# Patient Record
Sex: MALE | Race: WHITE | NOT HISPANIC OR LATINO | Employment: UNEMPLOYED | URBAN - METROPOLITAN AREA
[De-identification: names, ages, dates, MRNs, and addresses within clinical notes are randomized per-mention and may not be internally consistent; named-entity substitution may affect disease eponyms.]

---

## 2018-01-29 ENCOUNTER — APPOINTMENT (OUTPATIENT)
Dept: CT IMAGING | Facility: HOSPITAL | Age: 38
End: 2018-01-29

## 2018-01-29 ENCOUNTER — HOSPITAL ENCOUNTER (EMERGENCY)
Facility: HOSPITAL | Age: 38
Discharge: PSYCHIATRIC OR SUBSTANCE USE FACILITY | End: 2018-01-29
Attending: EMERGENCY MEDICINE | Admitting: EMERGENCY MEDICINE

## 2018-01-29 VITALS
WEIGHT: 132 LBS | BODY MASS INDEX: 20.72 KG/M2 | HEART RATE: 106 BPM | TEMPERATURE: 97.7 F | RESPIRATION RATE: 18 BRPM | HEIGHT: 67 IN | OXYGEN SATURATION: 99 % | DIASTOLIC BLOOD PRESSURE: 85 MMHG | SYSTOLIC BLOOD PRESSURE: 114 MMHG

## 2018-01-29 DIAGNOSIS — M54.6 ACUTE BILATERAL THORACIC BACK PAIN: Primary | ICD-10-CM

## 2018-01-29 DIAGNOSIS — R45.851 SUICIDAL IDEATIONS: ICD-10-CM

## 2018-01-29 DIAGNOSIS — S16.1XXA STRAIN OF NECK MUSCLE, INITIAL ENCOUNTER: ICD-10-CM

## 2018-01-29 PROCEDURE — 72125 CT NECK SPINE W/O DYE: CPT

## 2018-01-29 PROCEDURE — 99284 EMERGENCY DEPT VISIT MOD MDM: CPT

## 2018-01-29 PROCEDURE — 72128 CT CHEST SPINE W/O DYE: CPT

## 2018-01-29 RX ORDER — AZITHROMYCIN 250 MG/1
500 TABLET, FILM COATED ORAL ONCE
Status: COMPLETED | OUTPATIENT
Start: 2018-01-29 | End: 2018-01-29

## 2018-01-29 RX ORDER — PREGABALIN 100 MG/1
300 CAPSULE ORAL 2 TIMES DAILY
COMMUNITY
End: 2019-11-29

## 2018-01-29 RX ORDER — AZITHROMYCIN 250 MG/1
250 TABLET, FILM COATED ORAL DAILY
Qty: 4 TABLET | Refills: 0 | Status: SHIPPED | OUTPATIENT
Start: 2018-01-29 | End: 2019-11-29

## 2018-01-29 RX ORDER — HYDROCODONE BITARTRATE AND ACETAMINOPHEN 5; 325 MG/1; MG/1
1 TABLET ORAL ONCE
Status: COMPLETED | OUTPATIENT
Start: 2018-01-29 | End: 2018-01-29

## 2018-01-29 RX ORDER — EMTRICITABINE AND TENOFOVIR DISOPROXIL FUMARATE 200; 300 MG/1; MG/1
1 TABLET, FILM COATED ORAL DAILY
COMMUNITY
End: 2019-11-29

## 2018-01-29 RX ADMIN — AZITHROMYCIN 500 MG: 250 TABLET, FILM COATED ORAL at 23:23

## 2018-01-29 RX ADMIN — HYDROCODONE BITARTRATE AND ACETAMINOPHEN 1 TABLET: 5; 325 TABLET ORAL at 18:29

## 2018-04-22 ENCOUNTER — LAB REQUISITION (OUTPATIENT)
Dept: LAB | Facility: HOSPITAL | Age: 38
End: 2018-04-22

## 2018-04-22 DIAGNOSIS — Z00.00 ROUTINE GENERAL MEDICAL EXAMINATION AT A HEALTH CARE FACILITY: ICD-10-CM

## 2018-04-22 LAB
ALBUMIN SERPL-MCNC: 5 G/DL (ref 3.2–4.8)
ALBUMIN SERPL-MCNC: 5 G/DL (ref 3.2–4.8)
ALBUMIN/GLOB SERPL: 1.9 G/DL (ref 1.5–2.5)
ALP SERPL-CCNC: 106 U/L (ref 25–100)
ALP SERPL-CCNC: 106 U/L (ref 25–100)
ALT SERPL W P-5'-P-CCNC: 19 U/L (ref 7–40)
ALT SERPL W P-5'-P-CCNC: 19 U/L (ref 7–40)
ANION GAP SERPL CALCULATED.3IONS-SCNC: 13 MMOL/L (ref 3–11)
ARTICHOKE IGE QN: 112 MG/DL (ref 0–130)
AST SERPL-CCNC: 25 U/L (ref 0–33)
AST SERPL-CCNC: 25 U/L (ref 0–33)
BASOPHILS # BLD AUTO: 0.03 10*3/MM3 (ref 0–0.2)
BASOPHILS NFR BLD AUTO: 0.2 % (ref 0–1)
BILIRUB CONJ SERPL-MCNC: 0.4 MG/DL (ref 0–0.2)
BILIRUB INDIRECT SERPL-MCNC: 0.7 MG/DL (ref 0.1–1.1)
BILIRUB SERPL-MCNC: 1.1 MG/DL (ref 0.3–1.2)
BILIRUB SERPL-MCNC: 1.1 MG/DL (ref 0.3–1.2)
BUN BLD-MCNC: 12 MG/DL (ref 9–23)
BUN/CREAT SERPL: 13.3 (ref 7–25)
CALCIUM SPEC-SCNC: 9.7 MG/DL (ref 8.7–10.4)
CHLORIDE SERPL-SCNC: 100 MMOL/L (ref 99–109)
CHOLEST SERPL-MCNC: 164 MG/DL (ref 0–200)
CO2 SERPL-SCNC: 24 MMOL/L (ref 20–31)
CREAT BLD-MCNC: 0.9 MG/DL (ref 0.6–1.3)
DEPRECATED RDW RBC AUTO: 45.6 FL (ref 37–54)
EOSINOPHIL # BLD AUTO: 0.06 10*3/MM3 (ref 0–0.3)
EOSINOPHIL NFR BLD AUTO: 0.5 % (ref 0–3)
ERYTHROCYTE [DISTWIDTH] IN BLOOD BY AUTOMATED COUNT: 14.3 % (ref 11.3–14.5)
GFR SERPL CREATININE-BSD FRML MDRD: 94 ML/MIN/1.73
GLOBULIN UR ELPH-MCNC: 2.6 GM/DL
GLUCOSE BLD-MCNC: 94 MG/DL (ref 70–100)
HCT VFR BLD AUTO: 48.8 % (ref 38.9–50.9)
HDLC SERPL-MCNC: 41 MG/DL (ref 40–60)
HGB BLD-MCNC: 16.7 G/DL (ref 13.1–17.5)
IMM GRANULOCYTES # BLD: 0.04 10*3/MM3 (ref 0–0.03)
IMM GRANULOCYTES NFR BLD: 0.3 % (ref 0–0.6)
LYMPHOCYTES # BLD AUTO: 3.34 10*3/MM3 (ref 0.6–4.8)
LYMPHOCYTES NFR BLD AUTO: 27.8 % (ref 24–44)
MCH RBC QN AUTO: 29.7 PG (ref 27–31)
MCHC RBC AUTO-ENTMCNC: 34.2 G/DL (ref 32–36)
MCV RBC AUTO: 86.8 FL (ref 80–99)
MONOCYTES # BLD AUTO: 1.07 10*3/MM3 (ref 0–1)
MONOCYTES NFR BLD AUTO: 8.9 % (ref 0–12)
NEUTROPHILS # BLD AUTO: 7.53 10*3/MM3 (ref 1.5–8.3)
NEUTROPHILS NFR BLD AUTO: 62.6 % (ref 41–71)
PLATELET # BLD AUTO: 262 10*3/MM3 (ref 150–450)
PMV BLD AUTO: 9.6 FL (ref 6–12)
POTASSIUM BLD-SCNC: 4.1 MMOL/L (ref 3.5–5.5)
PROT SERPL-MCNC: 7.6 G/DL (ref 5.7–8.2)
PROT SERPL-MCNC: 7.6 G/DL (ref 5.7–8.2)
RBC # BLD AUTO: 5.62 10*6/MM3 (ref 4.2–5.76)
SODIUM BLD-SCNC: 137 MMOL/L (ref 132–146)
TRIGL SERPL-MCNC: 81 MG/DL (ref 0–150)
WBC NRBC COR # BLD: 12.03 10*3/MM3 (ref 3.5–10.8)

## 2018-04-22 PROCEDURE — 80076 HEPATIC FUNCTION PANEL: CPT

## 2018-04-22 PROCEDURE — 80061 LIPID PANEL: CPT

## 2018-04-22 PROCEDURE — 80053 COMPREHEN METABOLIC PANEL: CPT

## 2018-04-22 PROCEDURE — 85025 COMPLETE CBC W/AUTO DIFF WBC: CPT

## 2019-06-29 ENCOUNTER — HOSPITAL ENCOUNTER (EMERGENCY)
Facility: HOSPITAL | Age: 39
Discharge: HOME | End: 2019-06-29
Attending: EMERGENCY MEDICINE | Admitting: EMERGENCY MEDICINE

## 2019-06-29 VITALS
OXYGEN SATURATION: 98 % | WEIGHT: 133 LBS | TEMPERATURE: 98.5 F | DIASTOLIC BLOOD PRESSURE: 68 MMHG | RESPIRATION RATE: 15 BRPM | HEART RATE: 102 BPM | BODY MASS INDEX: 20.16 KG/M2 | SYSTOLIC BLOOD PRESSURE: 90 MMHG | HEIGHT: 68 IN

## 2019-06-29 DIAGNOSIS — E87.6 HYPOKALEMIA: ICD-10-CM

## 2019-06-29 DIAGNOSIS — F15.10 METHAMPHETAMINE ABUSE (HCC): Primary | ICD-10-CM

## 2019-06-29 LAB
ALBUMIN SERPL-MCNC: 4.4 G/DL (ref 3.5–5.2)
ALBUMIN/GLOB SERPL: 1.8 G/DL
ALP SERPL-CCNC: 85 U/L (ref 39–117)
ALT SERPL W P-5'-P-CCNC: 20 U/L (ref 1–41)
AMPHET+METHAMPHET UR QL: POSITIVE
AMPHETAMINES UR QL: POSITIVE
ANION GAP SERPL CALCULATED.3IONS-SCNC: 15 MMOL/L (ref 5–15)
APAP SERPL-MCNC: <5 MCG/ML (ref 10–30)
AST SERPL-CCNC: 36 U/L (ref 1–40)
BARBITURATES UR QL SCN: NEGATIVE
BASOPHILS # BLD AUTO: 0.04 10*3/MM3 (ref 0–0.2)
BASOPHILS NFR BLD AUTO: 0.4 % (ref 0–1.5)
BENZODIAZ UR QL SCN: POSITIVE
BILIRUB SERPL-MCNC: 0.9 MG/DL (ref 0.2–1.2)
BUN BLD-MCNC: 15 MG/DL (ref 6–20)
BUN/CREAT SERPL: 16.3 (ref 7–25)
BUPRENORPHINE SERPL-MCNC: NEGATIVE NG/ML
CALCIUM SPEC-SCNC: 9.6 MG/DL (ref 8.6–10.5)
CANNABINOIDS SERPL QL: NEGATIVE
CHLORIDE SERPL-SCNC: 104 MMOL/L (ref 98–107)
CO2 SERPL-SCNC: 22 MMOL/L (ref 22–29)
COCAINE UR QL: NEGATIVE
CREAT BLD-MCNC: 0.92 MG/DL (ref 0.76–1.27)
DEPRECATED RDW RBC AUTO: 43 FL (ref 37–54)
EOSINOPHIL # BLD AUTO: 0.06 10*3/MM3 (ref 0–0.4)
EOSINOPHIL NFR BLD AUTO: 0.7 % (ref 0.3–6.2)
ERYTHROCYTE [DISTWIDTH] IN BLOOD BY AUTOMATED COUNT: 13 % (ref 12.3–15.4)
ETHANOL BLD-MCNC: <10 MG/DL (ref 0–10)
GFR SERPL CREATININE-BSD FRML MDRD: 92 ML/MIN/1.73
GLOBULIN UR ELPH-MCNC: 2.5 GM/DL
GLUCOSE BLD-MCNC: 150 MG/DL (ref 65–99)
HCT VFR BLD AUTO: 40.5 % (ref 37.5–51)
HGB BLD-MCNC: 13.9 G/DL (ref 13–17.7)
HOLD SPECIMEN: NORMAL
HOLD SPECIMEN: NORMAL
IMM GRANULOCYTES # BLD AUTO: 0.02 10*3/MM3 (ref 0–0.05)
IMM GRANULOCYTES NFR BLD AUTO: 0.2 % (ref 0–0.5)
LYMPHOCYTES # BLD AUTO: 2.52 10*3/MM3 (ref 0.7–3.1)
LYMPHOCYTES NFR BLD AUTO: 28.1 % (ref 19.6–45.3)
MCH RBC QN AUTO: 30.7 PG (ref 26.6–33)
MCHC RBC AUTO-ENTMCNC: 34.3 G/DL (ref 31.5–35.7)
MCV RBC AUTO: 89.4 FL (ref 79–97)
METHADONE UR QL SCN: NEGATIVE
MONOCYTES # BLD AUTO: 1.08 10*3/MM3 (ref 0.1–0.9)
MONOCYTES NFR BLD AUTO: 12 % (ref 5–12)
NEUTROPHILS # BLD AUTO: 5.25 10*3/MM3 (ref 1.7–7)
NEUTROPHILS NFR BLD AUTO: 58.6 % (ref 42.7–76)
NRBC BLD AUTO-RTO: 0 /100 WBC (ref 0–0.2)
OPIATES UR QL: NEGATIVE
OXYCODONE UR QL SCN: NEGATIVE
PCP UR QL SCN: NEGATIVE
PLATELET # BLD AUTO: 272 10*3/MM3 (ref 140–450)
PMV BLD AUTO: 9 FL (ref 6–12)
POTASSIUM BLD-SCNC: 3 MMOL/L (ref 3.5–5.2)
PROPOXYPH UR QL: NEGATIVE
PROT SERPL-MCNC: 6.9 G/DL (ref 6–8.5)
RBC # BLD AUTO: 4.53 10*6/MM3 (ref 4.14–5.8)
SALICYLATES SERPL-MCNC: <0.3 MG/DL
SODIUM BLD-SCNC: 141 MMOL/L (ref 136–145)
TRICYCLICS UR QL SCN: POSITIVE
WBC NRBC COR # BLD: 8.97 10*3/MM3 (ref 3.4–10.8)
WHOLE BLOOD HOLD SPECIMEN: NORMAL
WHOLE BLOOD HOLD SPECIMEN: NORMAL

## 2019-06-29 PROCEDURE — 80306 DRUG TEST PRSMV INSTRMNT: CPT | Performed by: EMERGENCY MEDICINE

## 2019-06-29 PROCEDURE — 80307 DRUG TEST PRSMV CHEM ANLYZR: CPT | Performed by: EMERGENCY MEDICINE

## 2019-06-29 PROCEDURE — 96360 HYDRATION IV INFUSION INIT: CPT

## 2019-06-29 PROCEDURE — 93005 ELECTROCARDIOGRAM TRACING: CPT

## 2019-06-29 PROCEDURE — 99284 EMERGENCY DEPT VISIT MOD MDM: CPT

## 2019-06-29 PROCEDURE — 80053 COMPREHEN METABOLIC PANEL: CPT | Performed by: EMERGENCY MEDICINE

## 2019-06-29 PROCEDURE — 93005 ELECTROCARDIOGRAM TRACING: CPT | Performed by: EMERGENCY MEDICINE

## 2019-06-29 PROCEDURE — 85025 COMPLETE CBC W/AUTO DIFF WBC: CPT | Performed by: EMERGENCY MEDICINE

## 2019-06-29 RX ORDER — POTASSIUM CHLORIDE 750 MG/1
40 CAPSULE, EXTENDED RELEASE ORAL ONCE
Status: DISCONTINUED | OUTPATIENT
Start: 2019-06-29 | End: 2019-06-29 | Stop reason: HOSPADM

## 2019-06-29 RX ORDER — SODIUM CHLORIDE 0.9 % (FLUSH) 0.9 %
10 SYRINGE (ML) INJECTION AS NEEDED
Status: DISCONTINUED | OUTPATIENT
Start: 2019-06-29 | End: 2019-06-29 | Stop reason: HOSPADM

## 2019-06-29 RX ADMIN — SODIUM CHLORIDE 1000 ML: 9 INJECTION, SOLUTION INTRAVENOUS at 15:23

## 2019-06-29 RX ADMIN — SODIUM CHLORIDE 1000 ML: 9 INJECTION, SOLUTION INTRAVENOUS at 18:30

## 2019-11-29 ENCOUNTER — OFFICE VISIT (OUTPATIENT)
Dept: RETAIL CLINIC | Facility: CLINIC | Age: 39
End: 2019-11-29

## 2019-11-29 VITALS
DIASTOLIC BLOOD PRESSURE: 92 MMHG | BODY MASS INDEX: 21.22 KG/M2 | RESPIRATION RATE: 16 BRPM | HEIGHT: 68 IN | OXYGEN SATURATION: 99 % | WEIGHT: 140 LBS | SYSTOLIC BLOOD PRESSURE: 108 MMHG | HEART RATE: 112 BPM | TEMPERATURE: 98.5 F

## 2019-11-29 DIAGNOSIS — Z72.0 TOBACCO ABUSE: ICD-10-CM

## 2019-11-29 DIAGNOSIS — R05.9 COUGH: ICD-10-CM

## 2019-11-29 DIAGNOSIS — B37.0 ORAL PHARYNGEAL CANDIDIASIS: Primary | ICD-10-CM

## 2019-11-29 PROCEDURE — 99203 OFFICE O/P NEW LOW 30 MIN: CPT | Performed by: NURSE PRACTITIONER

## 2019-11-29 RX ORDER — 1.1% SODIUM FLUORIDE PRESCRIPTION DENTAL CREAM 5 MG/G
CREAM DENTAL
Refills: 3 | COMMUNITY
Start: 2019-10-30 | End: 2020-03-08

## 2019-11-29 RX ORDER — CLONIDINE HYDROCHLORIDE 0.3 MG/1
0.3 TABLET ORAL
COMMUNITY
Start: 2015-07-07 | End: 2019-11-29

## 2019-11-29 RX ORDER — EMTRICITABINE AND TENOFOVIR ALAFENAMIDE 200; 25 MG/1; MG/1
1 TABLET ORAL DAILY
Refills: 5 | COMMUNITY
Start: 2019-11-18 | End: 2022-11-18 | Stop reason: SDUPTHER

## 2019-11-29 RX ORDER — MECLIZINE HYDROCHLORIDE 25 MG/1
25 TABLET ORAL
COMMUNITY
End: 2020-03-08

## 2019-11-29 RX ORDER — TRAZODONE HYDROCHLORIDE 100 MG/1
100 TABLET ORAL NIGHTLY
COMMUNITY
End: 2021-01-03

## 2019-11-29 RX ORDER — GABAPENTIN 800 MG/1
300 TABLET ORAL 2 TIMES DAILY
COMMUNITY
Start: 2015-07-07 | End: 2020-03-08

## 2019-11-29 RX ORDER — VALACYCLOVIR HYDROCHLORIDE 1 G/1
1000 TABLET, FILM COATED ORAL
COMMUNITY
End: 2020-03-08

## 2019-11-29 RX ORDER — INFLUENZA A VIRUS A/SINGAPORE/GP1908/2015 IVR-180 (H1N1) ANTIGEN (MDCK CELL DERIVED, PROPIOLACTONE INACTIVATED), INFLUENZA A VIRUS A/NORTH CAROLINA/04/2016 (H3N2) HEMAGGLUTININ ANTIGEN (MDCK CELL DERIVED, PROPIOLACTONE INACTIVATED), INFLUENZA B VIRUS B/IOWA/06/2017 HEMAGGLUTININ ANTIGEN (MDCK CELL DERIVED, PROPIOLACTONE INACTIVATED), INFLUENZA B VIRUS B/SINGAPORE/INFTT-16-0610/2016 HEMAGGLUTININ ANTIGEN (MDCK CELL DERIVED, PROPIOLACTONE INACTIVATED) 15; 15; 15; 15 UG/.5ML; UG/.5ML; UG/.5ML; UG/.5ML
INJECTION, SUSPENSION INTRAMUSCULAR
Refills: 0 | COMMUNITY
Start: 2019-10-12 | End: 2020-03-08

## 2019-11-29 RX ORDER — NICOTINE 21 MG/24HR
1 PATCH, TRANSDERMAL 24 HOURS TRANSDERMAL EVERY 24 HOURS
Qty: 7 PATCH | Refills: 0 | Status: SHIPPED | OUTPATIENT
Start: 2019-11-29 | End: 2019-12-06

## 2019-11-29 RX ORDER — ALBUTEROL SULFATE 90 UG/1
2 AEROSOL, METERED RESPIRATORY (INHALATION) EVERY 6 HOURS PRN
Status: ON HOLD | COMMUNITY
Start: 2019-02-06 | End: 2021-06-22

## 2019-11-29 RX ORDER — BUPRENORPHINE HYDROCHLORIDE AND NALOXONE HYDROCHLORIDE DIHYDRATE 8; 2 MG/1; MG/1
1 TABLET SUBLINGUAL DAILY
Refills: 0 | Status: ON HOLD | COMMUNITY
Start: 2019-11-13 | End: 2021-06-22

## 2019-11-29 RX ORDER — CEFDINIR 300 MG/1
300 CAPSULE ORAL 2 TIMES DAILY
Refills: 0 | COMMUNITY
Start: 2019-11-15 | End: 2019-11-29

## 2019-11-29 RX ORDER — BENZONATATE 100 MG/1
100 CAPSULE ORAL 3 TIMES DAILY PRN
Qty: 21 CAPSULE | Refills: 0 | Status: SHIPPED | OUTPATIENT
Start: 2019-11-29 | End: 2019-12-06

## 2019-11-29 NOTE — PROGRESS NOTES
"Subjective   Barak Rivera is a 39 y.o. male.   Chief Complaint   Patient presents with   • URI      40 yo w/m presents with complaint of \"cold\" symptoms duration of 2 weeks.  He reports a cough and drainage.  He denies taking medication for symptoms today.  Refer to HPI/ROS for additional information.    URI    This is a new problem. The current episode started 1 to 4 weeks ago. The problem has been waxing and waning. There has been no fever. Associated symptoms include congestion and coughing. Pertinent negatives include no wheezing. He has tried nothing for the symptoms.        The following portions of the patient's history were reviewed and updated as appropriate: allergies, current medications, past family history, past medical history, past social history, past surgical history and problem list.    Current Outpatient Medications:   •  albuterol sulfate  (90 Base) MCG/ACT inhaler, Inhale 2 puffs Every 6 (Six) Hours As Needed., Disp: , Rfl:   •  BREO ELLIPTA 200-25 MCG/INH inhaler, Inhale 1 puff Daily., Disp: , Rfl: 1  •  buprenorphine-naloxone (SUBOXONE) 8-2 MG per SL tablet, , Disp: , Rfl: 0  •  DESCOVY 200-25 MG per tablet, Take 1 tablet by mouth Daily., Disp: , Rfl: 5  •  FLUCELVAX QUADRIVALENT 0.5 ML suspension prefilled syringe injection, inject 0.5 milliliters intramuscularly, Disp: , Rfl: 0  •  gabapentin (NEURONTIN) 800 MG tablet, Take 300 mg by mouth 2 (Two) Times a Day., Disp: , Rfl:   •  meclizine (ANTIVERT) 25 MG tablet, Take 25 mg by mouth., Disp: , Rfl:   •  Plecanatide 3 MG tablet, Take 3 mg by mouth., Disp: , Rfl:   •  traZODone (DESYREL) 100 MG tablet, Take 100 mg by mouth Every Night., Disp: , Rfl:   •  valACYclovir (VALTREX) 1000 MG tablet, Take 1,000 mg by mouth., Disp: , Rfl:   •  benzonatate (TESSALON PERLES) 100 MG capsule, Take 1 capsule by mouth 3 (Three) Times a Day As Needed for Cough for up to 7 days., Disp: 21 capsule, Rfl: 0  •  nicotine (NICODERM CQ) 14 MG/24HR patch, " "Place 1 patch on the skin as directed by provider Daily for 7 days., Disp: 7 patch, Rfl: 0  •  nystatin (MYCOSTATIN) 918714 UNIT/ML suspension, Swish and swallow 5 mL 4 (Four) Times a Day for 7 days., Disp: 140 mL, Rfl: 0  •  SF 5000 PLUS 1.1 % cream, APPLY TOPICALLY TWICE DAILY OR AS DIRECTED ON TUBE., Disp: , Rfl: 3    Review of Systems   Constitutional: Negative.    HENT: Positive for congestion.    Eyes: Negative.    Respiratory: Positive for cough. Negative for apnea, choking, chest tightness, shortness of breath, wheezing and stridor.    Cardiovascular: Negative.    Gastrointestinal: Negative.    Skin: Negative.    Psychiatric/Behavioral: Negative.      /92 (BP Location: Left arm, Patient Position: Sitting)   Pulse 112   Temp 98.5 °F (36.9 °C) (Oral)   Resp 16   Ht 172.7 cm (68\")   Wt 63.5 kg (140 lb)   SpO2 99%   BMI 21.29 kg/m²     Objective   Allergies   Allergen Reactions   • Phenobarbital Nausea And Vomiting       Physical Exam   Constitutional: He is oriented to person, place, and time. He appears well-developed and well-nourished. No distress.   HENT:   Head: Normocephalic.   Right Ear: External ear normal.   Left Ear: External ear normal.   Nose: Nose normal. No mucosal edema, rhinorrhea or sinus tenderness. Right sinus exhibits no maxillary sinus tenderness and no frontal sinus tenderness. Left sinus exhibits no maxillary sinus tenderness and no frontal sinus tenderness.   Mouth/Throat: Uvula is midline and mucous membranes are normal. Posterior oropharyngeal erythema present. Tonsils are 0 on the right. Tonsils are 0 on the left. No tonsillar exudate.   Posterior oropharynx is erythematous with white cottage cheese like substance noted.     Eyes: Conjunctivae are normal.   Neck: Normal range of motion. Neck supple. No JVD present. No tracheal deviation present. No thyromegaly present.   Cardiovascular: Regular rhythm and normal heart sounds. Tachycardia present.   Pulmonary/Chest: Effort " normal and breath sounds normal. No stridor. No respiratory distress. He has no wheezes. He has no rales. He exhibits no tenderness.   Lymphadenopathy:     He has no cervical adenopathy.   Neurological: He is alert and oriented to person, place, and time.   Skin: Skin is warm. Capillary refill takes less than 2 seconds. He is not diaphoretic.   Psychiatric: His speech is normal. Thought content normal. He is agitated (argumentative).   Pt became argumentative when he was informed of inability to prescribe his controlled and psychiatric medications at this clinic. He demands medication for cough, 14 mg nicotine patches.   Vitals reviewed.      Assessment/Plan   Barak was seen today for uri.    Diagnoses and all orders for this visit:    Oral pharyngeal candidiasis    Tobacco abuse    Cough    Other orders  -     Discontinue: nystatin (MYCOSTATIN) 949383 UNIT/ML suspension; Swish and swallow 5 mL 4 (Four) Times a Day.  -     nystatin (MYCOSTATIN) 685468 UNIT/ML suspension; Swish and swallow 5 mL 4 (Four) Times a Day for 7 days.  -     nicotine (NICODERM CQ) 14 MG/24HR patch; Place 1 patch on the skin as directed by provider Daily for 7 days.  -     benzonatate (TESSALON PERLES) 100 MG capsule; Take 1 capsule by mouth 3 (Three) Times a Day As Needed for Cough for up to 7 days.        Pt became agitated and started pacing around room, saying he was in a hurry.    AVS is completed with work note which he picked up at .       November 29, 2019 7:47 PM

## 2019-11-29 NOTE — PATIENT INSTRUCTIONS
Cough, Adult    A cough helps to clear your throat and lungs. A cough may last only 2-3 weeks (acute), or it may last longer than 8 weeks (chronic). Many different things can cause a cough. A cough may be a sign of an illness or another medical condition.  Follow these instructions at home:  · Pay attention to any changes in your cough.  · Take medicines only as told by your doctor.  ? If you were prescribed an antibiotic medicine, take it as told by your doctor. Do not stop taking it even if you start to feel better.  ? Talk with your doctor before you try using a cough medicine.  · Drink enough fluid to keep your pee (urine) clear or pale yellow.  · If the air is dry, use a cold steam vaporizer or humidifier in your home.  · Stay away from things that make you cough at work or at home.  · If your cough is worse at night, try using extra pillows to raise your head up higher while you sleep.  · Do not smoke, and try not to be around smoke. If you need help quitting, ask your doctor.  · Do not have caffeine.  · Do not drink alcohol.  · Rest as needed.  Contact a doctor if:  · You have new problems (symptoms).  · You cough up yellow fluid (pus).  · Your cough does not get better after 2-3 weeks, or your cough gets worse.  · Medicine does not help your cough and you are not sleeping well.  · You have pain that gets worse or pain that is not helped with medicine.  · You have a fever.  · You are losing weight and you do not know why.  · You have night sweats.  Get help right away if:  · You cough up blood.  · You have trouble breathing.  · Your heartbeat is very fast.  This information is not intended to replace advice given to you by your health care provider. Make sure you discuss any questions you have with your health care provider.  Document Released: 08/30/2012 Document Revised: 05/25/2017 Document Reviewed: 02/24/2016  [employer] Interactive Patient Education © 2019 [employer]  Smoking Tobacco Information,  Adult  Smoking tobacco can be harmful to your health. Tobacco contains a poisonous (toxic), colorless chemical called nicotine. Nicotine is addictive. It changes the brain and can make it hard to stop smoking. Tobacco also has other toxic chemicals that can hurt your body and raise your risk of many cancers.  How can smoking tobacco affect me?  Smoking tobacco puts you at risk for:  · Cancer. Smoking is most commonly associated with lung cancer, but can also lead to cancer in other parts of the body.  · Chronic obstructive pulmonary disease (COPD). This is a long-term lung condition that makes it hard to breathe. It also gets worse over time.  · High blood pressure (hypertension), heart disease, stroke, or heart attack.  · Lung infections, such as pneumonia.  · Cataracts. This is when the lenses in the eyes become clouded.  · Digestive problems. This may include peptic ulcers, heartburn, and gastroesophageal reflux disease (GERD).  · Oral health problems, such as gum disease and tooth loss.  · Loss of taste and smell.  Smoking can affect your appearance by causing:  · Wrinkles.  · Yellow or stained teeth, fingers, and fingernails.  Smoking tobacco can also affect your social life, because:  · It may be challenging to find places to smoke when away from home. Many workplaces, restaurants, hotels, and public places are tobacco-free.  · Smoking is expensive. This is due to the cost of tobacco and the long-term costs of treating health problems from smoking.  · Secondhand smoke may affect those around you. Secondhand smoke can cause lung cancer, breathing problems, and heart disease. Children of smokers have a higher risk for:  ? Sudden infant death syndrome (SIDS).  ? Ear infections.  ? Lung infections.  If you currently smoke tobacco, quitting now can help you:  · Lead a longer and healthier life.  · Look, smell, breathe, and feel better over time.  · Save money.  · Protect others from the harms of secondhand  smoke.  What actions can I take to prevent health problems?  Quit smoking    · Do not start smoking. Quit if you already do.  · Make a plan to quit smoking and commit to it. Look for programs to help you and ask your health care provider for recommendations and ideas.  · Set a date and write down all the reasons you want to quit.  · Let your friends and family know you are quitting so they can help and support you. Consider finding friends who also want to quit. It can be easier to quit with someone else, so that you can support each other.  · Talk with your health care provider about using nicotine replacement medicines to help you quit, such as gum, lozenges, patches, sprays, or pills.  · Do not replace cigarette smoking with electronic cigarettes, which are commonly called e-cigarettes. The safety of e-cigarettes is not known, and some may contain harmful chemicals.  · If you try to quit but return to smoking, stay positive. It is common to slip up when you first quit, so take it one day at a time.  · Be prepared for cravings. When you feel the urge to smoke, chew gum or suck on hard candy.  Lifestyle  · Stay busy and take care of your body.  · Drink enough fluid to keep your urine pale yellow.  · Get plenty of exercise and eat a healthy diet. This can help prevent weight gain after quitting.  · Monitor your eating habits. Quitting smoking can cause you to have a larger appetite than when you smoke.  · Find ways to relax. Go out with friends or family to a movie or a restaurant where people do not smoke.  · Ask your health care provider about having regular tests (screenings) to check for cancer. This may include blood tests, imaging tests, and other tests.  · Find ways to manage your stress, such as meditation, yoga, or exercise.  Where to find support  To get support to quit smoking, consider:  · Asking your health care provider for more information and resources.  · Taking classes to learn more about quitting  smoking.  · Looking for local organizations that offer resources about quitting smoking.  · Joining a support group for people who want to quit smoking in your local community.  · Calling the smokefree.gov counselor helpline: 1-800-Quit-Now (1-608.424.6420)  Where to find more information  You may find more information about quitting smoking from:  · HelpGuide.org: www.helpSkimo TVide.org  · Smokefree.gov: smokefree.gov  · American Lung Association: www.lung.org  Contact a health care provider if you:  · Have problems breathing.  · Notice that your lips, nose, or fingers turn blue.  · Have chest pain.  · Are coughing up blood.  · Feel faint or you pass out.  · Have other health changes that cause you to worry.  Summary  · Smoking tobacco can negatively affect your health, the health of those around you, your finances, and your social life.  · Do not start smoking. Quit if you already do. If you need help quitting, ask your health care provider.  · Think about joining a support group for people who want to quit smoking in your local community. There are many effective programs that will help you to quit this behavior.  This information is not intended to replace advice given to you by your health care provider. Make sure you discuss any questions you have with your health care provider.  Document Released: 01/02/2018 Document Revised: 02/06/2019 Document Reviewed: 01/02/2018  [employer] Interactive Patient Education © 2019 [employer]  Oral Thrush, Adult    Oral thrush is an infection in your mouth and throat. It causes white patches on your tongue and in your mouth.  Follow these instructions at home:  Helping with soreness    · To lessen your pain:  ? Drink cold liquids, like water and iced tea.  ? Eat frozen ice pops or frozen juices.  ? Eat foods that are easy to swallow, like gelatin and ice cream.  ? Drink from a straw if the patches in your mouth are painful.  General instructions  · Take or use over-the-counter and  prescription medicines only as told by your doctor. Medicine for oral thrush may be something to swallow, or it may be something to put on the infected area.  · Eat plain yogurt that has live cultures in it. Read the label to make sure.  · If you wear dentures:  ? Take out your dentures before you go to bed.  ? Brush them well.  ? Soak them in a denture .  · Rinse your mouth with warm salt-water many times a day. To make the salt-water mixture, completely dissolve 1/2-1 teaspoon of salt in 1 cup of warm water.  Contact a doctor if:  · Your problems are getting worse.  · Your problems do not get better in less than 7 days with treatment.  · Your infection is spreading. This may show as white patches on the skin outside of your mouth.  · You are nursing your baby and you have redness and pain in the nipples.  This information is not intended to replace advice given to you by your health care provider. Make sure you discuss any questions you have with your health care provider.  Document Released: 03/14/2011 Document Revised: 09/11/2017 Document Reviewed: 09/11/2017  [employer] Interactive Patient Education © 2019 [employer]

## 2020-03-08 ENCOUNTER — HOSPITAL ENCOUNTER (INPATIENT)
Facility: HOSPITAL | Age: 40
LOS: 1 days | Discharge: HOME | End: 2020-03-09
Attending: EMERGENCY MEDICINE | Admitting: INTERNAL MEDICINE

## 2020-03-08 ENCOUNTER — APPOINTMENT (OUTPATIENT)
Dept: GENERAL RADIOLOGY | Facility: HOSPITAL | Age: 40
End: 2020-03-08

## 2020-03-08 DIAGNOSIS — F23 ACUTE PSYCHOSIS (HCC): Primary | ICD-10-CM

## 2020-03-08 DIAGNOSIS — F19.959: ICD-10-CM

## 2020-03-08 PROBLEM — F41.9 ANXIETY: Status: ACTIVE | Noted: 2020-03-08

## 2020-03-08 PROBLEM — Z72.0 TOBACCO ABUSE: Status: ACTIVE | Noted: 2020-03-08

## 2020-03-08 PROBLEM — T18.5XXA FOREIGN BODY ANUS/RECTUM, INITIAL ENCOUNTER: Status: ACTIVE | Noted: 2020-03-08

## 2020-03-08 PROBLEM — F22 PARANOIA: Status: ACTIVE | Noted: 2020-03-08

## 2020-03-08 PROBLEM — B19.10 HEPATITIS B: Status: ACTIVE | Noted: 2020-03-08

## 2020-03-08 PROBLEM — R45.851 SUICIDAL IDEATIONS: Status: ACTIVE | Noted: 2020-03-08

## 2020-03-08 PROBLEM — F19.10 POLYSUBSTANCE ABUSE (HCC): Status: ACTIVE | Noted: 2020-03-08

## 2020-03-08 PROBLEM — B19.20 HEPATITIS C: Status: ACTIVE | Noted: 2020-03-08

## 2020-03-08 PROBLEM — F31.9 BIPOLAR DISORDER: Status: ACTIVE | Noted: 2020-03-08

## 2020-03-08 LAB
ALBUMIN SERPL-MCNC: 4.9 G/DL (ref 3.5–5.2)
ALBUMIN/GLOB SERPL: 1.9 G/DL
ALP SERPL-CCNC: 58 U/L (ref 39–117)
ALT SERPL W P-5'-P-CCNC: 24 U/L (ref 1–41)
AMPHET+METHAMPHET UR QL: POSITIVE
AMPHETAMINES UR QL: POSITIVE
ANION GAP SERPL CALCULATED.3IONS-SCNC: 16 MMOL/L (ref 5–15)
AST SERPL-CCNC: 31 U/L (ref 1–40)
BARBITURATES UR QL SCN: NEGATIVE
BASOPHILS # BLD AUTO: 0.03 10*3/MM3 (ref 0–0.2)
BASOPHILS NFR BLD AUTO: 0.2 % (ref 0–1.5)
BENZODIAZ UR QL SCN: POSITIVE
BILIRUB SERPL-MCNC: 0.6 MG/DL (ref 0.2–1.2)
BILIRUB UR QL STRIP: NEGATIVE
BUN BLD-MCNC: 16 MG/DL (ref 6–20)
BUN/CREAT SERPL: 20.3 (ref 7–25)
BUPRENORPHINE SERPL-MCNC: POSITIVE NG/ML
CALCIUM SPEC-SCNC: 9.7 MG/DL (ref 8.6–10.5)
CANNABINOIDS SERPL QL: NEGATIVE
CHLORIDE SERPL-SCNC: 99 MMOL/L (ref 98–107)
CK SERPL-CCNC: 484 U/L (ref 20–200)
CLARITY UR: CLEAR
CO2 SERPL-SCNC: 23 MMOL/L (ref 22–29)
COCAINE UR QL: NEGATIVE
COLOR UR: YELLOW
CREAT BLD-MCNC: 0.79 MG/DL (ref 0.76–1.27)
D DIMER PPP FEU-MCNC: <0.27 MCGFEU/ML (ref 0–0.56)
DEPRECATED RDW RBC AUTO: 43.7 FL (ref 37–54)
EOSINOPHIL # BLD AUTO: 0 10*3/MM3 (ref 0–0.4)
EOSINOPHIL NFR BLD AUTO: 0 % (ref 0.3–6.2)
ERYTHROCYTE [DISTWIDTH] IN BLOOD BY AUTOMATED COUNT: 12.8 % (ref 12.3–15.4)
GFR SERPL CREATININE-BSD FRML MDRD: 109 ML/MIN/1.73
GLOBULIN UR ELPH-MCNC: 2.6 GM/DL
GLUCOSE BLD-MCNC: 100 MG/DL (ref 65–99)
GLUCOSE UR STRIP-MCNC: NEGATIVE MG/DL
HCT VFR BLD AUTO: 40.9 % (ref 37.5–51)
HGB BLD-MCNC: 14.2 G/DL (ref 13–17.7)
HGB UR QL STRIP.AUTO: NEGATIVE
HIV1+2 AB SER QL: NORMAL
HOLD SPECIMEN: NORMAL
HOLD SPECIMEN: NORMAL
IMM GRANULOCYTES # BLD AUTO: 0.08 10*3/MM3 (ref 0–0.05)
IMM GRANULOCYTES NFR BLD AUTO: 0.5 % (ref 0–0.5)
KETONES UR QL STRIP: ABNORMAL
LEUKOCYTE ESTERASE UR QL STRIP.AUTO: NEGATIVE
LIPASE SERPL-CCNC: 6 U/L (ref 13–60)
LYMPHOCYTES # BLD AUTO: 1.38 10*3/MM3 (ref 0.7–3.1)
LYMPHOCYTES NFR BLD AUTO: 8.2 % (ref 19.6–45.3)
MCH RBC QN AUTO: 32.2 PG (ref 26.6–33)
MCHC RBC AUTO-ENTMCNC: 34.7 G/DL (ref 31.5–35.7)
MCV RBC AUTO: 92.7 FL (ref 79–97)
METHADONE UR QL SCN: NEGATIVE
MONOCYTES # BLD AUTO: 1.04 10*3/MM3 (ref 0.1–0.9)
MONOCYTES NFR BLD AUTO: 6.2 % (ref 5–12)
NEUTROPHILS # BLD AUTO: 14.37 10*3/MM3 (ref 1.7–7)
NEUTROPHILS NFR BLD AUTO: 84.9 % (ref 42.7–76)
NITRITE UR QL STRIP: NEGATIVE
NRBC BLD AUTO-RTO: 0 /100 WBC (ref 0–0.2)
NT-PROBNP SERPL-MCNC: 588 PG/ML (ref 5–450)
OPIATES UR QL: NEGATIVE
OXYCODONE UR QL SCN: NEGATIVE
PCP UR QL SCN: NEGATIVE
PH UR STRIP.AUTO: 5.5 [PH] (ref 5–8)
PLATELET # BLD AUTO: 233 10*3/MM3 (ref 140–450)
PMV BLD AUTO: 8.5 FL (ref 6–12)
POTASSIUM BLD-SCNC: 3.8 MMOL/L (ref 3.5–5.2)
PROPOXYPH UR QL: NEGATIVE
PROT SERPL-MCNC: 7.5 G/DL (ref 6–8.5)
PROT UR QL STRIP: ABNORMAL
RBC # BLD AUTO: 4.41 10*6/MM3 (ref 4.14–5.8)
SODIUM BLD-SCNC: 138 MMOL/L (ref 136–145)
SP GR UR STRIP: 1.02 (ref 1–1.03)
TRICYCLICS UR QL SCN: POSITIVE
TROPONIN T SERPL-MCNC: <0.01 NG/ML (ref 0–0.03)
UROBILINOGEN UR QL STRIP: ABNORMAL
WBC NRBC COR # BLD: 16.9 10*3/MM3 (ref 3.4–10.8)
WHOLE BLOOD HOLD SPECIMEN: NORMAL
WHOLE BLOOD HOLD SPECIMEN: NORMAL

## 2020-03-08 PROCEDURE — 85025 COMPLETE CBC W/AUTO DIFF WBC: CPT

## 2020-03-08 PROCEDURE — 99291 CRITICAL CARE FIRST HOUR: CPT | Performed by: INTERNAL MEDICINE

## 2020-03-08 PROCEDURE — 93005 ELECTROCARDIOGRAM TRACING: CPT | Performed by: EMERGENCY MEDICINE

## 2020-03-08 PROCEDURE — 83880 ASSAY OF NATRIURETIC PEPTIDE: CPT

## 2020-03-08 PROCEDURE — 81003 URINALYSIS AUTO W/O SCOPE: CPT | Performed by: NURSE PRACTITIONER

## 2020-03-08 PROCEDURE — 80306 DRUG TEST PRSMV INSTRMNT: CPT | Performed by: INTERNAL MEDICINE

## 2020-03-08 PROCEDURE — 25010000002 PIPERACILLIN SOD-TAZOBACTAM PER 1 G: Performed by: NURSE PRACTITIONER

## 2020-03-08 PROCEDURE — 82550 ASSAY OF CK (CPK): CPT | Performed by: NURSE PRACTITIONER

## 2020-03-08 PROCEDURE — 80053 COMPREHEN METABOLIC PANEL: CPT

## 2020-03-08 PROCEDURE — 84484 ASSAY OF TROPONIN QUANT: CPT

## 2020-03-08 PROCEDURE — 25010000002 ZIPRASIDONE MESYLATE PER 10 MG: Performed by: NURSE PRACTITIONER

## 2020-03-08 PROCEDURE — 85379 FIBRIN DEGRADATION QUANT: CPT | Performed by: NURSE PRACTITIONER

## 2020-03-08 PROCEDURE — 83690 ASSAY OF LIPASE: CPT

## 2020-03-08 PROCEDURE — G0432 EIA HIV-1/HIV-2 SCREEN: HCPCS | Performed by: NURSE PRACTITIONER

## 2020-03-08 PROCEDURE — 86592 SYPHILIS TEST NON-TREP QUAL: CPT | Performed by: INTERNAL MEDICINE

## 2020-03-08 PROCEDURE — 71045 X-RAY EXAM CHEST 1 VIEW: CPT

## 2020-03-08 PROCEDURE — 74018 RADEX ABDOMEN 1 VIEW: CPT

## 2020-03-08 PROCEDURE — 99284 EMERGENCY DEPT VISIT MOD MDM: CPT

## 2020-03-08 RX ORDER — QUETIAPINE FUMARATE 100 MG/1
600 TABLET, FILM COATED ORAL NIGHTLY
Status: DISCONTINUED | OUTPATIENT
Start: 2020-03-08 | End: 2020-03-09 | Stop reason: HOSPADM

## 2020-03-08 RX ORDER — TRAZODONE HYDROCHLORIDE 50 MG/1
50 TABLET ORAL NIGHTLY
Status: DISCONTINUED | OUTPATIENT
Start: 2020-03-08 | End: 2020-03-09 | Stop reason: SDUPTHER

## 2020-03-08 RX ORDER — ESCITALOPRAM OXALATE 20 MG/1
20 TABLET ORAL DAILY
Status: ON HOLD | COMMUNITY
End: 2021-06-22

## 2020-03-08 RX ORDER — ESCITALOPRAM OXALATE 20 MG/1
20 TABLET ORAL DAILY
Status: DISCONTINUED | OUTPATIENT
Start: 2020-03-09 | End: 2020-03-09 | Stop reason: HOSPADM

## 2020-03-08 RX ORDER — MAGNESIUM CARB/ALUMINUM HYDROX 105-160MG
296 TABLET,CHEWABLE ORAL ONCE
Status: DISCONTINUED | OUTPATIENT
Start: 2020-03-08 | End: 2020-03-08

## 2020-03-08 RX ORDER — ZIPRASIDONE MESYLATE 20 MG/ML
10 INJECTION, POWDER, LYOPHILIZED, FOR SOLUTION INTRAMUSCULAR ONCE
Status: COMPLETED | OUTPATIENT
Start: 2020-03-08 | End: 2020-03-08

## 2020-03-08 RX ORDER — ASPIRIN 81 MG/1
324 TABLET, CHEWABLE ORAL ONCE
Status: DISCONTINUED | OUTPATIENT
Start: 2020-03-08 | End: 2020-03-08

## 2020-03-08 RX ORDER — CLONIDINE HYDROCHLORIDE 0.3 MG/1
0.3 TABLET ORAL NIGHTLY
COMMUNITY
End: 2022-12-13 | Stop reason: SDUPTHER

## 2020-03-08 RX ORDER — ONDANSETRON 2 MG/ML
4 INJECTION INTRAMUSCULAR; INTRAVENOUS EVERY 6 HOURS PRN
Status: DISCONTINUED | OUTPATIENT
Start: 2020-03-08 | End: 2020-03-09 | Stop reason: HOSPADM

## 2020-03-08 RX ORDER — ALBUTEROL SULFATE 2.5 MG/3ML
2.5 SOLUTION RESPIRATORY (INHALATION) EVERY 6 HOURS PRN
Status: DISCONTINUED | OUTPATIENT
Start: 2020-03-08 | End: 2020-03-09 | Stop reason: HOSPADM

## 2020-03-08 RX ORDER — LORAZEPAM 2 MG/ML
2 INJECTION INTRAMUSCULAR ONCE
Status: DISCONTINUED | OUTPATIENT
Start: 2020-03-08 | End: 2020-03-09

## 2020-03-08 RX ORDER — QUETIAPINE FUMARATE 300 MG/1
600 TABLET, FILM COATED ORAL NIGHTLY
COMMUNITY
End: 2022-11-09

## 2020-03-08 RX ORDER — ZIPRASIDONE MESYLATE 20 MG/ML
10 INJECTION, POWDER, LYOPHILIZED, FOR SOLUTION INTRAMUSCULAR EVERY 12 HOURS PRN
Status: DISCONTINUED | OUTPATIENT
Start: 2020-03-08 | End: 2020-03-09 | Stop reason: HOSPADM

## 2020-03-08 RX ORDER — NICOTINE 21 MG/24HR
1 PATCH, TRANSDERMAL 24 HOURS TRANSDERMAL
Status: DISCONTINUED | OUTPATIENT
Start: 2020-03-08 | End: 2020-03-09 | Stop reason: HOSPADM

## 2020-03-08 RX ORDER — WATER 1000 ML/1000ML
INJECTION, SOLUTION INTRAVENOUS
Status: COMPLETED
Start: 2020-03-08 | End: 2020-03-08

## 2020-03-08 RX ORDER — SODIUM CHLORIDE 0.9 % (FLUSH) 0.9 %
10 SYRINGE (ML) INJECTION AS NEEDED
Status: DISCONTINUED | OUTPATIENT
Start: 2020-03-08 | End: 2020-03-09 | Stop reason: HOSPADM

## 2020-03-08 RX ORDER — SODIUM CHLORIDE 0.9 % (FLUSH) 0.9 %
10 SYRINGE (ML) INJECTION EVERY 12 HOURS SCHEDULED
Status: DISCONTINUED | OUTPATIENT
Start: 2020-03-08 | End: 2020-03-09 | Stop reason: HOSPADM

## 2020-03-08 RX ORDER — ACETAMINOPHEN 325 MG/1
650 TABLET ORAL EVERY 6 HOURS PRN
Status: DISCONTINUED | OUTPATIENT
Start: 2020-03-08 | End: 2020-03-09 | Stop reason: HOSPADM

## 2020-03-08 RX ORDER — PANTOPRAZOLE SODIUM 40 MG/10ML
40 INJECTION, POWDER, LYOPHILIZED, FOR SOLUTION INTRAVENOUS
Status: DISCONTINUED | OUTPATIENT
Start: 2020-03-09 | End: 2020-03-09

## 2020-03-08 RX ADMIN — WATER 1.2 ML: 1 INJECTION INTRAMUSCULAR; INTRAVENOUS; SUBCUTANEOUS at 17:26

## 2020-03-08 RX ADMIN — ACETAMINOPHEN 325 MG: 325 TABLET, FILM COATED ORAL at 20:57

## 2020-03-08 RX ADMIN — TRAZODONE HYDROCHLORIDE 50 MG: 50 TABLET ORAL at 21:49

## 2020-03-08 RX ADMIN — ZIPRASIDONE MESYLATE 10 MG: 20 INJECTION, POWDER, LYOPHILIZED, FOR SOLUTION INTRAMUSCULAR at 17:26

## 2020-03-08 RX ADMIN — TAZOBACTAM SODIUM AND PIPERACILLIN SODIUM 3.38 G: 375; 3 INJECTION, SOLUTION INTRAVENOUS at 20:41

## 2020-03-08 RX ADMIN — SODIUM CHLORIDE, PRESERVATIVE FREE 10 ML: 5 INJECTION INTRAVENOUS at 21:03

## 2020-03-08 RX ADMIN — QUETIAPINE FUMARATE 600 MG: 100 TABLET ORAL at 21:48

## 2020-03-08 NOTE — H&P
Initial Pulmonary Critical Care Evaluation                     Patient name: Barak Rivera  MRN: [id number]  : 1980  Today's date: 3/8/2020  Hospital Day: 0  ICU Stays Timeline            Hospital Admission: 20 1334 - Current  No ICU stays    No ICU stays to display              HPI:  Mr. Barak Rivera is a 39 y.o.  male with PMH Polysubstance Abuse, Suicidal ideations, Bipolar disorder, Paranoia, Hepatitis C and PE, and Hip Osteomyelitis.  Patient presented to the ED today with c/o chest pain and recent methamphetamine use but cardiac work-up was normal with a normal EKG and enzymes.  When told that he was going to be discharged home he threatened to go outside and throw himself in front of a bus.  He apparently has been sober for 7-8 months and relapsed yesterday while visiting his parents in [place].  In addition to using meth, he admitted to using an increased dose of Ambien that is apparently prescribed to him.  He has been having auditory hallucinations and is paranoid.  [facility] in the ED and found him to be unstable with acute psychosis and suicidal ideations.  They recommend psychiatric evaluation at [facility] involuntarily.  The  to facilitate this is not available until tomorrow at 8am.  The patient did agree voluntarily to a Geodon 10 mg IM injection.  He had a WBC 16.9, temperature of 99.8, pulse 106 and respirations of 18 with a blood pressure of 132/87.  , Troponin <0.010. CXR was unremarkable.  UDS + Methamphetamine, Amphetamine, Benzos and TCAs. He will be admitted into the ICU for closer monitoring.     He had a negative HIV 2019, and there is a prior record from 2015 that states he has a h/o taking Truvada due to an unfaithful male partner.  Additional history obtained from the patient's nurse in the ICU indicates that 2 to 3 days ago he pushed a pill bottle up to his rectum and has not been able to pass it.    Review of Systems   Reason unable to  perform ROS: Due to acuity of illness.       PMH:  Past Medical History:   Diagnosis Date   • ADD (attention deficit disorder)    • Anogenital HPV infection    • Anxiety    • Bipolar disorder (CMS/HCC)    • Condyloma acuminatum in male    • Constipation    • Depression    • Fibromyalgia    • Hepatitis B    • Hepatitis C    • Migraines    • Osteomyelitis hip (CMS/HCC)    • Osteoporosis    • Paranoia (CMS/HCC)    • Suicidal ideations    • Urinary tract infection      Past Surgical History:   Procedure Laterality Date   • BACK SURGERY with rods post trauma  09/22/2016   • HAND SURGERY  06/2016       Allergies   Allergen Reactions   • Phenobarbital Nausea And Vomiting     MEDS:  Prior to Admission medications    Medication Sig Start Date End Date Taking? Authorizing Provider   albuterol sulfate  (90 Base) MCG/ACT inhaler Inhale 2 puffs Every 6 (Six) Hours As Needed. 2/6/19  Yes Karan Graham MD   buprenorphine-naloxone (SUBOXONE) 8-2 MG per SL tablet  11/13/19  Yes Karan Graham MD   DESCOVY 200-25 MG per tablet Take 1 tablet by mouth Daily. 11/18/19  Yes Karan Graham MD   escitalopram (LEXAPRO) 20 MG tablet Take 20 mg by mouth Daily.   Yes Karan Graham MD   traZODone (DESYREL) 100 MG tablet Take 100 mg by mouth Every Night.   Yes ProviderKaran MD   BREARELY ELLIPTA 200-25 MCG/INH inhaler Inhale 1 puff Daily. 11/19/19 3/8/20  Karan Graham MD   FLUCELVAX QUADRIVALENT 0.5 ML suspension prefilled syringe injection inject 0.5 milliliters intramuscularly 10/12/19 3/8/20  Karan Graham MD   gabapentin (NEURONTIN) 800 MG tablet Take 300 mg by mouth 2 (Two) Times a Day. 7/7/15 3/8/20  Karan Graham MD   meclizine (ANTIVERT) 25 MG tablet Take 25 mg by mouth.  3/8/20  Karan Graham MD   Plecanatide 3 MG tablet Take 3 mg by mouth. 6/6/19 3/8/20  Karan Graham MD   SF 5000 PLUS 1.1 % cream APPLY TOPICALLY TWICE DAILY OR AS DIRECTED ON TUBE.  "10/30/19 3/8/20  ProviderKaran MD   valACYclovir (VALTREX) 1000 MG tablet Take 1,000 mg by mouth.  3/8/20  ProviderKaran MD     FH:  Family History   Problem Relation Age of Onset   • Hypertension Other    • Diabetes Other    • Depression Other      SH:  Social History     Socioeconomic History   • Marital status: Single     Spouse name: Not on file   • Number of children: Not on file   • Years of education: Not on file   • Highest education level: Not on file   Tobacco Use   • Smoking status: Current Every Day Smoker     Types: Cigarettes   • Smokeless tobacco: Never Used   • Tobacco comment: pt demands nicotine patch, refuses counseling   Substance and Sexual Activity   • Alcohol use: No   • Drug use: Yes     Types: Methamphetamines, Amphetamines, Benzodiazepines     Comment: TCA   • Sexual activity: Yes     Partners: Male     PE:  BP 95/65 (BP Location: Left arm, Patient Position: Lying)   Pulse 92   Temp 98.5 °F (36.9 °C) (Oral)   Resp 16   Ht 172.7 cm (68\")   Wt 65.8 kg (145 lb)   SpO2 97%   BMI 22.05 kg/m²         General: Well-developed healthy looking white male presently quite calm after receiving Geodon in the ER  HEENT: Normocephalic, PERRLA, nose and throat clear  NECK:   Neck is supple, no jvd,thyromegaly, or adenopathy  Nodes:   No axillary or supraclavicular adenopathy  Back:     No CVA or spinal tenderness  Lungs:   Clear without wheezes, rales, rhonchi  Cardiac: RRR without murmurs, gallops, clicks, or rubs   Abdomen: Soft and nontender without hepatosplenomegaly or masses  Extremities: No cyanosis or clubbing edema  Neuro: Nonfocal  Skin: Normal  Lymphatics: No adenopathy    LAB:  Results from last 7 days   Lab Units 03/08/20  1347   WBC 10*3/mm3 16.90*   HEMOGLOBIN g/dL 14.2   HEMATOCRIT % 40.9   PLATELETS 10*3/mm3 233     Results from last 7 days   Lab Units 03/08/20  1347   SODIUM mmol/L 138   POTASSIUM mmol/L 3.8   CHLORIDE mmol/L 99   CO2 mmol/L 23.0   BUN mg/dL 16 "   CREATININE mg/dL 0.79   GLUCOSE mg/dL 100*   CALCIUM mg/dL 9.7         Results from last 7 days   Lab Units 03/08/20  1347   ALK PHOS U/L 58   BILIRUBIN mg/dL 0.6   ALT (SGPT) U/L 24   AST (SGOT) U/L 31       ABG: Not obtained    CXR: Upper thoracic bilateral rods in place from T4, to T10.  Left lung normal.  Elevated right hemidiaphragm with some minimal platelike atelectasis of the right lung base.         KUB: Circular foreign body noted in rectum.  Appears to be a pill bottle and on    EKG 3/8/2020: Normal sinus rhythm    Impression:      Acute psychosis (CMS/HCC)    Suicidal ideations    Paranoia (CMS/HCC)    Bipolar disorder (CMS/HCC)    Foreign body anus/rectum    Polysubstance abuse (CMS/HCC)    Hepatitis C    Hepatitis B    Tobacco abuse    Anxiety    Discussion: Appears calm at the present time.  Only after he was admitted to the ICU that he mentioned that he had a foreign body in his rectum that he had been unable to pass.  On x-ray it appears this is a pill bottle as the patient himself indicated.  Situation discussed with Dr. Chaudhary.  Has asked that I give him a bottle of mag citrate and if he is unable to pass it he will see him tomorrow.    Plan:  ICU admission  Suicide precautions  Watch for withdrawal  UA C&S to R/O UTI  Add Zosyn until f/u culture  HIV  Smoking Cessation  Nicotine Patch  Magnesium citrate and if unsuccessful in passing foreign body colorectal surgery to see    I have interviewed and examined the patient as well as personally reviewing all labs and radiographs.  Case was discussed with the APRN, note reviewed and modified to reflect my additions and findings and I agree with the final note.    45 minutes of time spent with this evaluation    Corbin Michael MD  CC:  Provider, No Known

## 2020-03-08 NOTE — ED PROVIDER NOTES
Subjective   Barak Rivera is a 39 y.o.male who presents to the ED with complaints of an addiction problem. The patient relapsed from methamphetamines last night. Prior to this episode, he states he had been clean for 7-8 months. He states he relapsed yesterday while visiting his parents in [place], for when he is in [place] he states he has an increased urge to use methamphetamine. Of note, he has been admitted to a rehabilitation facility in [place] previously, as he states he was most recently there one year ago. In addition to using methamphetamine yesterday, he states he also took an increased dose of Ambien, which is prescribed to him. He denies any suicidal ideation or homicidal ideation. He also denies any current chest pain.     The patient reportedly told the police at the bedside that he hears voices constantly and he is paranoid. He is convinced this is not related to his drug use. He states he will cooperate with mental health professionals. There are no other complaints at this time.       History provided by:  Patient  Addiction Problem   Location:  Generalized  Quality:  Addiction problem  Severity:  Moderate  Onset quality:  Sudden  Duration:  1 day  Timing:  Constant  Progression:  Unchanged  Chronicity:  Recurrent  Context:  Hx of meth use. Relapsed last night. Also has hallucinations. No SI.  Relieved by:  None tried  Worsened by:  Nothing  Ineffective treatments:  None tried  Associated symptoms: no chest pain        Review of Systems   Cardiovascular: Negative for chest pain.   Psychiatric/Behavioral: Positive for hallucinations. Negative for suicidal ideas.        Addiction problem   All other systems reviewed and are negative.      Past Medical History:   Diagnosis Date   • ADD (attention deficit disorder)    • Anogenital HPV infection    • Anxiety    • Bipolar disorder (CMS/HCC)    • Constipation    • Depression    • Fibromyalgia    • Hepatitis B    • Hepatitis C    • Migraines    •  Osteoporosis    • Paranoia (CMS/HCC)    • Urinary tract infection        Allergies   Allergen Reactions   • Phenobarbital Nausea And Vomiting       Past Surgical History:   Procedure Laterality Date   • BACK SURGERY  09/22/2016   • HAND SURGERY  06/2016       Family History   Problem Relation Age of Onset   • Hypertension Other    • Diabetes Other    • Depression Other        Social History     Socioeconomic History   • Marital status: Single     Spouse name: Not on file   • Number of children: Not on file   • Years of education: Not on file   • Highest education level: Not on file   Tobacco Use   • Smoking status: Current Every Day Smoker     Types: Cigarettes   • Smokeless tobacco: Never Used   • Tobacco comment: pt demands nicotine patch, refuses counseling   Substance and Sexual Activity   • Alcohol use: No   • Drug use: Yes     Types: Methamphetamines, Amphetamines, Benzodiazepines     Comment: TCA   • Sexual activity: Yes     Partners: Male         Objective   Physical Exam   Constitutional: He is oriented to person, place, and time. He appears well-developed and well-nourished.   Agitated resistant to conversation with an angry facial expression. He is staring downwards. Limited eye contact.    HENT:   Head: Normocephalic and atraumatic.   Eyes: Conjunctivae are normal. No scleral icterus.   Neck: Normal range of motion. Neck supple.   Cardiovascular: Normal rate, regular rhythm, normal heart sounds and intact distal pulses.   No murmur heard.  Pulmonary/Chest: Effort normal and breath sounds normal. No respiratory distress.   Abdominal: Soft. Bowel sounds are normal. There is no tenderness.   Musculoskeletal: Normal range of motion. He exhibits no edema.   Neurological: He is alert and oriented to person, place, and time.   No gross neurosensory deficits.    Skin: Skin is warm and dry.   Nursing note and vitals reviewed.      Procedures         ED Course  ED Course as of Mar 08 1804   Sun Mar 08, 2020   1412  WBC(!): 16.90 [MS]   1435 Creatine Kinase(!): 484 [MS]   1602 I discussed the case with the [facility] evaluator.  They recommend placement on a hold and involuntary petition for transfer to [facility].  Please see their note for further details.  I have not personally evaluated the patient but Mable, the patient's primary provider, is in another room and unavailable at this time.    [CP]   1740 During this last hour, the patient's evaluation by professionals from the [facility] reveals an instability and acute psychosis with suicidal ideation that will require transfer to [facility] for psychiatric evaluation involuntarily.  The , who has authority to have the patient transferred, is not available until 8 AM tomorrow.  I have spoken to , Magda Street and the house supervisor.  The patient will have to be admitted with expectation for social work consultation first thing in the morning where the  will be contacted and the patient transferred for psychiatric evaluation.  I spoken to the intensivist, Dr. Michael who agrees to this admission.  The patient consented to receiving an injection of Geodon without confrontation or agitation.    [MS]      ED Course User Index  [CP] Dennis Vega,   [MS] Dayna Drake, CONTRERAS     Recent Results (from the past 24 hour(s))   Troponin    Collection Time: 03/08/20  1:47 PM   Result Value Ref Range    Troponin T <0.010 0.000 - 0.030 ng/mL   Comprehensive Metabolic Panel    Collection Time: 03/08/20  1:47 PM   Result Value Ref Range    Glucose 100 (H) 65 - 99 mg/dL    BUN 16 6 - 20 mg/dL    Creatinine 0.79 0.76 - 1.27 mg/dL    Sodium 138 136 - 145 mmol/L    Potassium 3.8 3.5 - 5.2 mmol/L    Chloride 99 98 - 107 mmol/L    CO2 23.0 22.0 - 29.0 mmol/L    Calcium 9.7 8.6 - 10.5 mg/dL    Total Protein 7.5 6.0 - 8.5 g/dL    Albumin 4.90 3.50 - 5.20 g/dL    ALT (SGPT) 24 1 - 41 U/L    AST (SGOT) 31 1 - 40 U/L    Alkaline Phosphatase 58 39 - 117 U/L    Total  Bilirubin 0.6 0.2 - 1.2 mg/dL    eGFR Non African Amer 109 >60 mL/min/1.73    Globulin 2.6 gm/dL    A/G Ratio 1.9 g/dL    BUN/Creatinine Ratio 20.3 7.0 - 25.0    Anion Gap 16.0 (H) 5.0 - 15.0 mmol/L   Lipase    Collection Time: 03/08/20  1:47 PM   Result Value Ref Range    Lipase 6 (L) 13 - 60 U/L   BNP    Collection Time: 03/08/20  1:47 PM   Result Value Ref Range    proBNP 588.0 (H) 5.0 - 450.0 pg/mL   Light Blue Top    Collection Time: 03/08/20  1:47 PM   Result Value Ref Range    Extra Tube hold for add-on    Green Top (Gel)    Collection Time: 03/08/20  1:47 PM   Result Value Ref Range    Extra Tube Hold for add-ons.    Lavender Top    Collection Time: 03/08/20  1:47 PM   Result Value Ref Range    Extra Tube hold for add-on    Gold Top - SST    Collection Time: 03/08/20  1:47 PM   Result Value Ref Range    Extra Tube Hold for add-ons.    CBC Auto Differential    Collection Time: 03/08/20  1:47 PM   Result Value Ref Range    WBC 16.90 (H) 3.40 - 10.80 10*3/mm3    RBC 4.41 4.14 - 5.80 10*6/mm3    Hemoglobin 14.2 13.0 - 17.7 g/dL    Hematocrit 40.9 37.5 - 51.0 %    MCV 92.7 79.0 - 97.0 fL    MCH 32.2 26.6 - 33.0 pg    MCHC 34.7 31.5 - 35.7 g/dL    RDW 12.8 12.3 - 15.4 %    RDW-SD 43.7 37.0 - 54.0 fl    MPV 8.5 6.0 - 12.0 fL    Platelets 233 140 - 450 10*3/mm3    Neutrophil % 84.9 (H) 42.7 - 76.0 %    Lymphocyte % 8.2 (L) 19.6 - 45.3 %    Monocyte % 6.2 5.0 - 12.0 %    Eosinophil % 0.0 (L) 0.3 - 6.2 %    Basophil % 0.2 0.0 - 1.5 %    Immature Grans % 0.5 0.0 - 0.5 %    Neutrophils, Absolute 14.37 (H) 1.70 - 7.00 10*3/mm3    Lymphocytes, Absolute 1.38 0.70 - 3.10 10*3/mm3    Monocytes, Absolute 1.04 (H) 0.10 - 0.90 10*3/mm3    Eosinophils, Absolute 0.00 0.00 - 0.40 10*3/mm3    Basophils, Absolute 0.03 0.00 - 0.20 10*3/mm3    Immature Grans, Absolute 0.08 (H) 0.00 - 0.05 10*3/mm3    nRBC 0.0 0.0 - 0.2 /100 WBC   CK    Collection Time: 03/08/20  1:47 PM   Result Value Ref Range    Creatine Kinase 484 (H) 20 - 200 U/L    D-dimer, Quantitative    Collection Time: 03/08/20  1:47 PM   Result Value Ref Range    D-Dimer, Quantitative <0.27 0.00 - 0.56 MCGFEU/mL     Note: In addition to lab results from this visit, the labs listed above may include labs taken at another facility or during a different encounter within the last 24 hours. Please correlate lab times with ED admission and discharge times for further clarification of the services performed during this visit.    XR Chest 1 View   Preliminary Result   Mild scarring or atelectatic changes seen within the right   lower lung field. Degenerative changes seen within the spine. No acute   parenchymal disease.                Vitals:    03/08/20 1519 03/08/20 1530 03/08/20 1752 03/08/20 1754   BP: 138/88 140/79 116/86    BP Location:       Patient Position:       Pulse: 82 98     Resp: 18      Temp:       TempSrc:       SpO2: 100% 95%  97%   Weight:       Height:         Medications   sodium chloride 0.9 % flush 10 mL (has no administration in time range)   LORazepam (ATIVAN) injection 2 mg (has no administration in time range)   ziprasidone (GEODON) injection 10 mg (10 mg Intramuscular Given 3/8/20 1726)   sterile water (preservative free) injection  - ADS Override Pull (1.2 mL  Given 3/8/20 1726)     ECG/EMG Results (last 24 hours)     Procedure Component Value Units Date/Time    ECG 12 Lead [[id number]] Collected:  03/08/20 1341     Updated:  03/08/20 1343        ECG 12 Lead                                                    MDM    Final diagnoses:   Acute psychosis (CMS/HCC)   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  Information recorded by the cholo was done at my direction and has been verified and validated by me.     Luis Addison  03/08/20 [id number]       Dayna Drake APRN  03/08/20 7930

## 2020-03-09 ENCOUNTER — APPOINTMENT (OUTPATIENT)
Dept: GENERAL RADIOLOGY | Facility: HOSPITAL | Age: 40
End: 2020-03-09

## 2020-03-09 VITALS
SYSTOLIC BLOOD PRESSURE: 88 MMHG | HEIGHT: 68 IN | HEART RATE: 96 BPM | BODY MASS INDEX: 20.98 KG/M2 | WEIGHT: 138.45 LBS | TEMPERATURE: 98.1 F | OXYGEN SATURATION: 95 % | DIASTOLIC BLOOD PRESSURE: 65 MMHG | RESPIRATION RATE: 18 BRPM

## 2020-03-09 LAB
ALBUMIN SERPL-MCNC: 4.2 G/DL (ref 3.5–5.2)
ALBUMIN/GLOB SERPL: 2 G/DL
ALP SERPL-CCNC: 53 U/L (ref 39–117)
ALT SERPL W P-5'-P-CCNC: 21 U/L (ref 1–41)
ANION GAP SERPL CALCULATED.3IONS-SCNC: 15 MMOL/L (ref 5–15)
AST SERPL-CCNC: 25 U/L (ref 1–40)
BILIRUB SERPL-MCNC: 0.5 MG/DL (ref 0.2–1.2)
BUN BLD-MCNC: 11 MG/DL (ref 6–20)
BUN/CREAT SERPL: 14.9 (ref 7–25)
CALCIUM SPEC-SCNC: 9.2 MG/DL (ref 8.6–10.5)
CHLORIDE SERPL-SCNC: 101 MMOL/L (ref 98–107)
CO2 SERPL-SCNC: 26 MMOL/L (ref 22–29)
CREAT BLD-MCNC: 0.74 MG/DL (ref 0.76–1.27)
DEPRECATED RDW RBC AUTO: 43.8 FL (ref 37–54)
ERYTHROCYTE [DISTWIDTH] IN BLOOD BY AUTOMATED COUNT: 12.9 % (ref 12.3–15.4)
GFR SERPL CREATININE-BSD FRML MDRD: 117 ML/MIN/1.73
GLOBULIN UR ELPH-MCNC: 2.1 GM/DL
GLUCOSE BLD-MCNC: 125 MG/DL (ref 65–99)
HBA1C MFR BLD: 5.2 % (ref 4.8–5.6)
HCT VFR BLD AUTO: 39.1 % (ref 37.5–51)
HGB BLD-MCNC: 13.1 G/DL (ref 13–17.7)
MAGNESIUM SERPL-MCNC: 2.1 MG/DL (ref 1.6–2.6)
MCH RBC QN AUTO: 31.2 PG (ref 26.6–33)
MCHC RBC AUTO-ENTMCNC: 33.5 G/DL (ref 31.5–35.7)
MCV RBC AUTO: 93.1 FL (ref 79–97)
PHOSPHATE SERPL-MCNC: 2.7 MG/DL (ref 2.5–4.5)
PLATELET # BLD AUTO: 196 10*3/MM3 (ref 140–450)
PMV BLD AUTO: 9 FL (ref 6–12)
POTASSIUM BLD-SCNC: 3.3 MMOL/L (ref 3.5–5.2)
PROT SERPL-MCNC: 6.3 G/DL (ref 6–8.5)
RBC # BLD AUTO: 4.2 10*6/MM3 (ref 4.14–5.8)
RPR SER QL: NORMAL
SODIUM BLD-SCNC: 142 MMOL/L (ref 136–145)
TSH SERPL DL<=0.05 MIU/L-ACNC: 2.58 UIU/ML (ref 0.27–4.2)
WBC NRBC COR # BLD: 9.18 10*3/MM3 (ref 3.4–10.8)

## 2020-03-09 PROCEDURE — 25010000002 PIPERACILLIN SOD-TAZOBACTAM PER 1 G: Performed by: NURSE PRACTITIONER

## 2020-03-09 PROCEDURE — 83735 ASSAY OF MAGNESIUM: CPT | Performed by: NURSE PRACTITIONER

## 2020-03-09 PROCEDURE — 84100 ASSAY OF PHOSPHORUS: CPT | Performed by: NURSE PRACTITIONER

## 2020-03-09 PROCEDURE — 25010000002 HALOPERIDOL LACTATE PER 5 MG: Performed by: INTERNAL MEDICINE

## 2020-03-09 PROCEDURE — 74018 RADEX ABDOMEN 1 VIEW: CPT

## 2020-03-09 PROCEDURE — 25010000002 LORAZEPAM PER 2 MG: Performed by: INTERNAL MEDICINE

## 2020-03-09 PROCEDURE — 25010000002 LORAZEPAM PER 2 MG: Performed by: NURSE PRACTITIONER

## 2020-03-09 PROCEDURE — 80053 COMPREHEN METABOLIC PANEL: CPT | Performed by: NURSE PRACTITIONER

## 2020-03-09 PROCEDURE — 85027 COMPLETE CBC AUTOMATED: CPT | Performed by: NURSE PRACTITIONER

## 2020-03-09 PROCEDURE — 83036 HEMOGLOBIN GLYCOSYLATED A1C: CPT | Performed by: NURSE PRACTITIONER

## 2020-03-09 PROCEDURE — 99238 HOSP IP/OBS DSCHRG MGMT 30/<: CPT | Performed by: INTERNAL MEDICINE

## 2020-03-09 PROCEDURE — 84443 ASSAY THYROID STIM HORMONE: CPT | Performed by: NURSE PRACTITIONER

## 2020-03-09 PROCEDURE — 63710000001 DIPHENHYDRAMINE PER 50 MG: Performed by: NURSE PRACTITIONER

## 2020-03-09 RX ORDER — LORAZEPAM 2 MG/ML
1 INJECTION INTRAMUSCULAR ONCE
Status: COMPLETED | OUTPATIENT
Start: 2020-03-09 | End: 2020-03-09

## 2020-03-09 RX ORDER — POTASSIUM CHLORIDE 750 MG/1
40 CAPSULE, EXTENDED RELEASE ORAL ONCE
Status: COMPLETED | OUTPATIENT
Start: 2020-03-09 | End: 2020-03-09

## 2020-03-09 RX ORDER — HALOPERIDOL 5 MG/ML
2 INJECTION INTRAMUSCULAR
Status: DISCONTINUED | OUTPATIENT
Start: 2020-03-09 | End: 2020-03-09

## 2020-03-09 RX ORDER — TRAZODONE HYDROCHLORIDE 100 MG/1
100 TABLET ORAL NIGHTLY
Status: DISCONTINUED | OUTPATIENT
Start: 2020-03-09 | End: 2020-03-09 | Stop reason: HOSPADM

## 2020-03-09 RX ORDER — ESCITALOPRAM OXALATE 20 MG/1
20 TABLET ORAL DAILY
Status: DISCONTINUED | OUTPATIENT
Start: 2020-03-09 | End: 2020-03-09 | Stop reason: SDUPTHER

## 2020-03-09 RX ORDER — DIPHENHYDRAMINE HCL 50 MG
50 CAPSULE ORAL ONCE
Status: COMPLETED | OUTPATIENT
Start: 2020-03-09 | End: 2020-03-09

## 2020-03-09 RX ORDER — HALOPERIDOL 5 MG/ML
5 INJECTION INTRAMUSCULAR EVERY 4 HOURS PRN
Status: DISCONTINUED | OUTPATIENT
Start: 2020-03-09 | End: 2020-03-09 | Stop reason: HOSPADM

## 2020-03-09 RX ORDER — BUPRENORPHINE HYDROCHLORIDE AND NALOXONE HYDROCHLORIDE DIHYDRATE 8; 2 MG/1; MG/1
1 TABLET SUBLINGUAL DAILY
Status: DISCONTINUED | OUTPATIENT
Start: 2020-03-09 | End: 2020-03-09 | Stop reason: HOSPADM

## 2020-03-09 RX ORDER — CHOLECALCIFEROL (VITAMIN D3) 125 MCG
5 CAPSULE ORAL NIGHTLY PRN
Status: DISCONTINUED | OUTPATIENT
Start: 2020-03-09 | End: 2020-03-09 | Stop reason: HOSPADM

## 2020-03-09 RX ORDER — LORAZEPAM 2 MG/ML
2 INJECTION INTRAMUSCULAR EVERY 4 HOURS PRN
Status: DISCONTINUED | OUTPATIENT
Start: 2020-03-09 | End: 2020-03-09 | Stop reason: HOSPADM

## 2020-03-09 RX ADMIN — TAZOBACTAM SODIUM AND PIPERACILLIN SODIUM 3.38 G: 375; 3 INJECTION, SOLUTION INTRAVENOUS at 01:30

## 2020-03-09 RX ADMIN — POTASSIUM CHLORIDE 40 MEQ: 10 CAPSULE, COATED, EXTENDED RELEASE ORAL at 12:32

## 2020-03-09 RX ADMIN — LORAZEPAM 1 MG: 2 INJECTION INTRAMUSCULAR; INTRAVENOUS at 03:22

## 2020-03-09 RX ADMIN — ESCITALOPRAM OXALATE 20 MG: 20 TABLET ORAL at 08:03

## 2020-03-09 RX ADMIN — HALOPERIDOL LACTATE 2 MG: 5 INJECTION INTRAMUSCULAR at 10:06

## 2020-03-09 RX ADMIN — NICOTINE 1 PATCH: 21 PATCH, EXTENDED RELEASE TRANSDERMAL at 08:02

## 2020-03-09 RX ADMIN — HALOPERIDOL LACTATE 3 MG: 5 INJECTION INTRAMUSCULAR at 10:39

## 2020-03-09 RX ADMIN — DIPHENHYDRAMINE HYDROCHLORIDE 50 MG: 50 CAPSULE ORAL at 01:30

## 2020-03-09 RX ADMIN — PANTOPRAZOLE SODIUM 40 MG: 40 INJECTION, POWDER, FOR SOLUTION INTRAVENOUS at 05:41

## 2020-03-09 RX ADMIN — SODIUM CHLORIDE, PRESERVATIVE FREE 10 ML: 5 INJECTION INTRAVENOUS at 08:03

## 2020-03-09 RX ADMIN — SODIUM CHLORIDE, POTASSIUM CHLORIDE, SODIUM LACTATE AND CALCIUM CHLORIDE 500 ML: 600; 310; 30; 20 INJECTION, SOLUTION INTRAVENOUS at 00:12

## 2020-03-09 RX ADMIN — MELATONIN TAB 5 MG 5 MG: 5 TAB at 00:12

## 2020-03-09 RX ADMIN — BUPRENORPHINE AND NALOXONE 1 TABLET: 8; 2 TABLET SUBLINGUAL at 09:15

## 2020-03-09 RX ADMIN — LORAZEPAM 1 MG: 2 INJECTION INTRAMUSCULAR; INTRAVENOUS at 08:26

## 2020-03-09 NOTE — PROGRESS NOTES
Attempted to round on patient, however, patient was resting soundly.    YANETH Coats  [place] Certified Adult Chemical Dependency/Mental Health  [phone]

## 2020-03-09 NOTE — PLAN OF CARE
Problem: Patient Care Overview  Goal: Plan of Care Review  Outcome: Ongoing (interventions implemented as appropriate)  Flowsheets  Taken 3/9/2020 0509  Progress: no change  Outcome Summary: Admitted from ED at 1850 for suicidal ideation after relapse with meth. Multiple medications given for sleep: 600mg seroquel, 50mg trazodone, 5mg melatonin, 50mg benadryl, 1mg ativan. Patient still has not slept. Experienced auditory hallucinations prior to ativan (normal but becoming more intense). Very paranoid and easily becomes fixated on things. Patient stated having foreign body in rectum, KUB obtained, but patient has had multiple bowel movements and expelled object at beginning of shift. HR 90s at beginning of shift, increased to 120s-140s. 500ml LR bolus given for heart rate and hypotension in 80s, HR now 110s mostly, BP 90s-110s.  Taken 3/9/2020 0400  Plan of Care Reviewed With: patient

## 2020-03-09 NOTE — PLAN OF CARE
"  Problem: Patient Care Overview  Goal: Plan of Care Review  Outcome: Outcome(s) achieved  Flowsheets  Taken 3/9/2020 1000  Plan of Care Reviewed With: patient  Taken 3/9/2020 1342  Outcome Summary: VSS. This am talked of suicidal thoughts, was very restless and a little agitated. Ativan 1mgx1 and 5mg Haldol given. David talked to patient in afternoon who denied suicidal thoughts and \"would feel himself after a little more sleep.\" Belongings brought up from security and lift will be called for patient transport.      "

## 2020-03-09 NOTE — PAYOR COMM NOTE
"Elizabeth Cortes RN Utilization Review [phone]  Fax # [phone]  Ref # [id number]            Sherry Rivera (39 y.o. Male)     Date of Birth Social Security Number Address Home Phone MRN    1980  [address]  [facility] [address] [phone] [id number]    Restorationist Marital Status          None Single       Admission Date Admission Type Admitting Provider Attending Provider Department, Room/Bed    3/8/20 Emergency Corbin Michael MD Harrison, John M, MD [facility] 2A ICU, N207/1    Discharge Date Discharge Disposition Discharge Destination                       Attending Provider:  Corbin Michael MD    Allergies:  Phenobarbital    Isolation:  None   Infection:  None   Code Status:  CPR    Ht:  172.7 cm (68\")   Wt:  62.8 kg (138 lb 7.2 oz)    Admission Cmt:  None   Principal Problem:  Acute psychosis (CMS/[facility]) [F23]                 Active Insurance as of 3/8/2020     Primary Coverage     Payor Plan Insurance Group Employer/Plan Group    [place] [id number]     Payor Plan Address Payor Plan Phone Number Payor Plan Fax Number Effective Dates    [employer] Claims Office - [address] [address] [phone]  2020 - None Entered    [facility] [id number]       Subscriber Name Subscriber Birth Date Member ID       SHERRY RIVERA 1980 [id number]                 Emergency Contacts      (Rel.) Home Phone Work Phone Mobile Phone    JACQUELIN RIVERA (Grandparent) -- -- [phone]               History & Physical      Corbin Michael MD at 20 1841          Initial Pulmonary Critical Care Evaluation                     Patient name: Sherry Rivera  MRN: [id number]  : 1980  Today's date: 3/8/2020  Hospital Day: 0  ICU Stays Timeline            Hospital Admission: 20 1334 - Current  No ICU stays    No ICU stays to display              HPI:  Mr. Sherry Rivera is a 39 y.o.  male with PMH Polysubstance Abuse, Suicidal ideations, Bipolar " disorder, Paranoia, Hepatitis C and PE, and Hip Osteomyelitis.  Patient presented to the ED today with c/o chest pain and recent methamphetamine use but cardiac work-up was normal with a normal EKG and enzymes.  When told that he was going to be discharged home he threatened to go outside and throw himself in front of a bus.  He apparently has been sober for 7-8 months and relapsed yesterday while visiting his parents in [place].  In addition to using meth, he admitted to using an increased dose of Ambien that is apparently prescribed to him.  He has been having auditory hallucinations and is paranoid.  [facility] in [facility] and found him to be unstable with acute psychosis and suicidal ideations.  They recommend psychiatric evaluation at [facility] involuntarily.  The  to facilitate this is not available until tomorrow at 8am.  The patient did agree voluntarily to a Geodon 10 mg IM injection.  He had a WBC 16.9, temperature of 99.8, pulse 106 and respirations of 18 with a blood pressure of 132/87.  , Troponin <0.010. CXR was unremarkable.  UDS + Methamphetamine, Amphetamine, Benzos and TCAs. He will be admitted into the ICU for closer monitoring.     He had a negative HIV 2/2019, and there is a prior record from 7/2015 that states he has a h/o taking Truvada due to an unfaithful male partner.  Additional history obtained from the patient's nurse in the ICU indicates that 2 to 3 days ago he pushed a pill bottle up to his rectum and has not been able to pass it.    Review of Systems   Reason unable to perform ROS: Due to acuity of illness.       PMH:  Past Medical History:   Diagnosis Date   • ADD (attention deficit disorder)    • Anogenital HPV infection    • Anxiety    • Bipolar disorder (CMS/HCC)    • Condyloma acuminatum in male    • Constipation    • Depression    • Fibromyalgia    • Hepatitis B    • Hepatitis C    • Migraines    • Osteomyelitis hip (CMS/HCC)    • Osteoporosis    •  Paranoia (CMS/[facility])    • Suicidal ideations    • Urinary tract infection      Past Surgical History:   Procedure Laterality Date   • BACK SURGERY with rods post trauma  09/22/2016   • HAND SURGERY  06/2016       Allergies   Allergen Reactions   • Phenobarbital Nausea And Vomiting     MEDS:  Prior to Admission medications    Medication Sig Start Date End Date Taking? Authorizing Provider   albuterol sulfate  (90 Base) MCG/ACT inhaler Inhale 2 puffs Every 6 (Six) Hours As Needed. 2/6/19  Yes Karan Graham MD   buprenorphine-naloxone (SUBOXONE) 8-2 MG per SL tablet  11/13/19  Yes Karan Graham MD   DESCOVY 200-25 MG per tablet Take 1 tablet by mouth Daily. 11/18/19  Yes Karan Graham MD   escitalopram (LEXAPRO) 20 MG tablet Take 20 mg by mouth Daily.   Yes Karan Graham MD   traZODone (DESYREL) 100 MG tablet Take 100 mg by mouth Every Night.   Yes Karan Graham MD   BREARELY ELLIPTA 200-25 MCG/INH inhaler Inhale 1 puff Daily. 11/19/19 3/8/20  Karan Graham MD   FLUCELVAX QUADRIVALENT 0.5 ML suspension prefilled syringe injection inject 0.5 milliliters intramuscularly 10/12/19 3/8/20  Karan Graham MD   gabapentin (NEURONTIN) 800 MG tablet Take 300 mg by mouth 2 (Two) Times a Day. 7/7/15 3/8/20  Karan Graham MD   meclizine (ANTIVERT) 25 MG tablet Take 25 mg by mouth.  3/8/20  Karan Graham MD   Plecanatide 3 MG tablet Take 3 mg by mouth. 6/6/19 3/8/20  Karan Graham MD   SF 5000 PLUS 1.1 % cream APPLY TOPICALLY TWICE DAILY OR AS DIRECTED ON TUBE. 10/30/19 3/8/20  Karan Graham MD   valACYclovir (VALTREX) 1000 MG tablet Take 1,000 mg by mouth.  3/8/20  Karan Graham MD     FH:  Family History   Problem Relation Age of Onset   • Hypertension Other    • Diabetes Other    • Depression Other      SH:  Social History     Socioeconomic History   • Marital status: Single     Spouse name: Not on file   • Number of children: Not on  "file   • Years of education: Not on file   • Highest education level: Not on file   Tobacco Use   • Smoking status: Current Every Day Smoker     Types: Cigarettes   • Smokeless tobacco: Never Used   • Tobacco comment: pt demands nicotine patch, refuses counseling   Substance and Sexual Activity   • Alcohol use: No   • Drug use: Yes     Types: Methamphetamines, Amphetamines, Benzodiazepines     Comment: TCA   • Sexual activity: Yes     Partners: Male     PE:  BP 95/65 (BP Location: Left arm, Patient Position: Lying)   Pulse 92   Temp 98.5 °F (36.9 °C) (Oral)   Resp 16   Ht 172.7 cm (68\")   Wt 65.8 kg (145 lb)   SpO2 97%   BMI 22.05 kg/m²          General: Well-developed healthy looking white male presently quite calm after receiving Geodon in the ER  HEENT: Normocephalic, PERRLA, nose and throat clear  NECK:   Neck is supple, no jvd,thyromegaly, or adenopathy  Nodes:   No axillary or supraclavicular adenopathy  Back:     No CVA or spinal tenderness  Lungs:   Clear without wheezes, rales, rhonchi  Cardiac: RRR without murmurs, gallops, clicks, or rubs   Abdomen: Soft and nontender without hepatosplenomegaly or masses  Extremities: No cyanosis or clubbing edema  Neuro: Nonfocal  Skin: Normal  Lymphatics: No adenopathy    LAB:  Results from last 7 days   Lab Units 03/08/20  1347   WBC 10*3/mm3 16.90*   HEMOGLOBIN g/dL 14.2   HEMATOCRIT % 40.9   PLATELETS 10*3/mm3 233     Results from last 7 days   Lab Units 03/08/20  1347   SODIUM mmol/L 138   POTASSIUM mmol/L 3.8   CHLORIDE mmol/L 99   CO2 mmol/L 23.0   BUN mg/dL 16   CREATININE mg/dL 0.79   GLUCOSE mg/dL 100*   CALCIUM mg/dL 9.7         Results from last 7 days   Lab Units 03/08/20  1347   ALK PHOS U/L 58   BILIRUBIN mg/dL 0.6   ALT (SGPT) U/L 24   AST (SGOT) U/L 31       ABG: Not obtained    CXR: Upper thoracic bilateral rods in place from T4, to T10.  Left lung normal.  Elevated right hemidiaphragm with some minimal platelike atelectasis of the right lung " base.         KUB: Circular foreign body noted in rectum.  Appears to be a pill bottle and on    EKG 3/8/2020: Normal sinus rhythm    Impression:      Acute psychosis (CMS/HCC)    Suicidal ideations    Paranoia (CMS/HCC)    Bipolar disorder (CMS/HCC)    Foreign body anus/rectum    Polysubstance abuse (CMS/HCC)    Hepatitis C    Hepatitis B    Tobacco abuse    Anxiety    Discussion: Appears calm at the present time.  Only after he was admitted to the ICU that he mentioned that he had a foreign body in his rectum that he had been unable to pass.  On x-ray it appears this is a pill bottle as the patient himself indicated.  Situation discussed with Dr. Chaudhary.  Has asked that I give him a bottle of mag citrate and if he is unable to pass it he will see him tomorrow.    Plan:  ICU admission  Suicide precautions  Watch for withdrawal  UA C&S to R/O UTI  Add Zosyn until f/u culture  HIV  Smoking Cessation  Nicotine Patch  Magnesium citrate and if unsuccessful in passing foreign body colorectal surgery to see    I have interviewed and examined the patient as well as personally reviewing all labs and radiographs.  Case was discussed with the APRN, note reviewed and modified to reflect my additions and findings and I agree with the final note.    45 minutes of time spent with this evaluation    Corbin Michael MD  CC:  Provider, No Known                Electronically signed by Corbin Michael MD at 03/08/20 2009          Emergency Department Notes      Dayna Drake APRN at 03/08/20 1751     Attestation signed by Dennis Vega DO at 03/08/20 3135          For this patient encounter, I reviewed the NP or PA documentation, treatment plan, and medical decision making. Dennis Vega DO 3/8/2020 11:17 PM                  Subjective   Barak Rivera is a 39 y.o.male who presents to the ED with complaints of an addiction problem. The patient relapsed from methamphetamines last night. Prior to this episode, he states he had  been clean for 7-8 months. He states he relapsed yesterday while visiting his parents in [place], for when he is in [place] he states he has an increased urge to use methamphetamine. Of note, he has been admitted to a rehabilitation facility in [place] previously, as he states he was most recently there one year ago. In addition to using methamphetamine yesterday, he states he also took an increased dose of Ambien, which is prescribed to him. He denies any suicidal ideation or homicidal ideation. He also denies any current chest pain.     The patient reportedly told the police at the bedside that he hears voices constantly and he is paranoid. He is convinced this is not related to his drug use. He states he will cooperate with mental health professionals. There are no other complaints at this time.       History provided by:  Patient  Addiction Problem   Location:  Generalized  Quality:  Addiction problem  Severity:  Moderate  Onset quality:  Sudden  Duration:  1 day  Timing:  Constant  Progression:  Unchanged  Chronicity:  Recurrent  Context:  Hx of meth use. Relapsed last night. Also has hallucinations. No SI.  Relieved by:  None tried  Worsened by:  Nothing  Ineffective treatments:  None tried  Associated symptoms: no chest pain        Review of Systems   Cardiovascular: Negative for chest pain.   Psychiatric/Behavioral: Positive for hallucinations. Negative for suicidal ideas.        Addiction problem   All other systems reviewed and are negative.      Past Medical History:   Diagnosis Date   • ADD (attention deficit disorder)    • Anogenital HPV infection    • Anxiety    • Bipolar disorder (CMS/HCC)    • Constipation    • Depression    • Fibromyalgia    • Hepatitis B    • Hepatitis C    • Migraines    • Osteoporosis    • Paranoia (CMS/HCC)    • Urinary tract infection        Allergies   Allergen Reactions   • Phenobarbital Nausea And Vomiting       Past Surgical History:   Procedure Laterality Date   • BACK  SURGERY  09/22/2016   • HAND SURGERY  06/2016       Family History   Problem Relation Age of Onset   • Hypertension Other    • Diabetes Other    • Depression Other        Social History     Socioeconomic History   • Marital status: Single     Spouse name: Not on file   • Number of children: Not on file   • Years of education: Not on file   • Highest education level: Not on file   Tobacco Use   • Smoking status: Current Every Day Smoker     Types: Cigarettes   • Smokeless tobacco: Never Used   • Tobacco comment: pt demands nicotine patch, refuses counseling   Substance and Sexual Activity   • Alcohol use: No   • Drug use: Yes     Types: Methamphetamines, Amphetamines, Benzodiazepines     Comment: TCA   • Sexual activity: Yes     Partners: Male         Objective   Physical Exam   Constitutional: He is oriented to person, place, and time. He appears well-developed and well-nourished.   Agitated resistant to conversation with an angry facial expression. He is staring downwards. Limited eye contact.    HENT:   Head: Normocephalic and atraumatic.   Eyes: Conjunctivae are normal. No scleral icterus.   Neck: Normal range of motion. Neck supple.   Cardiovascular: Normal rate, regular rhythm, normal heart sounds and intact distal pulses.   No murmur heard.  Pulmonary/Chest: Effort normal and breath sounds normal. No respiratory distress.   Abdominal: Soft. Bowel sounds are normal. There is no tenderness.   Musculoskeletal: Normal range of motion. He exhibits no edema.   Neurological: He is alert and oriented to person, place, and time.   No gross neurosensory deficits.    Skin: Skin is warm and dry.   Nursing note and vitals reviewed.      Procedures        ED Course  ED Course as of Mar 08 1804   Sun Mar 08, 2020   1412 WBC(!): 16.90 [MS]   1435 Creatine Kinase(!): 484 [MS]   1602 I discussed the case with the [facility] evaluator.  They recommend placement on a hold and involuntary petition for transfer to [facility].  Please  see their note for further details.  I have not personally evaluated the patient but Mable, the patient's primary provider, is in another room and unavailable at this time.    [CP]   1740 During this last hour, the patient's evaluation by professionals from the [place] reveals an instability and acute psychosis with suicidal ideation that will require transfer to [facility] for psychiatric evaluation involuntarily.  The , who has authority to have the patient transferred, is not available until 8 AM tomorrow.  I have spoken to , Magda Street and the house supervisor.  The patient will have to be admitted with expectation for social work consultation first thing in the morning where the  will be contacted and the patient transferred for psychiatric evaluation.  I spoken to the intensivist, Dr. Michael who agrees to this admission.  The patient consented to receiving an injection of Geodon without confrontation or agitation.    [MS]      ED Course User Index  [CP] Dennis Vega DO  [MS] Dayna Drake, CONTRERAS     Recent Results (from the past 24 hour(s))   Troponin    Collection Time: 03/08/20  1:47 PM   Result Value Ref Range    Troponin T <0.010 0.000 - 0.030 ng/mL   Comprehensive Metabolic Panel    Collection Time: 03/08/20  1:47 PM   Result Value Ref Range    Glucose 100 (H) 65 - 99 mg/dL    BUN 16 6 - 20 mg/dL    Creatinine 0.79 0.76 - 1.27 mg/dL    Sodium 138 136 - 145 mmol/L    Potassium 3.8 3.5 - 5.2 mmol/L    Chloride 99 98 - 107 mmol/L    CO2 23.0 22.0 - 29.0 mmol/L    Calcium 9.7 8.6 - 10.5 mg/dL    Total Protein 7.5 6.0 - 8.5 g/dL    Albumin 4.90 3.50 - 5.20 g/dL    ALT (SGPT) 24 1 - 41 U/L    AST (SGOT) 31 1 - 40 U/L    Alkaline Phosphatase 58 39 - 117 U/L    Total Bilirubin 0.6 0.2 - 1.2 mg/dL    eGFR Non African Amer 109 >60 mL/min/1.73    Globulin 2.6 gm/dL    A/G Ratio 1.9 g/dL    BUN/Creatinine Ratio 20.3 7.0 - 25.0    Anion Gap 16.0 (H) 5.0 - 15.0 mmol/L   Lipase     Collection Time: 03/08/20  1:47 PM   Result Value Ref Range    Lipase 6 (L) 13 - 60 U/L   BNP    Collection Time: 03/08/20  1:47 PM   Result Value Ref Range    proBNP 588.0 (H) 5.0 - 450.0 pg/mL   Light Blue Top    Collection Time: 03/08/20  1:47 PM   Result Value Ref Range    Extra Tube hold for add-on    Green Top (Gel)    Collection Time: 03/08/20  1:47 PM   Result Value Ref Range    Extra Tube Hold for add-ons.    Lavender Top    Collection Time: 03/08/20  1:47 PM   Result Value Ref Range    Extra Tube hold for add-on    Gold Top - SST    Collection Time: 03/08/20  1:47 PM   Result Value Ref Range    Extra Tube Hold for add-ons.    CBC Auto Differential    Collection Time: 03/08/20  1:47 PM   Result Value Ref Range    WBC 16.90 (H) 3.40 - 10.80 10*3/mm3    RBC 4.41 4.14 - 5.80 10*6/mm3    Hemoglobin 14.2 13.0 - 17.7 g/dL    Hematocrit 40.9 37.5 - 51.0 %    MCV 92.7 79.0 - 97.0 fL    MCH 32.2 26.6 - 33.0 pg    MCHC 34.7 31.5 - 35.7 g/dL    RDW 12.8 12.3 - 15.4 %    RDW-SD 43.7 37.0 - 54.0 fl    MPV 8.5 6.0 - 12.0 fL    Platelets 233 140 - 450 10*3/mm3    Neutrophil % 84.9 (H) 42.7 - 76.0 %    Lymphocyte % 8.2 (L) 19.6 - 45.3 %    Monocyte % 6.2 5.0 - 12.0 %    Eosinophil % 0.0 (L) 0.3 - 6.2 %    Basophil % 0.2 0.0 - 1.5 %    Immature Grans % 0.5 0.0 - 0.5 %    Neutrophils, Absolute 14.37 (H) 1.70 - 7.00 10*3/mm3    Lymphocytes, Absolute 1.38 0.70 - 3.10 10*3/mm3    Monocytes, Absolute 1.04 (H) 0.10 - 0.90 10*3/mm3    Eosinophils, Absolute 0.00 0.00 - 0.40 10*3/mm3    Basophils, Absolute 0.03 0.00 - 0.20 10*3/mm3    Immature Grans, Absolute 0.08 (H) 0.00 - 0.05 10*3/mm3    nRBC 0.0 0.0 - 0.2 /100 WBC   CK    Collection Time: 03/08/20  1:47 PM   Result Value Ref Range    Creatine Kinase 484 (H) 20 - 200 U/L   D-dimer, Quantitative    Collection Time: 03/08/20  1:47 PM   Result Value Ref Range    D-Dimer, Quantitative <0.27 0.00 - 0.56 MCGFEU/mL     Note: In addition to lab results from this visit, the labs listed  above may include labs taken at another facility or during a different encounter within the last 24 hours. Please correlate lab times with ED admission and discharge times for further clarification of the services performed during this visit.    XR Chest 1 View   Preliminary Result   Mild scarring or atelectatic changes seen within the right   lower lung field. Degenerative changes seen within the spine. No acute   parenchymal disease.                Vitals:    03/08/20 1519 03/08/20 1530 03/08/20 1752 03/08/20 1754   BP: 138/88 140/79 116/86    BP Location:       Patient Position:       Pulse: 82 98     Resp: 18      Temp:       TempSrc:       SpO2: 100% 95%  97%   Weight:       Height:         Medications   sodium chloride 0.9 % flush 10 mL (has no administration in time range)   LORazepam (ATIVAN) injection 2 mg (has no administration in time range)   ziprasidone (GEODON) injection 10 mg (10 mg Intramuscular Given 3/8/20 1726)   sterile water (preservative free) injection  - ADS Override Pull (1.2 mL  Given 3/8/20 1726)     ECG/EMG Results (last 24 hours)     Procedure Component Value Units Date/Time    ECG 12 Lead [[id number]] Collected:  03/08/20 1341     Updated:  03/08/20 1343        ECG 12 Lead                                                    MDM    Final diagnoses:   Acute psychosis (CMS/HCC)   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  Information recorded by the scribe was done at my direction and has been verified and validated by me.     Luis Addison  03/08/20 1551       Dayna Drake APRN  03/08/20 1805      Electronically signed by Dennis Vega DO at 03/08/20 5342       Vital Signs (last day)     Date/Time   Temp   Temp src   Pulse   Resp   BP   Patient Position   SpO2    03/09/20 0800   98.3 (36.8)   Oral   99   16   108/90   --   98    03/09/20 0600   --   --   (!) 121   20   (!) 89/78   Lying   92    03/09/20 0500   --    --   117   18   112/74   Lying   95    03/09/20 0400   98.5 (36.9)   Oral   118   18   93/73   Lying   96    03/09/20 0300   --   --   115   16   107/73   Lying   96    03/09/20 0202   --   --   118   16   90/63   Lying   --    03/09/20 0100   --   --   112   --   110/69   --   94    03/09/20 0000   98.1 (36.7)   Oral   (!) 133   20   91/67   Lying   94    03/08/20 2300   --   --   96   16   123/88   Lying   96    03/08/20 2200   --   --   93   18   111/73   Lying   95    03/08/20 2100   --   --   96   16   107/76   Lying   97    03/08/20 2000   98.5 (36.9)   Oral   96   16   95/65   Lying   97    03/08/20 1850   --   --   92   --   105/80   Lying   --    03/08/20 1810   --   --   78   18   113/83   --   98    03/08/20 1754   --   --   --   --   --   --   97    03/08/20 1752   --   --   --   --   116/86   --   --    03/08/20 1530   --   --   98   --   140/79   --   95    03/08/20 1519   --   --   82   18   138/88   --   100    03/08/20 1400   --   --   107   16   137/91   --   98    03/08/20 1322   99.8 (37.7)   Oral   106   18   132/87   Sitting   96                Current Facility-Administered Medications   Medication Dose Route Frequency Provider Last Rate Last Dose   • acetaminophen (TYLENOL) tablet 650 mg  650 mg Oral Q6H PRN Edgardo Gibson APRN   325 mg at 03/08/20 2057   • albuterol (PROVENTIL) nebulizer solution 0.083% 2.5 mg/3mL  2.5 mg Nebulization Q6H PRN Corbin Michael MD       • buprenorphine-naloxone (SUBOXONE) 8-2 MG per SL tablet 1 tablet  1 tablet Sublingual Daily Jose Johnson MD   1 tablet at 03/09/20 0915   • escitalopram (LEXAPRO) tablet 20 mg  20 mg Oral Daily Corbin Michael MD   20 mg at 03/09/20 0803   • haloperidol lactate (HALDOL) injection 2 mg  2 mg Intravenous Q2H PRN Jose Johnson MD       • melatonin tablet 5 mg  5 mg Oral Nightly PRN Edgardo Gibson APRN   5 mg at 03/09/20 0012   • nicotine (NICODERM CQ) 21 MG/24HR patch 1 patch  1 patch  Transdermal Q24H Saadia Live APRN   1 patch at 03/09/20 0802   • ondansetron (ZOFRAN) injection 4 mg  4 mg Intravenous Q6H PRN Saadia Live APRN       • pantoprazole (PROTONIX) injection 40 mg  40 mg Intravenous Q AM Saadia Live APRN   40 mg at 03/09/20 0541   • QUEtiapine (SEROquel) tablet 600 mg  600 mg Oral Nightly Corbin Michael MD   600 mg at 03/08/20 2148   • sodium chloride 0.9 % flush 10 mL  10 mL Intravenous PRN Emergency, Triage Protocol, MD       • sodium chloride 0.9 % flush 10 mL  10 mL Intravenous Q12H Saadia Live APRN   10 mL at 03/09/20 0803   • traZODone (DESYREL) tablet 100 mg  100 mg Oral Nightly Jose Johnson MD       • ziprasidone (GEODON) injection 10 mg  10 mg Intramuscular Q12H PRN Corbin Michael MD         Orders (active)      Start     Ordered    03/09/20 2100  traZODone (DESYREL) tablet 100 mg  Nightly      03/09/20 0904    03/09/20 1000  buprenorphine-naloxone (SUBOXONE) 8-2 MG per SL tablet 1 tablet  Daily      03/09/20 0904    03/09/20 0904  haloperidol lactate (HALDOL) injection 2 mg  Every 2 Hours PRN      03/09/20 0905    03/09/20 0903  XR Abdomen KUB  1 Time Imaging      03/09/20 0903 03/09/20 0900  escitalopram (LEXAPRO) tablet 20 mg  Daily      03/08/20 2018 03/09/20 0600  pantoprazole (PROTONIX) injection 40 mg  Every Early Morning      03/08/20 1849    03/09/20 0541  DIET MESSAGE Please send Norwegian toast, over easy eggs and sweet tea  Once     Comments:  Please send Norwegian toast, over easy eggs and sweet tea    03/09/20 0541    03/09/20 0008  melatonin tablet 5 mg  Nightly PRN      03/09/20 0008    03/08/20 2115  QUEtiapine (SEROquel) tablet 600 mg  Nightly      03/08/20 2018 03/08/20 2100  sodium chloride 0.9 % flush 10 mL  Every 12 Hours Scheduled      03/08/20 1849 03/08/20 2058  Diet Regular; Safe Tray  Diet Effective Now      03/08/20 2058 03/08/20 2018   ziprasidone (GEODON) injection 10 mg  Every 12 Hours PRN      03/08/20 2019 03/08/20 2011  albuterol (PROVENTIL) nebulizer solution 0.083% 2.5 mg/3mL  Every 6 Hours PRN      03/08/20 2011 03/08/20 1959  acetaminophen (TYLENOL) tablet 650 mg  Every 6 Hours PRN      03/08/20 2000 03/08/20 1945  nicotine (NICODERM CQ) 21 MG/24HR patch 1 patch  Every 24 Hours Scheduled      03/08/20 1853    03/08/20 1916  RPR  Once      03/08/20 1916 03/08/20 1900  Vital Signs Every Hour and Per Hospital Policy Based on Patient Condition  Every Hour      03/08/20 1849 03/08/20 1900  Intake and Output  Every Hour      03/08/20 1849 03/08/20 1850  Daily Weights  Daily      03/08/20 1849 03/08/20 1848  ondansetron (ZOFRAN) injection 4 mg  Every 6 Hours PRN      03/08/20 1849 03/08/20 1848  Code Status and Medical Interventions:  Continuous      03/08/20 1849    03/08/20 1848  Cardiac Monitoring  Continuous      03/08/20 1849    03/08/20 1848  Continuous Pulse Oximetry  Continuous      03/08/20 1849    03/08/20 1848  Strict Bed Rest  Until Discontinued      03/08/20 1849    03/08/20 1848  Use Mobility Guidelines for Advancement of Activity  Continuous      03/08/20 1849    03/08/20 1848  Height & Weight  Once      03/08/20 1849    03/08/20 1848  Insert Peripheral IV  Once      03/08/20 1849    03/08/20 1848  Saline Lock & Maintain IV Access  Continuous      03/08/20 1849    03/08/20 1848  Maintain Sequential Compression Device  Continuous      03/08/20 1849    03/08/20 1603  ED Place In [facility]  Once      03/08/20 1602    03/08/20 1343  Insert peripheral IV  Once      03/08/20 1343    03/08/20 1343  sodium chloride 0.9 % flush 10 mL  As Needed      03/08/20 1343    Unscheduled  Oxygen Therapy- Nasal Cannula; 2 LPM; Titrate for SPO2: equal to or greater than, 92%  Continuous PRN      03/08/20 1343    Pending  Inpatient Case Management  Consult  Once     Provider:  (Not yet assigned)     Pending                N207-1 - MAR Late Documentation   (last 72 hrs)   ** No Medications to display **   N207-1 - MAR Late Administrations   (last 72 hrs)   ** No Medications to display **   MAR Audit Report   SHERRY LEES (MR #[id number])   ASPIRIN CHEWABLE TABLET 324 MG (Once)     Action Due Canceled Entry*           Reason             Time Entered 03/08/20 1343 03/08/20 1435*           User  Jory Leavitt RN*           Second User             Time Scheduled 03/08/20 1345 03/08/20 1345           Admin Time 03/08/20 1345 03/08/20 1435*           Dose(unit)               Route  Oral*           Site             Comment             Infusion Rate(unit)               Duration(unit)                      ZIPRASIDONE (GEODON) INJECTION 10 MG (Once)     Action Due Given*           Reason             Time Entered 03/08/20 1655 03/08/20 1729*           User  Jory Leavitt RN*           Second User             Time Scheduled 03/08/20 1657 03/08/20 1657           Admin Time 03/08/20 1657 03/08/20 1726*           Dose(unit)   10 mg*           Route  Intramuscular*           Site  Right Deltoid*           Comment             Infusion Rate(unit)               Duration(unit)                      STERILE WATER (PRESERVATIVE FREE) INJECTION  - ADS OVERRIDE PULL (none)     Action Due Given*           Reason             Time Entered 03/08/20 1713 03/08/20 1729*           User  Jory Leavitt RN*           Second User             Time Scheduled 03/08/20 1714 03/08/20 1714           Admin Time 03/08/20 1714 03/08/20 1726*           Dose(unit)   1.2 mL*           Route             Site             Comment  USED TO DILUTE GEODON*           Infusion Rate(unit)               Duration(unit)                      SODIUM CHLORIDE 0.9 % FLUSH 10 ML (Every 12 Hours Scheduled)     Action Due Given*           Reason             Time Entered 03/08/20 1849 03/08/20 2103*           User  Hayes Ramirez RN*           Second  User             Time Scheduled 03/08/20 2100 03/08/20 2100           Admin Time 03/08/20 2100 03/08/20 2103*           Dose(unit)   10 mL*           Route  Intravenous*           Site             Comment             Infusion Rate(unit)               Duration(unit)                      Action Due Given*           Reason             Time Entered 03/08/20 1849 03/09/20 0803*           User  Cecilia Knott RN*           Second User             Time Scheduled 03/09/20 0900 03/09/20 0900           Admin Time 03/09/20 0900 03/09/20 0803*           Dose(unit)   10 mL*           Route  Intravenous*           Site             Comment             Infusion Rate(unit)               Duration(unit)                      PANTOPRAZOLE (PROTONIX) INJECTION 40 MG (Every Early Morning)     Action Due Given*           Reason             Time Entered 03/08/20 1849 03/09/20 0541*           User  Hayes Ramirez, RN*           Second User             Time Scheduled 03/09/20 0600 03/09/20 0600           Admin Time 03/09/20 0600 03/09/20 0541*           Dose(unit)   40 mg*           Route  Intravenous*           Site             Comment             Infusion Rate(unit)               Duration(unit)                      PIPERACILLIN-TAZOBACTAM (ZOSYN) 3.375 G IN ISO-OSMOTIC DEXTROSE 50 ML (PREMIX) (Once)     Action Due New Bag*           Reason             Time Entered 03/08/20 1849 03/08/20 2042*           User  Hayes Ramirez RN*           Second User             Time Scheduled 03/08/20 1945 03/08/20 1945           Admin Time 03/08/20 1945 03/08/20 2041*           Dose(unit)   3.375 g*           Route  Intravenous*           Site             Comment             Infusion Rate(unit)               Duration(unit)   30 Minutes*                  PIPERACILLIN-TAZOBACTAM (ZOSYN) 3.375 G IN ISO-OSMOTIC DEXTROSE 50 ML (PREMIX) (Every 8 Hours)     Action Due New Bag*           Reason             Time Entered 03/08/20 1851 03/09/20 0130*            User  Hayes Ramirez, RN*           Second User             Time Scheduled 03/09/20 0200 03/09/20 0200           Admin Time 03/09/20 0200 03/09/20 0130*           Dose(unit)   3.375 g*           Route  Intravenous*           Site             Comment             Infusion Rate(unit)               Duration(unit)   4 Hours*                  NICOTINE (NICODERM CQ) 21 MG/24HR PATCH 1 PATCH (Every 24 Hours Scheduled)     Action Due Not Given*           Reason  Patient/family refused*           Time Entered 03/08/20 1853 03/08/20 2042*           User  Hayes Ramirez, RN*           Second User             Time Scheduled 03/08/20 1945 03/08/20 1945           Admin Time 03/08/20 1945 03/08/20 2041*           Dose(unit)   1 patch*           Route  Transdermal*           Site             Comment  wants in the AM*           Infusion Rate(unit)               Duration(unit)   24 Hours*                  Action Due Medication Applied*           Reason             Time Entered 03/08/20 1853 03/09/20 0803*           User  Cecilia Knott RN*           Second User             Time Scheduled 03/09/20 0900 03/09/20 0900           Admin Time 03/09/20 0900 03/09/20 0802*           Dose(unit)   1 patch*           Route  Transdermal*           Site  Right Arm*           Comment             Infusion Rate(unit)               Duration(unit)   24 Hours*                  ACETAMINOPHEN (TYLENOL) TABLET 650 MG (Every 6 Hours PRN)     Action Due Given*           Reason             Time Entered 03/08/20 2056 03/08/20 2057*           User  Hayes Ramirez RN*           Second User             Time Scheduled 03/08/20 2056 03/08/20 2056           Admin Time 03/08/20 2056 03/08/20 2057*           Dose(unit)   325 mg*           Route  Oral*           Site             Comment             Infusion Rate(unit)               Duration(unit)                      ESCITALOPRAM (LEXAPRO) TABLET 20 MG (Daily)     Action Due Given*           Reason              Time Entered 03/08/20 2018 03/09/20 0803*           User  Cecilia Knott RN*           Second User             Time Scheduled 03/09/20 0900 03/09/20 0900           Admin Time 03/09/20 0900 03/09/20 0803*           Dose(unit)   20 mg*           Route  Oral*           Site             Comment             Infusion Rate(unit)               Duration(unit)                      QUETIAPINE (SEROQUEL) TABLET 600 MG (Nightly)     Action Due Given*           Reason             Time Entered 03/08/20 2018 03/08/20 2149*           User  Hayes Ramirez, RN*           Second User             Time Scheduled 03/08/20 2115 03/08/20 2115           Admin Time 03/08/20 2115 03/08/20 2148*           Dose(unit)   600 mg*           Route  Oral*           Site             Comment             Infusion Rate(unit)               Duration(unit)                      TRAZODONE (DESYREL) TABLET 50 MG (Nightly)     Action Due Given*           Reason             Time Entered 03/08/20 2018 03/08/20 2149*           User  Hayes Ramirez, RN*           Second User             Time Scheduled 03/08/20 2115 03/08/20 2115           Admin Time 03/08/20 2115 03/08/20 2149*           Dose(unit)   50 mg*           Route  Oral*           Site             Comment             Infusion Rate(unit)               Duration(unit)                      MELATONIN TABLET 5 MG (Nightly PRN)     Action Due Given*           Reason             Time Entered 03/09/20 0011 03/09/20 0012*           User  Hayes Ramirez, RN*           Second User             Time Scheduled 03/09/20 0011 03/09/20 0011           Admin Time 03/09/20 0011 03/09/20 0012*           Dose(unit)   5 mg*           Route  Oral*           Site             Comment             Infusion Rate(unit)               Duration(unit)                      LACTATED RINGERS BOLUS 500 ML (Once)     Action Due New Bag*           Reason             Time Entered 03/09/20 0008 03/09/20 0012*           User   Hayes Ramirez RN*           Second User             Time Scheduled 03/09/20 0100 03/09/20 0100           Admin Time 03/09/20 0100 03/09/20 0012*           Dose(unit)   500 mL*           Route  Intravenous*           Site             Comment             Infusion Rate(unit)   500 mL/hr*           Duration(unit)   1 Hours*                  DIPHENHYDRAMINE (BENADRYL) CAPSULE 50 MG (Once)     Action Due Given*           Reason             Time Entered 03/09/20 0124 03/09/20 0130*           User  Hayes Ramirez RN*           Second User             Time Scheduled 03/09/20 0215 03/09/20 0215           Admin Time 03/09/20 0215 03/09/20 0130*           Dose(unit)   50 mg*           Route  Oral*           Site             Comment             Infusion Rate(unit)               Duration(unit)                      LORAZEPAM (ATIVAN) INJECTION 1 MG (Once)     Action Due Given*           Reason             Time Entered 03/09/20 0317 03/09/20 0322*           User  Hayes Ramirez RN*           Second User             Time Scheduled 03/09/20 0415 03/09/20 0415           Admin Time 03/09/20 0415 03/09/20 0322*           Dose(unit)   1 mg*           Route  Intravenous*           Site             Comment             Infusion Rate(unit)               Duration(unit)                      LORAZEPAM (ATIVAN) INJECTION 1 MG (Once)     Action Due Given*           Reason             Time Entered 03/09/20 0821 03/09/20 0826*           User  Cecilia Knott RN*           Second User             Time Scheduled 03/09/20 0915 03/09/20 0915           Admin Time 03/09/20 0915 03/09/20 0826*           Dose(unit)   1 mg*           Route  Intravenous*           Site             Comment             Infusion Rate(unit)               Duration(unit)                      BUPRENORPHINE-NALOXONE (SUBOXONE) 8-2 MG PER SL TABLET 1 TABLET (Daily)     Action Due Given*           Reason             Time Entered 03/09/20 0904 03/09/20 0915*            User  Cecilia Knott RN*           Second User             Time Scheduled 03/09/20 1000 03/09/20 1000           Admin Time 03/09/20 1000 03/09/20 0915*           Dose(unit)   1 tablet*           Route  Sublingual*           Site             Comment             Infusion Rate(unit)               Duration(unit)

## 2020-03-09 NOTE — PROGRESS NOTES
Continued Stay Note   Sherine     Patient Name: Barak Rivera  MRN: [id number]  Today's Date: 3/9/2020    Admit Date: 3/8/2020    Discharge Plan     Row Name 03/09/20 1241       Plan    Plan  MSW available. Pt to follow up with [facility] as outpatient     Plan Comments  Pt reassessed by David. Pt is not suicidal and is agreeable to intensive outpatient program at [place] starting tomorrow. David to leave note in chart. Pt is cleared to d/c.  MSW will provide lyft ride at d/c.     Final Discharge Disposition Code  01 - home or self-care        Discharge Codes    No documentation.       Expected Discharge Date and Time     Expected Discharge Date Expected Discharge Time    Mar 11, 2020             OFE Montes

## 2020-03-09 NOTE — PROGRESS NOTES
Multidisciplinary Rounds    Time: 20min  Patient Name: Barak Rivera  Date of Encounter: 03/09/20 9:01 AM  MRN: [id number]  Admission date: 3/8/2020      Reason for visit: VANNESA. KRISTYN to continue to follow per protocol.       Current diet: Diet Regular; Safe Tray      Intervention:  Follow treatment plan  Care plan reviewed    Follow up:   Per protocol      Katie Osuna MS RD/KARTHIK CNSC  9:01 AM

## 2020-03-09 NOTE — PROGRESS NOTES
INTENSIVIST / PULMONARY FOLLOW UP NOTE     Hospital:  LOS: 1 day   Mr. Barak Rivera, 39 y.o. male is followed for:     Acute psychosis (CMS/HCC)    Polysubstance abuse (CMS/HCC)    Tobacco abuse    Hepatitis C    Suicidal ideations    Anxiety    Bipolar disorder (CMS/HCC)    Paranoia (CMS/HCC)    Hepatitis B    Foreign body anus/rectum          SUBJECTIVE   Wants to leave    The patient's relevant past medical, surgical, family, and social history were reviewed    Allergies and medications were reviewed    ROS:  Per subjective, all other systems were reviewed and were negative        OBJECTIVE     Vital Sign Min/Max for last 24 hours:  Temp  Min: 98.1 °F (36.7 °C)  Max: 99.8 °F (37.7 °C)   BP  Min: 89/78  Max: 140/79   Pulse  Min: 78  Max: 133   Resp  Min: 16  Max: 20   SpO2  Min: 92 %  Max: 100 %   No data recorded     Physical Exam:  General Appearance:  Conversant, in no acute distress  Eyes:  No scleral icterus or pallor, pupils normal  Ears, Nose, Mouth, Throat:  Atraumatic, oropharynx clear  Neck:  Trachea midline, thyroid normal  Respiratory:  Clear to auscultation bilaterally, normal effort, no tenderness to palpation  Cardiovascular:  Regular rate and rhythm, no murmurs, no peripheral edema, no thrill  Gastrointestinal:  Soft, non-tender, non-distended, no hepatosplenomegaly  Skin:  Normal temperature, no rash  Psychiatric:  Alert, poor insight  Neuro:  No new focal neurologic deficits observed    Telemetry:              Hemodynamics:   CVP:     PAP:     PAOP:     CO:     CI:     SVI:     SVR:       SpO2: 100 % SpO2  Min: 92 %  Max: 100 %   Device:      Flow Rate:   No data recorded     Mechanical Ventilator Settings:                                         Intake/Ouptut 24 hrs (7:00AM - 6:59 AM)  Intake & Output (last 3 days)       03/06 0701 - 03/07 0700 03/07 0701 - 03/08 0700 03/08 0701 - 03/09 0700 03/09 0701 - 03/10 0700    P.O.   680 120    I.V. (mL/kg)   2.9 (0)     IV Piggyback   600     Total  Intake(mL/kg)   1282.9 (20.4) 120 (1.9)    Urine (mL/kg/hr)   525     Stool   0     Total Output   525     Net   +757.9 +120            Urine Unmeasured Occurrence   2 x     Stool Unmeasured Occurrence   7 x           Lines, Drains & Airways    Active LDAs     Name:   Placement date:   Placement time:   Site:   Days:    Peripheral IV 03/08/20 2140 Right Arm   03/08/20 2140    Arm   less than 1                Hematology:  Results from last 7 days   Lab Units 03/09/20  0358 03/08/20  1347   WBC 10*3/mm3 9.18 16.90*   HEMOGLOBIN g/dL 13.1 14.2   HEMATOCRIT % 39.1 40.9   PLATELETS 10*3/mm3 196 233     Electrolytes, Magnesium and Phosphorus:  Results from last 7 days   Lab Units 03/09/20  0358 03/08/20  1347   SODIUM mmol/L 142 138   CHLORIDE mmol/L 101 99   POTASSIUM mmol/L 3.3* 3.8   CO2 mmol/L 26.0 23.0   MAGNESIUM mg/dL 2.1  --    PHOSPHORUS mg/dL 2.7  --      Renal:  Results from last 7 days   Lab Units 03/09/20  0358 03/08/20  1347   CREATININE mg/dL 0.74* 0.79   BUN mg/dL 11 16     Estimated Creatinine Clearance: 117.9 mL/min (A) (by C-G formula based on SCr of 0.74 mg/dL (L)).  Hepatic:  Results from last 7 days   Lab Units 03/09/20  0358 03/08/20  1347   ALK PHOS U/L 53 58   BILIRUBIN mg/dL 0.5 0.6   ALT (SGPT) U/L 21 24   AST (SGOT) U/L 25 31     Arterial Blood Gases:        Results from last 7 days   Lab Units 03/09/20  0358   HEMOGLOBIN A1C % 5.20       No results found for: LACTATE    Relevant imaging studies and labs from 03/09/20 were reviewed and interpreted by me    Medications (drips):         buprenorphine-naloxone 1 tablet Sublingual Daily   Emtricitabine-Tenofovir AF 1 tablet Oral Daily   escitalopram 20 mg Oral Daily   nicotine 1 patch Transdermal Q24H   pantoprazole 40 mg Intravenous Q AM   QUEtiapine 600 mg Oral Nightly   sodium chloride 10 mL Intravenous Q12H   traZODone 100 mg Oral Nightly       Assessment/Plan   IMPRESSION / PLAN     Inpatient Problem List:  39 y.o.male:  Active Hospital  "Problems    Diagnosis   • **Acute psychosis (CMS/HCC)   • Polysubstance abuse (CMS/HCC)   • Tobacco abuse   • Hepatitis C   • Suicidal ideations   • Anxiety   • Bipolar disorder (CMS/HCC)   • Paranoia (CMS/HCC)   • Hepatitis B   • Foreign body anus/rectum        Impression:  39 y.o.male with relevant PMH of polysubstance abuse, suicidal ideation, bipolar disorder, \"paranoia\", Hep C, prior PE, prior hip osteomyelitis admitted 3/8/2020 with chest pain and meth use.  Patient was told he was going to be discharged home and said he was going to go outside and throw himself in front of a bus.  Had previously been sober 7-8 months and relapsed just prior to admission.    Evaluated by the [facility] who recommended involuntary admission to [facility].  Currently awaiting court order and transport.    Plan:  Suicidal Ideation / Psychosis / Polysubstance abuse - awaiting psych admission    Rectal Foreign body - pill bottle.  Now gone.    HIV prophylaxis - patient states he has an unfaithful male partner.  HIV negative.  Continue.    Replace lytes prn    Nutrition - Diet Regular; Safe Tray     Medically cleared for psych admission from my standpoint    Plan of care and goals reviewed with mulitdisciplinary team at daily rounds         Jose Johnson MD  Intensive Care Medicine  03/09/20 11:51 AM       "

## 2020-03-09 NOTE — DISCHARGE SUMMARY
DISCHARGE SUMMARY       Patient name: Barak Rivera  CSN: [id number]  MRN: [id number]  : 1980  Today's date: 3/9/2020     Date of Admission: 3/8/2020  Date of Discharge:  3/9/2020    Admitting Physician: Corbin Michael MD   Primary Care Provider: Provider, No Known    Admission Diagnosis: Acute psychosis     Discharge Diagnoses:     Acute psychosis (CMS/HCC)    Polysubstance abuse (CMS/HCC)    Tobacco abuse    Hepatitis C    Suicidal ideations    Anxiety    Bipolar disorder (CMS/HCC)    Paranoia (CMS/HCC)    Hepatitis B    Foreign body anus/rectum    Imaging:  Xr Chest 1 View    Result Date: 3/9/2020  Mild scarring or atelectatic changes seen within the right lower lung field. Degenerative changes seen within the spine. No acute parenchymal disease.  D:  2020 E:  2020       Xr Abdomen Kub    Result Date: 3/9/2020  There is stool seen scattered throughout the colon with no evidence of obvious obstruction or gross free intraperitoneal air.  D:  2020 E:  2020       Xr Abdomen Kub    Result Date: 3/8/2020  Circular air density overlying the rectum which could represent the air within a pill bottle. Signer Name: Vinny Patel MD  Signed: 3/8/2020 7:44 PM  Workstation Name: [id number]  [facility]    History of Present Illness:  Mr. Barak Rivera is a 39 y.o. male with PMH significant for polysubstance abuse, Suicidal ideations, Bipolar disorder, Paranoia, Hepatitis C, PE, and Hip Osteomyelitis.  Patient presented to the ED yesterday c/o chest pain and recent methamphetamine use. Cardiac work-up was negative with a normal EKG and troponin.  When told that he was going to be discharged home he threatened to go outside and throw himself in front of a bus.  He apparently has been sober for 7-8 months and relapsed the day prior to admission while visiting his parents in [place].  In addition to using meth, he admitted to using an increased dose of Ambien that is  "apparently prescribed to him.  He has been having auditory hallucinations and is paranoid.  [facility] evaluated him in the ED and found him to be unstable with acute psychosis and suicidal ideations.  They recommend psychiatric evaluation at [facility] involuntarily.  The  to facilitate this is not available until tomorrow at 8am.  The patient did agree voluntarily to a Geodon 10 mg IM injection.  He had a WBC 16.9, temperature of 99.8, pulse 106 and respirations of 18 with a blood pressure of 132/87.  CK was 484, Troponin <0.010. CXR was unremarkable.  UDS + Methamphetamine, Amphetamine, Benzos and TCAs. He was admitted into the ICU for closer monitoring.      Of note, he had a negative HIV in 2/2019, and there is a prior record from 7/2015 that states he has a h/o taking Truvada due to an unfaithful male partner.  Additional history obtained from the patient's nurse in the ICU indicated that 2 to 3 days ago he pushed a pill bottle up his rectum and has not been able to pass it.    Hospital Course:  Following ICU admission he was placed on empiric Zosyn. KUB demonstrated a rectal foreign body (consistent with pill bottle) - he was given a bottle of magnesium citrate and passed it successfully. Overnight the patient had difficulty sleeping/persistent auditory hallucinations despite multiple medications (600 mg seroquel, 50 mg trazodone, 5 mg melatonin, 50 mg benadryl, and 1 mg ativan).     The following day the patient was re-assessed by the [facility] and was determined to not be suicidal, but rather attributed his symptoms to relapse on Meth. He was agreeable to intensive outpatient program at the [facility] starting tomorrow. He will go home today and [facility] will provide lyft ride for transportation.     Vitals:  BP (!) 88/65   Pulse 96   Temp 98.1 °F (36.7 °C) (Oral)   Resp 18   Ht 172.7 cm (68\")   Wt 62.8 kg (138 lb 7.2 oz)   SpO2 95%   BMI 21.05 kg/m²     Physical Exam:  General Appearance:  Conversant, in " no acute distress  Eyes:  No scleral icterus or pallor, pupils normal  Ears, Nose, Mouth, Throat:  Atraumatic, oropharynx clear  Neck:  Trachea midline, thyroid normal  Respiratory:  Clear to auscultation bilaterally, normal effort, no tenderness to palpation  Cardiovascular:  Regular rate and rhythm, no murmurs, no peripheral edema, no thrill  Gastrointestinal:  Soft, non-tender, non-distended, no hepatosplenomegaly  Skin:  Normal temperature, no rash  Psychiatric:  Alert, poor insight  Neuro:  No new focal neurologic deficits observed    Labs:  Results from last 7 days   Lab Units 03/09/20  0358   WBC 10*3/mm3 9.18   HEMOGLOBIN g/dL 13.1   HEMATOCRIT % 39.1   PLATELETS 10*3/mm3 196     Results from last 7 days   Lab Units 03/09/20  0358   SODIUM mmol/L 142   POTASSIUM mmol/L 3.3*   CHLORIDE mmol/L 101   CO2 mmol/L 26.0   BUN mg/dL 11   CREATININE mg/dL 0.74*   CALCIUM mg/dL 9.2   BILIRUBIN mg/dL 0.5   ALK PHOS U/L 53   ALT (SGPT) U/L 21   AST (SGOT) U/L 25   GLUCOSE mg/dL 125*         Magnesium   Date Value Ref Range Status   03/09/2020 2.1 1.6 - 2.6 mg/dL Final     Phosphorus   Date Value Ref Range Status   03/09/2020 2.7 2.5 - 4.5 mg/dL Final           Discharge Medications:     Discharge Medications      New Medications      Instructions Start Date   PHARMACY MEDS TO BED CONSULT   Does not apply, Daily         Continue These Medications      Instructions Start Date   albuterol sulfate  (90 Base) MCG/ACT inhaler  Commonly known as:  PROVENTIL HFA;VENTOLIN HFA;PROAIR HFA   2 puffs, Inhalation, Every 6 Hours PRN      buprenorphine-naloxone 8-2 MG per SL tablet  Commonly known as:  SUBOXONE  Notes to patient:  TOMORROW 3/10   No dose, route, or frequency recorded.      cloNIDine 0.3 MG tablet  Commonly known as:  CATAPRES   0.3 mg, Oral, Nightly      DESCOVY 200-25 MG per tablet  Generic drug:  Emtricitabine-Tenofovir AF   1 tablet, Oral, Daily      escitalopram 20 MG tablet  Commonly known as:  LEXAPRO   20  mg, Oral, Daily      QUEtiapine 300 MG tablet  Commonly known as:  SEROquel   600 mg, Oral, Nightly      traZODone 100 MG tablet  Commonly known as:  DESYREL   100 mg, Oral, Nightly           Discharge Diet:   Diet Instructions     Diet: Regular; Thin      Discharge Diet:  Regular    Fluid Consistency:  Thin        Activity at Discharge:    Activity Instructions     Activity as Tolerated          Follow-up Appointments  Future Appointments   Date Time Provider Department Center   3/10/2020  8:30 AM Corbin Waller MD [facility] None     Additional Instructions for the Follow-ups that You Need to Schedule     Discharge Follow-up with PCP   As directed       Currently Documented PCP:    Provider, No Known    PCP Phone Number:    None     Follow Up Details:  1 week             Discharge Instructions:  1. Ok to D/C home  2. To begin outpatient rehabilitation program at the [facility] tomorrow    Current Code Status     Date Active Code Status Order ID Comments User Context       3/8/2020 1849 CPR [id number]  Saadia Live APRN Inpatient       Questions for Current Code Status     Question Answer Comment    Code Status CPR     Medical Interventions (Level of Support Prior to Arrest) Full         Elizabeth Brannon DNP, APRN, AGAP-BC  Pulmonary and Critical Care Medicine  03/09/20     Time: Discharge 30 min    CC: Provider, No Known    *Please note that portions of this note were completed with a voice recognition program. Efforts were made to edit the dictations, but occasionally words are mistranscribed.

## 2020-03-11 NOTE — PROGRESS NOTES
Continued Stay Note  [facility]     Patient Name: Barak Rivera  MRN: [id number]  Today's Date: 3/11/2020    Admit Date: 3/8/2020    Discharge Plan     Row Name 03/11/20 1538       Plan    Plan Hospital Admission/SW consult in am    Provided Post Acute Provider List? N/A    Provided Post Acute Provider Quality & Resource List? N/A    Patient/Family in Agreement with Plan unable to assess    Plan Comments Received call from ED staff on 3/9/20 @ 4:30pm.  Patient presented in ED earlier in day with c/o meth relapse previous evening.  Exhibiting paranoia, hallucinations, agitation and threatening to leave hospital and run into traffic.  [facility] evaluation was completed in ED-recommended 72 hour hold with involuntary petition to [facility] for acute psychosis with suicidal ideation.  Unfortunately  unavailable to sign petition until 3/10/20 @ 8:00am.  Case discussed at length with [facility], ED nursing staff and CONTRERAS Jimenez. Plan hospital admission with the following:  -72 hour hold/chemical restraints; -UDS, informed lab lost prior sample, will need to know if paranoid state drug induced; SW consult in am.  [address], [phone]    Final Discharge Disposition Code 30 - still a patient        Discharge Codes    No documentation.       Expected Discharge Date and Time     Expected Discharge Date Expected Discharge Time    Mar 9, 2020             OFE Contreras

## 2020-04-28 DIAGNOSIS — Z00.6 EXAMINATION FOR NORMAL COMPARISON FOR CLINICAL RESEARCH: Primary | ICD-10-CM

## 2020-06-17 ENCOUNTER — TELEHEALTH PROVIDED OTHER THAN IN PATIENT'S HOME (OUTPATIENT)
Dept: URBAN - METROPOLITAN AREA TELEHEALTH 1 | Facility: TELEHEALTH | Age: 40
End: 2020-06-17
Payer: MEDICAID

## 2020-06-17 DIAGNOSIS — K59.09 OTHER CONSTIPATION: ICD-10-CM

## 2020-06-17 PROCEDURE — 99203 OFFICE O/P NEW LOW 30 MIN: CPT | Mod: 95 | Performed by: NURSE PRACTITIONER

## 2020-06-17 RX ORDER — PRUCALOPRIDE 2 MG/1
2 TABLET, FILM COATED ORAL
Qty: 30 | Refills: 11 | Status: ACTIVE
Start: 2020-06-17

## 2020-07-15 ENCOUNTER — HOSPITAL ENCOUNTER (EMERGENCY)
Facility: HOSPITAL | Age: 40
Discharge: HOME | End: 2020-07-15
Attending: EMERGENCY MEDICINE | Admitting: EMERGENCY MEDICINE

## 2020-07-15 VITALS
HEIGHT: 68 IN | BODY MASS INDEX: 24.25 KG/M2 | WEIGHT: 160 LBS | OXYGEN SATURATION: 97 % | DIASTOLIC BLOOD PRESSURE: 98 MMHG | HEART RATE: 102 BPM | RESPIRATION RATE: 16 BRPM | SYSTOLIC BLOOD PRESSURE: 132 MMHG | TEMPERATURE: 98.3 F

## 2020-07-15 DIAGNOSIS — R44.0 AUDITORY HALLUCINATION: ICD-10-CM

## 2020-07-15 DIAGNOSIS — F15.10 METHAMPHETAMINE ABUSE (HCC): Primary | ICD-10-CM

## 2020-07-15 DIAGNOSIS — F22 PARANOIA (HCC): ICD-10-CM

## 2020-07-15 DIAGNOSIS — R44.1 HALLUCINATIONS, VISUAL: ICD-10-CM

## 2020-07-15 LAB
ALBUMIN SERPL-MCNC: 4.9 G/DL (ref 3.5–5.2)
ALBUMIN/GLOB SERPL: 2 G/DL
ALP SERPL-CCNC: 57 U/L (ref 39–117)
ALT SERPL W P-5'-P-CCNC: 22 U/L (ref 1–41)
AMPHET+METHAMPHET UR QL: POSITIVE
AMPHETAMINES UR QL: POSITIVE
ANION GAP SERPL CALCULATED.3IONS-SCNC: 12 MMOL/L (ref 5–15)
APAP SERPL-MCNC: <5 MCG/ML (ref 10–30)
AST SERPL-CCNC: 22 U/L (ref 1–40)
BARBITURATES UR QL SCN: NEGATIVE
BASOPHILS # BLD AUTO: 0.03 10*3/MM3 (ref 0–0.2)
BASOPHILS NFR BLD AUTO: 0.3 % (ref 0–1.5)
BENZODIAZ UR QL SCN: POSITIVE
BILIRUB SERPL-MCNC: 0.4 MG/DL (ref 0–1.2)
BILIRUB UR QL STRIP: NEGATIVE
BUN SERPL-MCNC: 21 MG/DL (ref 6–20)
BUN/CREAT SERPL: 24.1 (ref 7–25)
BUPRENORPHINE SERPL-MCNC: POSITIVE NG/ML
CALCIUM SPEC-SCNC: 9 MG/DL (ref 8.6–10.5)
CANNABINOIDS SERPL QL: NEGATIVE
CHLORIDE SERPL-SCNC: 99 MMOL/L (ref 98–107)
CLARITY UR: CLEAR
CO2 SERPL-SCNC: 25 MMOL/L (ref 22–29)
COCAINE UR QL: NEGATIVE
COLOR UR: YELLOW
CREAT SERPL-MCNC: 0.87 MG/DL (ref 0.76–1.27)
DEPRECATED RDW RBC AUTO: 44.5 FL (ref 37–54)
EOSINOPHIL # BLD AUTO: 0.01 10*3/MM3 (ref 0–0.4)
EOSINOPHIL NFR BLD AUTO: 0.1 % (ref 0.3–6.2)
ERYTHROCYTE [DISTWIDTH] IN BLOOD BY AUTOMATED COUNT: 13.3 % (ref 12.3–15.4)
ETHANOL BLD-MCNC: <10 MG/DL (ref 0–10)
GFR SERPL CREATININE-BSD FRML MDRD: 97 ML/MIN/1.73
GLOBULIN UR ELPH-MCNC: 2.5 GM/DL
GLUCOSE SERPL-MCNC: 114 MG/DL (ref 65–99)
GLUCOSE UR STRIP-MCNC: NEGATIVE MG/DL
HCT VFR BLD AUTO: 41 % (ref 37.5–51)
HGB BLD-MCNC: 13.8 G/DL (ref 13–17.7)
HGB UR QL STRIP.AUTO: NEGATIVE
IMM GRANULOCYTES # BLD AUTO: 0.05 10*3/MM3 (ref 0–0.05)
IMM GRANULOCYTES NFR BLD AUTO: 0.5 % (ref 0–0.5)
KETONES UR QL STRIP: NEGATIVE
LEUKOCYTE ESTERASE UR QL STRIP.AUTO: NEGATIVE
LYMPHOCYTES # BLD AUTO: 2.28 10*3/MM3 (ref 0.7–3.1)
LYMPHOCYTES NFR BLD AUTO: 22.9 % (ref 19.6–45.3)
MCH RBC QN AUTO: 30.7 PG (ref 26.6–33)
MCHC RBC AUTO-ENTMCNC: 33.7 G/DL (ref 31.5–35.7)
MCV RBC AUTO: 91.3 FL (ref 79–97)
METHADONE UR QL SCN: NEGATIVE
MONOCYTES # BLD AUTO: 0.93 10*3/MM3 (ref 0.1–0.9)
MONOCYTES NFR BLD AUTO: 9.3 % (ref 5–12)
NEUTROPHILS NFR BLD AUTO: 6.67 10*3/MM3 (ref 1.7–7)
NEUTROPHILS NFR BLD AUTO: 66.9 % (ref 42.7–76)
NITRITE UR QL STRIP: NEGATIVE
NRBC BLD AUTO-RTO: 0 /100 WBC (ref 0–0.2)
OPIATES UR QL: NEGATIVE
OXYCODONE UR QL SCN: NEGATIVE
PCP UR QL SCN: NEGATIVE
PH UR STRIP.AUTO: 5.5 [PH] (ref 5–8)
PLATELET # BLD AUTO: 243 10*3/MM3 (ref 140–450)
PMV BLD AUTO: 8.4 FL (ref 6–12)
POTASSIUM SERPL-SCNC: 3.6 MMOL/L (ref 3.5–5.2)
PROPOXYPH UR QL: NEGATIVE
PROT SERPL-MCNC: 7.4 G/DL (ref 6–8.5)
PROT UR QL STRIP: ABNORMAL
RBC # BLD AUTO: 4.49 10*6/MM3 (ref 4.14–5.8)
SALICYLATES SERPL-MCNC: 0.5 MG/DL
SODIUM SERPL-SCNC: 136 MMOL/L (ref 136–145)
SP GR UR STRIP: >=1.03 (ref 1–1.03)
TRICYCLICS UR QL SCN: POSITIVE
UROBILINOGEN UR QL STRIP: ABNORMAL
WBC # BLD AUTO: 9.97 10*3/MM3 (ref 3.4–10.8)

## 2020-07-15 PROCEDURE — 96374 THER/PROPH/DIAG INJ IV PUSH: CPT

## 2020-07-15 PROCEDURE — 99284 EMERGENCY DEPT VISIT MOD MDM: CPT

## 2020-07-15 PROCEDURE — 80307 DRUG TEST PRSMV CHEM ANLYZR: CPT | Performed by: NURSE PRACTITIONER

## 2020-07-15 PROCEDURE — 80053 COMPREHEN METABOLIC PANEL: CPT | Performed by: NURSE PRACTITIONER

## 2020-07-15 PROCEDURE — 81003 URINALYSIS AUTO W/O SCOPE: CPT | Performed by: NURSE PRACTITIONER

## 2020-07-15 PROCEDURE — 25010000002 LORAZEPAM PER 2 MG: Performed by: EMERGENCY MEDICINE

## 2020-07-15 PROCEDURE — 85025 COMPLETE CBC W/AUTO DIFF WBC: CPT | Performed by: NURSE PRACTITIONER

## 2020-07-15 RX ORDER — CLONAZEPAM 1 MG/1
2 TABLET ORAL 3 TIMES DAILY PRN
Status: ON HOLD | COMMUNITY
End: 2021-06-22

## 2020-07-15 RX ORDER — LORAZEPAM 2 MG/ML
0.5 INJECTION INTRAMUSCULAR ONCE
Status: COMPLETED | OUTPATIENT
Start: 2020-07-15 | End: 2020-07-15

## 2020-07-15 RX ORDER — HALOPERIDOL 5 MG/1
5 TABLET ORAL EVERY 6 HOURS PRN
Status: DISCONTINUED | OUTPATIENT
Start: 2020-07-15 | End: 2020-07-15 | Stop reason: HOSPADM

## 2020-07-15 RX ADMIN — SODIUM CHLORIDE 1000 ML: 9 INJECTION, SOLUTION INTRAVENOUS at 08:41

## 2020-07-15 RX ADMIN — LORAZEPAM 0.5 MG: 2 INJECTION INTRAMUSCULAR; INTRAVENOUS at 08:42

## 2020-07-15 RX ADMIN — HALOPERIDOL 5 MG: 5 TABLET ORAL at 10:42

## 2020-07-15 NOTE — PROGRESS NOTES
Continued Stay Note  [facility]     Patient Name: Barak Rivera  MRN: [id number]  Today's Date: 7/15/2020    Admit Date: 7/15/2020    Discharge Plan     Row Name 07/15/20 1048       Plan    Plan Home    Plan Comments Spoke with patient at bedside. He believes that the police are looking for him; however, he does not know why. He stated that he has been to treatment 15 times. He is aware of the services offered through [facility] and also asked about [facility] in [place]. He said that he is prescribed four controlled substances and he takes them as directed. When he is around meth he can't say no. He asked about a dose of Haldol as he believed that would help him. Feli RN, spoke with the provider agreed to this. The patient is waiting to receive the medication and will then be discharged.    Patient reported that he was brought to the hospital by his roommate. His roommate returned home with the patient's car. The patient stated that he was going to call an Uber to get back home.         Discharge Codes    No documentation.             Saadia Ortez   [facility], Chemical Dependency Team  Ext[phone]

## 2020-07-15 NOTE — ED PROVIDER NOTES
"Subjective   Patient is a 43-year-old white male presents to the ER today with complaints of visual hallucinations, and anxiety.  Tells me that he had been off meth for 3 weeks and snorted meth last night, and now is having visual hallucinations of police officers chasing him.  Denies any suicidal ideation, or homicidal ideation.  Is requesting \"Haldol and Ativan\" as he tells me he has missed his Klonopin doses recently.  States that he sees a therapist and psychiatry through [facility], and tells me he has been taking his psych meds as directed.  Denies any alcohol use.  Tells me he wishes to get clean, and is requesting to talk to someone regarding outpatient rehab      History provided by:  Patient  Addiction Problem   Chronicity:  Chronic  Context:  Snorting meth  Relieved by:  Nothing  Worsened by:  Nothing  Associated symptoms: no abdominal pain, no cough, no nausea, no shortness of breath and no vomiting    Associated symptoms comment:  Visual hallucinations      Review of Systems   Respiratory: Negative for cough and shortness of breath.    Gastrointestinal: Negative for abdominal pain, nausea and vomiting.   Neurological: Negative for dizziness, syncope and light-headedness.   Psychiatric/Behavioral: Positive for hallucinations. The patient is nervous/anxious.    All other systems reviewed and are negative.      Past Medical History:   Diagnosis Date   • ADD (attention deficit disorder)    • Anogenital HPV infection    • Anxiety    • Bipolar disorder (CMS/HCC)    • Condyloma acuminatum in male    • Constipation    • Depression    • Fibromyalgia    • Hepatitis B    • Hepatitis C    • Migraines    • Osteomyelitis hip (CMS/HCC)    • Osteoporosis    • Paranoia (CMS/HCC)    • Suicidal ideations    • Urinary tract infection        Allergies   Allergen Reactions   • Phenobarbital Nausea And Vomiting       Past Surgical History:   Procedure Laterality Date   • BACK SURGERY  09/22/2016   • HAND SURGERY  " 06/2016       Family History   Problem Relation Age of Onset   • Hypertension Other    • Diabetes Other    • Depression Other        Social History     Socioeconomic History   • Marital status: Single     Spouse name: Not on file   • Number of children: Not on file   • Years of education: Not on file   • Highest education level: Not on file   Tobacco Use   • Smoking status: Current Every Day Smoker     Types: Cigarettes   • Smokeless tobacco: Never Used   • Tobacco comment: pt demands nicotine patch, refuses counseling   Substance and Sexual Activity   • Alcohol use: No   • Drug use: Yes     Types: Methamphetamines, Amphetamines, Benzodiazepines     Comment: TCA   • Sexual activity: Yes     Partners: Male           Objective   Physical Exam   Constitutional: He is oriented to person, place, and time. He appears well-developed and well-nourished.   HENT:   Right Ear: External ear normal.   Left Ear: External ear normal.   Eyes: Pupils are equal, round, and reactive to light. Conjunctivae are normal.   Cardiovascular: Regular rhythm and intact distal pulses. Tachycardia present.   Neurological: He is alert and oriented to person, place, and time.   Skin: Skin is warm and dry. Capillary refill takes less than 2 seconds.   Psychiatric: His speech is normal and behavior is normal. His mood appears anxious. He is actively hallucinating (sees police officers walking by). Thought content is paranoid. He expresses no homicidal and no suicidal ideation.   Visual hallucinations   Vitals reviewed.      Procedures           ED Course      No results found for this or any previous visit (from the past 24 hour(s)).  Note: In addition to lab results from this visit, the labs listed above may include labs taken at another facility or during a different encounter within the last 24 hours. Please correlate lab times with ED admission and discharge times for further clarification of the services performed during this visit.    No  "orders to display     Vitals:    07/15/20 0824 07/15/20 0900 07/15/20 0930 07/15/20 1000   BP: 95/65 119/88 128/91 132/98   BP Location: Left arm      Patient Position: Lying      Pulse: (!) 124 105 101 102   Resp: 16      Temp: 98.3 °F (36.8 °C)      TempSrc: Oral      SpO2: 94% 96% 97%    Weight: 72.6 kg (160 lb)      Height: 172.7 cm (68\")        Medications   sodium chloride 0.9 % bolus 1,000 mL (0 mL Intravenous Stopped 7/15/20 0930)   LORazepam (ATIVAN) injection 0.5 mg (0.5 mg Intravenous Given 7/15/20 0842)     ECG/EMG Results (last 24 hours)     ** No results found for the last 24 hours. **        No orders to display          Addiction specialist consulted.  Patient declined inpatient treatment.  Please refer to  for chemical dependency's note.                                 MDM    Final diagnoses:   Methamphetamine abuse (CMS/[facility])   Hallucinations, visual   Auditory hallucination   Paranoia (CMS/[facility])            Tiffanie Tran, APRLAUREN  07/20/20 0752    "

## 2020-07-22 ENCOUNTER — TELEPHONE (OUTPATIENT)
Dept: CARDIAC SURGERY | Facility: CLINIC | Age: 40
End: 2020-07-22

## 2020-10-23 ENCOUNTER — TELEPHONE (OUTPATIENT)
Dept: CARDIAC SURGERY | Facility: CLINIC | Age: 40
End: 2020-10-23

## 2020-11-17 ENCOUNTER — OFFICE VISIT (OUTPATIENT)
Dept: CARDIAC SURGERY | Facility: CLINIC | Age: 40
End: 2020-11-17

## 2020-11-17 VITALS
WEIGHT: 174.4 LBS | BODY MASS INDEX: 25.83 KG/M2 | HEIGHT: 69 IN | DIASTOLIC BLOOD PRESSURE: 69 MMHG | OXYGEN SATURATION: 96 % | TEMPERATURE: 98 F | HEART RATE: 113 BPM | SYSTOLIC BLOOD PRESSURE: 101 MMHG

## 2020-11-17 DIAGNOSIS — Z00.6 EXAMINATION FOR NORMAL COMPARISON FOR CLINICAL RESEARCH: Primary | ICD-10-CM

## 2020-11-17 DIAGNOSIS — J98.6 DIAPHRAGM PARALYSIS: Primary | ICD-10-CM

## 2020-11-17 PROCEDURE — 99203 OFFICE O/P NEW LOW 30 MIN: CPT | Performed by: THORACIC SURGERY (CARDIOTHORACIC VASCULAR SURGERY)

## 2020-11-17 RX ORDER — CRISABOROLE 20 MG/G
OINTMENT TOPICAL
Status: ON HOLD | COMMUNITY
Start: 2020-11-03 | End: 2021-06-22

## 2020-11-17 RX ORDER — PREDNISONE 20 MG/1
TABLET ORAL
Status: ON HOLD | COMMUNITY
Start: 2020-10-30 | End: 2021-01-04

## 2020-11-17 RX ORDER — MODAFINIL 200 MG/1
200 TABLET ORAL DAILY
COMMUNITY
End: 2021-01-03

## 2020-11-17 RX ORDER — ATORVASTATIN CALCIUM 10 MG/1
TABLET, FILM COATED ORAL
COMMUNITY
Start: 2020-10-06 | End: 2021-01-03

## 2020-11-17 RX ORDER — VILAZODONE HYDROCHLORIDE 10 MG/1
TABLET ORAL
Status: ON HOLD | COMMUNITY
Start: 2020-11-11 | End: 2021-06-22

## 2020-11-17 RX ORDER — LORAZEPAM 1 MG/1
TABLET ORAL
Status: ON HOLD | COMMUNITY
Start: 2020-11-04 | End: 2021-01-04

## 2020-11-17 RX ORDER — OLANZAPINE 10 MG/1
10 TABLET, ORALLY DISINTEGRATING ORAL NIGHTLY
Status: ON HOLD | COMMUNITY
Start: 2020-11-09 | End: 2021-06-22

## 2020-11-17 RX ORDER — ERGOCALCIFEROL 1.25 MG/1
50000 CAPSULE ORAL
Status: ON HOLD | COMMUNITY
Start: 2020-11-05 | End: 2021-06-22

## 2020-11-17 RX ORDER — ESZOPICLONE 3 MG/1
3 TABLET, FILM COATED ORAL NIGHTLY
Status: ON HOLD | COMMUNITY
End: 2021-06-22

## 2020-11-17 NOTE — PROGRESS NOTES
"11/17/2020  Patient Information  Barak Rivera                                                                                          [address]  [place] [address]   1980  'PCP/Referring Physician'  Lupe Ramires APRN  [phone]  Juliet Roger*  [phone]  Chief Complaint   Patient presents with   • Consult     Referred by CONTRERAS Yung for paralyzed diaphragm        History of Present Illness: 39-year-old  male with a history of hepatitis B/C, fibromyalgia, chronic constipation and active tobacco abuse who presents with shortness of breath.  For the past year, the patient notes worsening shortness of breath.  His dyspnea is present at night when laying flat or when ambulating approximately 500 feet.  Mr. Rivera lives on the fourth floor with no elevator and its \"a nightmare.\"  He denies any chest pain or trauma within the past year.  The patient has been placed on prednisone a few times over the past year, which is improved his breathing.  Per the patient, he has been treated for pneumonia three times over the last year.    Patient Active Problem List   Diagnosis   • Acute psychosis (CMS/HCC)   • Polysubstance abuse (CMS/HCC)   • Tobacco abuse   • Hepatitis C   • Suicidal ideations   • Anxiety   • Bipolar disorder (CMS/HCC)   • Paranoia (CMS/HCC)   • Hepatitis B   • Foreign body anus/rectum     Past Medical History:   Diagnosis Date   • ADD (attention deficit disorder)    • Anogenital HPV infection    • Anxiety    • Arthritis    • Bipolar disorder (CMS/HCC)    • Condyloma acuminatum in male    • Constipation    • COPD (chronic obstructive pulmonary disease) (CMS/HCC)    • Depression    • Fibromyalgia    • GERD (gastroesophageal reflux disease)    • Hepatitis B    • Hepatitis C    • Hyperlipidemia    • Migraines    • Osteomyelitis hip (CMS/HCC)    • Osteoporosis    • Osteoporosis    • Paranoia (CMS/HCC)    • Suicidal ideations    • Urinary tract infection      Past Surgical " History:   Procedure Laterality Date   • BACK SURGERY  09/22/2016   • HAND SURGERY Right 06/2016       Current Outpatient Medications:   •  albuterol sulfate  (90 Base) MCG/ACT inhaler, Inhale 2 puffs Every 6 (Six) Hours As Needed., Disp: , Rfl:   •  atorvastatin (LIPITOR) 10 MG tablet, , Disp: , Rfl:   •  buprenorphine-naloxone (SUBOXONE) 8-2 MG per SL tablet, , Disp: , Rfl: 0  •  cloNIDine (CATAPRES) 0.3 MG tablet, Take 0.3 mg by mouth Every Night., Disp: , Rfl:   •  DESCOVY 200-25 MG per tablet, Take 1 tablet by mouth Daily., Disp: , Rfl: 5  •  escitalopram (LEXAPRO) 20 MG tablet, Take 20 mg by mouth Daily., Disp: , Rfl:   •  eszopiclone (LUNESTA) 3 MG tablet, Take 3 mg by mouth Every Night. Take immediately before bedtime, Disp: , Rfl:   •  Eucrisa 2 % ointment, , Disp: , Rfl:   •  Flovent Diskus 250 MCG/BLIST aerosol powder , , Disp: , Rfl:   •  fluticasone-salmeterol (Advair Diskus) 250-50 MCG/DOSE DISKUS, Inhale 2 (Two) Times a Day., Disp: , Rfl:   •  linaclotide (LINZESS) 290 MCG capsule capsule, Take 290 mcg by mouth Every Morning Before Breakfast., Disp: , Rfl:   •  LINZESS 145 MCG capsule capsule, , Disp: , Rfl:   •  LORazepam (ATIVAN) 1 MG tablet, , Disp: , Rfl:   •  modafinil (PROVIGIL) 200 MG tablet, Take 200 mg by mouth Daily., Disp: , Rfl:   •  OLANZapine zydis (ZyPREXA) 10 MG disintegrating tablet, , Disp: , Rfl:   •  predniSONE (DELTASONE) 20 MG tablet, , Disp: , Rfl:   •  QUEtiapine (SEROquel) 300 MG tablet, Take 600 mg by mouth Every Night., Disp: , Rfl:   •  QUEtiapine (SEROquel) 300 MG tablet, 2 tabs po qhs, Disp: , Rfl:   •  traZODone (DESYREL) 100 MG tablet, Take 100 mg by mouth Every Night., Disp: , Rfl:   •  Viibryd 10 MG tablet tablet, , Disp: , Rfl:   •  vitamin D (ERGOCALCIFEROL) 1.25 MG (73296 UT) capsule capsule, , Disp: , Rfl:   •  clonazePAM (KlonoPIN) 1 MG tablet, Take 1 mg by mouth 3 (Three) Times a Day As Needed for Seizures., Disp: , Rfl:   •  clonazePAM (KlonoPIN) 1 MG  tablet, 1-2 tabs po daily prn panic attack, Disp: , Rfl:   •  PHARMACY MEDS TO BED CONSULT, Daily., Disp: , Rfl:   Allergies   Allergen Reactions   • Phenobarbital Nausea And Vomiting     Social History     Socioeconomic History   • Marital status: Single     Spouse name: Not on file   • Number of children: 0   • Years of education: Not on file   • Highest education level: Not on file   Occupational History   • Occupation: currently on unemployment   Tobacco Use   • Smoking status: Current Every Day Smoker     Packs/day: 1.00     Years: 24.00     Pack years: 24.00     Types: Cigarettes   • Smokeless tobacco: Never Used   • Tobacco comment: pt demands nicotine patch, refuses counseling   Substance and Sexual Activity   • Alcohol use: No   • Drug use: Yes     Types: Methamphetamines, Amphetamines, Benzodiazepines     Comment: TCA   • Sexual activity: Yes     Partners: Male   Social History Narrative    Lives in [place], [place] alone    Also has resident in [place], [place] with a roommate     Family History   Problem Relation Age of Onset   • Hypertension Other    • Diabetes Other    • Depression Other    • Heart attack Mother    • Anxiety disorder Father    • Depression Father      Review of Systems   Constitution: Negative for chills, fever, malaise/fatigue, night sweats and weight loss.   HENT: Negative for ear pain, hearing loss, odynophagia and sore throat.    Cardiovascular: Positive for chest pain, dyspnea on exertion, orthopnea and paroxysmal nocturnal dyspnea. Negative for claudication, leg swelling, palpitations and syncope.   Respiratory: Positive for shortness of breath, sputum production and wheezing. Negative for cough and hemoptysis.    Endocrine: Negative for cold intolerance, heat intolerance, polydipsia, polyphagia and polyuria.   Hematologic/Lymphatic: Does not bruise/bleed easily.   Skin: Negative for itching and rash.   Musculoskeletal: Negative for joint pain, joint swelling, myalgias and neck pain.  "  Gastrointestinal: Negative for abdominal pain, constipation, diarrhea, hematemesis, hematochezia, melena, nausea and vomiting.   Genitourinary: Negative for dysuria, frequency and hematuria.   Neurological: Negative for focal weakness, headaches, numbness and seizures.   Psychiatric/Behavioral: Positive for depression. Negative for suicidal ideas. The patient is nervous/anxious.    All other systems reviewed and are negative.    Vitals:    11/17/20 0917   BP: 101/69   BP Location: Right arm   Patient Position: Sitting   Pulse: 113   Temp: 98 °F (36.7 °C)   SpO2: 96%   Weight: 79.1 kg (174 lb 6.4 oz)   Height: 175.3 cm (69\")      Physical Exam  Constitutional:       General: He is not in acute distress.     Appearance: He is well-developed. He is not diaphoretic.      Comments:  male who appears stated age   HENT:      Head: Normocephalic and atraumatic.   Eyes:      General: No scleral icterus.     Conjunctiva/sclera: Conjunctivae normal.   Neck:      Musculoskeletal: Neck supple.      Vascular: No JVD.      Trachea: No tracheal deviation.      Comments: No carotid bruits bilaterally  Cardiovascular:      Rate and Rhythm: Normal rate and regular rhythm.      Heart sounds: Normal heart sounds. No murmur. No friction rub. No gallop.    Pulmonary:      Effort: Pulmonary effort is normal. No respiratory distress.      Breath sounds: Normal breath sounds. No wheezing or rales.   Abdominal:      General: There is distension.      Palpations: Abdomen is soft. There is no mass.      Tenderness: There is no abdominal tenderness. There is no guarding or rebound.   Musculoskeletal: Normal range of motion.   Skin:     General: Skin is warm and dry.      Findings: No erythema or rash.   Neurological:      Mental Status: He is alert and oriented to person, place, and time.   Psychiatric:         Behavior: Behavior normal.         Thought Content: Thought content normal.         Judgment: Judgment normal.         The " ROS, past medical history, surgical history, family history, social history and vitals were reviewed by myself and corrected as needed.      Labs/Imaging:  -CT of the thoracic spine performed 1/29/2018, personally reviewed, demonstrates T7 body compression fracture with retropulsion into the central canal and neuroforaminal narrowing.  Spinal hardware is present from T4-T10.  -Sniff test performed 2/18/2020, per report (images unavailable from [facility]), demonstrates normal appearance and motion of the left hemidiaphragm.  The right hemidiaphragm is elevated and does not move inferiorly.  -CT of the chest performed 11/10/2020, personally reviewed, demonstrates elevation of the right hemidiaphragm.  Massive stool burden within the abdomen  -Pulmonary function test performed 1/10/2020, forced vital capacity 2.93 (59% predicted), FEV1 2.51 (63% predicted), DLCO 54% predicted.    Assessment/Plan:   39-year-old  male with a history of hepatitis B/C, fibromyalgia, chronic constipation and active tobacco abuse who presents with shortness of breath.  The patient does not have a clear etiology for diaphragm paralysis.  He has not had recent surgery or trauma to the chest.  Obviously, there are other causes of diaphragm paralysis including viral inflammation, idiopathic cases and neuropathic diseases.  Overall, I am most concerned that the patient has significant abdominal distention and severe constipation that may simply be the etiology for his diaphragm elevation.  I recommended that the patient be more aggressive with his bowel regimen and repeat a sniff test.  I will have the patient return to clinic after this is been performed.    Patient Active Problem List   Diagnosis   • Acute psychosis (CMS/HCC)   • Polysubstance abuse (CMS/HCC)   • Tobacco abuse   • Hepatitis C   • Suicidal ideations   • Anxiety   • Bipolar disorder (CMS/HCC)   • Paranoia (CMS/HCC)   • Hepatitis B   • Foreign body anus/rectum                         Answers for HPI/ROS submitted by the patient on 11/9/2020   Shortness of breath  What is the primary reason for your visit?: Shortness of Breath  Chronicity: chronic  Onset: more than 1 month ago  Frequency: daily  Progression since onset: gradually worsening  Episode duration: 1 hours  coryza: No  leg pain: No  rhinorrhea: Yes  swollen glands: Yes  Aggravating factors: any activity, lying flat

## 2020-11-23 PROBLEM — J98.6 DIAPHRAGM PARALYSIS: Status: ACTIVE | Noted: 2020-11-23

## 2020-12-02 ENCOUNTER — APPOINTMENT (OUTPATIENT)
Dept: GENERAL RADIOLOGY | Facility: HOSPITAL | Age: 40
End: 2020-12-02

## 2020-12-14 ENCOUNTER — HOSPITAL ENCOUNTER (OUTPATIENT)
Dept: GENERAL RADIOLOGY | Facility: HOSPITAL | Age: 40
Discharge: HOME | End: 2020-12-14
Admitting: THORACIC SURGERY (CARDIOTHORACIC VASCULAR SURGERY)

## 2020-12-14 PROCEDURE — 76000 FLUOROSCOPY <1 HR PHYS/QHP: CPT

## 2020-12-16 ENCOUNTER — TELEPHONE (OUTPATIENT)
Dept: CARDIAC SURGERY | Facility: CLINIC | Age: 40
End: 2020-12-16

## 2020-12-16 NOTE — TELEPHONE ENCOUNTER
Pt called the office stating he needed to get into the office sooner due to his breathing. He stated he can not breath when laying down at all. His next f/u is with MI on 1/5/21 w/ a sniff test. Which he was asking the results of this when on the phone, explained to him we can not give that over the phone. He is very concerned about his breathing but there is not sooner appt due to the holidays. I told him I would send you a message about this, can you look over his sniff test please to see if he should be seen sooner?

## 2020-12-29 ENCOUNTER — OFFICE VISIT (OUTPATIENT)
Dept: CARDIAC SURGERY | Facility: CLINIC | Age: 40
End: 2020-12-29

## 2020-12-29 VITALS
OXYGEN SATURATION: 97 % | WEIGHT: 179 LBS | HEART RATE: 133 BPM | SYSTOLIC BLOOD PRESSURE: 112 MMHG | HEIGHT: 69 IN | BODY MASS INDEX: 26.51 KG/M2 | TEMPERATURE: 97.5 F | DIASTOLIC BLOOD PRESSURE: 84 MMHG

## 2020-12-29 DIAGNOSIS — J98.6 DIAPHRAGM PARALYSIS: Primary | ICD-10-CM

## 2020-12-29 PROCEDURE — 99214 OFFICE O/P EST MOD 30 MIN: CPT | Performed by: THORACIC SURGERY (CARDIOTHORACIC VASCULAR SURGERY)

## 2020-12-29 NOTE — PROGRESS NOTES
12/29/2020  Patient Information  Barak Rivera                                                                                          [address]  [place] [address]   1980  'PCP/Referring Physician'  Lupe Ramires, APRN  [phone]  No ref. provider found    Chief Complaint   Patient presents with   • Follow-up     Follow up with Sniff Test, very SOB when lying down, when walking any distance pt gets very SOB and light headed. Pt states that he is lightheaded, dizzy and very anxious because he can't breath. He is sleeping while setting up straight in his recliner.        History of Present Illness: 40-year-old  male with a history of hepatitis B/C, fibromyalgia, chronic constipation and active tobacco abuse who returns to clinic with shortness of breath.  Mr. Rivera notes that his dyspnea is worsening.  He is currently on oxygen at home and is unable to lie flat at night.  Mr. Rivera is miserable and feels as though he cannot continue on with this worsening shortness of breath.  He denies any fevers, but does have a cough.      Patient Active Problem List   Diagnosis   • Acute psychosis (CMS/HCC)   • Polysubstance abuse (CMS/HCC)   • Tobacco abuse   • Hepatitis C   • Suicidal ideations   • Anxiety   • Bipolar disorder (CMS/HCC)   • Paranoia (CMS/HCC)   • Hepatitis B   • Foreign body anus/rectum   • Diaphragm paralysis     Past Medical History:   Diagnosis Date   • ADD (attention deficit disorder)    • Anogenital HPV infection    • Anxiety    • Arthritis    • Bipolar disorder (CMS/HCC)    • Condyloma acuminatum in male    • Constipation    • COPD (chronic obstructive pulmonary disease) (CMS/HCC)    • Depression    • Fibromyalgia    • GERD (gastroesophageal reflux disease)    • Hepatitis B    • Hepatitis C    • Hyperlipidemia    • Migraines    • Osteomyelitis hip (CMS/HCC)    • Osteoporosis    • Osteoporosis    • Paranoia (CMS/HCC)    • Suicidal ideations    • Urinary tract infection      Past Surgical  History:   Procedure Laterality Date   • BACK SURGERY  09/22/2016   • HAND SURGERY Right 06/2016       Current Outpatient Medications:   •  albuterol sulfate  (90 Base) MCG/ACT inhaler, Inhale 2 puffs Every 6 (Six) Hours As Needed., Disp: , Rfl:   •  atorvastatin (LIPITOR) 10 MG tablet, , Disp: , Rfl:   •  buprenorphine-naloxone (SUBOXONE) 8-2 MG per SL tablet, , Disp: , Rfl: 0  •  clonazePAM (KlonoPIN) 1 MG tablet, Take 1 mg by mouth 3 (Three) Times a Day As Needed for Seizures., Disp: , Rfl:   •  clonazePAM (KlonoPIN) 1 MG tablet, 1-2 tabs po daily prn panic attack, Disp: , Rfl:   •  cloNIDine (CATAPRES) 0.3 MG tablet, Take 0.3 mg by mouth Every Night., Disp: , Rfl:   •  DESCOVY 200-25 MG per tablet, Take 1 tablet by mouth Daily., Disp: , Rfl: 5  •  escitalopram (LEXAPRO) 20 MG tablet, Take 20 mg by mouth Daily., Disp: , Rfl:   •  eszopiclone (LUNESTA) 3 MG tablet, Take 3 mg by mouth Every Night. Take immediately before bedtime, Disp: , Rfl:   •  Eucrisa 2 % ointment, , Disp: , Rfl:   •  Flovent Diskus 250 MCG/BLIST aerosol powder , , Disp: , Rfl:   •  fluticasone-salmeterol (Advair Diskus) 250-50 MCG/DOSE DISKUS, Inhale 2 (Two) Times a Day., Disp: , Rfl:   •  linaclotide (LINZESS) 290 MCG capsule capsule, Take 290 mcg by mouth Every Morning Before Breakfast., Disp: , Rfl:   •  LINZESS 145 MCG capsule capsule, , Disp: , Rfl:   •  LORazepam (ATIVAN) 1 MG tablet, , Disp: , Rfl:   •  modafinil (PROVIGIL) 200 MG tablet, Take 200 mg by mouth Daily., Disp: , Rfl:   •  OLANZapine zydis (ZyPREXA) 10 MG disintegrating tablet, , Disp: , Rfl:   •  PHARMACY MEDS TO BED CONSULT, Daily., Disp: , Rfl:   •  predniSONE (DELTASONE) 20 MG tablet, , Disp: , Rfl:   •  QUEtiapine (SEROquel) 300 MG tablet, Take 600 mg by mouth Every Night., Disp: , Rfl:   •  QUEtiapine (SEROquel) 300 MG tablet, 2 tabs po qhs, Disp: , Rfl:   •  traZODone (DESYREL) 100 MG tablet, Take 100 mg by mouth Every Night., Disp: , Rfl:   •  Viibryd 10 MG  tablet tablet, , Disp: , Rfl:   •  vitamin D (ERGOCALCIFEROL) 1.25 MG (08554 UT) capsule capsule, , Disp: , Rfl:   Allergies   Allergen Reactions   • Phenobarbital Nausea And Vomiting     Social History     Socioeconomic History   • Marital status: Single     Spouse name: Not on file   • Number of children: 0   • Years of education: Not on file   • Highest education level: Not on file   Occupational History   • Occupation: currently on unemployment   Tobacco Use   • Smoking status: Light Tobacco Smoker     Packs/day: 0.25     Years: 24.00     Pack years: 6.00     Types: Cigarettes   • Smokeless tobacco: Never Used   • Tobacco comment: pt demands nicotine patch, refuses counseling, Pt is smoking 5 cigs   Substance and Sexual Activity   • Alcohol use: No   • Drug use: Yes     Types: Methamphetamines, Amphetamines, Benzodiazepines     Comment: TCA   • Sexual activity: Yes     Partners: Male   Social History Narrative    Lives in [place], [place] alone    Also has resident in [place], [place] with a roommate     Family History   Problem Relation Age of Onset   • Hypertension Other    • Diabetes Other    • Depression Other    • Heart attack Mother    • Anxiety disorder Father    • Depression Father      Review of Systems   Constitution: Positive for malaise/fatigue and weight gain. Negative for chills, fever, night sweats and weight loss.   HENT: Negative for congestion, hearing loss, nosebleeds and odynophagia.    Cardiovascular: Positive for dyspnea on exertion. Negative for chest pain, claudication, leg swelling, orthopnea, palpitations and syncope.   Respiratory: Positive for cough, shortness of breath and wheezing. Negative for hemoptysis.    Endocrine: Negative for cold intolerance, heat intolerance, polydipsia, polyphagia and polyuria.   Hematologic/Lymphatic: Bruises/bleeds easily.   Skin: Negative for itching, poor wound healing and rash.   Musculoskeletal: Positive for arthritis (osteo), back pain, joint pain (right hand)  "and muscle cramps (chest just feels worn out). Negative for joint swelling and myalgias.   Gastrointestinal: Negative for abdominal pain, constipation, diarrhea, hematemesis, melena, nausea and vomiting.   Genitourinary: Positive for frequency and urgency. Negative for dysuria, hematuria and nocturia.   Neurological: Positive for dizziness, light-headedness, loss of balance and numbness (right hand).   Psychiatric/Behavioral: Positive for depression. Negative for suicidal ideas. The patient has insomnia and is nervous/anxious.    Allergic/Immunologic: Negative for environmental allergies and HIV exposure.     Vitals:    12/29/20 1240   BP: 112/84   Pulse: (!) 133   Temp: 97.5 °F (36.4 °C)   TempSrc: Temporal   SpO2: 97%   Weight: 81.2 kg (179 lb)   Height: 175.3 cm (69\")      Physical Exam  Vitals signs reviewed.   Constitutional:       General: He is not in acute distress.     Appearance: He is well-developed. He is not diaphoretic.      Comments:  male who appears stated age   HENT:      Head: Normocephalic and atraumatic.   Eyes:      General: No scleral icterus.     Conjunctiva/sclera: Conjunctivae normal.   Neck:      Musculoskeletal: Neck supple.      Vascular: No JVD.      Trachea: No tracheal deviation.      Comments: No carotid bruits bilaterally  Cardiovascular:      Rate and Rhythm: Normal rate and regular rhythm.      Heart sounds: Normal heart sounds. No murmur. No friction rub. No gallop.    Pulmonary:      Effort: Pulmonary effort is normal. No respiratory distress.      Breath sounds: Normal breath sounds. No wheezing or rales.   Abdominal:      General: There is no distension.      Palpations: Abdomen is soft. There is no mass.      Tenderness: There is no abdominal tenderness. There is no guarding or rebound.   Musculoskeletal: Normal range of motion.   Lymphadenopathy:      Cervical: No cervical adenopathy.      Upper Body:      Right upper body: No supraclavicular or axillary adenopathy. "      Left upper body: No supraclavicular or axillary adenopathy.   Skin:     General: Skin is warm and dry.      Findings: No erythema or rash.   Neurological:      Mental Status: He is alert and oriented to person, place, and time.   Psychiatric:         Behavior: Behavior normal.         Thought Content: Thought content normal.         Judgment: Judgment normal.         The ROS, past medical history, surgical history, family history, social history and vitals were reviewed by myself and corrected as needed.      Labs/Imaging:  -CT of the thoracic spine performed 1/29/2018, personally reviewed, demonstrates T7 body compression fracture with retropulsion into the central canal and neuroforaminal narrowing.  Spinal hardware is present from T4-T10.  -Sniff test performed 2/18/2020, per report (images unavailable from [facility]), demonstrates normal appearance and motion of the left hemidiaphragm.  The right hemidiaphragm is elevated and does not move inferiorly.  -CT of the chest performed 11/10/2020, personally reviewed, demonstrates elevation of the right hemidiaphragm.  Massive stool burden within the abdomen  -Pulmonary function test performed 1/10/2020, forced vital capacity 2.93 (59% predicted), FEV1 2.51 (63% predicted), DLCO 54% predicted.  -Sniff test performed 12/14/2020, personally reviewed, demonstrates elevation of the right hemidiaphragm with paradoxical movement reflective of paralysis.    Assessment/Plan:  40-year-old  male with a history of hepatitis B/C, fibromyalgia, chronic constipation and active tobacco abuse who returns to clinic with shortness of breath.  Mr. Rivera has symptomatic right diaphragm paralysis.  Imaging has failed to demonstrate any malignancy as the etiology of his phrenic nerve paralysis.  He does have to sniff test to confirm diaphragm paralysis over the past year and decreased FEV1 and forced vital capacity on his pulmonary function studies.  I discussed options  with the patient including continued observation versus surgical diaphragm plication and bronchoscopy.  The risks and benefits of surgery were discussed with the patient including pain, bleeding, infection, continued shortness of breath, myocardial infarction and death.  The patient understood these risks and wished to proceed with surgery.  Repeat pulmonary function studies will be obtained after he has recovered from surgery.    Patient Active Problem List   Diagnosis   • Acute psychosis (CMS/HCC)   • Polysubstance abuse (CMS/HCC)   • Tobacco abuse   • Hepatitis C   • Suicidal ideations   • Anxiety   • Bipolar disorder (CMS/HCC)   • Paranoia (CMS/HCC)   • Hepatitis B   • Foreign body anus/rectum   • Diaphragm paralysis

## 2020-12-30 ENCOUNTER — PREP FOR SURGERY (OUTPATIENT)
Dept: OTHER | Facility: HOSPITAL | Age: 40
End: 2020-12-30

## 2020-12-30 DIAGNOSIS — J98.6 DIAPHRAGM PARALYSIS: Primary | ICD-10-CM

## 2020-12-30 RX ORDER — CHLORHEXIDINE GLUCONATE 500 MG/1
1 CLOTH TOPICAL EVERY 12 HOURS PRN
Status: CANCELLED | OUTPATIENT
Start: 2021-01-03

## 2021-01-03 ENCOUNTER — HOSPITAL ENCOUNTER (OUTPATIENT)
Dept: GENERAL RADIOLOGY | Facility: HOSPITAL | Age: 41
Discharge: HOME | End: 2021-01-03

## 2021-01-03 ENCOUNTER — HOSPITAL ENCOUNTER (OUTPATIENT)
Dept: PULMONOLOGY | Facility: HOSPITAL | Age: 41
Discharge: HOME | End: 2021-01-03

## 2021-01-03 ENCOUNTER — ANESTHESIA EVENT (OUTPATIENT)
Dept: PERIOP | Facility: HOSPITAL | Age: 41
End: 2021-01-03

## 2021-01-03 ENCOUNTER — APPOINTMENT (OUTPATIENT)
Dept: PREADMISSION TESTING | Facility: HOSPITAL | Age: 41
End: 2021-01-03

## 2021-01-03 VITALS — BODY MASS INDEX: 26.12 KG/M2 | HEIGHT: 69 IN | WEIGHT: 176.37 LBS | OXYGEN SATURATION: 95 %

## 2021-01-03 DIAGNOSIS — J98.6 DIAPHRAGM PARALYSIS: ICD-10-CM

## 2021-01-03 LAB
ABO GROUP BLD: NORMAL
ALBUMIN SERPL-MCNC: 4.3 G/DL (ref 3.5–5.2)
ALP SERPL-CCNC: 90 U/L (ref 39–117)
ALT SERPL W P-5'-P-CCNC: 26 U/L (ref 1–41)
ANION GAP SERPL CALCULATED.3IONS-SCNC: 13 MMOL/L (ref 5–15)
AST SERPL-CCNC: 34 U/L (ref 1–40)
B PARAPERT DNA SPEC QL NAA+PROBE: NOT DETECTED
B PERT DNA SPEC QL NAA+PROBE: NOT DETECTED
BASOPHILS # BLD AUTO: 0.03 10*3/MM3 (ref 0–0.2)
BASOPHILS NFR BLD AUTO: 0.4 % (ref 0–1.5)
BILIRUB CONJ SERPL-MCNC: 0.3 MG/DL (ref 0–0.3)
BILIRUB INDIRECT SERPL-MCNC: 0.5 MG/DL
BILIRUB SERPL-MCNC: 0.8 MG/DL (ref 0–1.2)
BLD GP AB SCN SERPL QL: NEGATIVE
BUN SERPL-MCNC: 26 MG/DL (ref 6–20)
BUN/CREAT SERPL: 25 (ref 7–25)
C PNEUM DNA NPH QL NAA+NON-PROBE: NOT DETECTED
CALCIUM SPEC-SCNC: 9.4 MG/DL (ref 8.6–10.5)
CHLORIDE SERPL-SCNC: 100 MMOL/L (ref 98–107)
CO2 SERPL-SCNC: 23 MMOL/L (ref 22–29)
CREAT SERPL-MCNC: 1.04 MG/DL (ref 0.76–1.27)
DEPRECATED RDW RBC AUTO: 48.3 FL (ref 37–54)
EOSINOPHIL # BLD AUTO: 0.13 10*3/MM3 (ref 0–0.4)
EOSINOPHIL NFR BLD AUTO: 1.5 % (ref 0.3–6.2)
ERYTHROCYTE [DISTWIDTH] IN BLOOD BY AUTOMATED COUNT: 14.1 % (ref 12.3–15.4)
FLUAV H1 2009 PAND RNA NPH QL NAA+PROBE: NOT DETECTED
FLUAV H1 HA GENE NPH QL NAA+PROBE: NOT DETECTED
FLUAV H3 RNA NPH QL NAA+PROBE: NOT DETECTED
FLUAV SUBTYP SPEC NAA+PROBE: NOT DETECTED
FLUBV RNA ISLT QL NAA+PROBE: NOT DETECTED
GFR SERPL CREATININE-BSD FRML MDRD: 79 ML/MIN/1.73
GLUCOSE SERPL-MCNC: 186 MG/DL (ref 65–99)
HADV DNA SPEC NAA+PROBE: NOT DETECTED
HBA1C MFR BLD: 5.9 % (ref 4.8–5.6)
HCOV 229E RNA SPEC QL NAA+PROBE: NOT DETECTED
HCOV HKU1 RNA SPEC QL NAA+PROBE: NOT DETECTED
HCOV NL63 RNA SPEC QL NAA+PROBE: NOT DETECTED
HCOV OC43 RNA SPEC QL NAA+PROBE: NOT DETECTED
HCT VFR BLD AUTO: 42.8 % (ref 37.5–51)
HGB BLD-MCNC: 14.1 G/DL (ref 13–17.7)
HMPV RNA NPH QL NAA+NON-PROBE: NOT DETECTED
HPIV1 RNA SPEC QL NAA+PROBE: NOT DETECTED
HPIV2 RNA SPEC QL NAA+PROBE: NOT DETECTED
HPIV3 RNA NPH QL NAA+PROBE: NOT DETECTED
HPIV4 P GENE NPH QL NAA+PROBE: NOT DETECTED
IMM GRANULOCYTES # BLD AUTO: 0.05 10*3/MM3 (ref 0–0.05)
IMM GRANULOCYTES NFR BLD AUTO: 0.6 % (ref 0–0.5)
INR PPP: 0.98 (ref 0.85–1.16)
LYMPHOCYTES # BLD AUTO: 2.03 10*3/MM3 (ref 0.7–3.1)
LYMPHOCYTES NFR BLD AUTO: 23.9 % (ref 19.6–45.3)
M PNEUMO IGG SER IA-ACNC: NOT DETECTED
MCH RBC QN AUTO: 30.8 PG (ref 26.6–33)
MCHC RBC AUTO-ENTMCNC: 32.9 G/DL (ref 31.5–35.7)
MCV RBC AUTO: 93.4 FL (ref 79–97)
MONOCYTES # BLD AUTO: 0.48 10*3/MM3 (ref 0.1–0.9)
MONOCYTES NFR BLD AUTO: 5.7 % (ref 5–12)
NEUTROPHILS NFR BLD AUTO: 5.76 10*3/MM3 (ref 1.7–7)
NEUTROPHILS NFR BLD AUTO: 67.9 % (ref 42.7–76)
NRBC BLD AUTO-RTO: 0 /100 WBC (ref 0–0.2)
PLATELET # BLD AUTO: 231 10*3/MM3 (ref 140–450)
PMV BLD AUTO: 8.5 FL (ref 6–12)
POTASSIUM SERPL-SCNC: 3.5 MMOL/L (ref 3.5–5.2)
PROT SERPL-MCNC: 6.6 G/DL (ref 6–8.5)
PROTHROMBIN TIME: 12.7 SECONDS (ref 11.5–14)
RBC # BLD AUTO: 4.58 10*6/MM3 (ref 4.14–5.8)
RH BLD: POSITIVE
RHINOVIRUS RNA SPEC NAA+PROBE: NOT DETECTED
RSV RNA NPH QL NAA+NON-PROBE: NOT DETECTED
SARS-COV-2 RNA NPH QL NAA+NON-PROBE: NOT DETECTED
SODIUM SERPL-SCNC: 136 MMOL/L (ref 136–145)
T&S EXPIRATION DATE: NORMAL
WBC # BLD AUTO: 8.48 10*3/MM3 (ref 3.4–10.8)

## 2021-01-03 PROCEDURE — 80076 HEPATIC FUNCTION PANEL: CPT

## 2021-01-03 PROCEDURE — 94010 BREATHING CAPACITY TEST: CPT | Performed by: INTERNAL MEDICINE

## 2021-01-03 PROCEDURE — 85610 PROTHROMBIN TIME: CPT

## 2021-01-03 PROCEDURE — 86901 BLOOD TYPING SEROLOGIC RH(D): CPT

## 2021-01-03 PROCEDURE — 83036 HEMOGLOBIN GLYCOSYLATED A1C: CPT

## 2021-01-03 PROCEDURE — 94010 BREATHING CAPACITY TEST: CPT

## 2021-01-03 PROCEDURE — C9803 HOPD COVID-19 SPEC COLLECT: HCPCS

## 2021-01-03 PROCEDURE — 93010 ELECTROCARDIOGRAM REPORT: CPT | Performed by: INTERNAL MEDICINE

## 2021-01-03 PROCEDURE — 86850 RBC ANTIBODY SCREEN: CPT

## 2021-01-03 PROCEDURE — 86900 BLOOD TYPING SEROLOGIC ABO: CPT

## 2021-01-03 PROCEDURE — 85025 COMPLETE CBC W/AUTO DIFF WBC: CPT

## 2021-01-03 PROCEDURE — 71046 X-RAY EXAM CHEST 2 VIEWS: CPT

## 2021-01-03 PROCEDURE — 93005 ELECTROCARDIOGRAM TRACING: CPT

## 2021-01-03 PROCEDURE — 0202U NFCT DS 22 TRGT SARS-COV-2: CPT

## 2021-01-03 PROCEDURE — 36415 COLL VENOUS BLD VENIPUNCTURE: CPT

## 2021-01-03 PROCEDURE — 80048 BASIC METABOLIC PNL TOTAL CA: CPT

## 2021-01-03 RX ORDER — CHLORHEXIDINE GLUCONATE 500 MG/1
1 CLOTH TOPICAL EVERY 12 HOURS PRN
Status: ACTIVE | OUTPATIENT
Start: 2021-01-03

## 2021-01-03 RX ORDER — NICOTINE 21 MG/24HR
1 PATCH, TRANSDERMAL 24 HOURS TRANSDERMAL EVERY 24 HOURS
Status: ON HOLD | COMMUNITY
End: 2021-06-22

## 2021-01-03 RX ORDER — ESCITALOPRAM OXALATE 20 MG/1
20 TABLET ORAL DAILY
Status: ON HOLD | COMMUNITY
End: 2021-01-04

## 2021-01-03 RX ORDER — MODAFINIL 100 MG/1
100 TABLET ORAL DAILY
Status: ON HOLD | COMMUNITY
End: 2021-06-22

## 2021-01-03 RX ORDER — FAMOTIDINE 10 MG/ML
20 INJECTION, SOLUTION INTRAVENOUS ONCE
Status: CANCELLED | OUTPATIENT
Start: 2021-01-03 | End: 2021-01-03

## 2021-01-03 RX ORDER — NALOXEGOL OXALATE 25 MG/1
25 TABLET, FILM COATED ORAL EVERY MORNING
COMMUNITY
End: 2021-09-16

## 2021-01-03 RX ORDER — DEXLANSOPRAZOLE 60 MG/1
60 CAPSULE, DELAYED RELEASE ORAL DAILY
Status: ON HOLD | COMMUNITY
End: 2021-06-22

## 2021-01-03 NOTE — PAT
Patient to apply Chlorhexadine wipes  to surgical area (as instructed) the night before procedure and the AM of procedure. Wipes provided.  Blood bank bracelet applied to patient during Pre Admission Testing visit.  Patient instructed not to remove from arm until after procedure and they are discharged from the hospital.  Explained to patient that they may shower and get the bracelet wet, but not to immerse under water for longer periods (bathing, swimming, hand dishwashing, etc).  Patient verbalized understanding.  BACTROBAN APPLIED TO EACH NOSTRIL DURING PAT VISIT   Dr. Thompson was faxed abnormal ekg from today, March 2020 and June 2019 and also fax cover sheet and note from Dr. Jimenez.  Patient will be assessed by staff the morning of surgery and clinical judgement will be made then.

## 2021-01-04 ENCOUNTER — HOSPITAL ENCOUNTER (INPATIENT)
Facility: HOSPITAL | Age: 41
LOS: 5 days | Discharge: HOME | End: 2021-01-09
Attending: THORACIC SURGERY (CARDIOTHORACIC VASCULAR SURGERY) | Admitting: THORACIC SURGERY (CARDIOTHORACIC VASCULAR SURGERY)

## 2021-01-04 ENCOUNTER — APPOINTMENT (OUTPATIENT)
Dept: GENERAL RADIOLOGY | Facility: HOSPITAL | Age: 41
End: 2021-01-04

## 2021-01-04 ENCOUNTER — ANESTHESIA (OUTPATIENT)
Dept: PERIOP | Facility: HOSPITAL | Age: 41
End: 2021-01-04

## 2021-01-04 DIAGNOSIS — J98.6 DIAPHRAGM PARALYSIS: Primary | ICD-10-CM

## 2021-01-04 DIAGNOSIS — J98.6 DIAPHRAGM PARALYSIS: ICD-10-CM

## 2021-01-04 LAB
ABO GROUP BLD: NORMAL
BLD GP AB SCN SERPL QL: NEGATIVE
QT INTERVAL: 412 MS
QTC INTERVAL: 591 MS
RH BLD: POSITIVE
T&S EXPIRATION DATE: NORMAL

## 2021-01-04 PROCEDURE — 86901 BLOOD TYPING SEROLOGIC RH(D): CPT | Performed by: THORACIC SURGERY (CARDIOTHORACIC VASCULAR SURGERY)

## 2021-01-04 PROCEDURE — 25010000002 DEXAMETHASONE PER 1 MG: Performed by: NURSE ANESTHETIST, CERTIFIED REGISTERED

## 2021-01-04 PROCEDURE — 25010000002 CEFUROXIME: Performed by: PHYSICIAN ASSISTANT

## 2021-01-04 PROCEDURE — 25010000002 HYDROMORPHONE 1 MG/ML SOLUTION: Performed by: NURSE ANESTHETIST, CERTIFIED REGISTERED

## 2021-01-04 PROCEDURE — 25010000002 PHENYLEPHRINE PER 1 ML: Performed by: NURSE ANESTHETIST, CERTIFIED REGISTERED

## 2021-01-04 PROCEDURE — 25010000002 HYDROMORPHONE PER 4 MG: Performed by: NURSE ANESTHETIST, CERTIFIED REGISTERED

## 2021-01-04 PROCEDURE — 25010000002 ROPIVACAINE PER 1 MG: Performed by: THORACIC SURGERY (CARDIOTHORACIC VASCULAR SURGERY)

## 2021-01-04 PROCEDURE — 25010000002 MIDAZOLAM PER 1 MG: Performed by: NURSE ANESTHETIST, CERTIFIED REGISTERED

## 2021-01-04 PROCEDURE — 86850 RBC ANTIBODY SCREEN: CPT | Performed by: THORACIC SURGERY (CARDIOTHORACIC VASCULAR SURGERY)

## 2021-01-04 PROCEDURE — 25010000002 ONDANSETRON PER 1 MG: Performed by: NURSE ANESTHETIST, CERTIFIED REGISTERED

## 2021-01-04 PROCEDURE — 25010000002 FENTANYL CITRATE (PF) 100 MCG/2ML SOLUTION: Performed by: ANESTHESIOLOGY

## 2021-01-04 PROCEDURE — 25010000002 PROPOFOL 10 MG/ML EMULSION: Performed by: NURSE ANESTHETIST, CERTIFIED REGISTERED

## 2021-01-04 PROCEDURE — 71045 X-RAY EXAM CHEST 1 VIEW: CPT

## 2021-01-04 PROCEDURE — 39599 UNLISTED PX DIAPHRAGM: CPT | Performed by: THORACIC SURGERY (CARDIOTHORACIC VASCULAR SURGERY)

## 2021-01-04 PROCEDURE — C2618 PROBE/NEEDLE, CRYO: HCPCS | Performed by: THORACIC SURGERY (CARDIOTHORACIC VASCULAR SURGERY)

## 2021-01-04 PROCEDURE — 25010000002 NEOSTIGMINE 10 MG/10ML SOLUTION: Performed by: NURSE ANESTHETIST, CERTIFIED REGISTERED

## 2021-01-04 PROCEDURE — 99233 SBSQ HOSP IP/OBS HIGH 50: CPT | Performed by: INTERNAL MEDICINE

## 2021-01-04 PROCEDURE — 0BST0ZZ REPOSITION DIAPHRAGM, OPEN APPROACH: ICD-10-PCS | Performed by: THORACIC SURGERY (CARDIOTHORACIC VASCULAR SURGERY)

## 2021-01-04 PROCEDURE — 25010000002 MIDAZOLAM PER 1 MG: Performed by: ANESTHESIOLOGY

## 2021-01-04 PROCEDURE — 86900 BLOOD TYPING SEROLOGIC ABO: CPT | Performed by: THORACIC SURGERY (CARDIOTHORACIC VASCULAR SURGERY)

## 2021-01-04 PROCEDURE — C1755 CATHETER, INTRASPINAL: HCPCS | Performed by: ANESTHESIOLOGY

## 2021-01-04 PROCEDURE — 25010000002 HYDROMORPHONE HCL-NACL 30-0.9 MG/30ML-% SOLUTION PREFILLED SYRINGE: Performed by: THORACIC SURGERY (CARDIOTHORACIC VASCULAR SURGERY)

## 2021-01-04 PROCEDURE — 25010000003 CEFUROXIME SODIUM 1.5 G RECONSTITUTED SOLUTION: Performed by: PHYSICIAN ASSISTANT

## 2021-01-04 PROCEDURE — 86923 COMPATIBILITY TEST ELECTRIC: CPT

## 2021-01-04 PROCEDURE — 31622 DX BRONCHOSCOPE/WASH: CPT | Performed by: THORACIC SURGERY (CARDIOTHORACIC VASCULAR SURGERY)

## 2021-01-04 PROCEDURE — 0BJ08ZZ INSPECTION OF TRACHEOBRONCHIAL TREE, VIA NATURAL OR ARTIFICIAL OPENING ENDOSCOPIC: ICD-10-PCS | Performed by: THORACIC SURGERY (CARDIOTHORACIC VASCULAR SURGERY)

## 2021-01-04 DEVICE — PLEDGET INCISIONLINE REINF TFE SFT PTFE 1.5X4.8X9.5MM WHT: Type: IMPLANTABLE DEVICE | Site: BRONCHUS | Status: FUNCTIONAL

## 2021-01-04 RX ORDER — ALBUTEROL SULFATE 2.5 MG/3ML
2.5 SOLUTION RESPIRATORY (INHALATION) EVERY 6 HOURS PRN
Status: DISCONTINUED | OUTPATIENT
Start: 2021-01-04 | End: 2021-01-09 | Stop reason: HOSPADM

## 2021-01-04 RX ORDER — KETAMINE HCL IN NACL, ISO-OSM 100MG/10ML
SYRINGE (ML) INJECTION AS NEEDED
Status: DISCONTINUED | OUTPATIENT
Start: 2021-01-04 | End: 2021-01-04 | Stop reason: SURG

## 2021-01-04 RX ORDER — PROPOFOL 10 MG/ML
VIAL (ML) INTRAVENOUS AS NEEDED
Status: DISCONTINUED | OUTPATIENT
Start: 2021-01-04 | End: 2021-01-04 | Stop reason: SURG

## 2021-01-04 RX ORDER — OLANZAPINE 5 MG/1
10 TABLET, ORALLY DISINTEGRATING ORAL NIGHTLY
Status: DISCONTINUED | OUTPATIENT
Start: 2021-01-04 | End: 2021-01-09 | Stop reason: HOSPADM

## 2021-01-04 RX ORDER — NALOXONE HCL 0.4 MG/ML
0.1 VIAL (ML) INJECTION
Status: DISCONTINUED | OUTPATIENT
Start: 2021-01-04 | End: 2021-01-09 | Stop reason: HOSPADM

## 2021-01-04 RX ORDER — MELATONIN
1000 DAILY
Status: DISCONTINUED | OUTPATIENT
Start: 2021-01-05 | End: 2021-01-09 | Stop reason: HOSPADM

## 2021-01-04 RX ORDER — MIDAZOLAM HYDROCHLORIDE 1 MG/ML
INJECTION INTRAMUSCULAR; INTRAVENOUS AS NEEDED
Status: DISCONTINUED | OUTPATIENT
Start: 2021-01-04 | End: 2021-01-04 | Stop reason: SURG

## 2021-01-04 RX ORDER — SODIUM CHLORIDE, SODIUM LACTATE, POTASSIUM CHLORIDE, CALCIUM CHLORIDE 600; 310; 30; 20 MG/100ML; MG/100ML; MG/100ML; MG/100ML
9 INJECTION, SOLUTION INTRAVENOUS CONTINUOUS
Status: DISCONTINUED | OUTPATIENT
Start: 2021-01-04 | End: 2021-01-07

## 2021-01-04 RX ORDER — ONDANSETRON 2 MG/ML
INJECTION INTRAMUSCULAR; INTRAVENOUS AS NEEDED
Status: DISCONTINUED | OUTPATIENT
Start: 2021-01-04 | End: 2021-01-04 | Stop reason: SURG

## 2021-01-04 RX ORDER — LIDOCAINE HYDROCHLORIDE 10 MG/ML
0.5 INJECTION, SOLUTION EPIDURAL; INFILTRATION; INTRACAUDAL; PERINEURAL ONCE AS NEEDED
Status: COMPLETED | OUTPATIENT
Start: 2021-01-04 | End: 2021-01-04

## 2021-01-04 RX ORDER — ACETAMINOPHEN 325 MG/1
650 TABLET ORAL EVERY 4 HOURS PRN
Status: DISCONTINUED | OUTPATIENT
Start: 2021-01-04 | End: 2021-01-09 | Stop reason: HOSPADM

## 2021-01-04 RX ORDER — LIDOCAINE HYDROCHLORIDE 10 MG/ML
INJECTION, SOLUTION EPIDURAL; INFILTRATION; INTRACAUDAL; PERINEURAL AS NEEDED
Status: DISCONTINUED | OUTPATIENT
Start: 2021-01-04 | End: 2021-01-04 | Stop reason: SURG

## 2021-01-04 RX ORDER — QUETIAPINE FUMARATE 100 MG/1
600 TABLET, FILM COATED ORAL NIGHTLY
Status: DISCONTINUED | OUTPATIENT
Start: 2021-01-04 | End: 2021-01-09 | Stop reason: HOSPADM

## 2021-01-04 RX ORDER — NICOTINE 21 MG/24HR
1 PATCH, TRANSDERMAL 24 HOURS TRANSDERMAL
Status: DISCONTINUED | OUTPATIENT
Start: 2021-01-04 | End: 2021-01-09 | Stop reason: HOSPADM

## 2021-01-04 RX ORDER — ONDANSETRON 2 MG/ML
4 INJECTION INTRAMUSCULAR; INTRAVENOUS EVERY 6 HOURS PRN
Status: DISCONTINUED | OUTPATIENT
Start: 2021-01-04 | End: 2021-01-09 | Stop reason: HOSPADM

## 2021-01-04 RX ORDER — BISACODYL 10 MG
10 SUPPOSITORY, RECTAL RECTAL DAILY PRN
Status: DISCONTINUED | OUTPATIENT
Start: 2021-01-04 | End: 2021-01-09 | Stop reason: HOSPADM

## 2021-01-04 RX ORDER — MAGNESIUM SULFATE HEPTAHYDRATE 40 MG/ML
4 INJECTION, SOLUTION INTRAVENOUS AS NEEDED
Status: DISCONTINUED | OUTPATIENT
Start: 2021-01-04 | End: 2021-01-06

## 2021-01-04 RX ORDER — POTASSIUM CHLORIDE 750 MG/1
40 CAPSULE, EXTENDED RELEASE ORAL AS NEEDED
Status: DISCONTINUED | OUTPATIENT
Start: 2021-01-04 | End: 2021-01-09 | Stop reason: HOSPADM

## 2021-01-04 RX ORDER — NALOXONE HCL 0.4 MG/ML
0.4 VIAL (ML) INJECTION AS NEEDED
Status: DISCONTINUED | OUTPATIENT
Start: 2021-01-04 | End: 2021-01-04 | Stop reason: HOSPADM

## 2021-01-04 RX ORDER — FLUMAZENIL 0.1 MG/ML
0.1 INJECTION INTRAVENOUS AS NEEDED
Status: DISCONTINUED | OUTPATIENT
Start: 2021-01-04 | End: 2021-01-04 | Stop reason: HOSPADM

## 2021-01-04 RX ORDER — SODIUM CHLORIDE 0.9 % (FLUSH) 0.9 %
10 SYRINGE (ML) INJECTION EVERY 12 HOURS SCHEDULED
Status: DISCONTINUED | OUTPATIENT
Start: 2021-01-04 | End: 2021-01-04 | Stop reason: HOSPADM

## 2021-01-04 RX ORDER — MAGNESIUM SULFATE HEPTAHYDRATE 40 MG/ML
2 INJECTION, SOLUTION INTRAVENOUS AS NEEDED
Status: DISCONTINUED | OUTPATIENT
Start: 2021-01-04 | End: 2021-01-06

## 2021-01-04 RX ORDER — MIDAZOLAM HYDROCHLORIDE 1 MG/ML
2 INJECTION INTRAMUSCULAR; INTRAVENOUS ONCE
Status: COMPLETED | OUTPATIENT
Start: 2021-01-04 | End: 2021-01-04

## 2021-01-04 RX ORDER — HEPARIN SODIUM 5000 [USP'U]/ML
5000 INJECTION, SOLUTION INTRAVENOUS; SUBCUTANEOUS EVERY 12 HOURS SCHEDULED
Status: DISCONTINUED | OUTPATIENT
Start: 2021-01-05 | End: 2021-01-09 | Stop reason: HOSPADM

## 2021-01-04 RX ORDER — CLONAZEPAM 1 MG/1
2 TABLET ORAL 3 TIMES DAILY PRN
Status: DISCONTINUED | OUTPATIENT
Start: 2021-01-04 | End: 2021-01-09

## 2021-01-04 RX ORDER — HYDROMORPHONE HYDROCHLORIDE 1 MG/ML
0.5 INJECTION, SOLUTION INTRAMUSCULAR; INTRAVENOUS; SUBCUTANEOUS
Status: DISCONTINUED | OUTPATIENT
Start: 2021-01-04 | End: 2021-01-04 | Stop reason: HOSPADM

## 2021-01-04 RX ORDER — DEXTROSE MONOHYDRATE 25 G/50ML
25 INJECTION, SOLUTION INTRAVENOUS
Status: DISCONTINUED | OUTPATIENT
Start: 2021-01-04 | End: 2021-01-09 | Stop reason: HOSPADM

## 2021-01-04 RX ORDER — CLONIDINE HYDROCHLORIDE 0.1 MG/1
0.3 TABLET ORAL NIGHTLY
Status: DISCONTINUED | OUTPATIENT
Start: 2021-01-04 | End: 2021-01-09 | Stop reason: HOSPADM

## 2021-01-04 RX ORDER — MEPERIDINE HYDROCHLORIDE 25 MG/ML
12.5 INJECTION INTRAMUSCULAR; INTRAVENOUS; SUBCUTANEOUS
Status: DISCONTINUED | OUTPATIENT
Start: 2021-01-04 | End: 2021-01-04 | Stop reason: HOSPADM

## 2021-01-04 RX ORDER — SODIUM CHLORIDE 9 MG/ML
30 INJECTION, SOLUTION INTRAVENOUS CONTINUOUS
Status: DISCONTINUED | OUTPATIENT
Start: 2021-01-04 | End: 2021-01-05

## 2021-01-04 RX ORDER — OXYCODONE AND ACETAMINOPHEN 10; 325 MG/1; MG/1
1 TABLET ORAL EVERY 4 HOURS PRN
Status: DISCONTINUED | OUTPATIENT
Start: 2021-01-04 | End: 2021-01-04 | Stop reason: HOSPADM

## 2021-01-04 RX ORDER — POTASSIUM CHLORIDE 1.5 G/1.77G
40 POWDER, FOR SOLUTION ORAL AS NEEDED
Status: DISCONTINUED | OUTPATIENT
Start: 2021-01-04 | End: 2021-01-09 | Stop reason: HOSPADM

## 2021-01-04 RX ORDER — FENTANYL CITRATE 50 UG/ML
INJECTION, SOLUTION INTRAMUSCULAR; INTRAVENOUS AS NEEDED
Status: DISCONTINUED | OUTPATIENT
Start: 2021-01-04 | End: 2021-01-04 | Stop reason: SURG

## 2021-01-04 RX ORDER — ESCITALOPRAM OXALATE 20 MG/1
20 TABLET ORAL DAILY
Status: DISCONTINUED | OUTPATIENT
Start: 2021-01-04 | End: 2021-01-09 | Stop reason: HOSPADM

## 2021-01-04 RX ORDER — GLYCOPYRROLATE 0.2 MG/ML
INJECTION INTRAMUSCULAR; INTRAVENOUS AS NEEDED
Status: DISCONTINUED | OUTPATIENT
Start: 2021-01-04 | End: 2021-01-04 | Stop reason: SURG

## 2021-01-04 RX ORDER — LORAZEPAM 2 MG/ML
1 INJECTION INTRAMUSCULAR
Status: DISCONTINUED | OUTPATIENT
Start: 2021-01-04 | End: 2021-01-04 | Stop reason: HOSPADM

## 2021-01-04 RX ORDER — PANTOPRAZOLE SODIUM 40 MG/1
40 TABLET, DELAYED RELEASE ORAL
Status: DISCONTINUED | OUTPATIENT
Start: 2021-01-04 | End: 2021-01-09 | Stop reason: HOSPADM

## 2021-01-04 RX ORDER — DEXAMETHASONE SODIUM PHOSPHATE 4 MG/ML
INJECTION, SOLUTION INTRA-ARTICULAR; INTRALESIONAL; INTRAMUSCULAR; INTRAVENOUS; SOFT TISSUE AS NEEDED
Status: DISCONTINUED | OUTPATIENT
Start: 2021-01-04 | End: 2021-01-04 | Stop reason: SURG

## 2021-01-04 RX ORDER — ONDANSETRON 4 MG/1
4 TABLET, FILM COATED ORAL EVERY 6 HOURS PRN
Status: DISCONTINUED | OUTPATIENT
Start: 2021-01-04 | End: 2021-01-09 | Stop reason: HOSPADM

## 2021-01-04 RX ORDER — FENTANYL CITRATE 50 UG/ML
100 INJECTION, SOLUTION INTRAMUSCULAR; INTRAVENOUS ONCE
Status: COMPLETED | OUTPATIENT
Start: 2021-01-04 | End: 2021-01-04

## 2021-01-04 RX ORDER — ROPIVACAINE HYDROCHLORIDE 5 MG/ML
INJECTION, SOLUTION EPIDURAL; INFILTRATION; PERINEURAL AS NEEDED
Status: DISCONTINUED | OUTPATIENT
Start: 2021-01-04 | End: 2021-01-04 | Stop reason: HOSPADM

## 2021-01-04 RX ORDER — SODIUM CHLORIDE 0.9 % (FLUSH) 0.9 %
10 SYRINGE (ML) INJECTION AS NEEDED
Status: DISCONTINUED | OUTPATIENT
Start: 2021-01-04 | End: 2021-01-04 | Stop reason: HOSPADM

## 2021-01-04 RX ORDER — MAGNESIUM HYDROXIDE 1200 MG/15ML
LIQUID ORAL AS NEEDED
Status: DISCONTINUED | OUTPATIENT
Start: 2021-01-04 | End: 2021-01-04 | Stop reason: HOSPADM

## 2021-01-04 RX ORDER — FAMOTIDINE 20 MG/1
20 TABLET, FILM COATED ORAL ONCE
Status: COMPLETED | OUTPATIENT
Start: 2021-01-04 | End: 2021-01-04

## 2021-01-04 RX ORDER — FENTANYL CITRATE 50 UG/ML
50 INJECTION, SOLUTION INTRAMUSCULAR; INTRAVENOUS
Status: DISCONTINUED | OUTPATIENT
Start: 2021-01-04 | End: 2021-01-04 | Stop reason: HOSPADM

## 2021-01-04 RX ORDER — POLYETHYLENE GLYCOL 3350 17 G/17G
17 POWDER, FOR SOLUTION ORAL DAILY
Status: DISCONTINUED | OUTPATIENT
Start: 2021-01-04 | End: 2021-01-09 | Stop reason: HOSPADM

## 2021-01-04 RX ORDER — NEOSTIGMINE METHYLSULFATE 1 MG/ML
INJECTION, SOLUTION INTRAVENOUS AS NEEDED
Status: DISCONTINUED | OUTPATIENT
Start: 2021-01-04 | End: 2021-01-04 | Stop reason: SURG

## 2021-01-04 RX ORDER — ROCURONIUM BROMIDE 10 MG/ML
INJECTION, SOLUTION INTRAVENOUS AS NEEDED
Status: DISCONTINUED | OUTPATIENT
Start: 2021-01-04 | End: 2021-01-04 | Stop reason: SURG

## 2021-01-04 RX ORDER — NICOTINE POLACRILEX 4 MG
15 LOZENGE BUCCAL
Status: DISCONTINUED | OUTPATIENT
Start: 2021-01-04 | End: 2021-01-09 | Stop reason: HOSPADM

## 2021-01-04 RX ORDER — ONDANSETRON 2 MG/ML
4 INJECTION INTRAMUSCULAR; INTRAVENOUS ONCE AS NEEDED
Status: DISCONTINUED | OUTPATIENT
Start: 2021-01-04 | End: 2021-01-04 | Stop reason: HOSPADM

## 2021-01-04 RX ADMIN — Medication: at 17:35

## 2021-01-04 RX ADMIN — PHENYLEPHRINE HYDROCHLORIDE 160 MCG: 10 INJECTION INTRAVENOUS at 13:46

## 2021-01-04 RX ADMIN — DEXAMETHASONE SODIUM PHOSPHATE 8 MG: 4 INJECTION, SOLUTION INTRAMUSCULAR; INTRAVENOUS at 13:46

## 2021-01-04 RX ADMIN — SODIUM CHLORIDE 30 ML/HR: 9 INJECTION, SOLUTION INTRAVENOUS at 17:44

## 2021-01-04 RX ADMIN — LIDOCAINE HYDROCHLORIDE 50 MG: 10 INJECTION, SOLUTION EPIDURAL; INFILTRATION; INTRACAUDAL; PERINEURAL at 13:24

## 2021-01-04 RX ADMIN — CLONAZEPAM 2 MG: 1 TABLET ORAL at 20:53

## 2021-01-04 RX ADMIN — MIDAZOLAM 2 MG: 1 INJECTION INTRAMUSCULAR; INTRAVENOUS at 08:16

## 2021-01-04 RX ADMIN — PHENYLEPHRINE HYDROCHLORIDE 160 MCG: 10 INJECTION INTRAVENOUS at 13:55

## 2021-01-04 RX ADMIN — CEFUROXIME 1.5 G: 1.5 INJECTION, POWDER, FOR SOLUTION INTRAVENOUS at 13:31

## 2021-01-04 RX ADMIN — PROPOFOL 30 MG: 10 INJECTION, EMULSION INTRAVENOUS at 14:00

## 2021-01-04 RX ADMIN — PHENYLEPHRINE HYDROCHLORIDE 0.1 MCG/KG/MIN: 10 INJECTION INTRAVENOUS at 13:59

## 2021-01-04 RX ADMIN — FENTANYL CITRATE 100 MCG: 50 INJECTION, SOLUTION INTRAMUSCULAR; INTRAVENOUS at 12:45

## 2021-01-04 RX ADMIN — CLONIDINE HYDROCHLORIDE 0.3 MG: 0.3 TABLET ORAL at 20:53

## 2021-01-04 RX ADMIN — LIDOCAINE HYDROCHLORIDE 0.2 ML: 10 INJECTION, SOLUTION EPIDURAL; INFILTRATION; INTRACAUDAL; PERINEURAL at 07:45

## 2021-01-04 RX ADMIN — GLYCOPYRROLATE 0.4 MG: 0.4 INJECTION INTRAMUSCULAR; INTRAVENOUS at 16:15

## 2021-01-04 RX ADMIN — Medication 1 PATCH: at 18:55

## 2021-01-04 RX ADMIN — FAMOTIDINE 20 MG: 20 TABLET, FILM COATED ORAL at 08:07

## 2021-01-04 RX ADMIN — ROCURONIUM BROMIDE 30 MG: 10 INJECTION INTRAVENOUS at 14:00

## 2021-01-04 RX ADMIN — FENTANYL CITRATE 100 MCG: 0.05 INJECTION, SOLUTION INTRAMUSCULAR; INTRAVENOUS at 08:29

## 2021-01-04 RX ADMIN — ONDANSETRON 4 MG: 2 INJECTION INTRAMUSCULAR; INTRAVENOUS at 16:18

## 2021-01-04 RX ADMIN — QUETIAPINE FUMARATE 600 MG: 100 TABLET ORAL at 20:53

## 2021-01-04 RX ADMIN — PANTOPRAZOLE SODIUM 40 MG: 40 TABLET, DELAYED RELEASE ORAL at 20:53

## 2021-01-04 RX ADMIN — HYDROMORPHONE HYDROCHLORIDE 0.3 MG: 1 INJECTION, SOLUTION INTRAMUSCULAR; INTRAVENOUS; SUBCUTANEOUS at 15:59

## 2021-01-04 RX ADMIN — ROCURONIUM BROMIDE 60 MG: 10 INJECTION INTRAVENOUS at 13:24

## 2021-01-04 RX ADMIN — FENTANYL CITRATE 100 MCG: 50 INJECTION, SOLUTION INTRAMUSCULAR; INTRAVENOUS at 08:45

## 2021-01-04 RX ADMIN — NEOSTIGMINE 3 MG: 1 INJECTION INTRAVENOUS at 16:15

## 2021-01-04 RX ADMIN — SODIUM CHLORIDE, POTASSIUM CHLORIDE, SODIUM LACTATE AND CALCIUM CHLORIDE: 600; 310; 30; 20 INJECTION, SOLUTION INTRAVENOUS at 13:56

## 2021-01-04 RX ADMIN — Medication 30 MG: at 14:15

## 2021-01-04 RX ADMIN — HYDROMORPHONE HYDROCHLORIDE 0.5 MG: 1 INJECTION, SOLUTION INTRAMUSCULAR; INTRAVENOUS; SUBCUTANEOUS at 18:12

## 2021-01-04 RX ADMIN — OLANZAPINE 10 MG: 5 TABLET, ORALLY DISINTEGRATING ORAL at 20:53

## 2021-01-04 RX ADMIN — PHENYLEPHRINE HYDROCHLORIDE 160 MCG: 10 INJECTION INTRAVENOUS at 13:48

## 2021-01-04 RX ADMIN — ESCITALOPRAM OXALATE 20 MG: 20 TABLET ORAL at 20:53

## 2021-01-04 RX ADMIN — PHENYLEPHRINE HYDROCHLORIDE 160 MCG: 10 INJECTION INTRAVENOUS at 13:52

## 2021-01-04 RX ADMIN — PROPOFOL 250 MG: 10 INJECTION, EMULSION INTRAVENOUS at 13:24

## 2021-01-04 RX ADMIN — HYDROMORPHONE HYDROCHLORIDE 0.2 MG: 1 INJECTION, SOLUTION INTRAMUSCULAR; INTRAVENOUS; SUBCUTANEOUS at 15:46

## 2021-01-04 RX ADMIN — CEFUROXIME SODIUM 1.5 G: 1.5 INJECTION, POWDER, FOR SOLUTION INTRAVENOUS at 20:53

## 2021-01-04 RX ADMIN — MIDAZOLAM 2 MG: 1 INJECTION INTRAMUSCULAR; INTRAVENOUS at 13:18

## 2021-01-04 RX ADMIN — SODIUM CHLORIDE, POTASSIUM CHLORIDE, SODIUM LACTATE AND CALCIUM CHLORIDE 9 ML/HR: 600; 310; 30; 20 INJECTION, SOLUTION INTRAVENOUS at 07:45

## 2021-01-04 NOTE — ANESTHESIA POSTPROCEDURE EVALUATION
Patient: Barak Rivera    Procedure Summary     Date: 01/04/21 Room / Location:  [facility] [address] [place] [address] [facility]    Anesthesia Start: 1320 Anesthesia Stop:     Procedure: THORACOTOMY WITH DIAPHRAGM PLICATION RIGHT (Right Bronchus) Diagnosis:       Diaphragm paralysis      (Diaphragm paralysis [J98.6])    Surgeon: Corbin Waller MD Provider: Cain Benson MD    Anesthesia Type: epidural, general ASA Status: 3          Anesthesia Type: epidural, general    Vitals  Vitals Value Taken Time   /82 01/04/21 1720   Temp     Pulse 102 01/04/21 1723   Resp     SpO2 97 % 01/04/21 1723   Vitals shown include unvalidated device data.        Post Anesthesia Care and Evaluation    Patient location during evaluation: PACU  Patient participation: complete - patient participated  Level of consciousness: awake  Pain score: 0  Pain management: adequate  Airway patency: patent  Anesthetic complications: No anesthetic complications  PONV Status: none  Cardiovascular status: acceptable and stable  Respiratory status: nasal cannula, unassisted, acceptable and spontaneous ventilation  Hydration status: acceptable

## 2021-01-04 NOTE — PROGRESS NOTES
"Intensive Care Follow-up     Hospital:  LOS: 0 days   Mr. Barak Rivera, 40 y.o. male is followed for: Postoperative medical management including electrolyte and glucose management        Subjective      History of present illness:     Barak Rivera is a 40 y.o. male with PMH HLP, COPD, 2.5L Home O2, Hep B, Hep C, GERD, Bipolar, Acute Psychosis, Suicidal Ideations, Paranoia and Polysubstance abuse who was admitted today for Bronchoscopy, Thoracotomy and Plication of right diaphragm for paralysis.  He was referred to Dr. Waller from PCP for paralyzed diaphragm.  He had c/o worsening shortness of air especially with exertion and lying flat for one year.  He lives on the 4th floor of an apartment complex with no elevator and physically, it was \"a nightmare\".   He denied any recent surgery or trauma to the chest.  He underwent a sniff test 12/14 that revealed elevation of right hemidiaphragm with paradoxical movement observed during inhalation.  He returned to clinic for follow up and stated that he was miserable and didn't know if he could continue on with the worsening shortness of air.  Dr. Waller recommended plication of right diaphragm which was scheduled for today.    Patient was seen in the PACU.  He is awake and alert and complaining of chest pain.  No air leak from chest tubes.       The patient's past medical, surgical and social history were reviewed and updated in Epic as appropriate.       Objective     Infusions:  lactated ringers, 9 mL/hr, Last Rate: 9 mL/hr (01/04/21 0745)      Medications:  cefuroxime, 1.5 g, Intravenous, Once  sodium chloride, 10 mL, Intravenous, Q12H      I reviewed the patient's medications.    Vital Sign Min/Max for last 24 hours  Temp  Min: 97.8 °F (36.6 °C)  Max: 97.8 °F (36.6 °C)   BP  Min: 95/78  Max: 95/78   Pulse  Min: 111  Max: 111   Resp  Min: 18  Max: 18   SpO2  Min: 94 %  Max: 95 %   No data recorded       Input/Output for last 24 hour shift  No intake/output data " recorded.      Physical Exam:   GENERAL: Awake, uncomfortable, no overt distress   HEENT: No adenopathy or thyromegaly   LUNGS: Decreased breath sounds bilaterally, with chest tubes on the right.  No active drainage or air leak   HEART: Regular tachycardia without murmurs   GI: Soft, nontender   EXTREMITIES: No clubbing, cyanosis, or edema.   NEURO/PSYCH: Somewhat lethargic but arousable and follows commands.  Moves all 4 symmetrically      Results from last 7 days   Lab Units 01/03/21  1033   WBC 10*3/mm3 8.48   HEMOGLOBIN g/dL 14.1   PLATELETS 10*3/mm3 231     Results from last 7 days   Lab Units 01/03/21  1033   SODIUM mmol/L 136   POTASSIUM mmol/L 3.5   CO2 mmol/L 23.0   BUN mg/dL 26*   CREATININE mg/dL 1.04   GLUCOSE mg/dL 186*     Estimated Creatinine Clearance: 105.8 mL/min (by C-G formula based on SCr of 1.04 mg/dL).          I reviewed the patient's new clinical results.  I reviewed the patient's new imaging results/reports including actual images and agree with reports.            Postoperative chest x-ray reveals chest tubes in good position with a very small apical pneumothorax      Assessment/Plan   Impression        Rt Diaphragm Paralysis    Acute psychosis (CMS/HCC)    Tobacco abuse    Hyperlipidemia    COPD (chronic obstructive pulmonary disease) (CMS/HCC)    Hepatitis C    Hepatitis B    GERD (gastroesophageal reflux disease)    H/O Polysubstance abuse (CMS/HCC)    H/O Suicidal ideations    Bipolar disorder (CMS/HCC)    Paranoia (CMS/HCC)       Plan        Monitor in the intensive care unit  Monitor blood sugars and address with insulin  Follow-up electrolytes and replace as necessary  Monitor for evidence of drug withdrawal  Continue preoperative psychoactive medications    CONTRERAS Pal, AGACNP-BC, FNP-BC  Pulmonary and Critical Care Service    I discussed the patient's findings and my recommendations with patient and nursing staff     High level of risk due to:  illness with  threat to life or bodily function and parenteral controlled substances.        JOE Johnson MD  Pulmonary and Critical Care Medicine

## 2021-01-04 NOTE — BRIEF OP NOTE
BRONCHOSCOPY THORACOTOMY  Progress Note    Barak Rivera  1/4/2021    Pre-op Diagnosis:   Diaphragm paralysis [J98.6]       Post-Op Diagnosis Codes:     * Diaphragm paralysis [J98.6]    Procedure/CPT® Codes:    Procedure(s):  THORACOTOMY WITH DIAPHRAGM PLICATION RIGHT    Surgeon(s):  Corbin Waller MD Earle, Gary F, MD    Anesthesia: General    Staff:   Circulator: Vasiliy Paulino RN; Jacek Campos RN; Tiffany Estes RN  Scrub Person: Anni Euceda; Silvano Anne  Assistant: Kalpana Molina PA-C  Assistant: Kalpana Molina PA-C      Estimated Blood Loss: 75 mL    Urine Voided: 75 mL    Specimens:                None          Drains: * No LDAs found *    Findings: Diaphragm elevation    Complications: None    Assistant: Kalpana Molina PA-C  was responsible for performing the following activities: Retraction, Closing and Placing Dressing and their skilled assistance was necessary for the success of this case.    Corbin Waller MD     Date: 1/4/2021  Time: 16:28 EST

## 2021-01-04 NOTE — OP NOTE
DATE OF PROCEDURE: 1/4/2021     PREOPERATIVE DIAGNOSES:  1. Right diaphragm paralysis     POSTOPERATIVE DIAGNOSES:    1. Right diaphragm paralysis     PROCEDURES PERFORMED:    1. Bronchoscopy  2. Right thoracotomy  3. Diaphragm plication    SURGEON: Corbin Waller MD       ASSISTANTS:    1. Yoseph Wisdom MD was responsible for performing the following activities: Retraction and Suction and their skilled assistance was necessary for the success of this case.  2. Kalpana Molina PA-C was responsible for performing the following activities: Retraction, Suction, Closing and Placing Dressing and their skilled assistance was necessary for the success of this case.    Circulator: Vasiliy Paulino RN; Jacek Campos RN; Tiffany Estes RN  Scrub Person: Anni Euceda; Silvano Anne    ANESTHESIA: General endotracheal anesthesia with Rolando Jhaveri CRNA     ESTIMATED BLOOD LOSS: 75 mL.       INDICATIONS:  40-year-old  male with a history of hepatitis B/C, fibromyalgia, chronic constipation and active tobacco abuse who presented with worsening shortness of breath.  He has right diaphragm paralysis that has been present a year on Sniff tests.  The etiology is unknown and possibly viral.  The patient was felt to be a candidate for diaphragm plication. The risks and benefits of surgery were discussed with the patient including pain, bleeding, infection, continued shortness of breath, damage to surrounding structures including those underneath the diaphragm, myocardial infarction and death. The patient understood these risks and wished to proceed with surgery.      DESCRIPTION OF PROCEDURE:  The patient was taken to the operating room and placed under general endotracheal anesthesia.  A double lumen endotracheal tube and grant catheter were placed.  Bronchoscopy was performed and tube position verified.  Examination of the bilateral bronchial tree down to the subsegmental bronchi was performed that did not  demonstrate any masses or blood.  There were scattered clumps of tan secretions.  The patient was placed in the left lateral decubitus position and all 4 extremities were padded and secured to prevent neurologic injury.  He was prepped and draped in the usual sterile fashion and a timeout was performed including the patient's name, procedure and antibiotic administration.  A posterolateral thoracotomy incision was made and the seventh intercostal space was utilized.  The diaphragm was loose and well into the right chest as expected.  No defects were present in the diaphragm.  Zero Ethibond pledgeted sutures were placed from the central/medial portion of the diaphragm out the periphery.  Sutures were carefully placed a centimeter apart with attention to the contents below the diaphragm until the whole diaphragm was pleated.  These sutures were tightened and the diaphragm was placed under appropriate tension.  The sutures were individually tied when the plications were satisfactory.  After all sutures were tied down, the diaphragm was dramatically lowered.  No bleeding was present on the diaphragm surface.    Two 28 Lithuanian channel drains were then placed anterior and posterior to the hilum.  These were secured using 0 silk suture.  A 0 Ethibond suture was placed in a mattress fashion for later thoracostomy site closure.  Vicryl pericostal sutures were placed and the lung inflated under direct vision with satisfactorily occupation of the chest.  The incision was closed with a running 0 Vicryl suture x 2, 2-0 Vicryl suture followed by 3-0 Vicryl dermal layer and 4-0 Monocryl subcuticular stitch.  Overlying skin glue was applied to this incision and gauze and tape to the chest tube sites.  The patient was returned to the supine position, extubated in the operating room and transported to recovery in stable condition.

## 2021-01-04 NOTE — PERIOPERATIVE NURSING NOTE
Patient sleeping quietly. Pulse ox 94% on 2.5 L O2. Pulse 98. Resps 18. IV patent WNL running KVO. Call light in reach. Resps easy and reg.

## 2021-01-04 NOTE — ANESTHESIA PROCEDURE NOTES
Airway  Urgency: elective    Date/Time: 1/4/2021 1:27 PM  Airway not difficult    General Information and Staff    Patient location during procedure: OR  CRNA: Rolando Jhaveri CRNA    Indications and Patient Condition  Indications for airway management: airway protection    Preoxygenated: yes  MILS not maintained throughout  Mask difficulty assessment: 1 - vent by mask    Final Airway Details  Final airway type: endotracheal airway      Successful airway: EBT - double lumen left  Cuffed: yes   Successful intubation technique: direct laryngoscopy  Facilitating devices/methods: intubating stylet and anterior pressure/BURP  Endotracheal tube insertion site: oral  Blade: Tran  Blade size: 2  EBT DL size (fr): 39  Cormack-Lehane Classification: grade IIa - partial view of glottis  Placement verified by: chest auscultation and capnometry   Measured from: lips  ETT/EBT  to lips (cm): 31  Number of attempts at approach: 2  Assessment: lips, teeth, and gum same as pre-op and atraumatic intubation    Additional Comments  Negative epigastric sounds, Breath sound equal bilaterally with symmetric chest rise and fall

## 2021-01-04 NOTE — INTERVAL H&P NOTE
[facility] Pre-op    Full history and physical note from office is attached.    BP 95/78 (BP Location: Right arm, Patient Position: Lying)   Pulse 111   Temp 97.8 °F (36.6 °C) (Temporal)   Resp 18   SpO2 94%     Immunizations:  Influenza:  2020  Pneumococcal:  No  Tetanus:  yes    LAB Results:  Lab Results   Component Value Date    WBC 8.48 01/03/2021    HGB 14.1 01/03/2021    HCT 42.8 01/03/2021    MCV 93.4 01/03/2021     01/03/2021    NEUTROABS 5.76 01/03/2021    GLUCOSE 186 (H) 01/03/2021    BUN 26 (H) 01/03/2021    CREATININE 1.04 01/03/2021    EGFRIFNONA 79 01/03/2021     01/03/2021    K 3.5 01/03/2021     01/03/2021    CO2 23.0 01/03/2021    MG 2.1 03/09/2020    PHOS 2.7 03/09/2020    CALCIUM 9.4 01/03/2021    ALBUMIN 4.30 01/03/2021    AST 34 01/03/2021    ALT 26 01/03/2021    BILITOT 0.8 01/03/2021    INR 0.98 01/03/2021       Cancer Staging (if applicable)  Cancer Patient: __ yes __no __unknown__N/A; If yes, clinical stage T:__ N:__M:__, stage group or __N/A      Impression: Diaphragm paralysis       Plan: THORACOTOMY WITH DIAPHRAGM PLICATION RIGHT      Elizabeth Ly, CONTRERAS   1/4/2021   08:24 EST

## 2021-01-04 NOTE — PERIOPERATIVE NURSING NOTE
Patient sleeping quietly. Pulse ox 94% on 2.5 L O2. IV patent WNL running KVO. Call light in reach. Resps easy and reg.

## 2021-01-04 NOTE — PERIOPERATIVE NURSING NOTE
Patient rolled over onto side. Still sleeping quietly. IV patent, WNL running KVO. Resps and reg. Call light in reach.

## 2021-01-04 NOTE — ANESTHESIA PROCEDURE NOTES
Epidural Block      Patient reassessed immediately prior to procedure    Patient location during procedure: pre-op  Start Time: 1/4/2021 8:40 AM  Stop Time: 1/4/2021 9:03 AM  Indication:at surgeon's request and post-op pain management  Performed By  Anesthesiologist: Silvano Andrade Jr., MD  Preanesthetic Checklist  Completed: patient identified, site marked, surgical consent, pre-op evaluation, timeout performed, IV checked, risks and benefits discussed and monitors and equipment checked  Additional Notes  Procedure:                                                                                   The pt. was placed in the sitting position and the legs placed over the side of bed in position of comfort.  The pt was instructed in optimal spine presentation and the insertion site was prepped and draped in sterile fashion.  The insertion site was anesthetized with 1% Lidocaine 2 ml.  Multiple attempts at T5-6 and T6-7 without success. Pt has pt of thoracic back surgery with xi rods. Will discuss management with surgical team given inability to place epidural.  Prep:  Pt Position:sitting  Sterile Tech:cap, gloves, mask and sterile barrier  Prep:chlorhexidine gluconate and isopropyl alcohol  Monitoring:blood pressure monitoring, continuous pulse oximetry and EKG  Epidural Block Procedure:  Approach:midline  Guidance:landmark technique and palpation technique  Location:thoracic  Level:5-6  Needle Type:Tuohy  Needle Gauge:18 G  Paresthesia: none  Post Assessment:  Dressing:biopatch applied, occlusive dressing applied and secured with tape  Pt Tolerance:patient tolerated the procedure well with no apparent complications  Complications:no

## 2021-01-04 NOTE — ANESTHESIA PREPROCEDURE EVALUATION
Anesthesia Evaluation     Patient summary reviewed and Nursing notes reviewed   no history of anesthetic complications:  NPO Solid Status: > 8 hours  NPO Liquid Status: > 2 hours           Airway   Mallampati: II  TM distance: >3 FB  Neck ROM: full  No difficulty expected  Dental      Pulmonary    (+) COPD,   Cardiovascular   Exercise tolerance: good (4-7 METS)    ECG reviewed    (+) hyperlipidemia,       Neuro/Psych  (+) psychiatric history Anxiety, ADHD, Bipolar and Depression,     (-) seizures, CVA  GI/Hepatic/Renal/Endo    (+)   hepatitis C and B, liver disease,     Musculoskeletal     Abdominal    Substance History   (+) drug use     OB/GYN          Other   arthritis,                    Anesthesia Plan    ASA 3     epidural and general     intravenous induction     Anesthetic plan, all risks, benefits, and alternatives have been provided, discussed and informed consent has been obtained with: patient.    Plan discussed with CRNA.

## 2021-01-05 ENCOUNTER — APPOINTMENT (OUTPATIENT)
Dept: GENERAL RADIOLOGY | Facility: HOSPITAL | Age: 41
End: 2021-01-05

## 2021-01-05 LAB
ANION GAP SERPL CALCULATED.3IONS-SCNC: 9 MMOL/L (ref 5–15)
BASOPHILS # BLD AUTO: 0.02 10*3/MM3 (ref 0–0.2)
BASOPHILS NFR BLD AUTO: 0.1 % (ref 0–1.5)
BH BB BLOOD EXPIRATION DATE: NORMAL
BH BB BLOOD EXPIRATION DATE: NORMAL
BH BB BLOOD TYPE BARCODE: 5100
BH BB BLOOD TYPE BARCODE: 5100
BH BB DISPENSE STATUS: NORMAL
BH BB DISPENSE STATUS: NORMAL
BH BB PRODUCT CODE: NORMAL
BH BB PRODUCT CODE: NORMAL
BH BB UNIT NUMBER: NORMAL
BH BB UNIT NUMBER: NORMAL
BUN SERPL-MCNC: 15 MG/DL (ref 6–20)
BUN/CREAT SERPL: 22.7 (ref 7–25)
CALCIUM SPEC-SCNC: 8.8 MG/DL (ref 8.6–10.5)
CHLORIDE SERPL-SCNC: 102 MMOL/L (ref 98–107)
CO2 SERPL-SCNC: 26 MMOL/L (ref 22–29)
CREAT SERPL-MCNC: 0.66 MG/DL (ref 0.76–1.27)
CROSSMATCH INTERPRETATION: NORMAL
CROSSMATCH INTERPRETATION: NORMAL
DEPRECATED RDW RBC AUTO: 49.1 FL (ref 37–54)
EOSINOPHIL # BLD AUTO: 0 10*3/MM3 (ref 0–0.4)
EOSINOPHIL NFR BLD AUTO: 0 % (ref 0.3–6.2)
ERYTHROCYTE [DISTWIDTH] IN BLOOD BY AUTOMATED COUNT: 14.4 % (ref 12.3–15.4)
GFR SERPL CREATININE-BSD FRML MDRD: 133 ML/MIN/1.73
GLUCOSE BLDC GLUCOMTR-MCNC: 115 MG/DL (ref 70–130)
GLUCOSE BLDC GLUCOMTR-MCNC: 116 MG/DL (ref 70–130)
GLUCOSE BLDC GLUCOMTR-MCNC: 147 MG/DL (ref 70–130)
GLUCOSE BLDC GLUCOMTR-MCNC: 188 MG/DL (ref 70–130)
GLUCOSE SERPL-MCNC: 160 MG/DL (ref 65–99)
HCT VFR BLD AUTO: 38.6 % (ref 37.5–51)
HGB BLD-MCNC: 12.3 G/DL (ref 13–17.7)
IMM GRANULOCYTES # BLD AUTO: 0.11 10*3/MM3 (ref 0–0.05)
IMM GRANULOCYTES NFR BLD AUTO: 0.7 % (ref 0–0.5)
LYMPHOCYTES # BLD AUTO: 1.48 10*3/MM3 (ref 0.7–3.1)
LYMPHOCYTES NFR BLD AUTO: 9 % (ref 19.6–45.3)
MCH RBC QN AUTO: 29.9 PG (ref 26.6–33)
MCHC RBC AUTO-ENTMCNC: 31.9 G/DL (ref 31.5–35.7)
MCV RBC AUTO: 93.9 FL (ref 79–97)
MONOCYTES # BLD AUTO: 0.78 10*3/MM3 (ref 0.1–0.9)
MONOCYTES NFR BLD AUTO: 4.7 % (ref 5–12)
NEUTROPHILS NFR BLD AUTO: 14.08 10*3/MM3 (ref 1.7–7)
NEUTROPHILS NFR BLD AUTO: 85.5 % (ref 42.7–76)
NRBC BLD AUTO-RTO: 0 /100 WBC (ref 0–0.2)
PLATELET # BLD AUTO: 248 10*3/MM3 (ref 140–450)
PMV BLD AUTO: 8.8 FL (ref 6–12)
POTASSIUM SERPL-SCNC: 4.2 MMOL/L (ref 3.5–5.2)
RBC # BLD AUTO: 4.11 10*6/MM3 (ref 4.14–5.8)
SODIUM SERPL-SCNC: 137 MMOL/L (ref 136–145)
UNIT  ABO: NORMAL
UNIT  ABO: NORMAL
UNIT  RH: NORMAL
UNIT  RH: NORMAL
WBC # BLD AUTO: 16.47 10*3/MM3 (ref 3.4–10.8)

## 2021-01-05 PROCEDURE — 71045 X-RAY EXAM CHEST 1 VIEW: CPT

## 2021-01-05 PROCEDURE — 82962 GLUCOSE BLOOD TEST: CPT

## 2021-01-05 PROCEDURE — 25010000003 CEFUROXIME SODIUM 1.5 G RECONSTITUTED SOLUTION: Performed by: PHYSICIAN ASSISTANT

## 2021-01-05 PROCEDURE — 63710000001 ONDANSETRON PER 8 MG: Performed by: PHYSICIAN ASSISTANT

## 2021-01-05 PROCEDURE — 80048 BASIC METABOLIC PNL TOTAL CA: CPT | Performed by: PHYSICIAN ASSISTANT

## 2021-01-05 PROCEDURE — 85025 COMPLETE CBC W/AUTO DIFF WBC: CPT | Performed by: PHYSICIAN ASSISTANT

## 2021-01-05 PROCEDURE — 25010000002 ONDANSETRON PER 1 MG: Performed by: PHYSICIAN ASSISTANT

## 2021-01-05 PROCEDURE — 25010000002 HEPARIN (PORCINE) PER 1000 UNITS: Performed by: PHYSICIAN ASSISTANT

## 2021-01-05 PROCEDURE — 99232 SBSQ HOSP IP/OBS MODERATE 35: CPT | Performed by: INTERNAL MEDICINE

## 2021-01-05 PROCEDURE — 63710000001 INSULIN LISPRO (HUMAN) PER 5 UNITS: Performed by: THORACIC SURGERY (CARDIOTHORACIC VASCULAR SURGERY)

## 2021-01-05 PROCEDURE — 99024 POSTOP FOLLOW-UP VISIT: CPT | Performed by: PHYSICIAN ASSISTANT

## 2021-01-05 RX ORDER — OXYCODONE AND ACETAMINOPHEN 10; 325 MG/1; MG/1
1 TABLET ORAL
Status: DISCONTINUED | OUTPATIENT
Start: 2021-01-05 | End: 2021-01-05

## 2021-01-05 RX ORDER — OXYCODONE HYDROCHLORIDE AND ACETAMINOPHEN 5; 325 MG/1; MG/1
1 TABLET ORAL
Status: DISCONTINUED | OUTPATIENT
Start: 2021-01-05 | End: 2021-01-05

## 2021-01-05 RX ORDER — OXYCODONE HYDROCHLORIDE 5 MG/1
5 TABLET ORAL
Status: DISCONTINUED | OUTPATIENT
Start: 2021-01-05 | End: 2021-01-06

## 2021-01-05 RX ORDER — OXYCODONE HYDROCHLORIDE 5 MG/1
10 TABLET ORAL
Status: DISCONTINUED | OUTPATIENT
Start: 2021-01-05 | End: 2021-01-06

## 2021-01-05 RX ADMIN — CLONIDINE HYDROCHLORIDE 0.3 MG: 0.3 TABLET ORAL at 20:22

## 2021-01-05 RX ADMIN — CLONAZEPAM 2 MG: 1 TABLET ORAL at 04:57

## 2021-01-05 RX ADMIN — EMTRICITABINE AND TENOFOVIR ALAFENAMIDE 1 TABLET: 200; 25 TABLET ORAL at 20:31

## 2021-01-05 RX ADMIN — ESCITALOPRAM OXALATE 20 MG: 20 TABLET ORAL at 20:23

## 2021-01-05 RX ADMIN — PANTOPRAZOLE SODIUM 40 MG: 40 TABLET, DELAYED RELEASE ORAL at 16:39

## 2021-01-05 RX ADMIN — INSULIN LISPRO 2 UNITS: 100 INJECTION, SOLUTION INTRAVENOUS; SUBCUTANEOUS at 08:19

## 2021-01-05 RX ADMIN — OXYCODONE 10 MG: 5 TABLET ORAL at 13:32

## 2021-01-05 RX ADMIN — OXYCODONE 10 MG: 5 TABLET ORAL at 10:36

## 2021-01-05 RX ADMIN — OXYCODONE HYDROCHLORIDE AND ACETAMINOPHEN 1 TABLET: 10; 325 TABLET ORAL at 07:38

## 2021-01-05 RX ADMIN — CLONAZEPAM 2 MG: 1 TABLET ORAL at 20:30

## 2021-01-05 RX ADMIN — OLANZAPINE 10 MG: 5 TABLET, ORALLY DISINTEGRATING ORAL at 20:30

## 2021-01-05 RX ADMIN — ONDANSETRON HYDROCHLORIDE 4 MG: 4 TABLET, FILM COATED ORAL at 13:02

## 2021-01-05 RX ADMIN — ONDANSETRON 4 MG: 2 INJECTION INTRAMUSCULAR; INTRAVENOUS at 07:04

## 2021-01-05 RX ADMIN — CEFUROXIME SODIUM 1.5 G: 1.5 INJECTION, POWDER, FOR SOLUTION INTRAVENOUS at 04:57

## 2021-01-05 RX ADMIN — HEPARIN SODIUM 5000 UNITS: 5000 INJECTION INTRAVENOUS; SUBCUTANEOUS at 20:23

## 2021-01-05 RX ADMIN — Medication 1 PATCH: at 08:21

## 2021-01-05 RX ADMIN — OXYCODONE 10 MG: 5 TABLET ORAL at 20:23

## 2021-01-05 RX ADMIN — QUETIAPINE FUMARATE 600 MG: 100 TABLET ORAL at 20:22

## 2021-01-05 RX ADMIN — ACETAMINOPHEN 650 MG: 325 TABLET, FILM COATED ORAL at 12:32

## 2021-01-05 RX ADMIN — Medication 1000 UNITS: at 08:19

## 2021-01-05 RX ADMIN — HEPARIN SODIUM 5000 UNITS: 5000 INJECTION INTRAVENOUS; SUBCUTANEOUS at 08:20

## 2021-01-05 RX ADMIN — CLONAZEPAM 2 MG: 1 TABLET ORAL at 14:30

## 2021-01-05 RX ADMIN — OXYCODONE 10 MG: 5 TABLET ORAL at 16:36

## 2021-01-05 RX ADMIN — OXYCODONE 10 MG: 5 TABLET ORAL at 23:40

## 2021-01-05 RX ADMIN — PANTOPRAZOLE SODIUM 40 MG: 40 TABLET, DELAYED RELEASE ORAL at 08:19

## 2021-01-05 NOTE — PROGRESS NOTES
"Intensive Care Follow-up     Hospital:  LOS: 1 day   Mr. Barak Rivera, 40 y.o. male is followed for:   Diaphragm paralysis        History of present illness:   Barak Rivera is a 40 y.o. male with PMH HLP, COPD, 2.5L Home O2, Hep B, Hep C, GERD, Bipolar, Acute Psychosis, Suicidal Ideations, Paranoia and Polysubstance abuse who was admitted today for Bronchoscopy, Thoracotomy and Plication of right diaphragm for paralysis.  He was referred to Dr. Waller from PCP for paralyzed diaphragm.  He had c/o worsening shortness of air especially with exertion and lying flat for one year.  He lives on the 4th floor of an apartment complex with no elevator and physically, it was \"a nightmare\".   He denied any recent surgery or trauma to the chest.  He underwent a sniff test 12/14 that revealed elevation of right hemidiaphragm with paradoxical movement observed during inhalation.  He returned to clinic for follow up and stated that he was miserable and didn't know if he could continue on with the worsening shortness of air.  Dr. Waller recommended plication of right diaphragm which was scheduled for today.    Subjective   Interval History:  Patient stable this morning, chest remains in place.  Denies any nausea, or vomiting.  But continues to have significant pain that is difficult to control.  Blood pressures well controlled.           The patient's past medical, surgical and social history were reviewed and updated in Epic as appropriate.       Objective     Infusions:  HYDROmorphone HCl-NaCl,   lactated ringers, 9 mL/hr, Last Rate: 9 mL/hr (01/04/21 7430)      Medications:  cholecalciferol, 1,000 Units, Oral, Daily  cloNIDine, 0.3 mg, Oral, Nightly  Emtricitabine-Tenofovir AF, 1 tablet, Oral, Daily  escitalopram, 20 mg, Oral, Daily  heparin (porcine), 5,000 Units, Subcutaneous, Q12H  insulin lispro, 0-7 Units, Subcutaneous, TID AC  nicotine, 1 patch, Transdermal, Q24H  OLANZapine zydis, 10 mg, Oral, Nightly  pantoprazole, " 40 mg, Oral, BID AC  polyethylene glycol, 17 g, Oral, Daily  QUEtiapine, 600 mg, Oral, Nightly      I reviewed the patient's medications.    Vital Sign Min/Max for last 24 hours  Temp  Min: 97.5 °F (36.4 °C)  Max: 99.1 °F (37.3 °C)   BP  Min: 95/62  Max: 129/91   Pulse  Min: 96  Max: 110   Resp  Min: 14  Max: 18   SpO2  Min: 91 %  Max: 99 %   Flow (L/min)  Min: 2  Max: 4       Input/Output for last 24 hour shift  01/04 0701 - 01/05 0700  In: 2564.5 [I.V.:2564.5]  Out: 1195 [Urine:715]      GENERAL : NAD, conversant  RESPIRATORY/THORAX : normal respiratory effort and no intercostal retractions, clear to auscultation bilaterally,  CARDIOVASCULAR : Normal S1/S2, RRR. no lower ext edema.  GASTROINTESTINAL : Soft, NT/ND. BS x 4 normoactive. No hepatosplenomegaly.  MUSCULOSKELETAL : No cyanosis, clubbing, or ischemia  NEUROLOGICAL: alert and oriented to person, place and time  PSYCHOLOGICAL : Appropriate affect    Results from last 7 days   Lab Units 01/05/21  0331 01/03/21  1033   WBC 10*3/mm3 16.47* 8.48   HEMOGLOBIN g/dL 12.3* 14.1   PLATELETS 10*3/mm3 248 231     Results from last 7 days   Lab Units 01/05/21  0331 01/03/21  1033   SODIUM mmol/L 137 136   POTASSIUM mmol/L 4.2 3.5   CO2 mmol/L 26.0 23.0   BUN mg/dL 15 26*   CREATININE mg/dL 0.66* 1.04   GLUCOSE mg/dL 160* 186*     Estimated Creatinine Clearance: 166.7 mL/min (A) (by C-G formula based on SCr of 0.66 mg/dL (L)).          I reviewed the patient's new clinical results.  I reviewed the patient's new imaging results/reports including actual images and agree with reports.              Imaging Results (Last 24 Hours)     Procedure Component Value Units Date/Time    XR Chest 1 View [[id number]] Collected: 01/05/21 0758     Updated: 01/05/21 0833    Narrative:      EXAMINATION: XR CHEST 1 VW-      INDICATION: Postop; J98.6-Disorders of diaphragm.      COMPARISON: Chest x-ray 01/04/2021.     FINDINGS: Support hardware unchanged. Persistent bibasilar and  midlung  opacifications right greater than left similar to prior. No discrete  pneumothorax or large effusion.           Impression:      No significant interval change.     D:  01/05/2021  E:  01/05/2021       XR Chest 1 View [[id number]] Collected: 01/04/21 1759     Updated: 01/05/21 0822    Narrative:      EXAMINATION: XR CHEST 1 VW-      INDICATION: Postop; J98.6-Disorders of diaphragm.      COMPARISON: 01/03/2021.     FINDINGS: Portable chest reveals patient with two chest tubes identified  on the right. There is some increased markings identified in the mid and  lower lung fields bilaterally with increased pulmonary vascularity  bilaterally.  Tiny apical pneumothorax on the right. Degenerative change  is seen within the spine with hardware identified throughout the  thoracic spine.       Impression:      Increased markings seen in the perihilar regions bilaterally  with two chest tubes identified on the right and tiny apical  pneumothorax present with increased pulmonary vascularity bilaterally.     D:  01/04/2021  E:  01/05/2021             Assessment/Plan   Impression        Rt Diaphragm Paralysis    Acute psychosis (CMS/HCC)    H/O Polysubstance abuse (CMS/HCC)    Tobacco abuse    Hepatitis C    H/O Suicidal ideations    Bipolar disorder (CMS/HCC)    Paranoia (CMS/HCC)    Hepatitis B    Hyperlipidemia    GERD (gastroesophageal reflux disease)    COPD (chronic obstructive pulmonary disease) (CMS/HCC)       Plan        40-year-old male with a past medical history significant for acute psychosis, polysubstance abuse, hepatitis C, suicidal ideations, bipolar disease, hepatitis B, hyperlipidemia, GERD, and COPD.  Who presents to [facility] ICU on 1/4/2021 status post thoracotomy and plication of the right hemidiaphragm.  Postop course complicated by severe pain necessitating PCA pump.    · Continue PCA pump for adequate pain control  · Chest tubes per CT surgery  · Continue antihypertensive  medications  · Insulin for blood sugar control  · Continue home antipsychotic medications  · Patient okay to be transferred to the floor    Plan of care and goals reviewed with multidisciplinary/antibiotic stewardship team during rounds.   I discussed the patient's findings and my recommendations with patient, family and nursing staff       Delmar Oliva DO  Pulmonary, Critical care and Sleep Medicine

## 2021-01-05 NOTE — PLAN OF CARE
Goal Outcome Evaluation:  Plan of Care Reviewed With: patient  Progress: no change  Outcome Summary: Received from unit today. Patient alert and oriented. Patient complains of pain at a 10 at all times. Diluadid PCa pump in use along with PRN medications. Refuses to walk in hallway with staff. Refusing to eat. Tells staff when pain medication and anxiety medication due. Chest tube in place with serousangious drainage. Will continue to monitor.

## 2021-01-05 NOTE — PROGRESS NOTES
Discharge Planning Assessment  [facility]     Patient Name: Barak Rivera  MRN: [id number]  Today's Date: 1/5/2021    Admit Date: 1/4/2021    Discharge Needs Assessment     Row Name 01/05/21 1156       Living Environment    Lives With  alone    Current Living Arrangements  home/apartment/condo    Primary Care Provided by  self    Provides Primary Care For  no one    Family Caregiver if Needed  parent(s)    Family Caregiver Names  Father, Joe Rivera    Quality of Family Relationships  supportive    Able to Return to Prior Arrangements  yes    Living Arrangement Comments  Has a 4th floor apartment in [place] and an apartment at his fathers home in [facility].       Resource/Environmental Concerns    Resource/Environmental Concerns  none    Transportation Concerns  car, none       Transition Planning    Patient/Family Anticipates Transition to  home    Patient/Family Anticipated Services at Transition  none    Transportation Anticipated  family or friend will provide       Discharge Needs Assessment    Readmission Within the Last 30 Days  no previous admission in last 30 days    Equipment Currently Used at Home  oxygen Home O2 at 2-3 L/NC as needed. Ordered by physician in [facility] and supplied by [employer].    Concerns to be Addressed  discharge planning    Anticipated Changes Related to Illness  none    Equipment Needed After Discharge  none    Provided Post Acute Provider List?  N/A    N/A Provider List Comment  No post discharge services anticipated        Discharge Plan     Row Name 01/05/21 1202       Plan    Plan  Home    Patient/Family in Agreement with Plan  yes    Plan Comments  Met with patient and his father in the room for initial discharge planning.  He underwent a Thoracotomy with diaphragmic plication on 1-4.  Patient has a 4th floor apartment in [place] and an apartment at his dad's home in [facility]  Independent.  PCP is Lupe Ramires.  Has home O2 he uses PRN at 2-3L/NC provided  by [employer].  O2 ordered by physician in [facility]  History of polysubstance abuse and psychosis. Transfer orders.  Plan is home.  Following for discharge needs.    Final Discharge Disposition Code  01 - home or self-care        Continued Care and Services - Admitted Since 1/4/2021    Coordination has not been started for this encounter.         Demographic Summary     Row Name 01/05/21 1155       General Information    Admission Type  inpatient    Arrived From  operating room    Referral Source  admission list;physician    Reason for Consult  discharge planning    Preferred Language  English        Functional Status     Row Name 01/05/21 1156       Functional Status    Usual Activity Tolerance  moderate    Current Activity Tolerance  moderate       Functional Status, IADL    Medications  independent    Meal Preparation  independent    Housekeeping  independent    Laundry  independent    Shopping  independent        Psychosocial    No documentation.       Abuse/Neglect    No documentation.       Legal    No documentation.       Substance Abuse    No documentation.       Patient Forms    No documentation.           Manuela Vásquez RN

## 2021-01-05 NOTE — PROGRESS NOTES
Multidisciplinary Rounds    Time: 20min  Patient Name: Barak Rivera  Date of Encounter: 01/05/21 09:16 EST  MRN: [id number]  Admission date: 1/4/2021      Reason for visit: MDR. RD to continue to follow per protocol.     Additional information obtained during MDR: Pt s/p bronchoscopy, R thoracotomy, and diaphragm plication yesterday (1/4). 50% PO intake of past 1 documented meal. Pt with orders to be transferred to the floor.     Current diet: Diet Regular      Intervention:  Follow treatment plan  Care plan reviewed    Follow up:   Per protocol      Katie Osuna MS RD/LD [facility]  09:16 EST

## 2021-01-05 NOTE — PROGRESS NOTES
CTS Progress Note      POD 1 s/p bronchoscopy, right thoracotomy, diaphragm plication     LOS: 1 day   Patient Care Team:  Lupe Ramires APRN as PCP - General (Nurse Practitioner)    Subjective  Continues in significant pain despite PCA as predicted    Objective    Vital Signs  Temp:  [97.5 °F (36.4 °C)-99.1 °F (37.3 °C)] 98.3 °F (36.8 °C)  Heart Rate:  [] 105  Resp:  [14-18] 18  BP: ()/() 125/94    Physical Exam:   General Appearance: Lying in bed, comfortable until wakes up   Lungs: Decreased breath sounds right base, poor effort   Heart: regular rhythm & normal rate, normal S1, S2, no murmur, no gallop, no rub and no click   Skin: Incision c/d/i   Abdomen: Soft, nontender  Results   Results from last 7 days   Lab Units 01/05/21  0331   WBC 10*3/mm3 16.47*   HEMOGLOBIN g/dL 12.3*   HEMATOCRIT % 38.6   PLATELETS 10*3/mm3 248     Results from last 7 days   Lab Units 01/03/21  1033   SODIUM mmol/L 136   POTASSIUM mmol/L 3.5   CHLORIDE mmol/L 100   CO2 mmol/L 23.0   BUN mg/dL 26*   CREATININE mg/dL 1.04   GLUCOSE mg/dL 186*   CALCIUM mg/dL 9.4           Imaging Results (Last 24 Hours)     Procedure Component Value Units Date/Time    XR Chest 1 View [[id number]] Resulted: 01/05/21 0453     Updated: 01/05/21 0455    XR Chest 1 View [[id number]] Collected: 01/04/21 6739     Updated: 01/04/21 1803    Narrative:         EXAMINATION: XR CHEST 1 VW-      INDICATION: postop; J98.6-Disorders of diaphragm      COMPARISON: 01/03/2021     FINDINGS: Portable chest reveals patient with 2 chest tubes identified  on the right. There is some increased markings identified in the mid and  lower lung fields bilaterally with increased pulmonary vascularity  bilaterally. Tiny apical pneumothorax on the right. Degenerative changes  seen within the spine with hardware identified throughout the thoracic  spine.       Impression:      Is markings seen in the perihilar regions bilaterally with 2  chest tubes identified of the  right and tiny apical pneumothorax present  with increased pulmonary vascularity bilaterally.                Assessment  POD 1 s/p bronchoscopy, right thoracotomy, diaphragm plication      Rt Diaphragm Paralysis    Acute psychosis (CMS/HCC)    H/O Polysubstance abuse (CMS/HCC)    Tobacco abuse    Hepatitis C    H/O Suicidal ideations    Bipolar disorder (CMS/HCC)    Paranoia (CMS/HCC)    Hepatitis B    Hyperlipidemia    GERD (gastroesophageal reflux disease)    COPD (chronic obstructive pulmonary disease) (CMS/HCC)  Hemodynamically stable  Chest tubes 330 cc out previous 12 hours, no airleak    Plan   Transfer to telemetry  Chest tubes out when drainage down  Up to chair  Pulmonary toilet  PO oxycodone, PCA    01/05/21  06:44 EST

## 2021-01-05 NOTE — PLAN OF CARE
Goal Outcome Evaluation:  Plan of Care Reviewed With: patient  Progress: improving     Pt stable overnight on 2L/nc. Minimal drainage from pleural tubes. Rested well intermittently, but complains of pain 10/10 at all times when awake. Does not feel his PCA is sufficient: asked multiple times for PO/IV pain meds despite being educated as to medication orders. Asked multiple times for Anesthesia to be paged. Asked multiple times for additional doses of Klonopin. PCA titrated per order. Home psych meds given. Adequate UOP. Appropriate for transfer to telemetry.

## 2021-01-06 ENCOUNTER — APPOINTMENT (OUTPATIENT)
Dept: GENERAL RADIOLOGY | Facility: HOSPITAL | Age: 41
End: 2021-01-06

## 2021-01-06 LAB
AMPHET+METHAMPHET UR QL: NEGATIVE
AMPHETAMINES UR QL: NEGATIVE
ANION GAP SERPL CALCULATED.3IONS-SCNC: 4 MMOL/L (ref 5–15)
ARTERIAL PATENCY WRIST A: ABNORMAL
ATMOSPHERIC PRESS: ABNORMAL MM[HG]
BARBITURATES UR QL SCN: NEGATIVE
BASE EXCESS BLDA CALC-SCNC: 5.7 MMOL/L (ref 0–2)
BDY SITE: ABNORMAL
BENZODIAZ UR QL SCN: POSITIVE
BODY TEMPERATURE: 37 C
BUN SERPL-MCNC: 10 MG/DL (ref 6–20)
BUN/CREAT SERPL: 15.4 (ref 7–25)
BUPRENORPHINE SERPL-MCNC: POSITIVE NG/ML
CALCIUM SPEC-SCNC: 8.5 MG/DL (ref 8.6–10.5)
CANNABINOIDS SERPL QL: NEGATIVE
CHLORIDE SERPL-SCNC: 96 MMOL/L (ref 98–107)
CO2 BLDA-SCNC: 33.1 MMOL/L (ref 22–33)
CO2 SERPL-SCNC: 32 MMOL/L (ref 22–29)
COCAINE UR QL: NEGATIVE
COHGB MFR BLD: 1.7 % (ref 0–2)
CREAT SERPL-MCNC: 0.65 MG/DL (ref 0.76–1.27)
DEPRECATED RDW RBC AUTO: 49.7 FL (ref 37–54)
EPAP: 0
ERYTHROCYTE [DISTWIDTH] IN BLOOD BY AUTOMATED COUNT: 14 % (ref 12.3–15.4)
GFR SERPL CREATININE-BSD FRML MDRD: 135 ML/MIN/1.73
GLUCOSE BLDC GLUCOMTR-MCNC: 102 MG/DL (ref 70–130)
GLUCOSE BLDC GLUCOMTR-MCNC: 149 MG/DL (ref 70–130)
GLUCOSE BLDC GLUCOMTR-MCNC: 174 MG/DL (ref 70–130)
GLUCOSE BLDC GLUCOMTR-MCNC: 174 MG/DL (ref 70–130)
GLUCOSE SERPL-MCNC: 136 MG/DL (ref 65–99)
HCO3 BLDA-SCNC: 31.6 MMOL/L (ref 20–26)
HCT VFR BLD AUTO: 38 % (ref 37.5–51)
HCT VFR BLD CALC: 37 %
HGB BLD-MCNC: 12.2 G/DL (ref 13–17.7)
HGB BLDA-MCNC: 12.1 G/DL (ref 13.5–17.5)
INHALED O2 CONCENTRATION: 28 %
IPAP: 0
MAGNESIUM SERPL-MCNC: 2 MG/DL (ref 1.6–2.6)
MCH RBC QN AUTO: 30.9 PG (ref 26.6–33)
MCHC RBC AUTO-ENTMCNC: 32.1 G/DL (ref 31.5–35.7)
MCV RBC AUTO: 96.2 FL (ref 79–97)
METHADONE UR QL SCN: NEGATIVE
METHGB BLD QL: 0.4 % (ref 0–1.5)
MODALITY: ABNORMAL
NOTE: ABNORMAL
OPIATES UR QL: POSITIVE
OXYCODONE UR QL SCN: POSITIVE
OXYHGB MFR BLDV: 93 % (ref 94–99)
PAW @ PEAK INSP FLOW SETTING VENT: 0 CMH2O
PCO2 BLDA: 50.7 MM HG (ref 35–45)
PCO2 TEMP ADJ BLD: 50.7 MM HG (ref 35–48)
PCP UR QL SCN: NEGATIVE
PH BLDA: 7.4 PH UNITS (ref 7.35–7.45)
PH, TEMP CORRECTED: 7.4 PH UNITS
PLATELET # BLD AUTO: 179 10*3/MM3 (ref 140–450)
PMV BLD AUTO: 8.7 FL (ref 6–12)
PO2 BLDA: 70.4 MM HG (ref 83–108)
PO2 TEMP ADJ BLD: 70.4 MM HG (ref 83–108)
POTASSIUM SERPL-SCNC: 3.6 MMOL/L (ref 3.5–5.2)
PROPOXYPH UR QL: NEGATIVE
RBC # BLD AUTO: 3.95 10*6/MM3 (ref 4.14–5.8)
SODIUM SERPL-SCNC: 132 MMOL/L (ref 136–145)
TOTAL RATE: 0 BREATHS/MINUTE
TRICYCLICS UR QL SCN: POSITIVE
WBC # BLD AUTO: 11.52 10*3/MM3 (ref 3.4–10.8)

## 2021-01-06 PROCEDURE — 80306 DRUG TEST PRSMV INSTRMNT: CPT | Performed by: PHYSICIAN ASSISTANT

## 2021-01-06 PROCEDURE — 99024 POSTOP FOLLOW-UP VISIT: CPT | Performed by: PHYSICIAN ASSISTANT

## 2021-01-06 PROCEDURE — 25010000002 ONDANSETRON PER 1 MG: Performed by: PHYSICIAN ASSISTANT

## 2021-01-06 PROCEDURE — 82962 GLUCOSE BLOOD TEST: CPT

## 2021-01-06 PROCEDURE — 82805 BLOOD GASES W/O2 SATURATION: CPT

## 2021-01-06 PROCEDURE — 25010000002 MORPHINE PER 10 MG: Performed by: THORACIC SURGERY (CARDIOTHORACIC VASCULAR SURGERY)

## 2021-01-06 PROCEDURE — 25010000002 HEPARIN (PORCINE) PER 1000 UNITS: Performed by: PHYSICIAN ASSISTANT

## 2021-01-06 PROCEDURE — 83735 ASSAY OF MAGNESIUM: CPT | Performed by: INTERNAL MEDICINE

## 2021-01-06 PROCEDURE — 63710000001 INSULIN LISPRO (HUMAN) PER 5 UNITS: Performed by: PHYSICIAN ASSISTANT

## 2021-01-06 PROCEDURE — 85027 COMPLETE CBC AUTOMATED: CPT | Performed by: INTERNAL MEDICINE

## 2021-01-06 PROCEDURE — 36600 WITHDRAWAL OF ARTERIAL BLOOD: CPT

## 2021-01-06 PROCEDURE — 80048 BASIC METABOLIC PNL TOTAL CA: CPT | Performed by: INTERNAL MEDICINE

## 2021-01-06 PROCEDURE — 99232 SBSQ HOSP IP/OBS MODERATE 35: CPT | Performed by: PHYSICIAN ASSISTANT

## 2021-01-06 PROCEDURE — 82375 ASSAY CARBOXYHB QUANT: CPT

## 2021-01-06 PROCEDURE — 83050 HGB METHEMOGLOBIN QUAN: CPT

## 2021-01-06 PROCEDURE — 25010000002 KETOROLAC TROMETHAMINE PER 15 MG: Performed by: PHYSICIAN ASSISTANT

## 2021-01-06 PROCEDURE — 71045 X-RAY EXAM CHEST 1 VIEW: CPT

## 2021-01-06 RX ORDER — MORPHINE SULFATE 1 MG/ML
INJECTION INTRAVENOUS
Status: DISCONTINUED | OUTPATIENT
Start: 2021-01-06 | End: 2021-01-07

## 2021-01-06 RX ORDER — KETOROLAC TROMETHAMINE 30 MG/ML
30 INJECTION, SOLUTION INTRAMUSCULAR; INTRAVENOUS ONCE
Status: COMPLETED | OUTPATIENT
Start: 2021-01-06 | End: 2021-01-06

## 2021-01-06 RX ORDER — DEXTROMETHORPHAN POLISTIREX 30 MG/5ML
60 SUSPENSION ORAL EVERY 12 HOURS SCHEDULED
Status: DISCONTINUED | OUTPATIENT
Start: 2021-01-06 | End: 2021-01-09 | Stop reason: HOSPADM

## 2021-01-06 RX ORDER — OXYCODONE HYDROCHLORIDE 5 MG/1
5 TABLET ORAL EVERY 4 HOURS PRN
Status: DISCONTINUED | OUTPATIENT
Start: 2021-01-06 | End: 2021-01-07

## 2021-01-06 RX ADMIN — OXYCODONE 5 MG: 5 TABLET ORAL at 15:50

## 2021-01-06 RX ADMIN — OXYCODONE 5 MG: 5 TABLET ORAL at 11:45

## 2021-01-06 RX ADMIN — OXYCODONE 5 MG: 5 TABLET ORAL at 20:15

## 2021-01-06 RX ADMIN — INSULIN LISPRO 2 UNITS: 100 INJECTION, SOLUTION INTRAVENOUS; SUBCUTANEOUS at 17:18

## 2021-01-06 RX ADMIN — PANTOPRAZOLE SODIUM 40 MG: 40 TABLET, DELAYED RELEASE ORAL at 05:22

## 2021-01-06 RX ADMIN — HEPARIN SODIUM 5000 UNITS: 5000 INJECTION INTRAVENOUS; SUBCUTANEOUS at 08:11

## 2021-01-06 RX ADMIN — HEPARIN SODIUM 5000 UNITS: 5000 INJECTION INTRAVENOUS; SUBCUTANEOUS at 20:15

## 2021-01-06 RX ADMIN — ESCITALOPRAM OXALATE 20 MG: 20 TABLET ORAL at 20:15

## 2021-01-06 RX ADMIN — CLONAZEPAM 2 MG: 1 TABLET ORAL at 05:28

## 2021-01-06 RX ADMIN — KETOROLAC TROMETHAMINE 30 MG: 30 INJECTION, SOLUTION INTRAMUSCULAR at 11:45

## 2021-01-06 RX ADMIN — Medication: at 08:12

## 2021-01-06 RX ADMIN — Medication 1 PATCH: at 08:16

## 2021-01-06 RX ADMIN — OXYCODONE 10 MG: 5 TABLET ORAL at 05:22

## 2021-01-06 RX ADMIN — ACETAMINOPHEN 650 MG: 325 TABLET, FILM COATED ORAL at 20:22

## 2021-01-06 RX ADMIN — CLONIDINE HYDROCHLORIDE 0.3 MG: 0.3 TABLET ORAL at 20:15

## 2021-01-06 RX ADMIN — ACETAMINOPHEN 650 MG: 325 TABLET, FILM COATED ORAL at 06:00

## 2021-01-06 RX ADMIN — INSULIN LISPRO 2 UNITS: 100 INJECTION, SOLUTION INTRAVENOUS; SUBCUTANEOUS at 12:06

## 2021-01-06 RX ADMIN — ONDANSETRON 4 MG: 2 INJECTION INTRAMUSCULAR; INTRAVENOUS at 06:48

## 2021-01-06 RX ADMIN — DEXTROMETHORPHAN 60 MG: 30 SUSPENSION, EXTENDED RELEASE ORAL at 06:02

## 2021-01-06 RX ADMIN — DEXTROMETHORPHAN 60 MG: 30 SUSPENSION, EXTENDED RELEASE ORAL at 18:21

## 2021-01-06 RX ADMIN — PANTOPRAZOLE SODIUM 40 MG: 40 TABLET, DELAYED RELEASE ORAL at 15:46

## 2021-01-06 RX ADMIN — CLONAZEPAM 2 MG: 1 TABLET ORAL at 15:46

## 2021-01-06 RX ADMIN — ACETAMINOPHEN 650 MG: 325 TABLET, FILM COATED ORAL at 00:48

## 2021-01-06 RX ADMIN — POTASSIUM CHLORIDE 40 MEQ: 10 CAPSULE, COATED, EXTENDED RELEASE ORAL at 15:50

## 2021-01-06 RX ADMIN — OLANZAPINE 10 MG: 5 TABLET, ORALLY DISINTEGRATING ORAL at 20:22

## 2021-01-06 RX ADMIN — POTASSIUM CHLORIDE 40 MEQ: 10 CAPSULE, COATED, EXTENDED RELEASE ORAL at 20:15

## 2021-01-06 RX ADMIN — Medication: at 15:54

## 2021-01-06 RX ADMIN — QUETIAPINE FUMARATE 600 MG: 100 TABLET ORAL at 20:15

## 2021-01-06 RX ADMIN — SODIUM CHLORIDE 1000 ML: 9 INJECTION, SOLUTION INTRAVENOUS at 12:06

## 2021-01-06 RX ADMIN — Medication 1000 UNITS: at 08:11

## 2021-01-06 NOTE — PLAN OF CARE
Goal Outcome Evaluation:  Plan of Care Reviewed With: patient  Progress: no change  Outcome Summary: Patient very demanding and rude to staff. Bean pulled yesterday morning, pt unable to void. In and out cath 750 mL last night. Bladder scan this AM with only 231 mL. Dilaudid PCA pump in use and PRN pain and anxiety meds given. Pt seeks out staff often and states he has a pain of 10 at all times. RA-2L NC per pt request. Pt complains of new onset cough, pt requested cough syrup. Order obtained and given.

## 2021-01-06 NOTE — PROGRESS NOTES
[facility] Medicine Services  PROGRESS NOTE    Patient Name: Barak Rivera  : 1980  MRN: [id number]    Date of Admission: 2021  Primary Care Physician: Lupe Ramires APRN    Subjective   Subjective     CC:  F/u plication of right diaphragm    HPI:  Mr. Rivera is sleeping deeply when I approached and visited him today.  He had oxygen on but it was off and his oxygen saturation was 87% on room air.  I reapplied his oxygen.  I visited him a second time and his pulse ox was off and so was his oxygen.  When we checked his o2 with it was 84%.  He states that the morphine PCA is a not preferred for him.   He states during his sleeping time he is unable to press the button for pain medication.  He complains his klopopin isn't given enough either.  The patient's chest tubes were pulled this morning.  He has shortness of breath.    ROS:  Gen- No fevers, chills  CV- No chest pain, palpitations  Resp- No cough, dyspnea  GI- No N/V/D, abd pain  MSK- complains of pain    Objective   Objective     Vital Signs:   Temp:  [98.6 °F (37 °C)-98.8 °F (37.1 °C)] 98.8 °F (37.1 °C)  Heart Rate:  [] 109  Resp:  [18] 18  BP: ()/(62-99) 91/62        Physical Exam:  Constitutional: No acute distress, awake, alert  HENT: NCAT, mucous membranes moist  Respiratory: Clear to auscultation bilaterally, respiratory effort normal   Cardiovascular: RRR, no murmurs, rubs, or gallops  Gastrointestinal: Positive bowel sounds, soft, nontender, nondistended  Musculoskeletal: No bilateral ankle edema  Psychiatric: Appropriate affect, cooperative  Neurologic: Oriented x 3   Skin: No rashes    Results Reviewed:  Results from last 7 days   Lab Units 21  1220 21  03321  1033   WBC 10*3/mm3 11.52* 16.47* 8.48   HEMOGLOBIN g/dL 12.2* 12.3* 14.1   HEMATOCRIT % 38.0 38.6 42.8   PLATELETS 10*3/mm3 179 248 231   INR   --   --  0.98     Results from last 7 days   Lab Units 21  033  01/03/21  1033   SODIUM mmol/L 137 136   POTASSIUM mmol/L 4.2 3.5   CHLORIDE mmol/L 102 100   CO2 mmol/L 26.0 23.0   BUN mg/dL 15 26*   CREATININE mg/dL 0.66* 1.04   GLUCOSE mg/dL 160* 186*   CALCIUM mg/dL 8.8 9.4   ALT (SGPT) U/L  --  26   AST (SGOT) U/L  --  34     Estimated Creatinine Clearance: 166.7 mL/min (A) (by C-G formula based on SCr of 0.66 mg/dL (L)).    Microbiology Results Abnormal     None          Imaging Results (Last 24 Hours)     Procedure Component Value Units Date/Time    XR Chest 1 View [[id number]] Collected: 01/04/21 1753     Updated: 01/06/21 1213    Narrative:      EXAMINATION: XR CHEST 1 VW-      INDICATION: Postop; J98.6-Disorders of diaphragm.      COMPARISON: 01/03/2021.     FINDINGS: Portable chest reveals patient with two chest tubes identified  on the right. There is some increased markings identified in the mid and  lower lung fields bilaterally with increased pulmonary vascularity  bilaterally.  Tiny apical pneumothorax on the right. Degenerative change  is seen within the spine with hardware identified throughout the  thoracic spine.       Impression:      Increased markings seen in the perihilar regions bilaterally  with two chest tubes identified on the right and tiny apical  pneumothorax present with increased pulmonary vascularity bilaterally.     D:  01/04/2021  E:  01/05/2021     This report was finalized on 1/6/2021 12:10 PM by Dr. Marilyn Wallis MD.                  I have reviewed the medications:  Scheduled Meds:cholecalciferol, 1,000 Units, Oral, Daily  cloNIDine, 0.3 mg, Oral, Nightly  dextromethorphan polistirex ER, 60 mg, Oral, Q12H  Emtricitabine-Tenofovir AF, 1 tablet, Oral, Daily  escitalopram, 20 mg, Oral, Daily  heparin (porcine), 5,000 Units, Subcutaneous, Q12H  insulin lispro, 0-7 Units, Subcutaneous, TID AC  nicotine, 1 patch, Transdermal, Q24H  OLANZapine zydis, 10 mg, Oral, Nightly  pantoprazole, 40 mg, Oral, BID AC  polyethylene glycol, 17 g, Oral,  Daily  QUEtiapine, 600 mg, Oral, Nightly  sodium chloride, 1,000 mL, Intravenous, Once      Continuous Infusions:lactated ringers, 9 mL/hr, Last Rate: 9 mL/hr (01/04/21 0745)  Morphine,       PRN Meds:.•  acetaminophen  •  albuterol  •  bisacodyl  •  clonazePAM  •  dextrose  •  dextrose  •  glucagon (human recombinant)  •  magnesium hydroxide  •  naloxone  •  ondansetron **OR** ondansetron  •  oxyCODONE **OR** [DISCONTINUED] oxyCODONE  •  potassium & sodium phosphates **OR** potassium & sodium phosphates  •  potassium chloride  •  potassium chloride    Assessment/Plan   Assessment & Plan     Active Hospital Problems    Diagnosis  POA   • **Rt Diaphragm Paralysis [J98.6]  Yes   • Hyperlipidemia [E78.5]  Yes   • GERD (gastroesophageal reflux disease) [K21.9]  Yes   • COPD (chronic obstructive pulmonary disease) (CMS/HCC) [J44.9]  Yes   • Hepatitis C [B19.20]  Yes   • Hepatitis B [B19.10]  Yes   • Bipolar disorder (CMS/HCC) [F31.9]  Yes   • Acute psychosis (CMS/HCC) [F23]  Yes   • H/O Suicidal ideations [R45.851]  Not Applicable   • H/O Polysubstance abuse (CMS/HCC) [F19.10]  Yes   • Tobacco abuse [Z72.0]  Yes   • Paranoia (CMS/HCC) [F22]  Yes      Resolved Hospital Problems   No resolved problems to display.        Brief Hospital Course to date:  Barak Rivera is a 40 y.o. male with past medical hx of COPD, noctural hypoxia, heptatis C, hepatitis B, GERD, Bipolar disorder, substance abuse who was admitted for shortness of breath and right diaphragm paralysis.  He underwent bronchoscopy, right thoracotomy and diaphragm plication with Dr. Waller on 1/5/2021.    Right diaphragm paralysis  Status post bronchoscopy, right thoracotomy and diaphragm plication 1/5/2021  Nocturnal Hypoxia (on chronic 2.5L oxygen nightly)  -chest tubes removed  -hypoxia on room air (complicated by oversedation from morphine PCA)  -wean oxygen during the day as toleraterated  -labs and post chest tube pull xray pending.    Uncontrolled  pain  Opioid addiction  -requiring PCA, so sedated this am, could barely wake him.  More awake upon afternoon visit.  He seems frustrated he can get a bolus of morphine while sleeping because he cannot press the button  -wean off PCA today  -Continue oral pain medications    Chronic anxiety  Chronic benzo dependence  -Klonopin TID, do not change frequency    HIV exposure  -on prophylaxis dosing, taking home medication     DVT Prophylaxis:  SCD      Disposition: I expect the patient to be discharged .    CODE STATUS:   Code Status and Medical Interventions:   Ordered at: 01/04/21 1727     Code Status:    CPR     Medical Interventions (Level of Support Prior to Arrest):    Full       Nichelle Estes PA-C  01/06/21

## 2021-01-06 NOTE — PROGRESS NOTES
CTS Progress Note      POD 2 s/p bronchoscopy, right thoracotomy, right diaphragm plication       LOS: 2 days   Patient Care Team:  Lupe Ramires APRN as PCP - General (Nurse Practitioner)    Subjective  Continued complaints of pain despite morphine PCA and oral narcotics  Has not ambulated out of room    Objective    Vital Signs  Temp:  [98.5 °F (36.9 °C)-98.8 °F (37.1 °C)] 98.8 °F (37.1 °C)  Heart Rate:  [] 109  Resp:  [16-18] 18  BP: ()/(62-99) 91/62    Physical Exam:   General Appearance: Sleepy but easily arousable, in chair   Lungs: Decreased breath sounds bilateral bases, poor effort.  Scattered rhonchi poor cough   Heart: regular rhythm & normal rate, normal S1, S2, no murmur, no gallop, no rub and no click   Skin: Incision c/d/i   Abdomen: Soft, nontender  Results   Results from last 7 days   Lab Units 01/05/21  0331   WBC 10*3/mm3 16.47*   HEMOGLOBIN g/dL 12.3*   HEMATOCRIT % 38.6   PLATELETS 10*3/mm3 248     Results from last 7 days   Lab Units 01/05/21  0331   SODIUM mmol/L 137   POTASSIUM mmol/L 4.2   CHLORIDE mmol/L 102   CO2 mmol/L 26.0   BUN mg/dL 15   CREATININE mg/dL 0.66*   GLUCOSE mg/dL 160*   CALCIUM mg/dL 8.8           Imaging Results (Last 24 Hours)     Procedure Component Value Units Date/Time    XR Chest 1 View [[id number]] Collected: 01/05/21 0758     Updated: 01/05/21 1021    Narrative:      EXAMINATION: XR CHEST 1 VW-      INDICATION: Postop; J98.6-Disorders of diaphragm.      COMPARISON: Chest x-ray 01/04/2021.     FINDINGS: Support hardware unchanged. Persistent bibasilar and midlung  opacifications right greater than left similar to prior. No discrete  pneumothorax or large effusion.           Impression:      No significant interval change.     D:  01/05/2021  E:  01/05/2021     This report was finalized on 1/5/2021 10:18 AM by Dr. Corbin Scherer.             Assessment  POD 2 s/p bronchoscopy, right thoracotomy, right diaphragm plication    Rt Diaphragm Paralysis    Acute  psychosis (CMS/HCC)    H/O Polysubstance abuse (CMS/HCC)    Tobacco abuse    Hepatitis C    H/O Suicidal ideations    Bipolar disorder (CMS/HCC)    Paranoia (CMS/HCC)    Hepatitis B    Hyperlipidemia    GERD (gastroesophageal reflux disease)    COPD (chronic obstructive pulmonary disease) (CMS/HCC)  Chest tubes 250 cc out previous 24 hours, no airleak  Hemodynamically stable    Plan   D/C chest tubes  Pulmonary toilet - atelectasis on CXR  Ambulate  Wean narcotics over the next 24 hours.    01/06/21  08:53 EST

## 2021-01-07 ENCOUNTER — APPOINTMENT (OUTPATIENT)
Dept: GENERAL RADIOLOGY | Facility: HOSPITAL | Age: 41
End: 2021-01-07

## 2021-01-07 LAB
GLUCOSE BLDC GLUCOMTR-MCNC: 128 MG/DL (ref 70–130)
GLUCOSE BLDC GLUCOMTR-MCNC: 132 MG/DL (ref 70–130)
GLUCOSE BLDC GLUCOMTR-MCNC: 138 MG/DL (ref 70–130)
GLUCOSE BLDC GLUCOMTR-MCNC: 169 MG/DL (ref 70–130)
POTASSIUM SERPL-SCNC: 4.1 MMOL/L (ref 3.5–5.2)

## 2021-01-07 PROCEDURE — 94799 UNLISTED PULMONARY SVC/PX: CPT

## 2021-01-07 PROCEDURE — 99232 SBSQ HOSP IP/OBS MODERATE 35: CPT | Performed by: PHYSICIAN ASSISTANT

## 2021-01-07 PROCEDURE — 25010000002 HEPARIN (PORCINE) PER 1000 UNITS: Performed by: PHYSICIAN ASSISTANT

## 2021-01-07 PROCEDURE — 84132 ASSAY OF SERUM POTASSIUM: CPT | Performed by: THORACIC SURGERY (CARDIOTHORACIC VASCULAR SURGERY)

## 2021-01-07 PROCEDURE — 71045 X-RAY EXAM CHEST 1 VIEW: CPT

## 2021-01-07 PROCEDURE — 82962 GLUCOSE BLOOD TEST: CPT

## 2021-01-07 RX ORDER — GUAIFENESIN 600 MG/1
600 TABLET, EXTENDED RELEASE ORAL EVERY 12 HOURS SCHEDULED
Status: DISCONTINUED | OUTPATIENT
Start: 2021-01-07 | End: 2021-01-09 | Stop reason: HOSPADM

## 2021-01-07 RX ORDER — OXYCODONE HYDROCHLORIDE 5 MG/1
10 TABLET ORAL
Status: DISCONTINUED | OUTPATIENT
Start: 2021-01-07 | End: 2021-01-07

## 2021-01-07 RX ORDER — OXYCODONE HYDROCHLORIDE 5 MG/1
5 TABLET ORAL
Status: DISCONTINUED | OUTPATIENT
Start: 2021-01-07 | End: 2021-01-09 | Stop reason: HOSPADM

## 2021-01-07 RX ORDER — OXYCODONE HYDROCHLORIDE 5 MG/1
10 TABLET ORAL
Status: DISCONTINUED | OUTPATIENT
Start: 2021-01-07 | End: 2021-01-08

## 2021-01-07 RX ORDER — OXYCODONE HYDROCHLORIDE 5 MG/1
5 TABLET ORAL
Status: DISCONTINUED | OUTPATIENT
Start: 2021-01-07 | End: 2021-01-07

## 2021-01-07 RX ADMIN — OLANZAPINE 10 MG: 5 TABLET, ORALLY DISINTEGRATING ORAL at 21:33

## 2021-01-07 RX ADMIN — OXYCODONE 5 MG: 5 TABLET ORAL at 04:32

## 2021-01-07 RX ADMIN — CLONAZEPAM 2 MG: 1 TABLET ORAL at 08:16

## 2021-01-07 RX ADMIN — DEXTROMETHORPHAN 60 MG: 30 SUSPENSION, EXTENDED RELEASE ORAL at 21:32

## 2021-01-07 RX ADMIN — OXYCODONE 10 MG: 5 TABLET ORAL at 23:03

## 2021-01-07 RX ADMIN — Medication 1000 UNITS: at 08:16

## 2021-01-07 RX ADMIN — OXYCODONE 10 MG: 5 TABLET ORAL at 08:16

## 2021-01-07 RX ADMIN — ACETAMINOPHEN 650 MG: 325 TABLET, FILM COATED ORAL at 04:00

## 2021-01-07 RX ADMIN — GUAIFENESIN 600 MG: 600 TABLET, EXTENDED RELEASE ORAL at 11:37

## 2021-01-07 RX ADMIN — LINACLOTIDE 290 MCG: 290 CAPSULE, GELATIN COATED ORAL at 15:53

## 2021-01-07 RX ADMIN — HEPARIN SODIUM 5000 UNITS: 5000 INJECTION INTRAVENOUS; SUBCUTANEOUS at 08:16

## 2021-01-07 RX ADMIN — EMTRICITABINE AND TENOFOVIR ALAFENAMIDE 1 TABLET: 200; 25 TABLET ORAL at 22:02

## 2021-01-07 RX ADMIN — CLONAZEPAM 2 MG: 1 TABLET ORAL at 23:03

## 2021-01-07 RX ADMIN — CLONAZEPAM 2 MG: 1 TABLET ORAL at 15:53

## 2021-01-07 RX ADMIN — PANTOPRAZOLE SODIUM 40 MG: 40 TABLET, DELAYED RELEASE ORAL at 06:41

## 2021-01-07 RX ADMIN — OXYCODONE 5 MG: 5 TABLET ORAL at 00:46

## 2021-01-07 RX ADMIN — CLONIDINE HYDROCHLORIDE 0.3 MG: 0.3 TABLET ORAL at 21:32

## 2021-01-07 RX ADMIN — Medication 1 PATCH: at 08:20

## 2021-01-07 RX ADMIN — OXYCODONE 10 MG: 5 TABLET ORAL at 19:57

## 2021-01-07 RX ADMIN — PANTOPRAZOLE SODIUM 40 MG: 40 TABLET, DELAYED RELEASE ORAL at 15:53

## 2021-01-07 RX ADMIN — MAGNESIUM HYDROXIDE 10 ML: 2400 SUSPENSION ORAL at 08:16

## 2021-01-07 RX ADMIN — QUETIAPINE FUMARATE 600 MG: 100 TABLET ORAL at 21:32

## 2021-01-07 RX ADMIN — NALOXEGOL OXALATE 25 MG: 25 TABLET, FILM COATED ORAL at 15:53

## 2021-01-07 RX ADMIN — CLONAZEPAM 2 MG: 1 TABLET ORAL at 00:46

## 2021-01-07 RX ADMIN — SODIUM CHLORIDE 1000 ML: 9 INJECTION, SOLUTION INTRAVENOUS at 17:25

## 2021-01-07 RX ADMIN — GUAIFENESIN 600 MG: 600 TABLET, EXTENDED RELEASE ORAL at 21:33

## 2021-01-07 RX ADMIN — ACETAMINOPHEN 650 MG: 325 TABLET, FILM COATED ORAL at 22:02

## 2021-01-07 RX ADMIN — OXYCODONE 10 MG: 5 TABLET ORAL at 15:53

## 2021-01-07 RX ADMIN — ESCITALOPRAM OXALATE 20 MG: 20 TABLET ORAL at 21:33

## 2021-01-07 RX ADMIN — DEXTROMETHORPHAN 60 MG: 30 SUSPENSION, EXTENDED RELEASE ORAL at 08:20

## 2021-01-07 RX ADMIN — POLYETHYLENE GLYCOL 3350 17 G: 17 POWDER, FOR SOLUTION ORAL at 08:16

## 2021-01-07 RX ADMIN — OXYCODONE 10 MG: 5 TABLET ORAL at 11:37

## 2021-01-07 NOTE — PROGRESS NOTES
"    [facility] Medicine Services  PROGRESS NOTE    Patient Name: Barak Rivera  : 1980  MRN: [id number]    Date of Admission: 2021  Primary Care Physician: Lupe Ramires APRN    Subjective   Subjective     CC:  F/u chest wall pain and cough    HPI:  Mr. Rivera reports he is having pain in the right flank which has been chronic since his chest tube was removed.  He denies any shortness of breath.  He has been on 2 L of oxygen today.  He normally does not wear oxygen during the day but does wear it while sleeping.  He has been in and out of sleep today as well.  He also coughed up several large \"mucous plugs today.\"  Discussed the importance of flutter valve and ambulation.    ROS:  Gen- No fevers, chills  CV- No chest pain, palpitations  Resp- positive for cough, shortness of breath  GI- No N/V/D, abd pain       Objective   Objective     Vital Signs:   Temp:  [98.1 °F (36.7 °C)-98.2 °F (36.8 °C)] 98.2 °F (36.8 °C)  Heart Rate:  [] 107  Resp:  [18] 18  BP: ()/(68-75) 98/68        Physical Exam:  Constitutional: No acute distress, awake, alert  HENT: NCAT, mucous membranes moist  Respiratory: Clear to auscultation bilaterally, respiratory effort normal   Cardiovascular: RRR, no murmurs, rubs, or gallops  Gastrointestinal: Positive bowel sounds, soft, nontender, nondistended  Musculoskeletal: No bilateral ankle edema  Psychiatric: Appropriate affect, cooperative  Neurologic: Oriented x 3, strength symmetric in all extremities, Cranial Nerves grossly intact to confrontation, speech clear  Skin: No rashes      Results Reviewed:  Results from last 7 days   Lab Units 21  1220 21  0331 21  1033   WBC 10*3/mm3 11.52* 16.47* 8.48   HEMOGLOBIN g/dL 12.2* 12.3* 14.1   HEMATOCRIT % 38.0 38.6 42.8   PLATELETS 10*3/mm3 179 248 231   INR   --   --  0.98     Results from last 7 days   Lab Units 21  0148 21  1220 21  0331 21  1033   SODIUM " mmol/L  --  132* 137 136   POTASSIUM mmol/L 4.1 3.6 4.2 3.5   CHLORIDE mmol/L  --  96* 102 100   CO2 mmol/L  --  32.0* 26.0 23.0   BUN mg/dL  --  10 15 26*   CREATININE mg/dL  --  0.65* 0.66* 1.04   GLUCOSE mg/dL  --  136* 160* 186*   CALCIUM mg/dL  --  8.5* 8.8 9.4   ALT (SGPT) U/L  --   --   --  26   AST (SGOT) U/L  --   --   --  34     Estimated Creatinine Clearance: 169.2 mL/min (A) (by C-G formula based on SCr of 0.65 mg/dL (L)).    Microbiology Results Abnormal     None          Imaging Results (Last 24 Hours)     Procedure Component Value Units Date/Time    XR Chest 1 View [[id number]] Collected: 01/07/21 0901     Updated: 01/07/21 0901    Narrative:      EXAMINATION: XR CHEST 1 VW-     INDICATION: J98.6-Disorders of diaphragm.      COMPARISON: 01/06/2021.     FINDINGS: Paraspinous fixation rods are noted. Heart is mildly enlarged.  Vasculature appears at upper limits of normal. There is persistent and  fairly extensive right basilar atelectasis, stable in extent but a  little denser today. Milder left basilar discoid atelectasis appear  stable. No pneumothorax is seen. There is gaseous distention of the  colon as on prior study which may reflect some degree of ileus.       Impression:      Bibasilar atelectasis as described, mildly increased on the  right.      D:  01/07/2021  E:  01/07/2021                  I have reviewed the medications:  Scheduled Meds:cholecalciferol, 1,000 Units, Oral, Daily  cloNIDine, 0.3 mg, Oral, Nightly  dextromethorphan polistirex ER, 60 mg, Oral, Q12H  Emtricitabine-Tenofovir AF, 1 tablet, Oral, Daily  escitalopram, 20 mg, Oral, Daily  guaiFENesin, 600 mg, Oral, Q12H  heparin (porcine), 5,000 Units, Subcutaneous, Q12H  insulin lispro, 0-7 Units, Subcutaneous, TID AC  linaclotide, 290 mcg, Oral, Daily  Naloxegol Oxalate, 25 mg, Oral, QAM  nicotine, 1 patch, Transdermal, Q24H  OLANZapine zydis, 10 mg, Oral, Nightly  pantoprazole, 40 mg, Oral, BID AC  polyethylene glycol, 17 g,  Oral, Daily  QUEtiapine, 600 mg, Oral, Nightly      Continuous Infusions:   PRN Meds:.•  acetaminophen  •  albuterol  •  bisacodyl  •  clonazePAM  •  dextrose  •  dextrose  •  glucagon (human recombinant)  •  magnesium hydroxide  •  naloxone  •  ondansetron **OR** ondansetron  •  oxyCODONE  •  oxyCODONE  •  potassium & sodium phosphates **OR** potassium & sodium phosphates  •  potassium chloride  •  potassium chloride    Assessment/Plan   Assessment & Plan     Active Hospital Problems    Diagnosis  POA   • **Rt Diaphragm Paralysis [J98.6]  Yes   • Hyperlipidemia [E78.5]  Yes   • GERD (gastroesophageal reflux disease) [K21.9]  Yes   • COPD (chronic obstructive pulmonary disease) (CMS/HCC) [J44.9]  Yes   • Hepatitis C [B19.20]  Yes   • Hepatitis B [B19.10]  Yes   • Bipolar disorder (CMS/HCC) [F31.9]  Yes   • Acute psychosis (CMS/HCC) [F23]  Yes   • H/O Suicidal ideations [R45.851]  Not Applicable   • H/O Polysubstance abuse (CMS/HCC) [F19.10]  Yes   • Tobacco abuse [Z72.0]  Yes   • Paranoia (CMS/HCC) [F22]  Yes      Resolved Hospital Problems   No resolved problems to display.        Brief Hospital Course to date:  Barak Rivera is a 40 y.o. male with past medical hx of COPD, noctural hypoxia, heptatis C, hepatitis B, GERD, Bipolar disorder, substance abuse who was admitted for shortness of breath and right diaphragm paralysis.  He underwent bronchoscopy, right thoracotomy and diaphragm plication with Dr. Waller on 1/5/2021.     Right diaphragm paralysis  Status post bronchoscopy, right thoracotomy and diaphragm plication 1/5/2021  Nocturnal Hypoxia (on chronic 2.5L oxygen nightly)  Oxygen requirement (normally on room air during the day)  -chest tubes removed  -has some mucous plugging and using flutter valve  -weaned off PCA, now on oral pain medication  -post chest tube xray Is alright  -wean oxygen during the day as toleraterated  -WBC improving, no fevers, no sign of infection requiring abx      Uncontrolled  pain  Opioid addiction  -weaned off PCA  -on oxycodone  -Continue oral pain medications     Chronic anxiety  Chronic benzo dependence  -Klonopin TID, do not change frequency     HIV exposure  -on prophylaxis dosing, taking home medication      Constipation  -takes Linzess at home      DVT Prophylaxis:  SCD         Disposition: I expect the patient to be discharged once improved.    CODE STATUS:   Code Status and Medical Interventions:   Ordered at: 01/04/21 1727     Code Status:    CPR     Medical Interventions (Level of Support Prior to Arrest):    Full       Nichelle Estes PA-C  01/07/21

## 2021-01-07 NOTE — PROGRESS NOTES
Cardiothoracic Surgery Progress Note      POD 3 s/p bronchoscopy, right thoracotomy, right diaphragm plication     LOS: 3 days      Subjective:  Pain is still his complaint.  Wants to get rid of PCA.  According to nursing, patient is asleep most of the time.      Objective:  Vital Signs  Temp:  [98.1 °F (36.7 °C)-98.2 °F (36.8 °C)] 98.2 °F (36.8 °C)  Heart Rate:  [] 107  Resp:  [18] 18  BP: ()/(68-75) 98/68    Physical Exam:   General Appearance: Sitting up in chair on phone   Lungs: respirations regular, respirations even and respirations unlabored, Decreased breath sounds and rhonchi on right   Heart: normal S1, S2 and no murmur, no gallop, no rub, tachycardia   Skin: Incision c/d/i     Results:  Results from last 7 days   Lab Units 01/06/21  1220   WBC 10*3/mm3 11.52*   HEMOGLOBIN g/dL 12.2*   HEMATOCRIT % 38.0   PLATELETS 10*3/mm3 179     Results from last 7 days   Lab Units 01/07/21  0148 01/06/21  1220   SODIUM mmol/L  --  132*   POTASSIUM mmol/L 4.1 3.6   CHLORIDE mmol/L  --  96*   CO2 mmol/L  --  32.0*   BUN mg/dL  --  10   CREATININE mg/dL  --  0.65*   GLUCOSE mg/dL  --  136*   CALCIUM mg/dL  --  8.5*       Assessment:  POD 3 s/p bronchoscopy, right thoracotomy, right diaphragm plication  Marginal pulmonary toilet  No pain tolerance    Plan:  D/C PCA and Oral narcotics at patient's request  Ambulate  Pulmonary toilet - spoke with nursing staff about marginal status  IS and flutter valve  D/C home when pain controlled  AM CXR pending    Corbin Waller MD  01/07/21  07:51 EST

## 2021-01-07 NOTE — PLAN OF CARE
Goal Outcome Evaluation:  Plan of Care Reviewed With: patient  Progress: no change  Outcome Summary: Pt very lethargic but arousable overnight, still requiring PCA and PRN pain meds, continue to monitor

## 2021-01-08 ENCOUNTER — APPOINTMENT (OUTPATIENT)
Dept: GENERAL RADIOLOGY | Facility: HOSPITAL | Age: 41
End: 2021-01-08

## 2021-01-08 LAB — GLUCOSE BLDC GLUCOMTR-MCNC: 109 MG/DL (ref 70–130)

## 2021-01-08 PROCEDURE — 25010000002 HEPARIN (PORCINE) PER 1000 UNITS: Performed by: PHYSICIAN ASSISTANT

## 2021-01-08 PROCEDURE — 99233 SBSQ HOSP IP/OBS HIGH 50: CPT | Performed by: FAMILY MEDICINE

## 2021-01-08 PROCEDURE — 82962 GLUCOSE BLOOD TEST: CPT

## 2021-01-08 PROCEDURE — 71045 X-RAY EXAM CHEST 1 VIEW: CPT

## 2021-01-08 RX ORDER — OXYCODONE HYDROCHLORIDE 15 MG/1
15 TABLET ORAL
Status: DISCONTINUED | OUTPATIENT
Start: 2021-01-08 | End: 2021-01-09 | Stop reason: HOSPADM

## 2021-01-08 RX ADMIN — ESCITALOPRAM OXALATE 20 MG: 20 TABLET ORAL at 20:02

## 2021-01-08 RX ADMIN — EMTRICITABINE AND TENOFOVIR ALAFENAMIDE 1 TABLET: 200; 25 TABLET ORAL at 19:59

## 2021-01-08 RX ADMIN — OXYCODONE HYDROCHLORIDE 15 MG: 15 TABLET ORAL at 11:01

## 2021-01-08 RX ADMIN — CLONAZEPAM 2 MG: 1 TABLET ORAL at 16:13

## 2021-01-08 RX ADMIN — NALOXEGOL OXALATE 25 MG: 25 TABLET, FILM COATED ORAL at 08:10

## 2021-01-08 RX ADMIN — LINACLOTIDE 290 MCG: 290 CAPSULE, GELATIN COATED ORAL at 08:10

## 2021-01-08 RX ADMIN — GUAIFENESIN 600 MG: 600 TABLET, EXTENDED RELEASE ORAL at 20:01

## 2021-01-08 RX ADMIN — DEXTROMETHORPHAN 60 MG: 30 SUSPENSION, EXTENDED RELEASE ORAL at 08:05

## 2021-01-08 RX ADMIN — ACETAMINOPHEN 650 MG: 325 TABLET, FILM COATED ORAL at 18:01

## 2021-01-08 RX ADMIN — OXYCODONE HYDROCHLORIDE 15 MG: 15 TABLET ORAL at 20:01

## 2021-01-08 RX ADMIN — OXYCODONE HYDROCHLORIDE 15 MG: 15 TABLET ORAL at 17:01

## 2021-01-08 RX ADMIN — Medication 1 PATCH: at 08:04

## 2021-01-08 RX ADMIN — PANTOPRAZOLE SODIUM 40 MG: 40 TABLET, DELAYED RELEASE ORAL at 08:04

## 2021-01-08 RX ADMIN — HEPARIN SODIUM 5000 UNITS: 5000 INJECTION INTRAVENOUS; SUBCUTANEOUS at 08:05

## 2021-01-08 RX ADMIN — GUAIFENESIN 600 MG: 600 TABLET, EXTENDED RELEASE ORAL at 08:04

## 2021-01-08 RX ADMIN — HEPARIN SODIUM 5000 UNITS: 5000 INJECTION INTRAVENOUS; SUBCUTANEOUS at 20:02

## 2021-01-08 RX ADMIN — CLONIDINE HYDROCHLORIDE 0.3 MG: 0.3 TABLET ORAL at 20:25

## 2021-01-08 RX ADMIN — OLANZAPINE 10 MG: 5 TABLET, ORALLY DISINTEGRATING ORAL at 19:57

## 2021-01-08 RX ADMIN — Medication 1000 UNITS: at 08:04

## 2021-01-08 RX ADMIN — DEXTROMETHORPHAN 60 MG: 30 SUSPENSION, EXTENDED RELEASE ORAL at 19:58

## 2021-01-08 RX ADMIN — QUETIAPINE FUMARATE 600 MG: 100 TABLET ORAL at 20:00

## 2021-01-08 RX ADMIN — OXYCODONE HYDROCHLORIDE 15 MG: 15 TABLET ORAL at 08:04

## 2021-01-08 RX ADMIN — OXYCODONE HYDROCHLORIDE 15 MG: 15 TABLET ORAL at 13:58

## 2021-01-08 RX ADMIN — CLONAZEPAM 2 MG: 1 TABLET ORAL at 08:04

## 2021-01-08 NOTE — PROGRESS NOTES
Continued Stay Note  [facility]     Patient Name: Barak Rivera  MRN: [id number]  Today's Date: 1/8/2021    Admit Date: 1/4/2021    Discharge Plan     Row Name 01/08/21 1407       Plan    Plan  Home    Patient/Family in Agreement with Plan  yes    Plan Comments  Met & spoke with Mr Rivera at the bedside. Up in chair and dosing during limited conversation. Not wanting to answer questions or converse. Per chart review, possible DC home 1/9. No discharge needs voiced for case mgmt to address. Plan is home with dad to transport via car. I called and spoke to his dad, Joe. He said he left the oxygen tank in his room for the ride home. He stated it would take a few hours to get to [place] on DC day.    Final Discharge Disposition Code  01 - home or self-care        Discharge Codes    No documentation.       Expected Discharge Date and Time     Expected Discharge Date Expected Discharge Time    Jan 11, 2021             Cecilia Craft RN

## 2021-01-08 NOTE — PLAN OF CARE
Goal Outcome Evaluation:  Plan of Care Reviewed With: patient  Progress: no change   Pt consistency calling out for pain medication, even though he remains lethargic and falls asleep during conversation. Pt demanded more pain medicine for break through pain, PA notified and no new orders. Pt is verbally abusive to staff and is frequently on the call light asking repeated questions immediately after staff leaves the room. Pt refuses ambulation and the use of flutter valve. VSS, on 2L NC. Will continue to monitor.

## 2021-01-08 NOTE — PROGRESS NOTES
Cardiothoracic Surgery Progress Note      POD 4 s/p bronchoscopy, right thoracotomy, right diaphragm plication     LOS: 4 days      Subjective:  No acute events overnight.  Patient is sitting in bed and demanding to be given more pain medication than which has been prescribed.  Denies chest pain or dyspnea.     Objective:  Vital Signs  Temp:  [97.7 °F (36.5 °C)-99.9 °F (37.7 °C)] 97.7 °F (36.5 °C)  Heart Rate:  [105-127] 105  Resp:  [18] 18  BP: ()/(63-93) 101/74    Physical Exam:     General Appearance: Well-developed, well-nourished no acute distress   Lungs: Respirations even and unlabored and clear to auscultation bilaterally no wheezes, rales or rhonchi   Heart: Regular rate and rhythm no murmurs, rubs or gallops   Skin: Incision c/d/i     Results:    Results from last 7 days   Lab Units 01/06/21  1220   WBC 10*3/mm3 11.52*   HEMOGLOBIN g/dL 12.2*   HEMATOCRIT % 38.0   PLATELETS 10*3/mm3 179     Results from last 7 days   Lab Units 01/07/21  0148 01/06/21  1220   SODIUM mmol/L  --  132*   POTASSIUM mmol/L 4.1 3.6   CHLORIDE mmol/L  --  96*   CO2 mmol/L  --  32.0*   BUN mg/dL  --  10   CREATININE mg/dL  --  0.65*   GLUCOSE mg/dL  --  136*   CALCIUM mg/dL  --  8.5*       Assessment:  POD 4 s/p bronchoscopy, right thoracotomy, right diaphragm plication  Marginal pulmonary toilet  No pain tolerance     Plan:  Oral narcotics have been increased - no further increases  Mobilize  Aggressive pulmonary toilet  DC home in a.m.    01/08/21  08:39 EST

## 2021-01-09 ENCOUNTER — APPOINTMENT (OUTPATIENT)
Dept: GENERAL RADIOLOGY | Facility: HOSPITAL | Age: 41
End: 2021-01-09

## 2021-01-09 VITALS
TEMPERATURE: 98.2 F | DIASTOLIC BLOOD PRESSURE: 70 MMHG | OXYGEN SATURATION: 95 % | HEART RATE: 108 BPM | RESPIRATION RATE: 18 BRPM | SYSTOLIC BLOOD PRESSURE: 106 MMHG

## 2021-01-09 PROBLEM — R73.9 HYPERGLYCEMIA: Status: ACTIVE | Noted: 2021-01-09

## 2021-01-09 LAB
ANION GAP SERPL CALCULATED.3IONS-SCNC: 10 MMOL/L (ref 5–15)
BASOPHILS # BLD AUTO: 0.03 10*3/MM3 (ref 0–0.2)
BASOPHILS NFR BLD AUTO: 0.4 % (ref 0–1.5)
BUN SERPL-MCNC: 8 MG/DL (ref 6–20)
BUN/CREAT SERPL: 9.9 (ref 7–25)
CALCIUM SPEC-SCNC: 9.1 MG/DL (ref 8.6–10.5)
CHLORIDE SERPL-SCNC: 97 MMOL/L (ref 98–107)
CO2 SERPL-SCNC: 30 MMOL/L (ref 22–29)
CREAT SERPL-MCNC: 0.81 MG/DL (ref 0.76–1.27)
DEPRECATED RDW RBC AUTO: 48.1 FL (ref 37–54)
EOSINOPHIL # BLD AUTO: 0.3 10*3/MM3 (ref 0–0.4)
EOSINOPHIL NFR BLD AUTO: 3.9 % (ref 0.3–6.2)
ERYTHROCYTE [DISTWIDTH] IN BLOOD BY AUTOMATED COUNT: 13.7 % (ref 12.3–15.4)
GFR SERPL CREATININE-BSD FRML MDRD: 105 ML/MIN/1.73
GLUCOSE SERPL-MCNC: 152 MG/DL (ref 65–99)
HCT VFR BLD AUTO: 36.7 % (ref 37.5–51)
HGB BLD-MCNC: 11.9 G/DL (ref 13–17.7)
IMM GRANULOCYTES # BLD AUTO: 0.07 10*3/MM3 (ref 0–0.05)
IMM GRANULOCYTES NFR BLD AUTO: 0.9 % (ref 0–0.5)
LYMPHOCYTES # BLD AUTO: 2.45 10*3/MM3 (ref 0.7–3.1)
LYMPHOCYTES NFR BLD AUTO: 31.9 % (ref 19.6–45.3)
MCH RBC QN AUTO: 30.8 PG (ref 26.6–33)
MCHC RBC AUTO-ENTMCNC: 32.4 G/DL (ref 31.5–35.7)
MCV RBC AUTO: 95.1 FL (ref 79–97)
MONOCYTES # BLD AUTO: 0.71 10*3/MM3 (ref 0.1–0.9)
MONOCYTES NFR BLD AUTO: 9.2 % (ref 5–12)
NEUTROPHILS NFR BLD AUTO: 4.12 10*3/MM3 (ref 1.7–7)
NEUTROPHILS NFR BLD AUTO: 53.7 % (ref 42.7–76)
NRBC BLD AUTO-RTO: 0 /100 WBC (ref 0–0.2)
PLATELET # BLD AUTO: 207 10*3/MM3 (ref 140–450)
PMV BLD AUTO: 8.8 FL (ref 6–12)
POTASSIUM SERPL-SCNC: 3.8 MMOL/L (ref 3.5–5.2)
RBC # BLD AUTO: 3.86 10*6/MM3 (ref 4.14–5.8)
SODIUM SERPL-SCNC: 137 MMOL/L (ref 136–145)
WBC # BLD AUTO: 7.68 10*3/MM3 (ref 3.4–10.8)

## 2021-01-09 PROCEDURE — 85025 COMPLETE CBC W/AUTO DIFF WBC: CPT | Performed by: FAMILY MEDICINE

## 2021-01-09 PROCEDURE — 25010000002 HEPARIN (PORCINE) PER 1000 UNITS: Performed by: PHYSICIAN ASSISTANT

## 2021-01-09 PROCEDURE — 99233 SBSQ HOSP IP/OBS HIGH 50: CPT | Performed by: NURSE PRACTITIONER

## 2021-01-09 PROCEDURE — 71045 X-RAY EXAM CHEST 1 VIEW: CPT

## 2021-01-09 PROCEDURE — 80048 BASIC METABOLIC PNL TOTAL CA: CPT | Performed by: FAMILY MEDICINE

## 2021-01-09 RX ORDER — LIDOCAINE 50 MG/G
1 PATCH TOPICAL
Status: DISCONTINUED | OUTPATIENT
Start: 2021-01-09 | End: 2021-01-09 | Stop reason: HOSPADM

## 2021-01-09 RX ORDER — AMOXICILLIN 250 MG
2 CAPSULE ORAL 2 TIMES DAILY
Status: DISCONTINUED | OUTPATIENT
Start: 2021-01-09 | End: 2021-01-09 | Stop reason: HOSPADM

## 2021-01-09 RX ORDER — LIDOCAINE 50 MG/G
1 PATCH TOPICAL
Status: ON HOLD
Start: 2021-01-09 | End: 2021-06-22

## 2021-01-09 RX ORDER — BISACODYL 10 MG
10 SUPPOSITORY, RECTAL RECTAL DAILY
Status: DISCONTINUED | OUTPATIENT
Start: 2021-01-09 | End: 2021-01-09 | Stop reason: HOSPADM

## 2021-01-09 RX ORDER — MAGNESIUM CARB/ALUMINUM HYDROX 105-160MG
296 TABLET,CHEWABLE ORAL ONCE
Status: COMPLETED | OUTPATIENT
Start: 2021-01-09 | End: 2021-01-09

## 2021-01-09 RX ORDER — CLONAZEPAM 1 MG/1
2 TABLET ORAL 3 TIMES DAILY PRN
Status: DISCONTINUED | OUTPATIENT
Start: 2021-01-09 | End: 2021-01-09 | Stop reason: HOSPADM

## 2021-01-09 RX ORDER — OXYCODONE HYDROCHLORIDE 15 MG/1
15 TABLET ORAL
Qty: 60 TABLET | Refills: 0 | Status: ON HOLD
Start: 2021-01-09 | End: 2021-06-22

## 2021-01-09 RX ADMIN — Medication 296 ML: at 09:41

## 2021-01-09 RX ADMIN — Medication 1000 UNITS: at 07:58

## 2021-01-09 RX ADMIN — OXYCODONE HYDROCHLORIDE 15 MG: 15 TABLET ORAL at 09:40

## 2021-01-09 RX ADMIN — OXYCODONE HYDROCHLORIDE 15 MG: 15 TABLET ORAL at 00:07

## 2021-01-09 RX ADMIN — Medication 1 PATCH: at 09:40

## 2021-01-09 RX ADMIN — PANTOPRAZOLE SODIUM 40 MG: 40 TABLET, DELAYED RELEASE ORAL at 07:58

## 2021-01-09 RX ADMIN — DOCUSATE SODIUM 50MG AND SENNOSIDES 8.6MG 2 TABLET: 8.6; 5 TABLET, FILM COATED ORAL at 09:40

## 2021-01-09 RX ADMIN — NALOXEGOL OXALATE 25 MG: 25 TABLET, FILM COATED ORAL at 05:20

## 2021-01-09 RX ADMIN — HEPARIN SODIUM 5000 UNITS: 5000 INJECTION INTRAVENOUS; SUBCUTANEOUS at 09:41

## 2021-01-09 RX ADMIN — GUAIFENESIN 600 MG: 600 TABLET, EXTENDED RELEASE ORAL at 07:58

## 2021-01-09 RX ADMIN — CLONAZEPAM 2 MG: 1 TABLET ORAL at 00:07

## 2021-01-09 RX ADMIN — LINACLOTIDE 290 MCG: 290 CAPSULE, GELATIN COATED ORAL at 05:22

## 2021-01-09 RX ADMIN — ACETAMINOPHEN 650 MG: 325 TABLET, FILM COATED ORAL at 05:34

## 2021-01-09 RX ADMIN — BISACODYL 10 MG: 10 SUPPOSITORY RECTAL at 09:41

## 2021-01-09 RX ADMIN — OXYCODONE HYDROCHLORIDE 15 MG: 15 TABLET ORAL at 06:01

## 2021-01-09 RX ADMIN — OXYCODONE HYDROCHLORIDE 15 MG: 15 TABLET ORAL at 12:08

## 2021-01-09 RX ADMIN — DEXTROMETHORPHAN 60 MG: 30 SUSPENSION, EXTENDED RELEASE ORAL at 09:44

## 2021-01-09 RX ADMIN — OXYCODONE HYDROCHLORIDE 15 MG: 15 TABLET ORAL at 03:25

## 2021-01-09 RX ADMIN — CLONAZEPAM 2 MG: 1 TABLET ORAL at 07:58

## 2021-01-09 RX ADMIN — LIDOCAINE 1 PATCH: 50 PATCH CUTANEOUS at 12:08

## 2021-01-09 NOTE — PROGRESS NOTES
[facility] Medicine Services  PROGRESS NOTE    Patient Name: Barak Rivera  : 1980  MRN: [id number]    Date of Admission: 2021  Primary Care Physician: Lupe Ramires APRN    Subjective   Subjective   CC:  F/u chest wall pain    HPI:  Patient is sitting up in a chair in NAD. Patient is breathing without any difficulty. Patient was up walking in the hallways without oxygen, but apparently he is on 2 L of oxygen at home. Patient is requesting Neurontin, but he has not been prescribed it since January of last year. Patient wants his Klonopin scheduled like he takes it at home -3-9, but it is ordered as needed at this point. Patient is requesting pain medication at discharge, which will be left up to CT surgery. He told nurse that he takes it at home, but there is no narcotics on his Don. Don last shows buprenorphine prescribed 2020. UDS was positive for opiates, tricyclics, buprenorphine and benzos.    Okay from hospital medicine standpoint to discharge home, but no prescriptions for scheduled medications will be prescribed at discharge.    ROS:  Gen- No fevers, chills  CV- No chest pain, palpitations  Chest- +posterior chest wall pain  Resp- No cough, dyspnea  GI- No N/V/D, abd pain; +constipaiton  All other systems have been reviewed and the pertinent positives and negatives are listed above the HPI and ROS    Objective   Objective   Vital Signs:   Temp:  [98.2 °F (36.8 °C)-99.2 °F (37.3 °C)] 98.2 °F (36.8 °C)  Heart Rate:  [] 107  Resp:  [18] 18  BP: ()/(63-81) 106/70  Physical Exam:  Constitutional: Alert, WD, WN male sitting up in a chair in NAD  Eyes: EOMI, sclerae anicteric, no conjunctival injection  Head: NCAT  ENT: [place], moist mucous membranes   Respiratory: Nonlabored, symmetrical chest expansion, CTAB on 2 L NC  Cardiovascular: Mild tachycardia, no M/R/G, +DP pulses bilaterally  Gastrointestinal: Soft, NT, ND +BS  Musculoskeletal:  MILLAN; no LE edema bilaterally  Neurologic: Oriented x4, strength symmetric in all extremities, follows all commands, speech clear  Skin: No rashes on exposed skin; right posterior chest wall incision healing well CDI  Psychiatric:Pleasant and cooperative; normal affect    Results Reviewed:  Results from last 7 days   Lab Units 01/09/21  0529 01/06/21  1220 01/05/21  0331 01/03/21  1033   WBC 10*3/mm3 7.68 11.52* 16.47* 8.48   HEMOGLOBIN g/dL 11.9* 12.2* 12.3* 14.1   HEMATOCRIT % 36.7* 38.0 38.6 42.8   PLATELETS 10*3/mm3 207 179 248 231   INR   --   --   --  0.98     Results from last 7 days   Lab Units 01/09/21 0529 01/07/21  0148 01/06/21 1220 01/05/21 0331 01/03/21  1033   SODIUM mmol/L 137  --  132* 137 136   POTASSIUM mmol/L 3.8 4.1 3.6 4.2 3.5   CHLORIDE mmol/L 97*  --  96* 102 100   CO2 mmol/L 30.0*  --  32.0* 26.0 23.0   BUN mg/dL 8  --  10 15 26*   CREATININE mg/dL 0.81  --  0.65* 0.66* 1.04   GLUCOSE mg/dL 152*  --  136* 160* 186*   CALCIUM mg/dL 9.1  --  8.5* 8.8 9.4   ALT (SGPT) U/L  --   --   --   --  26   AST (SGOT) U/L  --   --   --   --  34     Estimated Creatinine Clearance: 135.8 mL/min (by C-G formula based on SCr of 0.81 mg/dL).    Microbiology Results Abnormal     None          Imaging Results (Last 24 Hours)     Procedure Component Value Units Date/Time    XR Chest 1 View [[id number]] Collected: 01/09/21 0748     Updated: 01/09/21 0751    Narrative:      EXAMINATION: XR CHEST 1 VW-      INDICATION: s/p diaphragm plication; J98.6-Disorders of diaphragm      COMPARISON: One day prior     FINDINGS: Mildly improved aeration in lung volumes, with persistent  linear opacities and small right pleural effusion. No distinct  pneumothorax. Unchanged heart and mediastinal contours, partially  obscured by extensive thoracic fusion hardware.       Impression:      Mildly improved aeration in lung volumes, with persistent  linear opacities and small right pleural effusion. No distinct  pneumothorax.  Unchanged heart and mediastinal contours, partially  obscured by extensive thoracic fusion hardware.     This report was finalized on 1/9/2021 7:48 AM by Evens Lazar.       XR Chest 1 View [[id number]] Collected: 01/08/21 0925     Updated: 01/08/21 1641    Narrative:      EXAMINATION: XR CHEST 1 VW-01/08/2021:      INDICATION: S/p diaphragm plication; J98.6-Disorders of diaphragm.      COMPARISON: 01/07/2021.     FINDINGS: Portable chest reveals hardware identified in the spine.  Patchy ill-defined opacification seen in the lower lung fields  bilaterally, somewhat improving in the right lung base in the interval.  Upper lung fields are clear. The heart is enlarged.       Impression:      Some improvement seen in the aeration of the right lung  base. The remainder of the chest is stable.     D:  01/08/2021  E:  01/08/2021     This report was finalized on 1/8/2021 4:38 PM by Dr. Marilyn Wallis MD.           I have reviewed the medications:  Scheduled Meds:bisacodyl, 10 mg, Rectal, Daily  cholecalciferol, 1,000 Units, Oral, Daily  cloNIDine, 0.3 mg, Oral, Nightly  dextromethorphan polistirex ER, 60 mg, Oral, Q12H  Emtricitabine-Tenofovir AF, 1 tablet, Oral, Daily  escitalopram, 20 mg, Oral, Daily  guaiFENesin, 600 mg, Oral, Q12H  heparin (porcine), 5,000 Units, Subcutaneous, Q12H  insulin lispro, 0-7 Units, Subcutaneous, TID AC  linaclotide, 290 mcg, Oral, Daily  magnesium citrate, 296 mL, Oral, Once  Naloxegol Oxalate, 25 mg, Oral, QAM  nicotine, 1 patch, Transdermal, Q24H  OLANZapine zydis, 10 mg, Oral, Nightly  pantoprazole, 40 mg, Oral, BID AC  polyethylene glycol, 17 g, Oral, Daily  QUEtiapine, 600 mg, Oral, Nightly  senna-docusate sodium, 2 tablet, Oral, BID      Continuous Infusions:   PRN Meds:.•  acetaminophen  •  albuterol  •  bisacodyl  •  clonazePAM  •  dextrose  •  dextrose  •  glucagon (human recombinant)  •  magnesium hydroxide  •  naloxone  •  ondansetron **OR** ondansetron  •  oxyCODONE  •   oxyCODONE  •  potassium & sodium phosphates **OR** potassium & sodium phosphates  •  potassium chloride  •  potassium chloride    Assessment/Plan   Assessment & Plan     Active Hospital Problems    Diagnosis  POA   • **Rt Diaphragm Paralysis [J98.6]  Yes   • Hyperglycemia [R73.9]  No   • Hyperlipidemia [E78.5]  Yes   • GERD (gastroesophageal reflux disease) [K21.9]  Yes   • COPD (chronic obstructive pulmonary disease) (CMS/HCC) [J44.9]  Yes   • Hepatitis C [B19.20]  Yes   • Hepatitis B [B19.10]  Yes   • Bipolar disorder (CMS/HCC) [F31.9]  Yes   • Acute psychosis (CMS/HCC) [F23]  Yes   • H/O Suicidal ideations [R45.851]  Not Applicable   • H/O Polysubstance abuse (CMS/HCC) [F19.10]  Yes   • Tobacco abuse [Z72.0]  Yes   • Paranoia (CMS/HCC) [F22]  Yes      Resolved Hospital Problems   No resolved problems to display.     Brief Hospital Course to date:  Barak Rivera is a 40 y.o. male with past medical hx of COPD, noctural hypoxia, heptatis C, hepatitis B, GERD, Bipolar disorder, substance abuse who was admitted for shortness of breath and right diaphragm paralysis.  He underwent bronchoscopy, right thoracotomy and diaphragm plication with Dr. Waller on 1/5/2021.    These problems are new to me today    This patient's problems and plans were partially entered by my partner and updated as appropriate by me 01/09/21.    Patient's procedure notes, diagnostic studies and notes were all reviewed by me and more than 2 problems were addressed.    Right diaphragm paralysis  Status post bronchoscopy, right thoracotomy and diaphragm plication 1/5/2021  Nocturnal Hypoxia (on chronic 2.5L oxygen nightly)  Oxygen requirement (normally on room air during the day)  -weaned off PCA, now on oral pain medication  -wean oxygen during the day as toleraterated  -WBC 7.68; no fevers, no sign of infection requiring abx    Uncontrolled pain  Opioid addiction  -weaned off PCA  --Stopped Suboxone 2 days prior to admission  --Patient knows  that to restart Suboxone will put him into withdrawal; he needs to be off narcotics and in mild withdrawal before he starts Suboxone back  -on oxycodone per CTS- will not increase dose   -Continue oral pain medications; to be prescribed per CTS   --CT surgery states pain from his type of surgery will last approximately 4 weeks  --Follow-up with Suboxone clinic  --May consider Addiction medicine or Palliative consult for pain management     Chronic anxiety  Chronic benzo dependence  -Klonopin TID PRN    Hyperglycemia  --A1c 5.9  --24H  Glucose 109-152  --SSI     HIV exposure  -on prophylaxis dosing, taking home medication      Constipation  --takes Linzess and Movantik at home  --Increased bowel meds    DVT Prophylaxis:  Mechanical     Disposition: discharged per CTS     Spoke with patient who understands that he cannot start back on his Suboxone while taking narcotics or he will going to withdrawal; patient will follow up in his Suboxone clinic after he finishes his pain medication from surgery; CTS will prescribe his medications; patient has plenty of Klonopin per his Don and his gabapentin was only prescribed for 2 weeks back in November 2020, so no controlled substances should be prescribed by hospital medicine service.    CODE STATUS:   Code Status and Medical Interventions:   Ordered at: 01/04/21 1727     Code Status:    CPR     Medical Interventions (Level of Support Prior to Arrest):    Full     Shirin Minor, APRN  01/09/21

## 2021-01-09 NOTE — PROGRESS NOTES
[facility] Medicine Services  PROGRESS NOTE    Patient Name: Barak Rivera  : 1980  MRN: [id number]    Date of Admission: 2021  Primary Care Physician: Lupe Ramires APRN    Subjective   Subjective     CC:  F/u chest wall pain    HPI:  Patient complains of left ear pain. He states he is able to hear out of it just fine.    ROS:  Gen- No fevers, chills  CV- No chest pain, palpitations  Resp- No cough,+ dyspnea  GI- No N/V/D, abd pain        Objective   Objective     Vital Signs:   Temp:  [97.7 °F (36.5 °C)-99.9 °F (37.7 °C)] 97.7 °F (36.5 °C)  Heart Rate:  [105-128] 128  Resp:  [18] 18  BP: (101-129)/(74-93) 108/78        Physical Exam:  Constitutional: No acute distress, awake, male sitting up in chair, less than alert   HENT: NCAT, mucous membranes moist  Respiratory: respiratory effort normal on 2L   Cardiovascular: tachycardia, no murmurs, rubs, or gallops  Gastrointestinal: Positive bowel sounds, soft, nontender, nondistended  Musculoskeletal: No bilateral ankle edema  Psychiatric: Appropriate affect, cooperative  Neurologic: Oriented x 3, strength symmetric in all extremities,  speech clear  Skin: No rashes      Results Reviewed:  Results from last 7 days   Lab Units 21  1220 21  03321  1033   WBC 10*3/mm3 11.52* 16.47* 8.48   HEMOGLOBIN g/dL 12.2* 12.3* 14.1   HEMATOCRIT % 38.0 38.6 42.8   PLATELETS 10*3/mm3 179 248 231   INR   --   --  0.98     Results from last 7 days   Lab Units 21  0148 21  1220 21  0331 21  1033   SODIUM mmol/L  --  132* 137 136   POTASSIUM mmol/L 4.1 3.6 4.2 3.5   CHLORIDE mmol/L  --  96* 102 100   CO2 mmol/L  --  32.0* 26.0 23.0   BUN mg/dL  --  10 15 26*   CREATININE mg/dL  --  0.65* 0.66* 1.04   GLUCOSE mg/dL  --  136* 160* 186*   CALCIUM mg/dL  --  8.5* 8.8 9.4   ALT (SGPT) U/L  --   --   --  26   AST (SGOT) U/L  --   --   --  34     Estimated Creatinine Clearance: 169.2 mL/min (A) (by C-G  formula based on SCr of 0.65 mg/dL (L)).    Microbiology Results Abnormal     None          Imaging Results (Last 24 Hours)     Procedure Component Value Units Date/Time    XR Chest 1 View [[id number]] Collected: 01/08/21 0925     Updated: 01/08/21 1641    Narrative:      EXAMINATION: XR CHEST 1 VW-01/08/2021:      INDICATION: S/p diaphragm plication; J98.6-Disorders of diaphragm.      COMPARISON: 01/07/2021.     FINDINGS: Portable chest reveals hardware identified in the spine.  Patchy ill-defined opacification seen in the lower lung fields  bilaterally, somewhat improving in the right lung base in the interval.  Upper lung fields are clear. The heart is enlarged.       Impression:      Some improvement seen in the aeration of the right lung  base. The remainder of the chest is stable.     D:  01/08/2021  E:  01/08/2021     This report was finalized on 1/8/2021 4:38 PM by Dr. Marilyn Wallis MD.       XR Chest 1 View [[id number]] Collected: 01/07/21 0901     Updated: 01/07/21 2206    Narrative:      EXAMINATION: XR CHEST 1 VW-     INDICATION: J98.6-Disorders of diaphragm.      COMPARISON: 01/06/2021.     FINDINGS: Paraspinous fixation rods are noted. Heart is mildly enlarged.  Vasculature appears at upper limits of normal. There is persistent and  fairly extensive right basilar atelectasis, stable in extent but a  little denser today. Milder left basilar discoid atelectasis appear  stable. No pneumothorax is seen. There is gaseous distention of the  colon as on prior study which may reflect some degree of ileus.       Impression:      Bibasilar atelectasis as described, mildly increased on the  right.      D:  01/07/2021  E:  01/07/2021     This report was finalized on 1/7/2021 10:03 PM by Dr. Demetrius Howe MD.                  I have reviewed the medications:  Scheduled Meds:cholecalciferol, 1,000 Units, Oral, Daily  cloNIDine, 0.3 mg, Oral, Nightly  dextromethorphan polistirex ER, 60 mg, Oral,  Q12H  Emtricitabine-Tenofovir AF, 1 tablet, Oral, Daily  escitalopram, 20 mg, Oral, Daily  guaiFENesin, 600 mg, Oral, Q12H  heparin (porcine), 5,000 Units, Subcutaneous, Q12H  insulin lispro, 0-7 Units, Subcutaneous, TID AC  linaclotide, 290 mcg, Oral, Daily  Naloxegol Oxalate, 25 mg, Oral, QAM  nicotine, 1 patch, Transdermal, Q24H  OLANZapine zydis, 10 mg, Oral, Nightly  pantoprazole, 40 mg, Oral, BID AC  polyethylene glycol, 17 g, Oral, Daily  QUEtiapine, 600 mg, Oral, Nightly      Continuous Infusions:   PRN Meds:.•  acetaminophen  •  albuterol  •  bisacodyl  •  clonazePAM  •  dextrose  •  dextrose  •  glucagon (human recombinant)  •  magnesium hydroxide  •  naloxone  •  ondansetron **OR** ondansetron  •  oxyCODONE  •  oxyCODONE  •  potassium & sodium phosphates **OR** potassium & sodium phosphates  •  potassium chloride  •  potassium chloride    Assessment/Plan   Assessment & Plan     Active Hospital Problems    Diagnosis  POA   • **Rt Diaphragm Paralysis [J98.6]  Yes   • Hyperlipidemia [E78.5]  Yes   • GERD (gastroesophageal reflux disease) [K21.9]  Yes   • COPD (chronic obstructive pulmonary disease) (CMS/HCC) [J44.9]  Yes   • Hepatitis C [B19.20]  Yes   • Hepatitis B [B19.10]  Yes   • Bipolar disorder (CMS/HCC) [F31.9]  Yes   • Acute psychosis (CMS/HCC) [F23]  Yes   • H/O Suicidal ideations [R45.851]  Not Applicable   • H/O Polysubstance abuse (CMS/HCC) [F19.10]  Yes   • Tobacco abuse [Z72.0]  Yes   • Paranoia (CMS/HCC) [F22]  Yes      Resolved Hospital Problems   No resolved problems to display.        Brief Hospital Course to date:  Barak Rivera is a 40 y.o. male with past medical hx of COPD, noctural hypoxia, heptatis C, hepatitis B, GERD, Bipolar disorder, substance abuse who was admitted for shortness of breath and right diaphragm paralysis.  He underwent bronchoscopy, right thoracotomy and diaphragm plication with Dr. Waller on 1/5/2021.  This patient's problems and plans were partially entered by my  partner and updated as appropriate by me 01/08/21.    Right diaphragm paralysis  Status post bronchoscopy, right thoracotomy and diaphragm plication 1/5/2021  Nocturnal Hypoxia (on chronic 2.5L oxygen nightly)  Oxygen requirement (normally on room air during the day)  -weaned off PCA, now on oral pain medication  -wean oxygen during the day as toleraterated  -WBC improving, no fevers, no sign of infection requiring abx      Uncontrolled pain  Opioid addiction  -weaned off PCA  -on oxycodone- will not increase dose   -Continue oral pain medications  -If he is not discharged, could consider getting addiction team consult and dr Hunt might be able to wean back to suboxone      Chronic anxiety  Chronic benzo dependence  -Klonopin TID, do not change frequency     HIV exposure  -on prophylaxis dosing, taking home medication      Constipation  -takes Linzess at home             DVT Prophylaxis:  Mechanical       Disposition: I expect the patient to be discharged possibly tomorrow     CODE STATUS:   Code Status and Medical Interventions:   Ordered at: 01/04/21 1727     Code Status:    CPR     Medical Interventions (Level of Support Prior to Arrest):    Full       Magalys Clarke,   01/08/21

## 2021-01-09 NOTE — PROGRESS NOTES
Cardiothoracic Surgery Progress Note    Postop day 5 s/p bronchoscopy, right thoracotomy, right diaphragm plication     LOS: 5 days      Subjective:  He is having significant pain  With his previous Suboxone his narcotics were just now started to take effect  He has had no bowel movements  He is having some amount of pain at the right chest wall     Objective:  Vital Signs  Temp:  [98.2 °F (36.8 °C)-99.2 °F (37.3 °C)] 98.2 °F (36.8 °C)  Heart Rate:  [] 107  Resp:  [18] 18  BP: ()/(63-81) 106/70    Physical Exam:     General Appearance: Well-developed, well-nourished no acute distress   Lungs: Minich breath sounds right chest   Heart: Regular rate and rhythm no murmurs, rubs or gallops   Skin: Incision c/d/i     Results:    Results from last 7 days   Lab Units 01/09/21  0529   WBC 10*3/mm3 7.68   HEMOGLOBIN g/dL 11.9*   HEMATOCRIT % 36.7*   PLATELETS 10*3/mm3 207     Results from last 7 days   Lab Units 01/09/21  0529   SODIUM mmol/L 137   POTASSIUM mmol/L 3.8   CHLORIDE mmol/L 97*   CO2 mmol/L 30.0*   BUN mg/dL 8   CREATININE mg/dL 0.81   GLUCOSE mg/dL 152*   CALCIUM mg/dL 9.1       Assessment:  POD 5 s/p bronchoscopy, right thoracotomy, right diaphragm plication  Marginal pulmonary toilet  No pain tolerance he has been in on Suboxone prior to this hospitalization  X-ray has improved  He still continued on 3 L of oxygen via nasal cannula  Plan:  Narcotics are at their maximum  Needs to have a bowel movement  Home 24 hours  Mobilize  Aggressive pulmonary toilet      01/09/21  08:56 EST

## 2021-01-10 ENCOUNTER — READMISSION MANAGEMENT (OUTPATIENT)
Dept: CALL CENTER | Facility: HOSPITAL | Age: 41
End: 2021-01-10

## 2021-01-10 NOTE — OUTREACH NOTE
Prep Survey      Responses   [employer] facility patient discharged from?  [place]   Is LACE score < 7 ?  No   Emergency Room discharge w/ pulse ox?  No   Eligibility  Readm Mgmt   Discharge diagnosis  Right diaphragm paralysis,  THORACOTOMY WITH DIAPHRAGM PLICATION RIGHT   Does the patient have one of the following disease processes/diagnoses(primary or secondary)?  Cardiothoracic surgery   Does the patient have Home health ordered?  No   Is there a DME ordered?  No   Prep survey completed?  Yes          Magda Bernabe RN

## 2021-01-11 NOTE — PAYOR COMM NOTE
"Mary oRman RN  Utilization Review  P: [phone]  F: [phone]    Ref # [id number]  DC date = 1/9/2021    Sherry Rivera (40 y.o. Male)     Date of Birth Social Security Number Address Home Phone MRN    1980  [address]  [place] [address] [phone] [id number]    Faith Marital Status          None Single       Admission Date Admission Type Admitting Provider Attending Provider Department, Room/Bed    1/4/21 Elective Corbin Waller MD  [address], S463/1    Discharge Date Discharge Disposition Discharge Destination        1/9/2021 Home or Self Care              Attending Provider: (none)   Allergies: Phenobarbital    Isolation: None   Infection: None   Code Status: Prior    Ht: 175.3 cm (69\")   Wt: 80 kg (176 lb 5.9 oz)    Admission Cmt: None   Principal Problem: Rt Diaphragm Paralysis [J98.6]                 Active Insurance as of 1/4/2021     Primary Coverage     Payor Plan Insurance Group Employer/Plan Group    ANTH MEDICAID ANTH MEDICAID [id number]     Payor Plan Address Payor Plan Phone Number Payor Plan Fax Number Effective Dates    [address] [address] [phone]  4/1/2020 - None Entered    [facility] [address]       Subscriber Name Subscriber Birth Date Member ID       SHERRY RIVERA 1980 [id number]                 Emergency Contacts      (Rel.) Home Phone Work Phone Mobile Phone    Joe Rivera (Father) -- -- [phone]            Discharge Summary    No notes of this type exist for this encounter.         "

## 2021-01-12 ENCOUNTER — READMISSION MANAGEMENT (OUTPATIENT)
Dept: CALL CENTER | Facility: HOSPITAL | Age: 41
End: 2021-01-12

## 2021-01-12 NOTE — OUTREACH NOTE
"CT Surgery Week 1 Survey      Responses   [facility] patient discharged from?  [place]   Does the patient have one of the following disease processes/diagnoses(primary or secondary)?  Cardiothoracic surgery   Week 1 attempt successful?  Yes   Call start time  0926   Call end time  1022   Meds reviewed with patient/caregiver?  Yes   Is the patient having any side effects they believe may be caused by any medication additions or changes?  No   Does the patient have all medications related to this admission filled (includes all antibiotics, pain medications, cardiac medications, etc.)  Yes   Is the patient taking all medications as directed (includes completed medication regime)?  Yes   Medication comments  Pain since Dc has been 9/10 with meds 5/10. He does not feel that he's getting good pain control, he's also using Advil. He has been using lidocaine pain patches directily over the incision for pain. He reports waking up w/\"gooey drainage\" on the sheets on occas. Pt denies redness, edema or warmth at incision.     Does the patient have a primary care provider?   Yes   Does the patient have an appointment scheduled with their C/T surgeon?  No   What is preventing the patient from scheduling follow up appointments?  Waiting on return call   Nursing Interventions  Verified appointment date/time/provider, Educated patient on importance of making appointment   Has the patient kept scheduled appointments due by today?  N/A   DME comments  Home O2 in place, no oximeter in place.    Psychosocial issues?  No   Comments  Pt has no appetite, has diarrhea. Encouraged him to eat small amt of high protien foods when he can, he plans to suppliment w/ BOOST. No issues urinating.Advised him to gently clean sticky substance off incision, w/antibacterial soap.Try to use ice packs for pain control in addition to the pain meds. Advised to call MD if pain does not improve.    Did the patient receive a copy of their discharge " instructions?  Yes   Nursing interventions  Reviewed instructions with patient   What is the patient's perception of their health status since discharge?  Improving   Nursing interventions  Nurse provided patient education   Is the patient/caregiver able to teach back normal signs of recovery?  Nausea and lack of appetite, Pain or discomfort at incisional site   Nursing interventions  Reassured on normal signs of recovery, Advised to call surgeon   Is the patient /caregiver able to teach back basic post-op care?  Continue use of incentive spirometry at least 1 week post discharge, Practice 'cough and deep breath', Keep incision areas clean, dry and protected, No tub bath, swimming, or hot tub until instructed by MD   Is the patient/caregiver able to teach back signs and symptoms of incisional infection?  Increased redness, swelling or pain at the incisonal site, Increased drainage or bleeding, Incisional warmth, Pus or odor from incision   Is the patient/caregiver able to teach back steps to recovery at home?  Set small, achievable goals for return to baseline health, Rest and rebuild strength, gradually increase activity, Eat a well-balance diet   Is the patient/caregiver able to teach back the hierarchy of who to call/visit for symptoms/problems? PCP, Specialist, Home health nurse, Urgent Care, ED, 911  Yes   Week 1 call completed?  Yes            Elaine Wallace RN

## 2021-01-19 ENCOUNTER — READMISSION MANAGEMENT (OUTPATIENT)
Dept: CALL CENTER | Facility: HOSPITAL | Age: 41
End: 2021-01-19

## 2021-01-19 NOTE — OUTREACH NOTE
CT Surgery Week 2 Survey      Responses   [facility] patient discharged from?  [place]   Does the patient have one of the following disease processes/diagnoses(primary or secondary)?  Cardiothoracic surgery   Week 2 attempt successful?  Yes   Call start time  1527   Call end time  1533   Discharge diagnosis  Right diaphragm paralysis,  THORACOTOMY WITH DIAPHRAGM PLICATION RIGHT   Meds reviewed with patient/caregiver?  Yes   Is the patient having any side effects they believe may be caused by any medication additions or changes?  No   Does the patient have all medications related to this admission filled (includes all antibiotics, pain medications, cardiac medications, etc.)  Yes   Is the patient taking all medications as directed (includes completed medication regime)?  Yes   Does the patient have a primary care provider?   Yes   Does the patient have an appointment scheduled with their C/T surgeon?  Yes   Has the patient kept scheduled appointments due by today?  N/A   Has home health visited the patient within 72 hours of discharge?  N/A   Psychosocial issues?  No   Comments  states having increased pain with coughing, states unable to walk long distances   Did the patient receive a copy of their discharge instructions?  Yes   Nursing interventions  Reviewed instructions with patient   What is the patient's perception of their health status since discharge?  Improving   Nursing interventions  Nurse provided patient education   Is the patient/caregiver able to teach back normal signs of recovery?  Pain or discomfort at incisional site   Nursing interventions  Reassured on normal signs of recovery   Is the patient /caregiver able to teach back basic post-op care?  Keep incision areas clean, dry and protected, No tub bath, swimming, or hot tub until instructed by MD, Take showers only when approved by MD-sponge bathe until then, Drive as instructed by MD in discharge instructions, Do not remove steri-strips,  Lifting as instructed by MD in discharge instructions   Is the patient/caregiver able to teach back signs and symptoms of incisional infection?  Increased redness, swelling or pain at the incisonal site, Increased drainage or bleeding, Incisional warmth, Pus or odor from incision, Fever   Is the patient/caregiver able to teach back steps to recovery at home?  Set small, achievable goals for return to baseline health, Rest and rebuild strength, gradually increase activity, Eat a well-balance diet   Is the patient/caregiver able to teach back the hierarchy of who to call/visit for symptoms/problems? PCP, Specialist, Home health nurse, Urgent Care, ED, 911  Yes   Additional teach back comments  states had episode x1 of incision leaking sanguinous fluid, reviewed wound care with pt and advised to cover lightly with loose gauze to avoid clothing irritating site   Week 2 call completed?  Yes          Josie Zaragoza, RN

## 2021-01-26 ENCOUNTER — OFFICE VISIT (OUTPATIENT)
Dept: CARDIAC SURGERY | Facility: CLINIC | Age: 41
End: 2021-01-26

## 2021-01-26 ENCOUNTER — READMISSION MANAGEMENT (OUTPATIENT)
Dept: CALL CENTER | Facility: HOSPITAL | Age: 41
End: 2021-01-26

## 2021-01-26 VITALS
HEIGHT: 69 IN | HEART RATE: 131 BPM | TEMPERATURE: 97.4 F | WEIGHT: 174 LBS | OXYGEN SATURATION: 94 % | DIASTOLIC BLOOD PRESSURE: 77 MMHG | SYSTOLIC BLOOD PRESSURE: 104 MMHG | BODY MASS INDEX: 25.77 KG/M2

## 2021-01-26 DIAGNOSIS — Z00.6 EXAMINATION FOR NORMAL COMPARISON FOR CLINICAL RESEARCH: Primary | ICD-10-CM

## 2021-01-26 DIAGNOSIS — J98.6 DIAPHRAGM PARALYSIS: Primary | ICD-10-CM

## 2021-01-26 PROCEDURE — 99024 POSTOP FOLLOW-UP VISIT: CPT | Performed by: NURSE PRACTITIONER

## 2021-01-26 NOTE — PROGRESS NOTES
[facility] Cardiothoracic Surgery Office Follow Up Note     Date of Encounter: 01/26/2021     MRN Number: [id number]  Name: Barak Rivera  Phone Number: [phone]     Referred By: No ref. provider found  PCP: Lupe Ramires APRN    Chief Complaint:    Chief Complaint   Patient presents with   • Post-op     Hospital follow up s/p right thoracotomy with diaphram plicatation 1/4/21. Complains of shortness of breath and fluid leaking from incision site.       History of Present Illness:    Barak Rivera is a 40 y.o. male with a history of hepatitis B/C, fibromyalgia, drug use, ongoing tobacco use and right diaphragm paralysis s/p bronchoscopy, right thoracotomy and diaphragm plication on 1/4/2021 with Dr. Waller.  He presents today for postoperative follow-up.  Patient complains of shortness of breath and using oxygen 2-1/2 L nightly.  He has been laying in bed most of the day and getting little exercise.  He continues to smoke.  He has not been using his incentive spirometer from the hospital due to losing it.  Denies deep breathing.  He does have a baseline productive cough.  He has had some clear drainage out of his incision over the last 2 days but this has improved.  He denies any fevers or chills.  Due to his shortness of breath, primary care provider did obtain a CT scan of his chest on 1/20/2021 at his local hospital.    Review of Systems:  Review of Systems   Constitution: Positive for malaise/fatigue. Negative for chills, decreased appetite and fever.   Cardiovascular: Positive for dyspnea on exertion. Negative for chest pain, claudication, irregular heartbeat, leg swelling, near-syncope, orthopnea, palpitations and syncope.   Respiratory: Positive for cough, shortness of breath and sputum production. Negative for hemoptysis and wheezing.    Hematologic/Lymphatic: Negative for bleeding problem. Does not bruise/bleed easily.   Skin: Negative for color change, poor wound healing and rash.    Musculoskeletal: Negative for back pain, falls and joint pain.   Gastrointestinal: Positive for constipation. Negative for abdominal pain, diarrhea, nausea and vomiting.   Neurological: Negative for focal weakness, numbness and paresthesias.   Psychiatric/Behavioral: Positive for depression. The patient does not have insomnia.        I have reviewed the review of systems as entered by my clinical support staff and have updated it as appropriate.       Allergies:  Allergies   Allergen Reactions   • Phenobarbital Nausea And Vomiting       Medications:      Current Outpatient Medications:   •  albuterol sulfate  (90 Base) MCG/ACT inhaler, Inhale 2 puffs Every 6 (Six) Hours As Needed., Disp: , Rfl:   •  buprenorphine-naloxone (SUBOXONE) 8-2 MG per SL tablet, 1 tablet Daily. Last dose of suboxone was 12-31-20 per patient's choice  Indications: 1 tablet, sublingual, daily, Disp: , Rfl: 0  •  clonazePAM (KlonoPIN) 1 MG tablet, Take 2 mg by mouth 3 (Three) Times a Day As Needed for Anxiety., Disp: , Rfl:   •  cloNIDine (CATAPRES) 0.3 MG tablet, Take 0.3 mg by mouth Every Night., Disp: , Rfl:   •  DESCOVY 200-25 MG per tablet, Take 1 tablet by mouth Daily., Disp: , Rfl: 5  •  dexlansoprazole (Dexilant) 60 MG capsule, Take 60 mg by mouth Daily., Disp: , Rfl:   •  eszopiclone (LUNESTA) 3 MG tablet, Take 3 mg by mouth Every Night. Take immediately before bedtime, Disp: , Rfl:   •  Flovent Diskus 250 MCG/BLIST aerosol powder , , Disp: , Rfl:   •  fluticasone-salmeterol (Advair Diskus) 250-50 MCG/DOSE DISKUS, Inhale 2 (Two) Times a Day., Disp: , Rfl:   •  linaclotide (LINZESS) 290 MCG capsule capsule, Take 290 mcg by mouth Every Morning Before Breakfast., Disp: , Rfl:   •  Naloxegol Oxalate (Movantik) 25 MG tablet, Take 25 mg by mouth Every Morning., Disp: , Rfl:   •  nicotine (NICODERM CQ) 21 MG/24HR patch, Place 1 patch on the skin as directed by provider Daily., Disp: , Rfl:   •  OLANZapine zydis (ZyPREXA) 10 MG  disintegrating tablet, Place 10 mg on the tongue Every Night., Disp: , Rfl:   •  QUEtiapine (SEROquel) 300 MG tablet, Take 600 mg by mouth Every Night., Disp: , Rfl:   •  Viibryd 10 MG tablet tablet, , Disp: , Rfl:   •  vitamin D (ERGOCALCIFEROL) 1.25 MG (60896 UT) capsule capsule, Take 50,000 Units by mouth Every 30 (Thirty) Days. MONTHLY, Disp: , Rfl:   •  escitalopram (LEXAPRO) 20 MG tablet, Take 20 mg by mouth Daily., Disp: , Rfl:   •  Eucrisa 2 % ointment, , Disp: , Rfl:   •  lidocaine (LIDODERM) 5 %, Place 1 patch on the skin as directed by provider Daily. Remove & Discard patch within 12 hours or as directed by MD, Disp:  , Rfl:   •  modafinil (PROVIGIL) 100 MG tablet, Take 100 mg by mouth Daily., Disp: , Rfl:   •  oxyCODONE (ROXICODONE) 15 MG immediate release tablet, Take 1 tablet by mouth Every 3 (Three) Hours As Needed for Severe Pain ., Disp: 60 tablet, Rfl: 0  •  PHARMACY MEDS TO BED CONSULT, Daily., Disp: , Rfl:   No current facility-administered medications for this visit.     Facility-Administered Medications Ordered in Other Visits:   •  Chlorhexidine Gluconate Cloth 2 % pads 1 application, 1 application, Topical, Q12H PRN, Josee Sanchez PA-C    Social History     Socioeconomic History   • Marital status: Single     Spouse name: Not on file   • Number of children: 0   • Years of education: Not on file   • Highest education level: Not on file   Occupational History   • Occupation: currently on unemployment   Tobacco Use   • Smoking status: Light Tobacco Smoker     Packs/day: 0.25     Years: 24.00     Pack years: 6.00     Types: Cigarettes   • Smokeless tobacco: Never Used   • Tobacco comment: pt demands nicotine patch, refuses counseling, Pt is smoking 5 cigs   Substance and Sexual Activity   • Alcohol use: No   • Drug use: Yes     Types: Methamphetamines, Amphetamines, Benzodiazepines     Comment: TCA; 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    • Sexual activity: Yes     Partners: Male  "  Social History Narrative    Lives in [place], [place] alone    Also has resident in [place], [place] with a roommate       Family History   Problem Relation Age of Onset   • Hypertension Other    • Diabetes Other    • Depression Other    • Heart attack Mother    • Anxiety disorder Father    • Depression Father        Past Medical History:   Diagnosis Date   • ADD (attention deficit disorder)    • Anogenital HPV infection    • Anxiety    • Arthritis    • Bipolar disorder (CMS/HCC)    • Condyloma acuminatum in male    • Constipation    • COPD (chronic obstructive pulmonary disease) (CMS/HCC)    • Depression    • Fibromyalgia    • GERD (gastroesophageal reflux disease)    • Hepatitis B     antibodies   • Hepatitis C     antibodies   • HIV disease (CMS/HCC)    • Hyperlipidemia    • Migraines    • On home oxygen therapy     at hs per NC, 2.5 L   • Osteomyelitis hip (CMS/HCC)    • Osteoporosis    • Osteoporosis    • Paranoia (CMS/HCC)    • Suicidal ideations    • Urinary tract infection        Past Surgical History:   Procedure Laterality Date   • BACK SURGERY  09/22/2016   • BRONCHOSCOPY THORACOTOMY Right 1/4/2021    Procedure: THORACOTOMY WITH DIAPHRAGM PLICATION RIGHT;  Surgeon: Corbin Waller MD;  Location: [facility];  Service: Cardiothoracic;  Laterality: Right;   • HAND SURGERY Right 06/2016       Physical Exam:  Vital Signs:    Vitals:    01/26/21 1215   BP: 104/77   BP Location: Right arm   Patient Position: Sitting   Pulse: (!) 131   Temp: 97.4 °F (36.3 °C)   SpO2: 94%   Weight: 78.9 kg (174 lb)   Height: 175.3 cm (69\")     Body mass index is 25.7 kg/m².     Physical Exam  Vitals signs and nursing note reviewed.   Constitutional:       Appearance: Normal appearance. He is well-developed and well-groomed.   HENT:      Head: Normocephalic and atraumatic.   Neck:      Musculoskeletal: Neck supple.   Cardiovascular:      Rate and Rhythm: Regular rhythm. Tachycardia present.      Heart sounds: Normal heart sounds, S1 normal " and S2 normal. No murmur. No friction rub.   Pulmonary:      Comments: Mildly labored, decreased in the right  Abdominal:      General: Bowel sounds are normal.      Palpations: Abdomen is soft.      Tenderness: There is no abdominal tenderness.   Musculoskeletal:      Right lower leg: No edema.      Left lower leg: No edema.   Skin:     General: Skin is warm and dry.      Comments: Incisions: Right lower incision intact.  Appears to have had superficial dehiscence along incision line, but this has healed.  Noted large fluid collection around incision line.  No drainage, surrounding erythema, hematoma   Neurological:      Mental Status: He is alert and oriented to person, place, and time.   Psychiatric:         Attention and Perception: Attention normal.         Mood and Affect: Mood normal.         Speech: Speech normal.         Behavior: Behavior is cooperative.         Labs/Imaging:    Xr Chest 1 View    Result Date: 1/9/2021  Mildly improved aeration in lung volumes, with persistent linear opacities and small right pleural effusion. No distinct pneumothorax. Unchanged heart and mediastinal contours, partially obscured by extensive thoracic fusion hardware.  This report was finalized on 1/9/2021 7:48 AM by Evens Lazar.      Xr Chest 1 View    Result Date: 1/8/2021  Some improvement seen in the aeration of the right lung base. The remainder of the chest is stable.  D:  01/08/2021 E:  01/08/2021  This report was finalized on 1/8/2021 4:38 PM by Dr. Marilyn Wallis MD.      CT chest with contrast on 1/20/2021 [address] [place] [address]: Large right pleural effusion.  Right hemidiaphragm with elevation.  No pneumothorax.  Passive atelectasis and residual aeration of the right upper lobe.  Personally reviewed.       Assessment / Plan:  Diagnoses and all orders for this visit:    1. Rt Diaphragm Paralysis (Primary)     Barak Rivera is a 40 y.o. male with a history of hepatitis B/C, fibromyalgia, drug use,  ongoing tobacco use and right diaphragm paralysis s/p bronchoscopy, right thoracotomy and diaphragm plication on 1/4/2021 with Dr. Waller.  Patient with noted large right pleural effusion on CT chest 1/20/2021.  Dr. Waller had a long discussion with patient and father with the need to increase activity with recommendations for walking 3 times per day, deep breathing with incentive spirometer every 15 minutes while awake (new I-S provided).  Discussed risks for pneumonia.  Have advised patient on the importance of smoking cessation.  Will refer patient to physical therapy due to deconditioning.  We will follow-up with patient in 2 months or earlier with chest x-ray.    Elizabeth Dempsey, [facility] Cardiothoracic Surgery    Please note that portions of this note may have been completed with a voice recognition program. Efforts were made to edit the dictations, but occasionally words are mistranscribed.   Answers for HPI/ROS submitted by the patient on 1/22/2021   What is the primary reason for your visit?: Other  Please describe your symptoms.: Followup  Have you had these symptoms before?: No  How long have you been having these symptoms?: 1-4 days  Please list any medications you are currently taking for this condition.: None for this condition.  Please describe any probable cause for these symptoms. : Follow up.

## 2021-01-26 NOTE — OUTREACH NOTE
CT Surgery Week 3 Survey      Responses   [facility] patient discharged from?  [place]   Does the patient have one of the following disease processes/diagnoses(primary or secondary)?  Cardiothoracic surgery   Week 3 attempt successful?  Yes   Call start time  0725   Rescheduled  Rescheduled-pt requested   Discharge diagnosis  Right diaphragm paralysis,  THORACOTOMY WITH DIAPHRAGM PLICATION RIGHT          Jeannine Solorzano RN

## 2021-01-28 ENCOUNTER — READMISSION MANAGEMENT (OUTPATIENT)
Dept: CALL CENTER | Facility: HOSPITAL | Age: 41
End: 2021-01-28

## 2021-01-28 NOTE — OUTREACH NOTE
CT Surgery Week 3 Survey      Responses   [facility] patient discharged from?  [place]   Does the patient have one of the following disease processes/diagnoses(primary or secondary)?  Cardiothoracic surgery   Week 3 attempt successful?  Yes   Call start time  1104   Call end time  1119   Discharge diagnosis  Right diaphragm paralysis,  THORACOTOMY WITH DIAPHRAGM PLICATION RIGHT   Meds reviewed with patient/caregiver?  Yes   Is the patient having any side effects they believe may be caused by any medication additions or changes?  No   Does the patient have all medications related to this admission filled (includes all antibiotics, pain medications, cardiac medications, etc.)  Yes   Is the patient taking all medications as directed (includes completed medication regime)?  Yes   Does the patient have a primary care provider?   Yes   Does the patient have an appointment scheduled with their C/T surgeon?  Yes   Has the patient kept scheduled appointments due by today?  Yes   Has home health visited the patient within 72 hours of discharge?  N/A   Psychosocial issues?  No   Comments  pt states incision draining clear odorless fluid, pt afebrile, has been seen by MD   Did the patient receive a copy of their discharge instructions?  Yes   Nursing interventions  Reviewed instructions with patient   What is the patient's perception of their health status since discharge?  Improving   Nursing interventions  Nurse provided patient education   Is the patient/caregiver able to teach back normal signs of recovery?  Pain or discomfort at incisional site   Nursing interventions  Reassured on normal signs of recovery   Is the patient /caregiver able to teach back basic post-op care?  Take showers only when approved by MD-sponge bathe until then, No tub bath, swimming, or hot tub until instructed by MD, Keep incision areas clean, dry and protected, Lifting as instructed by MD in discharge instructions   Is the  patient/caregiver able to teach back signs and symptoms of incisional infection?  Increased redness, swelling or pain at the incisonal site, Increased drainage or bleeding, Incisional warmth, Pus or odor from incision, Fever   Is the patient/caregiver able to teach back steps to recovery at home?  Set small, achievable goals for return to baseline health, Rest and rebuild strength, gradually increase activity   Is the patient/caregiver able to teach back the hierarchy of who to call/visit for symptoms/problems? PCP, Specialist, Home health nurse, Urgent Care, ED, 911  Yes   Additional teach back comments  states incision at times leaking serous sanguinous dge, asked about use of antibiotic oint. instructed to avoid ointments, instructed to use good handwashing, clean area with soap and water in shower, pat dry, cover with absorbant gauze, pt asked about using NSS wound wash, advised pt to use as instructed on bottle   Week 3 call completed?  Yes          Josie Zaragoza RN

## 2021-02-08 ENCOUNTER — READMISSION MANAGEMENT (OUTPATIENT)
Dept: CALL CENTER | Facility: HOSPITAL | Age: 41
End: 2021-02-08

## 2021-02-08 NOTE — OUTREACH NOTE
CT Surgery Week 4 Survey      Responses   [facility] patient discharged from?  [facility]   Does the patient have one of the following disease processes/diagnoses(primary or secondary)?  Cardiothoracic surgery   Week 4 attempt successful?  Yes   Call start time  1053   Call end time  1101   General alerts for this patient  Acute psychosis, polysubstance abuse, suicidal ideation   Discharge diagnosis  Right diaphragm paralysis,  THORACOTOMY WITH DIAPHRAGM PLICATION RIGHT   Meds reviewed with patient/caregiver?  Yes   Is the patient taking all medications as directed (includes completed medication regime)?  Yes   Has the patient kept scheduled appointments due by today?  Yes   Is the patient still receiving Home Health Services?  N/A   Comments  Pulse ox, does not have one   What is the patient's perception of their health status since discharge?  New symptoms unrelated to diagnosis   If the patient is a current smoker, are they able to teach back resources for cessation?  [id number]   Additional teach back comments  Smoking about 1 per day   Week 4 Call Completed?  Yes   Would the patient like one additional call?  No   Graduated  Yes   Is the patient interested in additional calls from an ambulatory ?  NOTE:  applies to high risk patients requiring additional follow-up.  Yes   Did the patient feel the follow up calls were helpful during their recovery period?  Yes   Was the number of calls appropriate?  Yes   Wrap up additional comments  Pt has lost 20 lb since discharge from hospital. No appetite.  States he still has fluid on his lung.  MD is aware.  Appt have been kept.  Referred to ambulatory .           Mary Reyes RN

## 2021-02-12 ENCOUNTER — PATIENT OUTREACH (OUTPATIENT)
Dept: CASE MANAGEMENT | Facility: OTHER | Age: 41
End: 2021-02-12

## 2021-02-12 NOTE — OUTREACH NOTE
Care Coordination Assessment    Documented/Reviewed By: Socorro Morris RN Date/time: 2/12/2021  2:51 PM   Assessment completed with: patient  Enrolled in care management program: Yes  Living arrangement: friends, family members (Comment: Patient lives part time with his father in [facility] and at other times in [place] with a friend. )  Support system: family, friends  Type of residence: private residence  Home care services: No  Equipment used at home: oxygen/respiratory treatment (Comment: Patient has home O2.  He reports starting the night with the oxygen on but it becomes dislodged during the night.  Education provided. )  Bed or wheelchair confined: No  Inadequate nutrition:  (Comment: Patient reported 20lb weight loss in recent weeks.  He reported supplimenting with Boost.)

## 2021-02-12 NOTE — OUTREACH NOTE
Patient Outreach Note    [employer]    Patient recently completed Readmission Management with [facility] who referred patient to Ambulatory Case Management.  Brief conversation with patient this date.  RN-LITAM introduced self and explained purpose of outreach.  Patient v/u and requested to call RN-LITAM back at a more convenient time.     Socorro Morris RN  Ambulatory     2/12/2021, 13:27 EST

## 2021-02-12 NOTE — OUTREACH NOTE
Care Coordination Note    RN-ABDULAZIZ outreach with [facility] Cardiothoracic Surgery. Patient would like to be seen sooner than the end of March.  Per scheduling, only one early appointment is open at this time.      RN-LITAM spoke with patient.  Patient declined appointment on 02-16-21 at 8:45 am.  Patient stated preference to keep existing appointment.  He stated intent to call periodically through the month to see if anything opens up, and present to ED for acute/worsening needs.      Socorro Morris RN  Ambulatory     2/12/2021, 15:24 EST

## 2021-02-12 NOTE — OUTREACH NOTE
Care Plan Note      Responses   Lifestyle Goals  Avoid respiratory irritants, Eat a healthy diet, Have more energy, Less shortness of air, Medication management   Barriers  -- [Hx of social concerns]   Self Management  Breathing techniques, Check Weight Daily, Decrease smoking, Medication Adherence, Use oxygen   Suggested Appointments  Other (See Comment) [Patient has next appointment with cardiothoracic surgeon on 03/30/21.  Patient to call periodically to check for cancellations as he would like to be seen sooner. ]   Care Gaps Addressed  Other (See Comment)   Specific Disease Process Teaching  -- [Compliance with oxygen, medications, use of pulse oximeter, keep log for review with MD.]        The main concerns and/or symptoms the patient would like to address are: Patient recently completed Readmission Management with [facility]. Call center referral to Ambulatory Case Management.  Patient enrolled this date for ongoing HRCM.    Education/instruction provided by Care Coordinator: RN-ACM assessment and care plan initiated.     Follow Up Outreach Due: HRCM ongoing, additional outreach scheduled.  Patient will be at his father's home until the first of March.  Calls should be directed to [phone].  Per patient, okay to leave messages on this number and his cell phone.      Socorro Morris RN  Ambulatory     2/12/2021, 15:20 EST

## 2021-03-24 ENCOUNTER — TELEPHONE (OUTPATIENT)
Dept: CARDIAC SURGERY | Facility: CLINIC | Age: 41
End: 2021-03-24

## 2021-03-30 ENCOUNTER — OFFICE VISIT (OUTPATIENT)
Dept: CARDIAC SURGERY | Facility: CLINIC | Age: 41
End: 2021-03-30

## 2021-03-30 VITALS
DIASTOLIC BLOOD PRESSURE: 70 MMHG | OXYGEN SATURATION: 99 % | TEMPERATURE: 98.7 F | BODY MASS INDEX: 22.96 KG/M2 | WEIGHT: 155 LBS | SYSTOLIC BLOOD PRESSURE: 108 MMHG | HEIGHT: 69 IN | HEART RATE: 87 BPM

## 2021-03-30 DIAGNOSIS — J98.6 DIAPHRAGM PARALYSIS: Primary | ICD-10-CM

## 2021-03-30 PROCEDURE — 99024 POSTOP FOLLOW-UP VISIT: CPT | Performed by: NURSE PRACTITIONER

## 2021-03-30 RX ORDER — ONDANSETRON HYDROCHLORIDE 8 MG/1
TABLET, FILM COATED ORAL
Status: ON HOLD | COMMUNITY
Start: 2021-02-24 | End: 2021-06-22

## 2021-03-30 NOTE — PROGRESS NOTES
"     [facility] Cardi[facility] Office Follow Up Note     Date of Encounter: 03/30/2021     MRN Number: [id number]  Name: Barak Rivera  Phone Number: [phone]     Referred By: No ref. provider found  PCP: Lupe Ramires APRN    Chief Complaint:    Chief Complaint   Patient presents with   • Follow-up     2 month follow up w/CXR. 1/4/2021 Right Thor, diaphram                                 2month follow up with CXR right Thor, diaphram plication 1/4/2021 [facility]. Pt states that he is still having trouble breathing especially at night wakes up terrified because he feels like he is smothering, he is very fatigued.                                                                                                                           History of Present Illness:    Barak Rivera is a 40 y.o. male with a history of hep B/C, HIV, fibromyalgia, drug use, ongoing tobacco use and right diaphragm paralysis s/p bronchoscopy, right thoracotomy and diaphragm plication on 1/4/2021 with Dr. Waller.  Presents today in follow-up.  Last seen in the office on 1/26/2021.  Patient has discontinued his antianxiety medication has lost 30 pounds since last office visit.  He is walking daily.  He continues to need O2 2 L at night and to feel \"smothered\".  This is not improved since surgery.  He is following closely with his pulmonologist Dr. Jimenez and reports recent PFTs and chest x-ray.  Reports that his chest x-ray performed 2 days ago is unchanged since preoperatively and is concerned that his symptoms are no better.    Review of Systems:  Review of Systems   Constitutional: Positive for malaise/fatigue. Negative for chills, decreased appetite, diaphoresis, fever, night sweats and weight loss.   HENT: Negative for congestion, hoarse voice, sore throat and stridor.    Cardiovascular: Positive for chest pain (some chest pain ) and dyspnea on exertion. Negative for claudication, irregular heartbeat, leg swelling, " near-syncope, orthopnea, palpitations, paroxysmal nocturnal dyspnea and syncope.   Respiratory: Negative for cough, hemoptysis, shortness of breath, sleep disturbances due to breathing, snoring, sputum production and wheezing.    Hematologic/Lymphatic: Negative for adenopathy and bleeding problem. Does not bruise/bleed easily.   Skin: Negative for color change, dry skin, itching, poor wound healing and rash.   Musculoskeletal: Negative for arthritis, back pain, falls and muscle weakness.   Gastrointestinal: Positive for constipation (chronic ). Negative for abdominal pain, anorexia, diarrhea, hematochezia, melena, nausea and vomiting.   Neurological: Positive for numbness. Negative for difficulty with concentration, disturbances in coordination, dizziness, loss of balance, seizures, vertigo and weakness.   Psychiatric/Behavioral: Negative for altered mental status, depression, memory loss and substance abuse. The patient has insomnia and is nervous/anxious.    Allergic/Immunologic: Negative for persistent infections.       I have reviewed the review of systems as entered by my clinical support staff and have updated it as appropriate.       Allergies:  Allergies   Allergen Reactions   • Phenobarbital Nausea And Vomiting       Medications:      Current Outpatient Medications:   •  albuterol sulfate  (90 Base) MCG/ACT inhaler, Inhale 2 puffs Every 6 (Six) Hours As Needed., Disp: , Rfl:   •  buprenorphine-naloxone (SUBOXONE) 8-2 MG per SL tablet, 1 tablet Daily. Last dose of suboxone was 12-31-20 per patient's choice  Indications: 1 tablet, sublingual, daily, Disp: , Rfl: 0  •  clonazePAM (KlonoPIN) 1 MG tablet, Take 2 mg by mouth 3 (Three) Times a Day As Needed for Anxiety., Disp: , Rfl:   •  cloNIDine (CATAPRES) 0.3 MG tablet, Take 0.3 mg by mouth Every Night., Disp: , Rfl:   •  DESCOVY 200-25 MG per tablet, Take 1 tablet by mouth Daily., Disp: , Rfl: 5  •  dexlansoprazole (Dexilant) 60 MG capsule, Take 60 mg  by mouth Daily., Disp: , Rfl:   •  escitalopram (LEXAPRO) 20 MG tablet, Take 20 mg by mouth Daily., Disp: , Rfl:   •  eszopiclone (LUNESTA) 3 MG tablet, Take 3 mg by mouth Every Night. Take immediately before bedtime, Disp: , Rfl:   •  Eucrisa 2 % ointment, , Disp: , Rfl:   •  Flovent Diskus 250 MCG/BLIST aerosol powder , , Disp: , Rfl:   •  fluticasone-salmeterol (Advair Diskus) 250-50 MCG/DOSE DISKUS, Inhale 2 (Two) Times a Day., Disp: , Rfl:   •  lidocaine (LIDODERM) 5 %, Place 1 patch on the skin as directed by provider Daily. Remove & Discard patch within 12 hours or as directed by MD, Disp:  , Rfl:   •  linaclotide (LINZESS) 290 MCG capsule capsule, Take 290 mcg by mouth Every Morning Before Breakfast., Disp: , Rfl:   •  modafinil (PROVIGIL) 100 MG tablet, Take 100 mg by mouth Daily., Disp: , Rfl:   •  Naloxegol Oxalate (Movantik) 25 MG tablet, Take 25 mg by mouth Every Morning., Disp: , Rfl:   •  nicotine (NICODERM CQ) 21 MG/24HR patch, Place 1 patch on the skin as directed by provider Daily., Disp: , Rfl:   •  OLANZapine zydis (ZyPREXA) 10 MG disintegrating tablet, Place 10 mg on the tongue Every Night., Disp: , Rfl:   •  ondansetron (ZOFRAN) 8 MG tablet, , Disp: , Rfl:   •  oxyCODONE (ROXICODONE) 15 MG immediate release tablet, Take 1 tablet by mouth Every 3 (Three) Hours As Needed for Severe Pain ., Disp: 60 tablet, Rfl: 0  •  PHARMACY MEDS TO BED CONSULT, Daily., Disp: , Rfl:   •  QUEtiapine (SEROquel) 300 MG tablet, Take 600 mg by mouth Every Night., Disp: , Rfl:   •  Viibryd 10 MG tablet tablet, , Disp: , Rfl:   •  vitamin D (ERGOCALCIFEROL) 1.25 MG (90585 UT) capsule capsule, Take 50,000 Units by mouth Every 30 (Thirty) Days. MONTHLY, Disp: , Rfl:   No current facility-administered medications for this visit.    Facility-Administered Medications Ordered in Other Visits:   •  Chlorhexidine Gluconate Cloth 2 % pads 1 application, 1 application, Topical, Q12H PRN, Josee Sanchez PA-C    Social History      Socioeconomic History   • Marital status: Single     Spouse name: Not on file   • Number of children: 0   • Years of education: Not on file   • Highest education level: Not on file   Tobacco Use   • Smoking status: Light Tobacco Smoker     Packs/day: 0.25     Years: 24.00     Pack years: 6.00     Types: Cigarettes   • Smokeless tobacco: Never Used   • Tobacco comment: pt demands nicotine patch, refuses counseling, Pt is smoking 5 cigs   Vaping Use   • Vaping Use: Never used   Substance and Sexual Activity   • Alcohol use: No   • Drug use: Yes     Types: Methamphetamines, Amphetamines, Benzodiazepines     Comment: TCA; 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    • Sexual activity: Yes     Partners: Male       Family History   Problem Relation Age of Onset   • Hypertension Other    • Diabetes Other    • Depression Other    • Heart attack Mother    • Anxiety disorder Father    • Depression Father        Past Medical History:   Diagnosis Date   • ADD (attention deficit disorder)    • Anogenital HPV infection    • Anxiety    • Arthritis    • Bipolar disorder (CMS/HCC)    • Condyloma acuminatum in male    • Constipation    • COPD (chronic obstructive pulmonary disease) (CMS/HCC)    • Depression    • Fibromyalgia    • GERD (gastroesophageal reflux disease)    • Hepatitis B     antibodies   • Hepatitis C     antibodies   • HIV disease (CMS/HCC)    • Hyperlipidemia    • Migraines    • On home oxygen therapy     at hs per NC, 2.5 L   • Osteomyelitis hip (CMS/HCC)    • Osteoporosis    • Osteoporosis    • Paranoia (CMS/HCC)    • Suicidal ideations    • Urinary tract infection        Past Surgical History:   Procedure Laterality Date   • BACK SURGERY  09/22/2016   • BRONCHOSCOPY THORACOTOMY Right 1/4/2021    Procedure: THORACOTOMY WITH DIAPHRAGM PLICATION RIGHT;  Surgeon: Corbin Waller MD;  Location: [facility];  Service: Cardiothoracic;  Laterality: Right;   • HAND SURGERY Right 06/2016       Physical Exam:  Vital  "Signs:    Vitals:    03/30/21 1339   BP: 108/70   Pulse: 87   Temp: 98.7 °F (37.1 °C)   SpO2: 99%   Weight: 70.3 kg (155 lb)   Height: 175.3 cm (69\")     Body mass index is 22.89 kg/m².     Physical Exam  Vitals and nursing note reviewed.   Constitutional:       Appearance: Normal appearance. He is well-developed and well-groomed.   HENT:      Head: Normocephalic and atraumatic.   Cardiovascular:      Rate and Rhythm: Normal rate and regular rhythm.      Heart sounds: Normal heart sounds, S1 normal and S2 normal. No murmur heard.   No friction rub.   Pulmonary:      Comments: Unlabored, Clear to auscultation bilaterally  Abdominal:      General: Bowel sounds are normal.      Palpations: Abdomen is soft.      Tenderness: There is no abdominal tenderness.   Musculoskeletal:      Cervical back: Neck supple.      Right lower leg: No edema.      Left lower leg: No edema.   Skin:     General: Skin is warm and dry.   Neurological:      Mental Status: He is alert and oriented to person, place, and time.   Psychiatric:         Attention and Perception: Attention normal.         Mood and Affect: Mood normal.         Speech: Speech normal.         Behavior: Behavior is cooperative.         Labs/Imaging:    No radiology results for the last 30 days.     Assessment / Plan:  Diagnoses and all orders for this visit:    1. Rt Diaphragm Paralysis (Primary)     Barak Rivera is a 40 y.o. male with a history of hep B/C, HIV, fibromyalgia, drug use, ongoing tobacco use and right diaphragm paralysis s/p bronchoscopy, right thoracotomy and diaphragm plication on 1/4/2021 with Dr. Waller.  Patient recently had chest x-ray at pulmonology office, Dr. Del Rio as well as PFTs.  He does not have his disc.  We will obtain for review.  Reassured patient on his improvement since last office visit.  He continues to smoke approximately 3 to 4 cigarettes/day.  We will follow-up with patient in 1 month after obtaining PFT/chest x-ray from " pulmonology.  May need to proceed with CT chest.  Will discuss with Dr. Waller.    Elizabeth Dempsey [facility] Cardiothoracic Surgery    Please note that portions of this note may have been completed with a voice recognition program. Efforts were made to edit the dictations, but occasionally words are mistranscribed.

## 2021-04-01 DIAGNOSIS — Z00.6 EXAMINATION FOR NORMAL COMPARISON FOR CLINICAL RESEARCH: Primary | ICD-10-CM

## 2021-04-13 NOTE — TELEPHONE ENCOUNTER
Just wanted to let you know his films from [facility] ER visit are uploaded.  He also saw Elizabeth on 3/30/21 in the office, let me know if there's anything else I need to do.

## 2021-04-16 DIAGNOSIS — J90 PLEURAL EFFUSION: Primary | ICD-10-CM

## 2021-05-12 ENCOUNTER — APPOINTMENT (OUTPATIENT)
Dept: CT IMAGING | Facility: HOSPITAL | Age: 41
End: 2021-05-12

## 2021-05-16 ENCOUNTER — APPOINTMENT (OUTPATIENT)
Dept: PREADMISSION TESTING | Facility: HOSPITAL | Age: 41
End: 2021-05-16

## 2021-05-16 LAB — SARS-COV-2 RNA NOSE QL NAA+PROBE: NOT DETECTED

## 2021-05-16 PROCEDURE — C9803 HOPD COVID-19 SPEC COLLECT: HCPCS

## 2021-05-16 PROCEDURE — U0004 COV-19 TEST NON-CDC HGH THRU: HCPCS

## 2021-05-19 ENCOUNTER — HOSPITAL ENCOUNTER (OUTPATIENT)
Dept: GENERAL RADIOLOGY | Facility: HOSPITAL | Age: 41
Discharge: HOME | End: 2021-05-19

## 2021-05-19 ENCOUNTER — HOSPITAL ENCOUNTER (OUTPATIENT)
Dept: CT IMAGING | Facility: HOSPITAL | Age: 41
Discharge: HOME | End: 2021-05-19

## 2021-05-19 VITALS
WEIGHT: 148 LBS | TEMPERATURE: 97.3 F | HEART RATE: 84 BPM | HEIGHT: 69 IN | SYSTOLIC BLOOD PRESSURE: 110 MMHG | RESPIRATION RATE: 18 BRPM | DIASTOLIC BLOOD PRESSURE: 81 MMHG | OXYGEN SATURATION: 92 % | BODY MASS INDEX: 21.92 KG/M2

## 2021-05-19 DIAGNOSIS — J90 PLEURAL EFFUSION: ICD-10-CM

## 2021-05-19 PROCEDURE — 71045 X-RAY EXAM CHEST 1 VIEW: CPT

## 2021-05-19 NOTE — PROGRESS NOTES
Thoracentesis procedure not done as scan revealed insufficient fluid. IV dc'd. Pt discharged home with Father and with all belongings at 1010.

## 2021-05-20 PROBLEM — T18.5XXA FOREIGN BODY ANUS/RECTUM, INITIAL ENCOUNTER: Status: RESOLVED | Noted: 2020-03-08 | Resolved: 2021-05-20

## 2021-05-20 PROBLEM — F23 ACUTE PSYCHOSIS: Status: RESOLVED | Noted: 2020-03-08 | Resolved: 2021-05-20

## 2021-05-28 ENCOUNTER — TELEPHONE (OUTPATIENT)
Dept: CARDIAC SURGERY | Facility: CLINIC | Age: 41
End: 2021-05-28

## 2021-06-22 ENCOUNTER — HOSPITAL ENCOUNTER (EMERGENCY)
Facility: HOSPITAL | Age: 41
Discharge: PSYCHIATRIC OR SUBSTANCE USE FACILITY | End: 2021-06-22
Attending: EMERGENCY MEDICINE | Admitting: EMERGENCY MEDICINE

## 2021-06-22 ENCOUNTER — APPOINTMENT (OUTPATIENT)
Dept: GENERAL RADIOLOGY | Facility: HOSPITAL | Age: 41
End: 2021-06-22

## 2021-06-22 ENCOUNTER — HOSPITAL ENCOUNTER (INPATIENT)
Facility: HOSPITAL | Age: 41
LOS: 1 days | Discharge: LEFT BEFORE DISCHARGE | End: 2021-06-23
Attending: PSYCHIATRY & NEUROLOGY | Admitting: PSYCHIATRY & NEUROLOGY

## 2021-06-22 VITALS
RESPIRATION RATE: 18 BRPM | SYSTOLIC BLOOD PRESSURE: 115 MMHG | OXYGEN SATURATION: 100 % | HEART RATE: 99 BPM | DIASTOLIC BLOOD PRESSURE: 88 MMHG | TEMPERATURE: 98 F | BODY MASS INDEX: 20.73 KG/M2 | WEIGHT: 140 LBS | HEIGHT: 69 IN

## 2021-06-22 DIAGNOSIS — F19.10 SUBSTANCE ABUSE (HCC): Primary | ICD-10-CM

## 2021-06-22 PROBLEM — F15.20 METHAMPHETAMINE USE DISORDER, SEVERE: Status: ACTIVE | Noted: 2021-06-22

## 2021-06-22 PROBLEM — F11.20 OPIOID USE DISORDER, SEVERE, DEPENDENCE: Status: ACTIVE | Noted: 2021-06-22

## 2021-06-22 PROBLEM — F13.20 SEVERE BENZODIAZEPINE USE DISORDER: Status: ACTIVE | Noted: 2021-06-22

## 2021-06-22 PROBLEM — F17.200 NICOTINE USE DISORDER: Status: ACTIVE | Noted: 2020-03-08

## 2021-06-22 LAB
ALBUMIN SERPL-MCNC: 4.4 G/DL (ref 3.5–5.2)
ALBUMIN/GLOB SERPL: 1.4 G/DL
ALP SERPL-CCNC: 107 U/L (ref 39–117)
ALT SERPL W P-5'-P-CCNC: 40 U/L (ref 1–41)
AMPHET+METHAMPHET UR QL: POSITIVE
AMPHETAMINES UR QL: POSITIVE
ANION GAP SERPL CALCULATED.3IONS-SCNC: 11 MMOL/L (ref 5–15)
APAP SERPL-MCNC: <5 MCG/ML (ref 0–30)
AST SERPL-CCNC: 65 U/L (ref 1–40)
BACTERIA UR QL AUTO: ABNORMAL /HPF
BARBITURATES UR QL SCN: NEGATIVE
BASOPHILS # BLD AUTO: 0.05 10*3/MM3 (ref 0–0.2)
BASOPHILS NFR BLD AUTO: 0.5 % (ref 0–1.5)
BENZODIAZ UR QL SCN: POSITIVE
BILIRUB SERPL-MCNC: 0.7 MG/DL (ref 0–1.2)
BILIRUB UR QL STRIP: ABNORMAL
BUN SERPL-MCNC: 10 MG/DL (ref 6–20)
BUN/CREAT SERPL: 12.8 (ref 7–25)
BUPRENORPHINE SERPL-MCNC: POSITIVE NG/ML
CALCIUM SPEC-SCNC: 9.9 MG/DL (ref 8.6–10.5)
CANNABINOIDS SERPL QL: NEGATIVE
CHLORIDE SERPL-SCNC: 100 MMOL/L (ref 98–107)
CLARITY UR: CLEAR
CO2 SERPL-SCNC: 26 MMOL/L (ref 22–29)
COCAINE UR QL: NEGATIVE
COLOR UR: ABNORMAL
CREAT SERPL-MCNC: 0.78 MG/DL (ref 0.76–1.27)
DEPRECATED RDW RBC AUTO: 47.4 FL (ref 37–54)
EOSINOPHIL # BLD AUTO: 0.08 10*3/MM3 (ref 0–0.4)
EOSINOPHIL NFR BLD AUTO: 0.8 % (ref 0.3–6.2)
ERYTHROCYTE [DISTWIDTH] IN BLOOD BY AUTOMATED COUNT: 14.9 % (ref 12.3–15.4)
ETHANOL BLD-MCNC: <10 MG/DL (ref 0–10)
FLUAV RNA RESP QL NAA+PROBE: NOT DETECTED
FLUAV RNA RESP QL NAA+PROBE: NOT DETECTED
FLUBV RNA RESP QL NAA+PROBE: NOT DETECTED
FLUBV RNA RESP QL NAA+PROBE: NOT DETECTED
GFR SERPL CREATININE-BSD FRML MDRD: 110 ML/MIN/1.73
GLOBULIN UR ELPH-MCNC: 3.2 GM/DL
GLUCOSE SERPL-MCNC: 100 MG/DL (ref 65–99)
GLUCOSE UR STRIP-MCNC: NEGATIVE MG/DL
HCT VFR BLD AUTO: 42.1 % (ref 37.5–51)
HGB BLD-MCNC: 14.2 G/DL (ref 13–17.7)
HGB UR QL STRIP.AUTO: NEGATIVE
HOLD SPECIMEN: NORMAL
HOLD SPECIMEN: NORMAL
HYALINE CASTS UR QL AUTO: ABNORMAL /LPF
IMM GRANULOCYTES # BLD AUTO: 0.03 10*3/MM3 (ref 0–0.05)
IMM GRANULOCYTES NFR BLD AUTO: 0.3 % (ref 0–0.5)
KETONES UR QL STRIP: ABNORMAL
LEUKOCYTE ESTERASE UR QL STRIP.AUTO: NEGATIVE
LYMPHOCYTES # BLD AUTO: 3.26 10*3/MM3 (ref 0.7–3.1)
LYMPHOCYTES NFR BLD AUTO: 31.3 % (ref 19.6–45.3)
MAGNESIUM SERPL-MCNC: 2.1 MG/DL (ref 1.6–2.6)
MCH RBC QN AUTO: 29.3 PG (ref 26.6–33)
MCHC RBC AUTO-ENTMCNC: 33.7 G/DL (ref 31.5–35.7)
MCV RBC AUTO: 86.8 FL (ref 79–97)
METHADONE UR QL SCN: NEGATIVE
MONOCYTES # BLD AUTO: 1.29 10*3/MM3 (ref 0.1–0.9)
MONOCYTES NFR BLD AUTO: 12.4 % (ref 5–12)
NEUTROPHILS NFR BLD AUTO: 5.72 10*3/MM3 (ref 1.7–7)
NEUTROPHILS NFR BLD AUTO: 54.7 % (ref 42.7–76)
NITRITE UR QL STRIP: NEGATIVE
NRBC BLD AUTO-RTO: 0 /100 WBC (ref 0–0.2)
OPIATES UR QL: NEGATIVE
OXYCODONE UR QL SCN: NEGATIVE
PCP UR QL SCN: NEGATIVE
PH UR STRIP.AUTO: 7 [PH] (ref 5–8)
PLATELET # BLD AUTO: 292 10*3/MM3 (ref 140–450)
PMV BLD AUTO: 8.3 FL (ref 6–12)
POTASSIUM SERPL-SCNC: 3.6 MMOL/L (ref 3.5–5.2)
PROPOXYPH UR QL: NEGATIVE
PROT SERPL-MCNC: 7.6 G/DL (ref 6–8.5)
PROT UR QL STRIP: ABNORMAL
QT INTERVAL: 346 MS
QTC INTERVAL: 468 MS
RBC # BLD AUTO: 4.85 10*6/MM3 (ref 4.14–5.8)
RBC # UR: ABNORMAL /HPF
REF LAB TEST METHOD: ABNORMAL
SALICYLATES SERPL-MCNC: <0.3 MG/DL
SARS-COV-2 RNA RESP QL NAA+PROBE: NOT DETECTED
SARS-COV-2 RNA RESP QL NAA+PROBE: NOT DETECTED
SODIUM SERPL-SCNC: 137 MMOL/L (ref 136–145)
SP GR UR STRIP: 1.02 (ref 1–1.03)
SQUAMOUS #/AREA URNS HPF: ABNORMAL /HPF
TRICYCLICS UR QL SCN: POSITIVE
UROBILINOGEN UR QL STRIP: ABNORMAL
WBC # BLD AUTO: 10.43 10*3/MM3 (ref 3.4–10.8)
WBC UR QL AUTO: ABNORMAL /HPF
WHOLE BLOOD HOLD SPECIMEN: NORMAL

## 2021-06-22 PROCEDURE — 96374 THER/PROPH/DIAG INJ IV PUSH: CPT

## 2021-06-22 PROCEDURE — 80179 DRUG ASSAY SALICYLATE: CPT | Performed by: EMERGENCY MEDICINE

## 2021-06-22 PROCEDURE — HZ2ZZZZ DETOXIFICATION SERVICES FOR SUBSTANCE ABUSE TREATMENT: ICD-10-PCS | Performed by: PSYCHIATRY & NEUROLOGY

## 2021-06-22 PROCEDURE — 93010 ELECTROCARDIOGRAM REPORT: CPT | Performed by: INTERNAL MEDICINE

## 2021-06-22 PROCEDURE — 87636 SARSCOV2 & INF A&B AMP PRB: CPT | Performed by: EMERGENCY MEDICINE

## 2021-06-22 PROCEDURE — 25010000002 HALOPERIDOL LACTATE PER 5 MG: Performed by: PSYCHIATRY & NEUROLOGY

## 2021-06-22 PROCEDURE — 99285 EMERGENCY DEPT VISIT HI MDM: CPT

## 2021-06-22 PROCEDURE — 93005 ELECTROCARDIOGRAM TRACING: CPT | Performed by: PSYCHIATRY & NEUROLOGY

## 2021-06-22 PROCEDURE — 99223 1ST HOSP IP/OBS HIGH 75: CPT | Performed by: PSYCHIATRY & NEUROLOGY

## 2021-06-22 PROCEDURE — 74018 RADEX ABDOMEN 1 VIEW: CPT

## 2021-06-22 PROCEDURE — 85025 COMPLETE CBC W/AUTO DIFF WBC: CPT | Performed by: EMERGENCY MEDICINE

## 2021-06-22 PROCEDURE — 87636 SARSCOV2 & INF A&B AMP PRB: CPT | Performed by: PSYCHIATRY & NEUROLOGY

## 2021-06-22 PROCEDURE — 74018 RADEX ABDOMEN 1 VIEW: CPT | Performed by: RADIOLOGY

## 2021-06-22 PROCEDURE — 80306 DRUG TEST PRSMV INSTRMNT: CPT | Performed by: EMERGENCY MEDICINE

## 2021-06-22 PROCEDURE — 93005 ELECTROCARDIOGRAM TRACING: CPT | Performed by: EMERGENCY MEDICINE

## 2021-06-22 PROCEDURE — 25010000002 LORAZEPAM PER 2 MG: Performed by: EMERGENCY MEDICINE

## 2021-06-22 PROCEDURE — 71045 X-RAY EXAM CHEST 1 VIEW: CPT

## 2021-06-22 PROCEDURE — 82077 ASSAY SPEC XCP UR&BREATH IA: CPT | Performed by: EMERGENCY MEDICINE

## 2021-06-22 PROCEDURE — 83735 ASSAY OF MAGNESIUM: CPT | Performed by: EMERGENCY MEDICINE

## 2021-06-22 PROCEDURE — 25010000002 LORAZEPAM PER 2 MG: Performed by: PSYCHIATRY & NEUROLOGY

## 2021-06-22 PROCEDURE — 81001 URINALYSIS AUTO W/SCOPE: CPT | Performed by: EMERGENCY MEDICINE

## 2021-06-22 PROCEDURE — 80143 DRUG ASSAY ACETAMINOPHEN: CPT | Performed by: EMERGENCY MEDICINE

## 2021-06-22 PROCEDURE — 80053 COMPREHEN METABOLIC PANEL: CPT | Performed by: EMERGENCY MEDICINE

## 2021-06-22 RX ORDER — CLONIDINE HYDROCHLORIDE 0.1 MG/1
0.1 TABLET ORAL 4 TIMES DAILY PRN
Status: DISPENSED | OUTPATIENT
Start: 2021-06-22 | End: 2021-06-23

## 2021-06-22 RX ORDER — QUETIAPINE FUMARATE 300 MG/1
600 TABLET, FILM COATED ORAL NIGHTLY
Status: DISCONTINUED | OUTPATIENT
Start: 2021-06-22 | End: 2021-06-23 | Stop reason: HOSPADM

## 2021-06-22 RX ORDER — FAMOTIDINE 20 MG/1
20 TABLET, FILM COATED ORAL 2 TIMES DAILY PRN
Status: DISCONTINUED | OUTPATIENT
Start: 2021-06-22 | End: 2021-06-23 | Stop reason: HOSPADM

## 2021-06-22 RX ORDER — OLANZAPINE 10 MG/1
10 TABLET, ORALLY DISINTEGRATING ORAL NIGHTLY
COMMUNITY
End: 2021-09-16

## 2021-06-22 RX ORDER — CLONIDINE HYDROCHLORIDE 0.1 MG/1
0.1 TABLET ORAL 4 TIMES DAILY PRN
Status: DISCONTINUED | OUTPATIENT
Start: 2021-06-23 | End: 2021-06-23 | Stop reason: HOSPADM

## 2021-06-22 RX ORDER — CLONIDINE HYDROCHLORIDE 0.1 MG/1
0.1 TABLET ORAL 2 TIMES DAILY PRN
Status: DISCONTINUED | OUTPATIENT
Start: 2021-06-25 | End: 2021-06-23 | Stop reason: HOSPADM

## 2021-06-22 RX ORDER — LORAZEPAM 2 MG/1
2 TABLET ORAL EVERY 4 HOURS PRN
Status: DISCONTINUED | OUTPATIENT
Start: 2021-06-23 | End: 2021-06-23 | Stop reason: HOSPADM

## 2021-06-22 RX ORDER — DICYCLOMINE HYDROCHLORIDE 10 MG/1
10 CAPSULE ORAL 3 TIMES DAILY PRN
Status: DISCONTINUED | OUTPATIENT
Start: 2021-06-22 | End: 2021-06-23 | Stop reason: HOSPADM

## 2021-06-22 RX ORDER — ACETAMINOPHEN 500 MG
1000 TABLET ORAL ONCE
Status: COMPLETED | OUTPATIENT
Start: 2021-06-22 | End: 2021-06-22

## 2021-06-22 RX ORDER — OLANZAPINE 5 MG/1
10 TABLET, ORALLY DISINTEGRATING ORAL NIGHTLY
Status: CANCELLED | OUTPATIENT
Start: 2021-06-22

## 2021-06-22 RX ORDER — LORAZEPAM 2 MG/1
2 TABLET ORAL
Status: DISPENSED | OUTPATIENT
Start: 2021-06-22 | End: 2021-06-23

## 2021-06-22 RX ORDER — ONDANSETRON 4 MG/1
4 TABLET, FILM COATED ORAL EVERY 6 HOURS PRN
Status: DISCONTINUED | OUTPATIENT
Start: 2021-06-22 | End: 2021-06-23 | Stop reason: HOSPADM

## 2021-06-22 RX ORDER — HYDROXYZINE 50 MG/1
50 TABLET, FILM COATED ORAL EVERY 6 HOURS PRN
Status: DISCONTINUED | OUTPATIENT
Start: 2021-06-22 | End: 2021-06-23 | Stop reason: HOSPADM

## 2021-06-22 RX ORDER — CLONIDINE HYDROCHLORIDE 0.1 MG/1
0.3 TABLET ORAL NIGHTLY
Status: CANCELLED | OUTPATIENT
Start: 2021-06-22

## 2021-06-22 RX ORDER — TRAZODONE HYDROCHLORIDE 100 MG/1
100 TABLET ORAL NIGHTLY
COMMUNITY
End: 2021-12-20 | Stop reason: ALTCHOICE

## 2021-06-22 RX ORDER — LORAZEPAM 1 MG/1
1 TABLET ORAL
Status: DISCONTINUED | OUTPATIENT
Start: 2021-06-25 | End: 2021-06-23 | Stop reason: HOSPADM

## 2021-06-22 RX ORDER — LORAZEPAM 2 MG/1
2 TABLET ORAL
Status: DISCONTINUED | OUTPATIENT
Start: 2021-06-23 | End: 2021-06-23 | Stop reason: HOSPADM

## 2021-06-22 RX ORDER — LORAZEPAM 0.5 MG/1
0.5 TABLET ORAL
Status: DISCONTINUED | OUTPATIENT
Start: 2021-06-26 | End: 2021-06-23 | Stop reason: HOSPADM

## 2021-06-22 RX ORDER — ACETAMINOPHEN 325 MG/1
650 TABLET ORAL EVERY 6 HOURS PRN
Status: DISCONTINUED | OUTPATIENT
Start: 2021-06-22 | End: 2021-06-22

## 2021-06-22 RX ORDER — NICOTINE 21 MG/24HR
1 PATCH, TRANSDERMAL 24 HOURS TRANSDERMAL EVERY 24 HOURS
Status: DISCONTINUED | OUTPATIENT
Start: 2021-06-22 | End: 2021-06-23 | Stop reason: HOSPADM

## 2021-06-22 RX ORDER — CYCLOBENZAPRINE HCL 10 MG
10 TABLET ORAL 3 TIMES DAILY PRN
Status: DISCONTINUED | OUTPATIENT
Start: 2021-06-22 | End: 2021-06-23 | Stop reason: HOSPADM

## 2021-06-22 RX ORDER — BUPRENORPHINE AND NALOXONE 8; 2 MG/1; MG/1
1.5 FILM, SOLUBLE BUCCAL; SUBLINGUAL DAILY
COMMUNITY
End: 2021-12-20 | Stop reason: ALTCHOICE

## 2021-06-22 RX ORDER — IBUPROFEN 400 MG/1
400 TABLET ORAL EVERY 6 HOURS PRN
Status: DISCONTINUED | OUTPATIENT
Start: 2021-06-22 | End: 2021-06-23 | Stop reason: HOSPADM

## 2021-06-22 RX ORDER — ECHINACEA PURPUREA EXTRACT 125 MG
2 TABLET ORAL AS NEEDED
Status: DISCONTINUED | OUTPATIENT
Start: 2021-06-22 | End: 2021-06-23 | Stop reason: HOSPADM

## 2021-06-22 RX ORDER — ALUMINA, MAGNESIA, AND SIMETHICONE 2400; 2400; 240 MG/30ML; MG/30ML; MG/30ML
15 SUSPENSION ORAL EVERY 6 HOURS PRN
Status: DISCONTINUED | OUTPATIENT
Start: 2021-06-22 | End: 2021-06-23 | Stop reason: HOSPADM

## 2021-06-22 RX ORDER — LORAZEPAM 0.5 MG/1
0.5 TABLET ORAL EVERY 4 HOURS PRN
Status: DISCONTINUED | OUTPATIENT
Start: 2021-06-26 | End: 2021-06-23 | Stop reason: HOSPADM

## 2021-06-22 RX ORDER — TRAZODONE HYDROCHLORIDE 50 MG/1
50 TABLET ORAL NIGHTLY PRN
Status: DISCONTINUED | OUTPATIENT
Start: 2021-06-22 | End: 2021-06-23 | Stop reason: HOSPADM

## 2021-06-22 RX ORDER — BENZTROPINE MESYLATE 1 MG/1
2 TABLET ORAL ONCE AS NEEDED
Status: DISCONTINUED | OUTPATIENT
Start: 2021-06-22 | End: 2021-06-23 | Stop reason: HOSPADM

## 2021-06-22 RX ORDER — LORAZEPAM 2 MG/ML
2 INJECTION INTRAMUSCULAR ONCE
Status: COMPLETED | OUTPATIENT
Start: 2021-06-22 | End: 2021-06-22

## 2021-06-22 RX ORDER — TRAZODONE HYDROCHLORIDE 50 MG/1
100 TABLET ORAL NIGHTLY
Status: CANCELLED | OUTPATIENT
Start: 2021-06-22

## 2021-06-22 RX ORDER — LORAZEPAM 1 MG/1
1 TABLET ORAL EVERY 4 HOURS PRN
Status: DISCONTINUED | OUTPATIENT
Start: 2021-06-25 | End: 2021-06-23 | Stop reason: HOSPADM

## 2021-06-22 RX ORDER — BENZTROPINE MESYLATE 1 MG/ML
1 INJECTION INTRAMUSCULAR; INTRAVENOUS ONCE AS NEEDED
Status: DISCONTINUED | OUTPATIENT
Start: 2021-06-22 | End: 2021-06-23 | Stop reason: HOSPADM

## 2021-06-22 RX ORDER — CLONIDINE HYDROCHLORIDE 0.1 MG/1
0.1 TABLET ORAL DAILY PRN
Status: DISCONTINUED | OUTPATIENT
Start: 2021-06-26 | End: 2021-06-23 | Stop reason: HOSPADM

## 2021-06-22 RX ORDER — CLONIDINE HYDROCHLORIDE 0.1 MG/1
0.1 TABLET ORAL 3 TIMES DAILY PRN
Status: DISCONTINUED | OUTPATIENT
Start: 2021-06-24 | End: 2021-06-23 | Stop reason: HOSPADM

## 2021-06-22 RX ORDER — QUETIAPINE FUMARATE 300 MG/1
600 TABLET, FILM COATED ORAL NIGHTLY
Status: CANCELLED | OUTPATIENT
Start: 2021-06-22

## 2021-06-22 RX ORDER — OLANZAPINE 5 MG/1
5 TABLET ORAL NIGHTLY
Status: DISCONTINUED | OUTPATIENT
Start: 2021-06-22 | End: 2021-06-23 | Stop reason: HOSPADM

## 2021-06-22 RX ORDER — LORAZEPAM 2 MG/ML
1 INJECTION INTRAMUSCULAR ONCE
Status: COMPLETED | OUTPATIENT
Start: 2021-06-22 | End: 2021-06-22

## 2021-06-22 RX ORDER — BUPRENORPHINE HYDROCHLORIDE AND NALOXONE HYDROCHLORIDE DIHYDRATE 8; 2 MG/1; MG/1
1.5 TABLET SUBLINGUAL DAILY
Status: CANCELLED | OUTPATIENT
Start: 2021-06-22

## 2021-06-22 RX ORDER — HALOPERIDOL 5 MG/ML
5 INJECTION INTRAMUSCULAR ONCE
Status: COMPLETED | OUTPATIENT
Start: 2021-06-22 | End: 2021-06-22

## 2021-06-22 RX ORDER — BENZONATATE 100 MG/1
100 CAPSULE ORAL 3 TIMES DAILY PRN
Status: DISCONTINUED | OUTPATIENT
Start: 2021-06-22 | End: 2021-06-23 | Stop reason: HOSPADM

## 2021-06-22 RX ORDER — LOPERAMIDE HYDROCHLORIDE 2 MG/1
2 CAPSULE ORAL
Status: DISCONTINUED | OUTPATIENT
Start: 2021-06-22 | End: 2021-06-23 | Stop reason: HOSPADM

## 2021-06-22 RX ADMIN — QUETIAPINE FUMARATE 600 MG: 300 TABLET, FILM COATED ORAL at 21:27

## 2021-06-22 RX ADMIN — LORAZEPAM 2 MG: 2 INJECTION INTRAMUSCULAR; INTRAVENOUS at 14:56

## 2021-06-22 RX ADMIN — LORAZEPAM 1 MG: 2 INJECTION INTRAMUSCULAR; INTRAVENOUS at 05:16

## 2021-06-22 RX ADMIN — TRAZODONE HYDROCHLORIDE 50 MG: 50 TABLET ORAL at 21:27

## 2021-06-22 RX ADMIN — HYDROXYZINE HYDROCHLORIDE 50 MG: 50 TABLET ORAL at 21:27

## 2021-06-22 RX ADMIN — HALOPERIDOL LACTATE 5 MG: 5 INJECTION, SOLUTION INTRAMUSCULAR at 14:56

## 2021-06-22 RX ADMIN — CLONIDINE HYDROCHLORIDE 0.1 MG: 0.1 TABLET ORAL at 11:51

## 2021-06-22 RX ADMIN — OLANZAPINE 5 MG: 5 TABLET, FILM COATED ORAL at 21:27

## 2021-06-22 RX ADMIN — ACETAMINOPHEN 1000 MG: 500 TABLET, FILM COATED ORAL at 05:19

## 2021-06-22 RX ADMIN — LORAZEPAM 2 MG: 2 TABLET ORAL at 11:52

## 2021-06-22 RX ADMIN — IBUPROFEN 400 MG: 400 TABLET, FILM COATED ORAL at 11:51

## 2021-06-22 NOTE — ED PROVIDER NOTES
"Subjective   40-year-old male with extensive past medical history presents requesting detox.  Of note, the patient checked into the [facility] today for inpatient detox from benzodiazepines, meth, and Klonopin.  He has a complex past medical history and the providers at the [facility] did not feel comfortable with him detoxing there as result.  They subsequently sent him here to be medically evaluated as they felt that he needed \"medical detox.\"  The patient states that he last used drugs 2 days ago.  He denies any concomitant alcohol use or IV drug use.  He is not suicidal or homicidal.          Review of Systems   Musculoskeletal: Positive for myalgias.   Psychiatric/Behavioral: Negative for hallucinations and suicidal ideas.   All other systems reviewed and are negative.      Past Medical History:   Diagnosis Date   • ADD (attention deficit disorder)    • Anogenital HPV infection    • Anxiety    • Arthritis    • Bipolar disorder (CMS/HCC)    • Condyloma acuminatum in male    • Constipation    • COPD (chronic obstructive pulmonary disease) (CMS/HCC)    • Depression    • Fibromyalgia    • GERD (gastroesophageal reflux disease)    • Hepatitis B     antibodies   • Hepatitis C     antibodies   • HIV disease (CMS/HCC)    • Hyperlipidemia    • Migraines    • On home oxygen therapy     at  per NC, 2.5 L   • Osteomyelitis hip (CMS/HCC)    • Osteoporosis    • Osteoporosis    • Paranoia (CMS/HCC)    • Suicidal ideations    • Urinary tract infection        Allergies   Allergen Reactions   • Phenobarbital Nausea And Vomiting       Past Surgical History:   Procedure Laterality Date   • BACK SURGERY  09/22/2016   • BRONCHOSCOPY THORACOTOMY Right 1/4/2021    Procedure: THORACOTOMY WITH DIAPHRAGM PLICATION RIGHT;  Surgeon: Corbin Waller MD;  [facility];  Service: Cardiothoracic;  Laterality: Right;   • HAND SURGERY Right 06/2016       Family History   Problem Relation Age of Onset   • Hypertension Other    • Diabetes Other  "   • Depression Other    • Heart attack Mother    • Anxiety disorder Father    • Depression Father        Social History     Socioeconomic History   • Marital status: Single     Spouse name: Not on file   • Number of children: 0   • Years of education: Not on file   • Highest education level: Not on file   Tobacco Use   • Smoking status: Light Tobacco Smoker     Packs/day: 0.25     Years: 24.00     Pack years: 6.00     Types: Cigarettes   • Smokeless tobacco: Never Used   • Tobacco comment: pt demands nicotine patch, refuses counseling, Pt is smoking 5 cigs   Vaping Use   • Vaping Use: Never used   Substance and Sexual Activity   • Alcohol use: No   • Drug use: Yes     Types: Methamphetamines, Amphetamines, Benzodiazepines     Comment: TCA; 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    • Sexual activity: Yes     Partners: Male           Objective   Physical Exam  Vitals and nursing note reviewed.   Constitutional:       General: He is not in acute distress.     Appearance: Normal appearance. He is well-developed. He is not diaphoretic.   HENT:      Head: Normocephalic and atraumatic.   Neck:      Vascular: No JVD.   Cardiovascular:      Rate and Rhythm: Regular rhythm.      Heart sounds: Normal heart sounds. No murmur heard.   No friction rub. No gallop.       Comments: Tachycardic  Pulmonary:      Effort: Pulmonary effort is normal. No respiratory distress.      Breath sounds: Normal breath sounds. No wheezing or rales.   Abdominal:      General: Bowel sounds are normal. There is no distension.      Palpations: Abdomen is soft. There is no mass.      Tenderness: There is no abdominal tenderness. There is no guarding.   Musculoskeletal:         General: Normal range of motion.      Cervical back: Normal range of motion.   Skin:     General: Skin is warm and dry.      Coloration: Skin is not pale.      Findings: No erythema or rash.   Neurological:      General: No focal deficit present.      Mental Status: He  "is alert and oriented to person, place, and time.   Psychiatric:         Thought Content: Thought content normal.         Judgment: Judgment normal.      Comments: No suicidal or homicidal ideation         Procedures           ED Course  ED Course as of Jun 22 0616 Tue Jun 22, 2021   0148 40-year-old male with extensive past medical history presents for evaluation requesting \"detox.\"  The patient checked into the [facility] today for voluntary detox of methamphetamines, Klonopin, and benzodiazepines.  However, they were concerned given his complex past medical history and sent him here for \"medical detox.\"  On arrival to the ED, the patient is nontoxic-appearing.  He appears slightly anxious and is mildly tachycardic.  His initial EKG revealed sinus tachycardia with a heart rate of 119 and was otherwise unrevealing.  He is not suicidal or homicidal at this time.  We will obtain labs and a chest x-ray, and we will reassess following initial interventions.    [DD]   0208 Chest x-ray is negative for acute/emergent cardiothoracic process.I personally viewed the patient's x-ray images myself, and I am in agreement with the radiologist's reading for final interpretation.        [DD]   0338 UDS is positive for multiple substances.    [DD]   0400 The patient was evaluated by the [facility] behavioral [facility] specialist, Rachelle.  She requested that we obtain a COVID-19 swab and is currently working on her evaluation.    [DD]   0553 Rachelle informed me that she is still waiting to hear back from a voluntary detox facility in [place].    [DD]   0615 The patient was accepted for voluntary detox at the [facility] under the care of Dr. Yee.  We will arrange transport.  The patient is aware/agreeable with the plan at this time.    [DD]      ED Course User Index  [DD] Barak Cristina MD                                   Recent Results (from the past 24 hour(s))   Ethanol    Collection Time: 06/22/21  " 2:02 AM    Specimen: Blood   Result Value Ref Range    Ethanol <10 0 - 10 mg/dL   Green Top (Gel)    Collection Time: 06/22/21  2:02 AM   Result Value Ref Range    Extra Tube Hold for add-ons.    Lavender Top    Collection Time: 06/22/21  2:02 AM   Result Value Ref Range    Extra Tube hold for add-on    Gold Top - SST    Collection Time: 06/22/21  2:02 AM   Result Value Ref Range    Extra Tube Hold for add-ons.    Comprehensive Metabolic Panel    Collection Time: 06/22/21  2:02 AM    Specimen: Blood   Result Value Ref Range    Glucose 100 (H) 65 - 99 mg/dL    BUN 10 6 - 20 mg/dL    Creatinine 0.78 0.76 - 1.27 mg/dL    Sodium 137 136 - 145 mmol/L    Potassium 3.6 3.5 - 5.2 mmol/L    Chloride 100 98 - 107 mmol/L    CO2 26.0 22.0 - 29.0 mmol/L    Calcium 9.9 8.6 - 10.5 mg/dL    Total Protein 7.6 6.0 - 8.5 g/dL    Albumin 4.40 3.50 - 5.20 g/dL    ALT (SGPT) 40 1 - 41 U/L    AST (SGOT) 65 (H) 1 - 40 U/L    Alkaline Phosphatase 107 39 - 117 U/L    Total Bilirubin 0.7 0.0 - 1.2 mg/dL    eGFR Non African Amer 110 >60 mL/min/1.73    Globulin 3.2 gm/dL    A/G Ratio 1.4 g/dL    BUN/Creatinine Ratio 12.8 7.0 - 25.0    Anion Gap 11.0 5.0 - 15.0 mmol/L   Magnesium    Collection Time: 06/22/21  2:02 AM    Specimen: Blood   Result Value Ref Range    Magnesium 2.1 1.6 - 2.6 mg/dL   Salicylate Level    Collection Time: 06/22/21  2:02 AM    Specimen: Blood   Result Value Ref Range    Salicylate <0.3 <=30.0 mg/dL   Acetaminophen Level    Collection Time: 06/22/21  2:02 AM    Specimen: Blood   Result Value Ref Range    Acetaminophen <5.0 0.0 - 30.0 mcg/mL   CBC Auto Differential    Collection Time: 06/22/21  2:02 AM    Specimen: Blood   Result Value Ref Range    WBC 10.43 3.40 - 10.80 10*3/mm3    RBC 4.85 4.14 - 5.80 10*6/mm3    Hemoglobin 14.2 13.0 - 17.7 g/dL    Hematocrit 42.1 37.5 - 51.0 %    MCV 86.8 79.0 - 97.0 fL    MCH 29.3 26.6 - 33.0 pg    MCHC 33.7 31.5 - 35.7 g/dL    RDW 14.9 12.3 - 15.4 %    RDW-SD 47.4 37.0 - 54.0 fl     MPV 8.3 6.0 - 12.0 fL    Platelets 292 140 - 450 10*3/mm3    Neutrophil % 54.7 42.7 - 76.0 %    Lymphocyte % 31.3 19.6 - 45.3 %    Monocyte % 12.4 (H) 5.0 - 12.0 %    Eosinophil % 0.8 0.3 - 6.2 %    Basophil % 0.5 0.0 - 1.5 %    Immature Grans % 0.3 0.0 - 0.5 %    Neutrophils, Absolute 5.72 1.70 - 7.00 10*3/mm3    Lymphocytes, Absolute 3.26 (H) 0.70 - 3.10 10*3/mm3    Monocytes, Absolute 1.29 (H) 0.10 - 0.90 10*3/mm3    Eosinophils, Absolute 0.08 0.00 - 0.40 10*3/mm3    Basophils, Absolute 0.05 0.00 - 0.20 10*3/mm3    Immature Grans, Absolute 0.03 0.00 - 0.05 10*3/mm3    nRBC 0.0 0.0 - 0.2 /100 WBC   Urine Drug Screen - Urine, Clean Catch    Collection Time: 06/22/21  2:52 AM    Specimen: Urine, Clean Catch   Result Value Ref Range    THC, Screen, Urine Negative Negative    Phencyclidine (PCP), Urine Negative Negative    Cocaine Screen, Urine Negative Negative    Methamphetamine, Ur Positive (A) Negative    Opiate Screen Negative Negative    Amphetamine Screen, Urine Positive (A) Negative    Benzodiazepine Screen, Urine Positive (A) Negative    Tricyclic Antidepressants Screen Positive (A) Negative    Methadone Screen, Urine Negative Negative    Barbiturates Screen, Urine Negative Negative    Oxycodone Screen, Urine Negative Negative    Propoxyphene Screen Negative Negative    Buprenorphine, Screen, Urine Positive (A) Negative   Urinalysis With Microscopic If Indicated (No Culture) - Urine, Clean Catch    Collection Time: 06/22/21  2:52 AM    Specimen: Urine, Clean Catch   Result Value Ref Range    Color, UA Dark Yellow (A) Yellow, Straw    Appearance, UA Clear Clear    pH, UA 7.0 5.0 - 8.0    Specific Gravity, UA 1.022 1.001 - 1.030    Glucose, UA Negative Negative    Ketones, UA Trace (A) Negative    Bilirubin, UA Small (1+) (A) Negative    Blood, UA Negative Negative    Protein, UA 30 mg/dL (1+) (A) Negative    Leuk Esterase, UA Negative Negative    Nitrite, UA Negative Negative    Urobilinogen, UA 2.0 E.U./dL  (A) 0.2 - 1.0 E.U./dL   Urinalysis, Microscopic Only - Urine, Clean Catch    Collection Time: 06/22/21  2:52 AM    Specimen: Urine, Clean Catch   Result Value Ref Range    RBC, UA 3-6 (A) None Seen, 0-2 /HPF    WBC, UA 0-2 None Seen, 0-2 /HPF    Bacteria, UA None Seen None Seen, Trace /HPF    Squamous Epithelial Cells, UA 0-2 None Seen, 0-2 /HPF    Hyaline Casts, UA 7-12 0 - 6 /LPF    Methodology Automated Microscopy    COVID-19 and FLU A/B PCR - Swab, Nasopharynx    Collection Time: 06/22/21  4:03 AM    Specimen: Nasopharynx; Swab   Result Value Ref Range    COVID19 Not Detected Not Detected - Ref. Range    Influenza A PCR Not Detected Not Detected    Influenza B PCR Not Detected Not Detected     Note: In addition to lab results from this visit, the labs listed above may include labs taken at another facility or during a different encounter within the last 24 hours. Please correlate lab times with ED admission and discharge times for further clarification of the services performed during this visit.    XR Chest 1 View   Final Result   Slight improvement in right base infiltrate or atelectasis as compared with 5/19/2021      Left lung is clear      Signer Name: Enrique Mckeon MD    Signed: 6/22/2021 2:05 AM    Workstation Name: [email or web address]     [facility]        Vitals:    06/22/21 0100 06/22/21 0115 06/22/21 0134 06/22/21 0457   BP: 116/91  115/94 136/98   BP Location:   Right arm Right arm   Patient Position:   Lying Lying   Pulse:  (!) 123 118 109   Resp:   20 16   Temp:    98.2 °F (36.8 °C)   TempSrc:    Oral   SpO2:  98% 97% 100%   Weight:       Height:         Medications   sodium chloride 0.9 % bolus 500 mL (500 mL Intravenous Not Given 6/22/21 0455)   LORazepam (ATIVAN) injection 1 mg (1 mg Intravenous Given 6/22/21 0516)   acetaminophen (TYLENOL) tablet 1,000 mg (1,000 mg Oral Given 6/22/21 0519)     ECG/EMG Results (last 24 hours)     Procedure Component Value Units Date/Time    ECG 12  Lead [[id number]] Collected: 06/22/21 0116     Updated: 06/22/21 0115        ECG 12 Lead           Recent Results (from the past 24 hour(s))   Ethanol    Collection Time: 06/22/21  2:02 AM    Specimen: Blood   Result Value Ref Range    Ethanol <10 0 - 10 mg/dL   Green Top (Gel)    Collection Time: 06/22/21  2:02 AM   Result Value Ref Range    Extra Tube Hold for add-ons.    Lavender Top    Collection Time: 06/22/21  2:02 AM   Result Value Ref Range    Extra Tube hold for add-on    Gold Top - SST    Collection Time: 06/22/21  2:02 AM   Result Value Ref Range    Extra Tube Hold for add-ons.    Comprehensive Metabolic Panel    Collection Time: 06/22/21  2:02 AM    Specimen: Blood   Result Value Ref Range    Glucose 100 (H) 65 - 99 mg/dL    BUN 10 6 - 20 mg/dL    Creatinine 0.78 0.76 - 1.27 mg/dL    Sodium 137 136 - 145 mmol/L    Potassium 3.6 3.5 - 5.2 mmol/L    Chloride 100 98 - 107 mmol/L    CO2 26.0 22.0 - 29.0 mmol/L    Calcium 9.9 8.6 - 10.5 mg/dL    Total Protein 7.6 6.0 - 8.5 g/dL    Albumin 4.40 3.50 - 5.20 g/dL    ALT (SGPT) 40 1 - 41 U/L    AST (SGOT) 65 (H) 1 - 40 U/L    Alkaline Phosphatase 107 39 - 117 U/L    Total Bilirubin 0.7 0.0 - 1.2 mg/dL    eGFR Non African Amer 110 >60 mL/min/1.73    Globulin 3.2 gm/dL    A/G Ratio 1.4 g/dL    BUN/Creatinine Ratio 12.8 7.0 - 25.0    Anion Gap 11.0 5.0 - 15.0 mmol/L   Magnesium    Collection Time: 06/22/21  2:02 AM    Specimen: Blood   Result Value Ref Range    Magnesium 2.1 1.6 - 2.6 mg/dL   Salicylate Level    Collection Time: 06/22/21  2:02 AM    Specimen: Blood   Result Value Ref Range    Salicylate <0.3 <=30.0 mg/dL   Acetaminophen Level    Collection Time: 06/22/21  2:02 AM    Specimen: Blood   Result Value Ref Range    Acetaminophen <5.0 0.0 - 30.0 mcg/mL   CBC Auto Differential    Collection Time: 06/22/21  2:02 AM    Specimen: Blood   Result Value Ref Range    WBC 10.43 3.40 - 10.80 10*3/mm3    RBC 4.85 4.14 - 5.80 10*6/mm3    Hemoglobin 14.2 13.0 - 17.7  g/dL    Hematocrit 42.1 37.5 - 51.0 %    MCV 86.8 79.0 - 97.0 fL    MCH 29.3 26.6 - 33.0 pg    MCHC 33.7 31.5 - 35.7 g/dL    RDW 14.9 12.3 - 15.4 %    RDW-SD 47.4 37.0 - 54.0 fl    MPV 8.3 6.0 - 12.0 fL    Platelets 292 140 - 450 10*3/mm3    Neutrophil % 54.7 42.7 - 76.0 %    Lymphocyte % 31.3 19.6 - 45.3 %    Monocyte % 12.4 (H) 5.0 - 12.0 %    Eosinophil % 0.8 0.3 - 6.2 %    Basophil % 0.5 0.0 - 1.5 %    Immature Grans % 0.3 0.0 - 0.5 %    Neutrophils, Absolute 5.72 1.70 - 7.00 10*3/mm3    Lymphocytes, Absolute 3.26 (H) 0.70 - 3.10 10*3/mm3    Monocytes, Absolute 1.29 (H) 0.10 - 0.90 10*3/mm3    Eosinophils, Absolute 0.08 0.00 - 0.40 10*3/mm3    Basophils, Absolute 0.05 0.00 - 0.20 10*3/mm3    Immature Grans, Absolute 0.03 0.00 - 0.05 10*3/mm3    nRBC 0.0 0.0 - 0.2 /100 WBC   Urine Drug Screen - Urine, Clean Catch    Collection Time: 06/22/21  2:52 AM    Specimen: Urine, Clean Catch   Result Value Ref Range    THC, Screen, Urine Negative Negative    Phencyclidine (PCP), Urine Negative Negative    Cocaine Screen, Urine Negative Negative    Methamphetamine, Ur Positive (A) Negative    Opiate Screen Negative Negative    Amphetamine Screen, Urine Positive (A) Negative    Benzodiazepine Screen, Urine Positive (A) Negative    Tricyclic Antidepressants Screen Positive (A) Negative    Methadone Screen, Urine Negative Negative    Barbiturates Screen, Urine Negative Negative    Oxycodone Screen, Urine Negative Negative    Propoxyphene Screen Negative Negative    Buprenorphine, Screen, Urine Positive (A) Negative   Urinalysis With Microscopic If Indicated (No Culture) - Urine, Clean Catch    Collection Time: 06/22/21  2:52 AM    Specimen: Urine, Clean Catch   Result Value Ref Range    Color, UA Dark Yellow (A) Yellow, Straw    Appearance, UA Clear Clear    pH, UA 7.0 5.0 - 8.0    Specific Gravity, UA 1.022 1.001 - 1.030    Glucose, UA Negative Negative    Ketones, UA Trace (A) Negative    Bilirubin, UA Small (1+) (A)  Negative    Blood, UA Negative Negative    Protein, UA 30 mg/dL (1+) (A) Negative    Leuk Esterase, UA Negative Negative    Nitrite, UA Negative Negative    Urobilinogen, UA 2.0 E.U./dL (A) 0.2 - 1.0 E.U./dL   Urinalysis, Microscopic Only - Urine, Clean Catch    Collection Time: 06/22/21  2:52 AM    Specimen: Urine, Clean Catch   Result Value Ref Range    RBC, UA 3-6 (A) None Seen, 0-2 /HPF    WBC, UA 0-2 None Seen, 0-2 /HPF    Bacteria, UA None Seen None Seen, Trace /HPF    Squamous Epithelial Cells, UA 0-2 None Seen, 0-2 /HPF    Hyaline Casts, UA 7-12 0 - 6 /LPF    Methodology Automated Microscopy    COVID-19 and FLU A/B PCR - Swab, Nasopharynx    Collection Time: 06/22/21  4:03 AM    Specimen: Nasopharynx; Swab   Result Value Ref Range    COVID19 Not Detected Not Detected - Ref. Range    Influenza A PCR Not Detected Not Detected    Influenza B PCR Not Detected Not Detected     Note: In addition to lab results from this visit, the labs listed above may include labs taken at another facility or during a different encounter within the last 24 hours. Please correlate lab times with ED admission and discharge times for further clarification of the services performed during this visit.    XR Chest 1 View   Final Result   Slight improvement in right base infiltrate or atelectasis as compared with 5/19/2021      Left lung is clear      Signer Name: Enrique Mckeon MD    Signed: 6/22/2021 2:05 AM    Workstation Name: [id number]     [facility]        Vitals:    06/22/21 0100 06/22/21 0115 06/22/21 0134 06/22/21 0457   BP: 116/91  115/94 136/98   BP Location:   Right arm Right arm   Patient Position:   Lying Lying   Pulse:  (!) 123 118 109   Resp:   20 16   Temp:    98.2 °F (36.8 °C)   TempSrc:    Oral   SpO2:  98% 97% 100%   Weight:       Height:         Medications   sodium chloride 0.9 % bolus 500 mL (500 mL Intravenous Not Given 6/22/21 0455)   LORazepam (ATIVAN) injection 1 mg (1 mg Intravenous  Given 6/22/21 0516)   acetaminophen (TYLENOL) tablet 1,000 mg (1,000 mg Oral Given 6/22/21 0519)     ECG/EMG Results (last 24 hours)     Procedure Component Value Units Date/Time    ECG 12 Lead [[id number]] Collected: 06/22/21 0116     Updated: 06/22/21 0115        ECG 12 Lead                       MDM    Final diagnoses:   Substance abuse (CMS/[facility])       ED Disposition  ED Disposition     ED Disposition Condition Comment    Transfer to Another Facility             No follow-up provider specified.       Medication List      No changes were made to your prescriptions during this visit.          Barak Cristina MD  06/22/21 0608       Barak Cristina MD  06/22/21 0616

## 2021-06-22 NOTE — H&P
INITIAL PSYCHIATRIC HISTORY & PHYSICAL    Patient Identification:  Name:  Barak Rivera  Age:  40 y.o.  Sex:  male  :  1980  MRN:  [id number]   Visit Number:  [id number]  Primary Care Physician:  Lupe Ramires APRN    SUBJECTIVE    CC/Focus of Exam: Polysubstance use    HPI: Barak Rivera is a 40 y.o. male who was admitted on 2021 with complaints of benzodiazepine, opioids and methamphetamine use and withdrawals. The patient reports a long history of substance use. First use was in his teens. Over time the use increased and the patient  continued to use despite negative consequences. The patient endorses symptoms of tolerance and withdrawals. Has tried to cut down and stop but has not been successful. Spends too much time and resources in pursuit of substance use. Longest period of sobriety is reported to be 9 months a few years ago.  Currently using clonazepam about 10 mg daily, Suboxone 1-2 strips of 8 mg daily, methamphetamine a quarter gram about once a month.  Withdrawal symptoms included headache, body aches, stomach pain, watery eyes, hot flashes, depression anxiety, tremors.    PAST PSYCHIATRIC HX: Patient has had multiple inpatient psychiatric treatments in the past.  He has previous diagnosis of bipolar disorder.    SUBSTANCE USE HX: See HPI.  Smokes 1 pack/day of cigarettes.  He has had multiple detox treatments in the past also.    SOCIAL HX:   Social History     Socioeconomic History   • Marital status: Single     Spouse name: Not on file   • Number of children: 0   • Years of education: Not on file   • Highest education level: Not on file   Tobacco Use   • Smoking status: Heavy Tobacco Smoker     Packs/day: 1.00     Years: 24.00     Pack years: 24.00     Types: Cigarettes   • Smokeless tobacco: Never Used   • Tobacco comment: pt demands nicotine patch, refuses counseling, Pt is smoking 5 cigs   Vaping Use   • Vaping Use: Never used   Substance and Sexual Activity   • Alcohol  use: No   • Drug use: Yes     Types: Methamphetamines, Amphetamines, Benzodiazepines     Comment: TCA; 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    • Sexual activity: Yes     Partners: Male     Comment: currently had unprotected sex 6/21/21 consensual.         Past Medical History:   Diagnosis Date   • ADD (attention deficit disorder)    • Anogenital HPV infection    • Anxiety    • Arthritis    • Bipolar disorder (CMS/HCC)    • Condyloma acuminatum in male    • Constipation    • COPD (chronic obstructive pulmonary disease) (CMS/HCC)    • Depression    • Fibromyalgia    • GERD (gastroesophageal reflux disease)    • Hepatitis B     antibodies   • Hepatitis C     antibodies   • HIV disease (CMS/HCC)     reported in this encounter that he does not have HIV, but has risky behavior.    • Hyperlipidemia    • Migraines    • On home oxygen therapy     at  per NC, 2.5 L   • Osteomyelitis hip (CMS/HCC)    • Osteoporosis    • Osteoporosis    • Paranoia (CMS/HCC)    • Suicidal ideations    • Tuberculosis 2021    Latent, chest x-ray neg   • Urinary tract infection           Past Surgical History:   Procedure Laterality Date   • BACK SURGERY  09/22/2016   • BRONCHOSCOPY THORACOTOMY Right 1/4/2021    Procedure: THORACOTOMY WITH DIAPHRAGM PLICATION RIGHT;  Surgeon: Corbin Waller MD;  Location: [facility];  Service: Cardiothoracic;  Laterality: Right;   • HAND SURGERY Right 06/2016       Family History   Problem Relation Age of Onset   • Hypertension Other    • Diabetes Other    • Depression Other    • Heart attack Mother    • Anxiety disorder Father    • Depression Father          Medications Prior to Admission   Medication Sig Dispense Refill Last Dose   • albuterol sulfate  (90 Base) MCG/ACT inhaler Inhale 2 puffs Every 6 (Six) Hours As Needed.      • buprenorphine-naloxone (SUBOXONE) 8-2 MG per SL tablet 1 tablet Daily. Last dose of suboxone was 12-31-20 per patient's choice  Indications: 1 tablet, sublingual,  daily  0    • clonazePAM (KlonoPIN) 1 MG tablet Take 2 mg by mouth 3 (Three) Times a Day As Needed for Anxiety.      • cloNIDine (CATAPRES) 0.3 MG tablet Take 0.3 mg by mouth Every Night.      • DESCOVY 200-25 MG per tablet Take 1 tablet by mouth Daily.  5    • dexlansoprazole (Dexilant) 60 MG capsule Take 60 mg by mouth Daily.      • escitalopram (LEXAPRO) 20 MG tablet Take 20 mg by mouth Daily.      • eszopiclone (LUNESTA) 3 MG tablet Take 3 mg by mouth Every Night. Take immediately before bedtime      • Eucrisa 2 % ointment       • Flovent Diskus 250 MCG/BLIST aerosol powder        • fluticasone-salmeterol (Advair Diskus) 250-50 MCG/DOSE DISKUS Inhale 2 (Two) Times a Day.      • lidocaine (LIDODERM) 5 % Place 1 patch on the skin as directed by provider Daily. Remove & Discard patch within 12 hours or as directed by MD      • linaclotide (LINZESS) 290 MCG capsule capsule Take 290 mcg by mouth Every Morning Before Breakfast.      • modafinil (PROVIGIL) 100 MG tablet Take 100 mg by mouth Daily.      • Naloxegol Oxalate (Movantik) 25 MG tablet Take 25 mg by mouth Every Morning.      • nicotine (NICODERM CQ) 21 MG/24HR patch Place 1 patch on the skin as directed by provider Daily.      • OLANZapine zydis (ZyPREXA) 10 MG disintegrating tablet Place 10 mg on the tongue Every Night.      • ondansetron (ZOFRAN) 8 MG tablet       • oxyCODONE (ROXICODONE) 15 MG immediate release tablet Take 1 tablet by mouth Every 3 (Three) Hours As Needed for Severe Pain . 60 tablet 0    • PHARMACY MEDS TO BED CONSULT Daily.      • QUEtiapine (SEROquel) 300 MG tablet Take 600 mg by mouth Every Night.      • Viibryd 10 MG tablet tablet       • vitamin D (ERGOCALCIFEROL) 1.25 MG (81887 UT) capsule capsule Take 50,000 Units by mouth Every 30 (Thirty) Days. MONTHLY            ALLERGIES:  Phenobarbital    Temp:  [97.8 °F (36.6 °C)-98.7 °F (37.1 °C)] 98.2 °F (36.8 °C)  Heart Rate:  [] 126  Resp:  [16-20] 18  BP: (114-140)/()  123/83    REVIEW OF SYSTEMS:  Review of Systems   Constitutional: Positive for diaphoresis.   Eyes: Negative.    Respiratory: Negative.    Cardiovascular: Negative.    Gastrointestinal: Positive for rectal pain.        Patient reports foreign body in his rectum.   Endocrine: Negative.    Genitourinary: Negative.    Musculoskeletal: Positive for arthralgias and myalgias.   Skin: Negative.    Allergic/Immunologic: Negative.    Neurological: Positive for tremors and headaches.   Hematological: Negative.    Psychiatric/Behavioral: Positive for dysphoric mood. The patient is nervous/anxious.         OBJECTIVE    PHYSICAL EXAM:  Physical Exam  Constitutional:  Appears well-developed and well-nourished.   HENT:   Head: Normocephalic and atraumatic.   Right Ear: External ear normal.   Left Ear: External ear normal.   Mouth/Throat: Oropharynx is clear and moist.   Eyes: Pupils are equal, round, and reactive to light. Conjunctivae and EOM are normal.   Neck: Normal range of motion. Neck supple.   Cardiovascular: Normal rate, regular rhythm and normal heart sounds.    Respiratory: Effort normal and breath sounds normal. No respiratory distress. No wheezes.   GI: Soft. Bowel sounds are normal.No distension. There is no tenderness.   Musculoskeletal: Normal range of motion. No edema or deformity.   Neurological:No cranial nerve deficit. Coordination normal.   Skin: Skin is warm and dry. No rash noted. No erythema.       MENTAL STATUS EXAM:   Hygiene:   poor  Cooperation:  Evasive  Eye Contact:  Poor  Psychomotor Behavior:  Restless  Affect:  Restricted  Hopelessness: 5  Speech:  Normal  Thought Progress:  Disorganized  Thought Content:  Bizarre  Suicidal:  None  Homicidal:  None  Hallucinations:  None  Delusion:  Paranoid  Memory:  Deficits  Orientation:  Person, Place, Time and Situation  Reliability:  poor  Insight:  Poor  Judgement:  Poor  Impulse Control:  Poor      Imaging Results (Last 24 Hours)     ** No results found for  the last 24 hours. **           ECG/EMG Results (most recent)     None           Lab Results   Component Value Date    GLUCOSE 100 (H) 06/22/2021    BUN 10 06/22/2021    CREATININE 0.78 06/22/2021    EGFRIFNONA 110 06/22/2021    BCR 12.8 06/22/2021    CO2 26.0 06/22/2021    CALCIUM 9.9 06/22/2021    ALBUMIN 4.40 06/22/2021    LABIL2 1.4 03/20/2021    AST 65 (H) 06/22/2021    ALT 40 06/22/2021       Lab Results   Component Value Date    WBC 10.43 06/22/2021    HGB 14.2 06/22/2021    HCT 42.1 06/22/2021    MCV 86.8 06/22/2021     06/22/2021       Last Urine Toxicity     LAST URINE TOXICITY RESULTS Latest Ref Rng & Units 6/22/2021 1/6/2021    AMPHETAMINES SCREEN, URINE Negative Positive(A) Negative    BARBITURATES SCREEN Negative Negative Negative    BENZODIAZEPINE SCREEN, URINE Negative Positive(A) Positive(A)    BUPRENORPHINEUR Negative Positive(A) Positive(A)    COCAINE SCREEN, URINE Negative Negative Negative    METHADONE SCREEN, URINE Negative Negative Negative    METHAMPHETAMINEUR Negative Positive(A) Negative          Brief Urine Lab Results  (Last result in the past 365 days)      Color   Clarity   Blood   Leuk Est   Nitrite   Protein   CREAT   Urine HCG        06/22/21 0252 Dark Yellow Clear Negative Negative Negative 30 mg/dL (1+)               DATA  Labs reviewed.  Glucose 100, AST 65.  CBC is within normal limits.  Urinalysis shows dark yellow color, trace ketones, 1+ protein, 1+ bilirubin, urobilinogen, 3-6 RBCs.  Urine drug screen is positive for amphetamine, benzodiazepine, buprenorphine, methamphetamine, tricyclic antidepressants.  EKG reviewed.  QTc interval is 455 ms.  [place] reviewed.  No active controlled prescriptions noted.  His last Suboxone prescription was dated May 21, 2021 for 4-day supply and the next was April 28, 2021 for a 30-day supply.  Record reviewed.  Reviewed patient's encounter in outside hospital ED.    Strengths: Minimal    Weaknesses:Poor social support, Substance use,  Poor coping skills and Personality issues    Code status: Full  Discussed code status with patient.    ASSESSMENT & PLAN:        Severe benzodiazepine use disorder (CMS/HCC)  - Ativan detox      Opioid use disorder, severe, dependence (CMS/HCC)  - Clonidine detox      Methamphetamine use disorder, severe (CMS/HCC)  - Supportive treatment      Bipolar disorder (CMS/HCC)  - Olanzapine 5 mg hs      Paranoia (CMS/HCC)  - Appears to be due to methamphetamine use, will treat symptomatically.      Rt Diaphragm Paralysis  - O2      Hepatitis C  - Encourage patient to maintain sobriety and seek outpatient treatment      Foreign body in rectum  - Surgery consult      The patient has been admitted for safety and stabilization.  Patient will be monitored for suicidality daily and maintained on Special Precautions Level 4 (q30 min checks).  The patient will have individual and group therapy with a master's level therapist. A master treatment plan will be developed and agreed upon by the patient and his/her treatment team.  The patient's estimated length of stay in the hospital is 5-7 days.

## 2021-06-22 NOTE — NURSING NOTE
"Pt. Admitted to unit direct admit from [facility], pt wanting to detox from benzodiazapines and opiates. Usage of 6 mg of Klonopin daily, suboxone 2mg daily and 1 gram of meth he reports was laced with PCP and fentanyl. Pt is paranoid and states he attempted to sign into [facility] but states \"i'm carter and can't take phenobarbital so they don't want me there\" Denies SI and HI. Currently worried that his ex- is telling police that he has possesion of child pornography, this is untrue he states. He is worried that this will cause him to go to [facility] and financially cause him and his father trouble.  He reports many hospitilizations for treatment of mental health and substance abuse but unspecific on dates/locations. He is very distracted and unable to focus on admission questions. Pt. Reports sleep disturbances and is increasingly paranoid about the police monitoring him. He reports loss of appetite and weight loss of more then 40 lbs in 2 months nutrition consult ordered. History of seizures with withdraw, placed on falls.  "

## 2021-06-22 NOTE — NURSING NOTE
"MD MADE AWARE THE PT REPORTS HAVING A BAG CONTAINING A \"BUMP OF METH\" IN HIS ANAL CAVITY. ALSO AWARE THE PT HAS NO CURRENT DETOX ORDERS AND HIS INITIAL HEART RATE . SEE NEW ORDERS.   "

## 2021-06-22 NOTE — NURSING NOTE
Pt. Aggitated, throwing papers, pencil and demanding to leave unit AMA, throwing hands up in the air and cursing staff. Notifed MD of desire to leave ama, pt is paranoid and MD advised to monitor pt related to recent administration of ativan and halodol and call back within and hour is pt continues to demand to leave.

## 2021-06-22 NOTE — PLAN OF CARE
"  Problem: Adult Behavioral Health Plan of Care  Goal: Patient-Specific Goal (Individualization)  Recent Flowsheet Documentation  Taken 6/22/2021 1235 by Steph Kincaid RN  Patient-Specific Goals (Include Timeframe): IOP upon discharge  Individualized Care Needs: Detox and get into outpatient program  Anxieties, Fears or Concerns: Fear of having charges brought against him for \"fake\" charges   Goal Outcome Evaluation:      New admission.  Care plan initiated.            "

## 2021-06-22 NOTE — CONSULTS
"Barak Rivera  1980    TIME: 300-400    Is patient agreeable to admission/treatment? Yes    Guardian: self    Pt Lives With:       Presenting Problems: Patient presents to ED requesting medical detox. He reports using Suboxone and Klonopin daily and meth, last use was 6/20/21. Patient reports following withdrawal symptoms; headache, body aches, stomach pain, watery eyes, hot flashes, depressed/anxious mood, tremors     Current Stressors: Patient states, \"I hate where I live, I lost my job last weekend, my ex- set me up, I think the government is out to get me, and I am tired of hearing voices.\"    Depression: 8     Anxiety: 10    Previous Psychiatric Treatment: Yes.  Patient reports history of 25 inpatient hospitalizations \"everywhere in [place], [place], [place].\"  Patient reports most recent hospitalization was 1-1/2 years ago at the [facility]. Patient receives medication management from Brittney Miller.  Patient also reports receiving substance abuse treatment at 15 different locations \"all over the [place].\"    Last inpatient admission: 1-1/2 years at at [facility].     Number of admissions: 25    Suicidal: Patient endorses desire to be dead but denies SI.  Patient states, \"I do not want to go to hell but I wish God would amber me the wish of death.\"     Previous Attempts: no prior suicide attempts, patient reports he is \"too scared\" to kill himself      [place]-SUICIDE SEVERITY RATING SCALE  Psychiatric Inpatient Setting - Discharge Screener    Ask questions that are bold and underlined Discharge   Ask Questions 1 and 2 YES NO   1) Wish to be Dead:   Person endorses thoughts about a wish to be dead or not alive anymore, or wish to fall asleep and not wake up.  While you were here in the hospital, have you wished you were dead or wished you could go to sleep and not wake up? X    2) Suicidal Thoughts:   General non-specific thoughts of wanting to end one's life/die by suicide, “I've thought " about killing myself” without general thoughts of ways to kill oneself/associated methods, intent, or plan.   While you were here in the hospital, have you actually had thoughts about killing yourself?   X   If YES to 2, ask questions 3, 4, 5, and 6.  If NO to 2, go directly to question 6   3) Suicidal Thoughts with Method (without Specific Plan or Intent to Act):   Person endorses thoughts of suicide and has thought of a least one method during the assessment period. This is different than a specific plan with time, place or method details worked out. “I thought about taking an overdose but I never made a specific plan as to when where or how I would actually do it….and I would never go through with it.”   Have you been thinking about how you might kill yourself?      4) Suicidal Intent (without Specific Plan):   Active suicidal thoughts of killing oneself and patient reports having some intent to act on such thoughts, as opposed to “I have the thoughts but I definitely will not do anything about them.”   Have you had these thoughts and had some intention of acting on them or do you have some intention of acting on them after you leave the hospital?      5) Suicide Intent with Specific Plan:   Thoughts of killing oneself with details of plan fully or partially worked out and person has some intent to carry it out.   Have you started to work out or worked out the details of how to kill yourself either for while you were here in the hospital or for after you leave the hospital? Do you intend to carry out this plan?        6) Suicide Behavior    While you were here in the hospital, have you done anything, started to do anything, or prepared to do anything to end your life?    Examples: Took pills, cut yourself, tried to hang yourself, took out pills but didn't swallow any because you changed your mind or someone took them from you, collected pills, secured a means of obtaining a gun, gave away valuables, wrote a will  "or suicide note, etc.  X       Family Hx of Mental Health/Substance Abuse: Patient reports history of mental illness on mother's side of family.    Delusions: Patient reports feeling paranoid that the government is out to get him and he constantly feels followed by the police.    Hallucinations: Patient reports auditory hallucinations.  Patient denies hallucinations are commanding but they tell him \"how they are going to hurt me.\"  Patient denies visual hallucinations.    Mood: depressed, irritable and sad     Homicidal Ideations: Absent     Abuse History: Patient does not disclose    Does this require reporting: N/A    Legal History / History of Violence: The patient has no current pending legal charges.     Sleep: Poor    Appetite: Poor, patient reports losing 50 pounds in 3 months    Current Medical Conditions: Polysubstance abuse, hep C, schizoaffective disorder, COPD    Current Psychiatric Medications:   Lexapro,   Seroquel,   trazodone     History of Inappropriate Sexual Behavior: No    Hopelessness: Moderate    Orientation: alert and oriented to person, place, and time     Substance Abuse: Patient reports using Klonopin (daily), meth (1x month), and Suboxone (daily).  Patient states last use was Sunday, 6/20/2021. UDS is suggestively positive for methamphetamine, benzo, tricyclic antidepressants, and buprenorphine.     COWS: 13    CIWA: N/A    Withdrawal Symptoms: headache, body aches, stomach pain, watery eyes, hot flashes, depressed/anxious mood, tremors     History of DT's: No    History of Seizures: Yes- \"1 month ago when I ran out Klonopin\"     SUBSTANCE ABUSE HISTORY:      DRUG   PRESENT USE  Y/N   AGE @ 1ST USE    ROUTE   HOW MUCH   HOW OFTEN   HOW LONG AT THIS RATE   Date of last use/  Amount used   Nicotine   Yes 15  1 ppd daily     Alcohol   N         Marijuana   N         Benzos     Yes-  Klonopin 18  3 mg per day daily  6/20/21   Neurontin   N         Methadone   N         Opiates            " "  Cocaine    N         Heroin   N         Meth   Yes 23  1/4 gram 1x per month 8 years 6/20/21   Suboxone   Yes 35  1-2 strips per day daily  6/20/21       DATA:   This therapist received a call from [facility] staff MD Cristina for a behavioral health consult.  The patient serves as his own guardian and is agreeable to speak with me.  Met with patient at bedside. Patient is not under 1:1 security monitoring during assessment.  Patient is a 40 year old, , , male residing in [place], [place]. Patient currently lives with alone.  Patient is unemployed. Patient presents to ED requesting medical detox. He reports using Suboxone and Klonopin (daily) and meth, last use was 6/20/21. Patient reports following withdrawal symptoms; headache, body aches, stomach pain, watery eyes, hot flashes, depressed/anxious mood, tremors.  Patient identifies main stressors as: \"I hate where I live, I lost my job last weekend, my ex- set me up, I think the government is out to get me, and I am tired of hearing voices.\"  Patient reports he is interested in detox tonight because, \"I cannot keep doing this.  The meth is what read her life, it because the paranoia and it ruined my marriage.  I am just over it.\"  Patient reports longest period of sobriety was 9 months a few years ago.  Patient reports feeling hopeless and worthless.  He states he is not have any friends and identifies his family as main support system.  Patient previously had a sponsor however he had to \"fire him\" due to sponsor attempting to engage in an inappropriate relationship.  Patient reports extensive psychiatric and substance abuse treatment.  He is currently engaged in medication management.  Although patient states, \"I wish God would greatly the wish of death,\" he denies current SI, intent, plan because he does not want to go to hell.  Patient denies history of previous suicide attempts.     ASSESSMENT:    Therapist completed CSSRS with patient for " suicide risk assessment.  The results of patient’s CSSRS suggest that patient is endorses death wish but denies SI, intent, method and preparatory behaviors. Patient holds attention and is Cooperative with assessment.  Patient’s appearance is clean and casually dressed, appropriate.  The patient displays mild restlessness psychomotor behavior. The patient's affect appears mood-congruent. The patient is observed to have normal rate, tone and rhythm of speech.   Patient observed to have Good eye contact. The patient's displays fair insight, with poor impulse control and limited judgement.     PLAN:    At this time, therapist recommends inpatient treatment based upon above assessment. Patient reports to be agreeable to the recommendations.  Therapist informed [facility] ED treatment team members, SELENA Steward, MD Cristina who are agreeable to plan. Therapist to contact the [facility] upon resulted Covid test and UA.     455: Lab work resulted. Therapist phoned [facility] and spoke to Lanie, Lead RN, to present case. RN Lanie requesting updated vitals, therapist notified SELENA Steward.     600: Patient was accepted by MD Yee as communicated by the Lead RN.  Updated patient and treatment team members who all remain agreeable for transfer. Therapist contacted [facility] for transportation. Patient to transfer to [facility] upon [facility]AR arrival. STAR eta 800. Therapist facilitated RN to RN.      Shira Castrejon [facility] 6/22/21    This provider is located at the [facility] located at [address] [place] [address], [facility] using a secure Zoom Video Visit. Patient is being seen remotely via telehealth at [address] [facility], [address], and stated they are in a secure environment for this session. The patient's condition being diagnosed/treated is appropriate for telemedicine. The provider identified herself as well as her credentials.   The patient, and/or patients guardian, consent to be seen remotely, and when  consent is given they understand that the consent allows for patient identifiable information to be sent to a third party as needed.   They may refuse to be seen remotely at any time. The electronic data is encrypted and password protected, and the patient and/or guardian has been advised of the potential risks to privacy not withstanding such measures.

## 2021-06-22 NOTE — NURSING NOTE
"During initial patient search he reports he has a plastic baggie containing \"a bump of meth\" in his anal cavity. Reports the police showed up yesterday and he did not want to get arrested so he \"stashed it\". Reports he attempted to remove it but \"there was a big 10 inch black man in my ass, that's why I cant get it\". Pt reports it was consensual sex.  "

## 2021-06-22 NOTE — DISCHARGE SUMMARY
CTS Discharge Summary    Patient Care Team:  Lupe Ramires APRN as PCP - General (Nurse Practitioner)      Date of Admission: 1/4/2021  5:51 AM  Date of Discharge: 1/9/2021    Discharge Diagnosis  Past Medical History:   Diagnosis Date   • ADD (attention deficit disorder)    • Anogenital HPV infection    • Anxiety    • Arthritis    • Bipolar disorder (CMS/HCC)    • Condyloma acuminatum in male    • Constipation    • COPD (chronic obstructive pulmonary disease) (CMS/HCC)    • Depression    • Fibromyalgia    • GERD (gastroesophageal reflux disease)    • Hepatitis B     antibodies   • Hepatitis C     antibodies   • HIV disease (CMS/HCC)    • Hyperlipidemia    • Migraines    • On home oxygen therapy     at hs per NC, 2.5 L   • Osteomyelitis hip (CMS/HCC)    • Osteoporosis    • Osteoporosis    • Paranoia (CMS/HCC)    • Suicidal ideations    • Urinary tract infection          Rt Diaphragm Paralysis    Acute psychosis (CMS/HCC)    H/O Polysubstance abuse (CMS/HCC)    Tobacco abuse    Hepatitis C    H/O Suicidal ideations    Bipolar disorder (CMS/HCC)    Paranoia (CMS/HCC)    Hepatitis B    Hyperlipidemia    GERD (gastroesophageal reflux disease)    COPD (chronic obstructive pulmonary disease) (CMS/HCC)    Hyperglycemia      History of Present Illness40-year-old  male with a history of hepatitis B/C, fibromyalgia, chronic constipation and active tobacco abuse who returns to clinic with shortness of breath.  Mr. Rivera notes that his dyspnea is worsening.  He is currently on oxygen at home and is unable to lie flat at night.  Mr. Rivera is miserable and feels as though he cannot continue on with this worsening shortness of breath.  He denies any fevers, but does have a cough.     40-year-old  male with a history of hepatitis B/C, fibromyalgia, chronic constipation and active tobacco abuse who returns to clinic with shortness of breath.  Mr. Rivera has symptomatic right diaphragm paralysis.  Imaging has  failed to demonstrate any malignancy as the etiology of his phrenic nerve paralysis.  He does have to sniff test to confirm diaphragm paralysis over the past year and decreased FEV1 and forced vital capacity on his pulmonary function studies.  I discussed options with the patient including continued observation versus surgical diaphragm plication and bronchoscopy.  The risks and benefits of surgery were discussed with the patient including pain, bleeding, infection, continued shortness of breath, myocardial infarction and death.  The patient understood these risks and wished to proceed with surgery.  Repeat pulmonary function studies will be obtained after he has recovered from surgery.           Hospital Course  Patient is a 40 y.o. male was admitted underwent bronchoscopy right thoracotomy with diaphragm plication  Postop he did well he had his chest tube removed second postop day he was transferred up to telemetry  He started to mobilize and his x-ray was improving he was still on 3 L of oxygen at home was narcotics for at their maximum he is able to have a bowel movement and he was then discharged home  Procedures Performed  Procedure(s):  THORACOTOMY WITH DIAPHRAGM PLICATION RIGHT       Consults:   Consults     No orders found from 12/6/2020 to 1/5/2021.            Discharge Medications     Discharge Medications      New Medications      Instructions Start Date   lidocaine 5 %  Commonly known as: LIDODERM   1 patch, Transdermal, Every 24 Hours Scheduled, Remove & Discard patch within 12 hours or as directed by MD      oxyCODONE 15 MG immediate release tablet  Commonly known as: ROXICODONE   15 mg, Oral, Every 3 Hours PRN         Continue These Medications      Instructions Start Date   Advair Diskus 250-50 MCG/DOSE DISKUS  Generic drug: fluticasone-salmeterol   Inhalation, 2 Times Daily - RT      albuterol sulfate  (90 Base) MCG/ACT inhaler  Commonly known as: PROVENTIL HFA;VENTOLIN HFA;PROAIR HFA   2  puffs, Inhalation, Every 6 Hours PRN      buprenorphine-naloxone 8-2 MG per SL tablet  Commonly known as: SUBOXONE   1 tablet, Daily, Last dose of suboxone was 12-31-20 per patient's choice      clonazePAM 1 MG tablet  Commonly known as: KlonoPIN   2 mg, Oral, 3 Times Daily PRN      cloNIDine 0.3 MG tablet  Commonly known as: CATAPRES   0.3 mg, Oral, Nightly      Descovy 200-25 MG per tablet  Generic drug: Emtricitabine-Tenofovir AF   1 tablet, Oral, Daily      Dexilant 60 MG capsule  Generic drug: dexlansoprazole   60 mg, Oral, Daily      escitalopram 20 MG tablet  Commonly known as: LEXAPRO   20 mg, Oral, Daily      eszopiclone 3 MG tablet  Commonly known as: LUNESTA   3 mg, Oral, Nightly, Take immediately before bedtime      Eucrisa 2 % ointment  Generic drug: Crisaborole   No dose, route, or frequency recorded.      Flovent Diskus 250 MCG/BLIST aerosol powder   Generic drug: Fluticasone Propionate (Inhal)   No dose, route, or frequency recorded.      linaclotide 290 MCG capsule capsule  Commonly known as: LINZESS   290 mcg, Oral, Every Morning Before Breakfast      modafinil 100 MG tablet  Commonly known as: PROVIGIL   100 mg, Oral, Daily      Movantik 25 MG tablet  Generic drug: Naloxegol Oxalate   25 mg, Oral, Every Morning      nicotine 21 MG/24HR patch  Commonly known as: NICODERM CQ   1 patch, Transdermal, Every 24 Hours      OLANZapine zydis 10 MG disintegrating tablet  Commonly known as: ZyPREXA   10 mg, Translingual, Nightly      PHARMACY MEDS TO BED CONSULT   Does not apply, Daily      QUEtiapine 300 MG tablet  Commonly known as: SEROquel   600 mg, Oral, Nightly      Viibryd 10 MG tablet tablet  Generic drug: vilazodone   No dose, route, or frequency recorded.      vitamin D 1.25 MG (20097 UT) capsule capsule  Commonly known as: ERGOCALCIFEROL   50,000 Units, Oral, Every 30 Days, MONTHLY             Discharge Diet: Low-sodium    Activity at Discharge:   Do not drive while taking narcotics    Follow-up  Appointments  Future Appointments   Date Time Provider Department Center   2/9/2021  3:45 PM Elizabeth Dempsey APRN [facility] None          Giovanni Parker PA-C  01/17/21  07:56 EST             Complex Repair And Transposition Flap Text: The defect edges were debeveled with a #15 scalpel blade.  The primary defect was closed partially with a complex linear closure.  Given the location of the remaining defect, shape of the defect and the proximity to free margins a transposition flap was deemed most appropriate for complete closure of the defect.  Using a sterile surgical marker, an appropriate advancement flap was drawn incorporating the defect and placing the expected incisions within the relaxed skin tension lines where possible.    The area thus outlined was incised deep to adipose tissue with a #15 scalpel blade.  The skin margins were undermined to an appropriate distance in all directions utilizing iris scissors.

## 2021-06-23 VITALS
OXYGEN SATURATION: 98 % | RESPIRATION RATE: 18 BRPM | TEMPERATURE: 97.8 F | BODY MASS INDEX: 19.98 KG/M2 | HEART RATE: 112 BPM | DIASTOLIC BLOOD PRESSURE: 82 MMHG | HEIGHT: 69 IN | WEIGHT: 134.9 LBS | SYSTOLIC BLOOD PRESSURE: 126 MMHG

## 2021-06-23 RX ORDER — HALOPERIDOL 5 MG/ML
5 INJECTION INTRAMUSCULAR ONCE AS NEEDED
Status: ACTIVE | OUTPATIENT
Start: 2021-06-23 | End: 2021-06-23

## 2021-06-23 RX ORDER — OLANZAPINE 5 MG/1
5 TABLET ORAL ONCE AS NEEDED
Status: ACTIVE | OUTPATIENT
Start: 2021-06-23 | End: 2021-06-23

## 2021-06-23 RX ORDER — LORAZEPAM 2 MG/ML
2 INJECTION INTRAMUSCULAR ONCE AS NEEDED
Status: ACTIVE | OUTPATIENT
Start: 2021-06-23 | End: 2021-06-23

## 2021-06-23 RX ORDER — DIPHENHYDRAMINE HYDROCHLORIDE 50 MG/ML
50 INJECTION INTRAMUSCULAR; INTRAVENOUS ONCE AS NEEDED
Status: ACTIVE | OUTPATIENT
Start: 2021-06-23 | End: 2021-06-23

## 2021-06-23 NOTE — PLAN OF CARE
Problem: Adult Behavioral Health Plan of Care  Goal: Plan of Care Review  Outcome: Adequate for Care Transition  Goal: Patient-Specific Goal (Individualization)  Outcome: Adequate for Care Transition  Goal: Adheres to Safety Considerations for Self and Others  Outcome: Adequate for Care Transition  Goal: Absence of New-Onset Illness or Injury  Outcome: Adequate for Care Transition  Goal: Optimized Coping Skills in Response to Life Stressors  Outcome: Adequate for Care Transition  Goal: Develops/Participates in Therapeutic [place] to Support Successful Transition  Outcome: Adequate for Care Transition     Problem: COPD Comorbidity  Goal: Maintenance of COPD Symptom Control  Outcome: Adequate for Care Transition     Problem: Seizure Disorder Comorbidity  Goal: Maintenance of Seizure Control  Outcome: Adequate for Care Transition     Problem: Activity and Energy Impairment (Excessive Substance Use)  Goal: Optimized Energy Level (Excessive Substance Use)  Outcome: Adequate for Care Transition     Problem: Behavior Regulation Impairment (Excessive Substance Use)  Goal: Improved Behavioral Control (Excessive Substance Use)  Outcome: Adequate for Care Transition     Problem: Decreased Participation and Engagement (Excessive Substance Use)  Goal: Increased Participation and Engagement (Excessive Substance Use)  Outcome: Adequate for Care Transition     Problem: Physiologic Impairment (Excessive Substance Use)  Goal: Improved Physiologic Symptoms (Excessive Substance Use)  Outcome: Adequate for Care Transition     Problem: Social, Occupational or Functional Impairment (Excessive Substance Use)  Goal: Enhanced Social, Occupational or Functional Skills (Excessive Substance Use)  Outcome: Adequate for Care Transition     Problem: Cognitive Impairment (Psychotic Signs/Symptoms)  Goal: Optimal Cognitive Function (Psychotic Signs/Symptoms)  Outcome: Adequate for Care Transition     Problem: Fall Injury Risk  Goal: Absence of  Fall and Fall-Related Injury  Outcome: Adequate for Care Transition   Goal Outcome Evaluation:

## 2021-06-23 NOTE — NURSING NOTE
"Pt came to nurses station and sts, \"I just pooped those drugs that were in my rectum. I flushed them down the toilet.\" Pt sts, \"I didn't want you all to see them.\"  Patient's room including bathroom searched by this RN, Brianna SOW, and Gavin, Security and nothing found. Pt extremely preoccupied with medication and wanting Ativan. Pt educated on protocol and medication administration. Pt accusing staff of intentionally withholding medication. Pt sts, \"You all just don't like. This place is like long-term.\" Pt provided support through withdrawal process and encouraged to complete admission. Pt sts, \" I will stay til tomorrow but if I don't get more Ativan than I'm not staying.\"     Dr. Stratton notified. New orders given for Zyprexa 5mg PO PRN one time and Benadryl 50mg IM prn once, Haldol 5mg IM prn once, Ativan 2mg prn IM.   "

## 2021-06-23 NOTE — NURSING NOTE
"Client states \"my belt is missing. You all will get a bill for it !\" note there isnt a belt listed on belongings sheet that client refused to sign. Client refused to sign discharge paper and ama paper. States \"I have signed myself in voluntarily so I sure dont have to sign myself out so just let me out of this place. Im tired of you all.\"  Dr. dhaliwal was called earlier see note to obtain order verbally via telephone. Client insisted on leaving because \"you all act like im a demon or something\" . Teaching done in regards to the risks of non compliance of his treatment plan and the benefits of completing his treatment plan. Client encouraged to stay but refuses.   "

## 2021-06-23 NOTE — NURSING NOTE
Pt very upset over Seroquel 600mg not being ordered, pt stated that he takes it every night and will not be able to sleep without it, Dr Stratton was called and order received to continue home med.

## 2021-06-23 NOTE — NURSING NOTE
"Client insists on being discharged because he doesn't want to follow the rules of the unit. He becomes belligerent,demanding  and loud. Client informed the doctor has  Been called and an order for a.m.a discharge ordered. \"I want out of here. I cant stand this place\". Client reassured that he can leave and to get his belongings together.  "

## 2021-06-23 NOTE — NURSING NOTE
"Client stating false accusations toward staff. States that \"you all are treating me like an enemy. Saying things like I use a lot of meth. I dont understand why I have to wait to smoke. Why I cant use the phone. Why I cant have the meds I need !!! Note client came behind nurses desk after being told numerous times to stay off of the desk and directed not to come behind the desk.   "

## 2021-06-23 NOTE — PLAN OF CARE
Goal Outcome Evaluation:  Plan of Care Reviewed With: patient  Patient Agreement with Plan of Care: agrees     Progress: improving   Pt demanding, irritable, required redirection several times during night and preoccupied with medication. Attending part of group and difficulty sleeping.

## 2021-06-23 NOTE — PROGRESS NOTES
"Continued Stay Note  [facility]     Patient Name: Barak Rivera  MRN: [id number]  Today's Date: 7/15/2020    Admit Date: 7/15/2020    Discharge Plan     Row Name 07/15/20 4003       Plan    Plan  Home    Plan Comments  Rounded on patient in the ER.  He is extremely paranoid and openly admits to relapsing on methamphetamine and having a continued problem with chronic relapsing on meth with associated paranoid events.  He feels that the FBI is out to get him and just wants to know why they are after him.  He states that \"all the people in my apartment complex are outside the door and won't come in here- and I know they are out there....what do they want?!\"  He has been to treatment \"15 times\" with the most successful treatment being with [facility] for 9 months.  He states today that he will pursue his own treatment plan, even though I have questioned him several times about being linked directly to treatment from here.  He is aware that he is in [facility]'s ER, the time, the date, and he is oriented to self.  He does not appear to be able at this time to separate the paranoia/auditory hallucinations with his otherwise intact orientation.  He did request information about [facility]- so that was provided to him.  He requested Haldol, as he states \"that is the only thing that helps with these hallucinations\"  (so there does appear to be some awareness that the hallucinations are caused by the meth).  I relayed this to CONTRERAS Singh, and Haldol was ordered.  I have discussed all of this with CONTRERAS Singh, as well as OFE Vargas, and Darby Maya RN.  At this time, the patient will most likely be discharged home with resources.  He is aware of [facility]'s IOP program and has their contact information.  Thank you for asking us to see Mr. Rivera.    Row Name 07/15/20 6688       Plan    Plan  Home Simultaneous filing. User may be unaware of other data.    Plan Comments  PT Simultaneous filing. User may be unaware of " Detail Level: Detailed other data.        Discharge Codes    No documentation.             Feli Calderon RN

## 2021-06-23 NOTE — NURSING NOTE
Client called from [facility] and states we did not give all of his meds back to him. Note that client was escorted to the pharmacy via RN on the way out for discharge and they stopped at pharmacy and he signed his meds out. Client calling and stated that his HIV medication is not in there. Client notified that all of his meds that he brought in were in the sealed bag he obtained from the pharmacy on the way out for discharge. Client also told that he would be called if anything belonging to him is found here. RN also states that client had already made accusations that his cell phone was not present in his belongings when he went out to the door of hospital. Note myself and other RN were present when belonging accounted for at nurses desk.

## 2021-06-24 LAB
QT INTERVAL: 324 MS
QTC INTERVAL: 455 MS

## 2021-06-25 NOTE — DISCHARGE SUMMARY
":  1980  MRN:  [id number]  Visit Number:  [id number]      Date of Admission:2021   Date of Discharge:  2021    Discharge Diagnosis:  Principal Problem:    Severe benzodiazepine use disorder (CMS/HCC)  Active Problems:    Hepatitis C    Bipolar disorder (CMS/HCC)    Paranoia (CMS/HCC)    Rt Diaphragm Paralysis    Opioid use disorder, severe, dependence (CMS/HCC)    Methamphetamine use disorder, severe (CMS/HCC)        Admission Diagnosis:  BENZO ABUSE     HPI  Barak Rivera is a 40 y.o. male who was admitted on 2021 with complaints of benzodiazepine, opioids and methamphetamine use and withdrawals. For details please see H&P dates 21      Hospital Course  Patient is a 40 y.o. male presented with polysubstance use and withdrawals. The patient was admitted to the detox recovery unit and started on Ativan and clonidine detox. For his bipolar disorder he was started on olanzapine. The patient was very agitated and restless initially and needed prn Haldol and Ativan and it helped him calm down some. Surgical consult was done as patient reported he had a bag of meth in his rectum. He reported that he was able to pass it without any problems. The patient demanded to leave the next day as he felt he was not getting the appropriate detox treatment. He was encouraged to stay and complete the treatment but he was adamant about leaving and didn't meet criteria for a hold, and was discharged AMA.      Mental Status Exam upon discharge:   Mood \"irritable\"   Affect-congruent, appropriate, stable  Thought Content-goal directed, no delusional material present  Thought process-linear, organized.  Suicidality: No SI  Homicidality: No HI  Perception: No AH/VH    Procedures Performed         Consults:   Consults     No orders found from 2021 to 2021.          Pertinent Test Results:   Admission on 2021, Discharged on 2021   Component Date Value Ref Range Status   • COVID19 2021 " Not Detected  Not Detected - Ref. Range Final   • Influenza A PCR 06/22/2021 Not Detected  Not Detected Final   • Influenza B PCR 06/22/2021 Not Detected  Not Detected Final   • QT Interval 06/22/2021 346  ms Final   • QTC Interval 06/22/2021 468  ms Final   Admission on 06/22/2021, Discharged on 06/22/2021   Component Date Value Ref Range Status   • Ethanol 06/22/2021 <10  0 - 10 mg/dL Final   • THC, Screen, Urine 06/22/2021 Negative  Negative Final   • Phencyclidine (PCP), Urine 06/22/2021 Negative  Negative Final   • Cocaine Screen, Urine 06/22/2021 Negative  Negative Final   • Methamphetamine, Ur 06/22/2021 Positive* Negative Final   • Opiate Screen 06/22/2021 Negative  Negative Final   • Amphetamine Screen, Urine 06/22/2021 Positive* Negative Final   • Benzodiazepine Screen, Urine 06/22/2021 Positive* Negative Final   • Tricyclic Antidepressants Screen 06/22/2021 Positive* Negative Final   • Methadone Screen, Urine 06/22/2021 Negative  Negative Final   • Barbiturates Screen, Urine 06/22/2021 Negative  Negative Final   • Oxycodone Screen, Urine 06/22/2021 Negative  Negative Final   • Propoxyphene Screen 06/22/2021 Negative  Negative Final   • Buprenorphine, Screen, Urine 06/22/2021 Positive* Negative Final   • Extra Tube 06/22/2021 Hold for add-ons.   Final    Auto resulted.   • Extra Tube 06/22/2021 hold for add-on   Final    Auto resulted   • Extra Tube 06/22/2021 Hold for add-ons.   Final    Auto resulted.   • Glucose 06/22/2021 100* 65 - 99 mg/dL Final   • BUN 06/22/2021 10  6 - 20 mg/dL Final   • Creatinine 06/22/2021 0.78  0.76 - 1.27 mg/dL Final   • Sodium 06/22/2021 137  136 - 145 mmol/L Final   • Potassium 06/22/2021 3.6  3.5 - 5.2 mmol/L Final   • Chloride 06/22/2021 100  98 - 107 mmol/L Final   • CO2 06/22/2021 26.0  22.0 - 29.0 mmol/L Final   • Calcium 06/22/2021 9.9  8.6 - 10.5 mg/dL Final   • Total Protein 06/22/2021 7.6  6.0 - 8.5 g/dL Final   • Albumin 06/22/2021 4.40  3.50 - 5.20 g/dL Final   •  ALT (SGPT) 06/22/2021 40  1 - 41 U/L Final   • AST (SGOT) 06/22/2021 65* 1 - 40 U/L Final   • Alkaline Phosphatase 06/22/2021 107  39 - 117 U/L Final   • Total Bilirubin 06/22/2021 0.7  0.0 - 1.2 mg/dL Final   • eGFR Non African Amer 06/22/2021 110  >60 mL/min/1.73 Final   • Globulin 06/22/2021 3.2  gm/dL Final   • A/G Ratio 06/22/2021 1.4  g/dL Final   • BUN/Creatinine Ratio 06/22/2021 12.8  7.0 - 25.0 Final   • Anion Gap 06/22/2021 11.0  5.0 - 15.0 mmol/L Final   • Magnesium 06/22/2021 2.1  1.6 - 2.6 mg/dL Final   • Salicylate 06/22/2021 <0.3  <=30.0 mg/dL Final   • Acetaminophen 06/22/2021 <5.0  0.0 - 30.0 mcg/mL Final   • WBC 06/22/2021 10.43  3.40 - 10.80 10*3/mm3 Final   • RBC 06/22/2021 4.85  4.14 - 5.80 10*6/mm3 Final   • Hemoglobin 06/22/2021 14.2  13.0 - 17.7 g/dL Final   • Hematocrit 06/22/2021 42.1  37.5 - 51.0 % Final   • MCV 06/22/2021 86.8  79.0 - 97.0 fL Final   • MCH 06/22/2021 29.3  26.6 - 33.0 pg Final   • MCHC 06/22/2021 33.7  31.5 - 35.7 g/dL Final   • RDW 06/22/2021 14.9  12.3 - 15.4 % Final   • RDW-SD 06/22/2021 47.4  37.0 - 54.0 fl Final   • MPV 06/22/2021 8.3  6.0 - 12.0 fL Final   • Platelets 06/22/2021 292  140 - 450 10*3/mm3 Final   • Neutrophil % 06/22/2021 54.7  42.7 - 76.0 % Final   • Lymphocyte % 06/22/2021 31.3  19.6 - 45.3 % Final   • Monocyte % 06/22/2021 12.4* 5.0 - 12.0 % Final   • Eosinophil % 06/22/2021 0.8  0.3 - 6.2 % Final   • Basophil % 06/22/2021 0.5  0.0 - 1.5 % Final   • Immature Grans % 06/22/2021 0.3  0.0 - 0.5 % Final   • Neutrophils, Absolute 06/22/2021 5.72  1.70 - 7.00 10*3/mm3 Final   • Lymphocytes, Absolute 06/22/2021 3.26* 0.70 - 3.10 10*3/mm3 Final   • Monocytes, Absolute 06/22/2021 1.29* 0.10 - 0.90 10*3/mm3 Final   • Eosinophils, Absolute 06/22/2021 0.08  0.00 - 0.40 10*3/mm3 Final   • Basophils, Absolute 06/22/2021 0.05  0.00 - 0.20 10*3/mm3 Final   • Immature Grans, Absolute 06/22/2021 0.03  0.00 - 0.05 10*3/mm3 Final   • nRBC 06/22/2021 0.0  0.0 - 0.2 /100  WBC Final   • QT Interval 06/22/2021 324  ms Final   • QTC Interval 06/22/2021 455  ms Final   • COVID19 06/22/2021 Not Detected  Not Detected - Ref. Range Final   • Influenza A PCR 06/22/2021 Not Detected  Not Detected Final   • Influenza B PCR 06/22/2021 Not Detected  Not Detected Final   • Color, UA 06/22/2021 Dark Yellow* Yellow, Straw Final   • Appearance, UA 06/22/2021 Clear  Clear Final   • pH, UA 06/22/2021 7.0  5.0 - 8.0 Final   • Specific Gravity, UA 06/22/2021 1.022  1.001 - 1.030 Final   • Glucose, UA 06/22/2021 Negative  Negative Final   • Ketones, UA 06/22/2021 Trace* Negative Final   • Bilirubin, UA 06/22/2021 Small (1+)* Negative Final   • Blood, UA 06/22/2021 Negative  Negative Final   • Protein, UA 06/22/2021 30 mg/dL (1+)* Negative Final   • Leuk Esterase, UA 06/22/2021 Negative  Negative Final   • Nitrite, UA 06/22/2021 Negative  Negative Final   • Urobilinogen, UA 06/22/2021 2.0 E.U./dL* 0.2 - 1.0 E.U./dL Final   • RBC, UA 06/22/2021 3-6* None Seen, 0-2 /HPF Final   • WBC, UA 06/22/2021 0-2  None Seen, 0-2 /HPF Final   • Bacteria, UA 06/22/2021 None Seen  None Seen, Trace /HPF Final   • Squamous Epithelial Cells, UA 06/22/2021 0-2  None Seen, 0-2 /HPF Final   • Hyaline Casts, UA 06/22/2021 7-12  0 - 6 /LPF Final   • Methodology 06/22/2021 Automated Microscopy   Final        Condition on Discharge:  guarded    Vital Signs         Discharge Disposition:  Left Against Medical Advice    Discharge Medications:     Discharge Medications      Continue These Medications      Instructions Start Date   buprenorphine-naloxone 8-2 MG film film  Commonly known as: SUBOXONE   1.5 films, Sublingual, Daily      cloNIDine 0.3 MG tablet  Commonly known as: CATAPRES   0.3 mg, Oral, Nightly      Descovy 200-25 MG per tablet  Generic drug: Emtricitabine-Tenofovir AF   1 tablet, Oral, Daily      Movantik 25 MG tablet  Generic drug: Naloxegol Oxalate   25 mg, Oral, Every Morning      OLANZapine zydis 10 MG  disintegrating tablet  Commonly known as: ZyPREXA   10 mg, Translingual, Nightly      QUEtiapine 300 MG tablet  Commonly known as: SEROquel   600 mg, Oral, Nightly      traZODone 100 MG tablet  Commonly known as: DESYREL   100 mg, Oral, Nightly             Discharge Diet:    Diet Instructions     Advance as tolerated             Activity at Discharge:    Activity Instructions     As tolerated             Follow-up Appointments  No future appointments.      Test Results Pending at Discharge      Time spent in discharge: < 30 min    Clinician:   Karl Monroe MD  06/25/21  10:49 EDT

## 2021-06-29 ENCOUNTER — TELEPHONE (OUTPATIENT)
Dept: CARDIAC SURGERY | Facility: CLINIC | Age: 41
End: 2021-06-29

## 2021-07-09 ENCOUNTER — HOSPITAL ENCOUNTER (EMERGENCY)
Facility: HOSPITAL | Age: 41
Discharge: HOME | End: 2021-07-10
Attending: EMERGENCY MEDICINE | Admitting: EMERGENCY MEDICINE

## 2021-07-09 DIAGNOSIS — F15.10 METHAMPHETAMINE ABUSE (HCC): Primary | ICD-10-CM

## 2021-07-09 DIAGNOSIS — F22 PARANOIA (PSYCHOSIS) (HCC): ICD-10-CM

## 2021-07-09 PROCEDURE — 99283 EMERGENCY DEPT VISIT LOW MDM: CPT

## 2021-07-09 PROCEDURE — 80053 COMPREHEN METABOLIC PANEL: CPT | Performed by: EMERGENCY MEDICINE

## 2021-07-09 PROCEDURE — 85025 COMPLETE CBC W/AUTO DIFF WBC: CPT | Performed by: EMERGENCY MEDICINE

## 2021-07-09 RX ORDER — AZITHROMYCIN 250 MG/1
1000 TABLET, FILM COATED ORAL ONCE
Status: COMPLETED | OUTPATIENT
Start: 2021-07-09 | End: 2021-07-10

## 2021-07-09 RX ORDER — ONDANSETRON 2 MG/ML
4 INJECTION INTRAMUSCULAR; INTRAVENOUS
Status: DISCONTINUED | OUTPATIENT
Start: 2021-07-09 | End: 2021-07-10 | Stop reason: HOSPADM

## 2021-07-09 RX ORDER — CEFTRIAXONE SODIUM 1 G/50ML
1 INJECTION, SOLUTION INTRAVENOUS ONCE
Status: COMPLETED | OUTPATIENT
Start: 2021-07-09 | End: 2021-07-10

## 2021-07-09 RX ORDER — HYDROXYZINE HYDROCHLORIDE 25 MG/1
25 TABLET, FILM COATED ORAL ONCE
Status: DISCONTINUED | OUTPATIENT
Start: 2021-07-09 | End: 2021-07-10 | Stop reason: HOSPADM

## 2021-07-10 VITALS
SYSTOLIC BLOOD PRESSURE: 116 MMHG | HEIGHT: 69 IN | TEMPERATURE: 98.6 F | OXYGEN SATURATION: 92 % | WEIGHT: 138 LBS | BODY MASS INDEX: 20.44 KG/M2 | HEART RATE: 93 BPM | RESPIRATION RATE: 18 BRPM | DIASTOLIC BLOOD PRESSURE: 78 MMHG

## 2021-07-10 LAB
ALBUMIN SERPL-MCNC: 4.4 G/DL (ref 3.5–5.2)
ALBUMIN/GLOB SERPL: 1.6 G/DL
ALP SERPL-CCNC: 106 U/L (ref 39–117)
ALT SERPL W P-5'-P-CCNC: 11 U/L (ref 1–41)
ANION GAP SERPL CALCULATED.3IONS-SCNC: 12 MMOL/L (ref 5–15)
AST SERPL-CCNC: 19 U/L (ref 1–40)
BASOPHILS # BLD AUTO: 0.03 10*3/MM3 (ref 0–0.2)
BASOPHILS NFR BLD AUTO: 0.4 % (ref 0–1.5)
BILIRUB SERPL-MCNC: 0.6 MG/DL (ref 0–1.2)
BUN SERPL-MCNC: 14 MG/DL (ref 6–20)
BUN/CREAT SERPL: 11.8 (ref 7–25)
CALCIUM SPEC-SCNC: 9.8 MG/DL (ref 8.6–10.5)
CHLORIDE SERPL-SCNC: 96 MMOL/L (ref 98–107)
CO2 SERPL-SCNC: 25 MMOL/L (ref 22–29)
CREAT SERPL-MCNC: 1.19 MG/DL (ref 0.76–1.27)
DEPRECATED RDW RBC AUTO: 46.1 FL (ref 37–54)
EOSINOPHIL # BLD AUTO: 0.05 10*3/MM3 (ref 0–0.4)
EOSINOPHIL NFR BLD AUTO: 0.7 % (ref 0.3–6.2)
ERYTHROCYTE [DISTWIDTH] IN BLOOD BY AUTOMATED COUNT: 14.2 % (ref 12.3–15.4)
GFR SERPL CREATININE-BSD FRML MDRD: 67 ML/MIN/1.73
GLOBULIN UR ELPH-MCNC: 2.8 GM/DL
GLUCOSE SERPL-MCNC: 124 MG/DL (ref 65–99)
HCT VFR BLD AUTO: 42.3 % (ref 37.5–51)
HGB BLD-MCNC: 14.3 G/DL (ref 13–17.7)
IMM GRANULOCYTES # BLD AUTO: 0.01 10*3/MM3 (ref 0–0.05)
IMM GRANULOCYTES NFR BLD AUTO: 0.1 % (ref 0–0.5)
LYMPHOCYTES # BLD AUTO: 2.45 10*3/MM3 (ref 0.7–3.1)
LYMPHOCYTES NFR BLD AUTO: 34.2 % (ref 19.6–45.3)
MCH RBC QN AUTO: 30.2 PG (ref 26.6–33)
MCHC RBC AUTO-ENTMCNC: 33.8 G/DL (ref 31.5–35.7)
MCV RBC AUTO: 89.4 FL (ref 79–97)
MONOCYTES # BLD AUTO: 0.94 10*3/MM3 (ref 0.1–0.9)
MONOCYTES NFR BLD AUTO: 13.1 % (ref 5–12)
NEUTROPHILS NFR BLD AUTO: 3.68 10*3/MM3 (ref 1.7–7)
NEUTROPHILS NFR BLD AUTO: 51.5 % (ref 42.7–76)
NRBC BLD AUTO-RTO: 0 /100 WBC (ref 0–0.2)
PLATELET # BLD AUTO: 263 10*3/MM3 (ref 140–450)
PMV BLD AUTO: 8.6 FL (ref 6–12)
POTASSIUM SERPL-SCNC: 3.3 MMOL/L (ref 3.5–5.2)
PROT SERPL-MCNC: 7.2 G/DL (ref 6–8.5)
RBC # BLD AUTO: 4.73 10*6/MM3 (ref 4.14–5.8)
SODIUM SERPL-SCNC: 133 MMOL/L (ref 136–145)
WBC # BLD AUTO: 7.16 10*3/MM3 (ref 3.4–10.8)

## 2021-07-10 PROCEDURE — 25010000002 CEFTRIAXONE PER 250 MG

## 2021-07-10 PROCEDURE — G0432 EIA HIV-1/HIV-2 SCREEN: HCPCS | Performed by: EMERGENCY MEDICINE

## 2021-07-10 PROCEDURE — 96365 THER/PROPH/DIAG IV INF INIT: CPT

## 2021-07-10 RX ORDER — LORAZEPAM 1 MG/1
1 TABLET ORAL ONCE
Status: COMPLETED | OUTPATIENT
Start: 2021-07-10 | End: 2021-07-10

## 2021-07-10 RX ORDER — LORAZEPAM 2 MG/ML
1 INJECTION INTRAMUSCULAR ONCE
Status: DISCONTINUED | OUTPATIENT
Start: 2021-07-10 | End: 2021-07-10

## 2021-07-10 RX ADMIN — LORAZEPAM 1 MG: 1 TABLET ORAL at 03:59

## 2021-07-10 RX ADMIN — CEFTRIAXONE SODIUM 1 G: 1 INJECTION, SOLUTION INTRAVENOUS at 01:16

## 2021-07-10 RX ADMIN — AZITHROMYCIN MONOHYDRATE 1000 MG: 250 TABLET ORAL at 01:16

## 2021-07-10 RX ADMIN — SODIUM CHLORIDE 2000 ML: 9 INJECTION, SOLUTION INTRAVENOUS at 00:45

## 2021-07-10 NOTE — ED NOTES
"Barak Rivera  1980    TIME: 200-210    Is patient agreeable to admission/treatment? Yes    Guardian: (Must have paperwork) FAMILY MEMBER - GUARDIAN: Self    Therapist received call from [facility] SELENA Mijares stating Pt was wanting to talk to someone in behavioral health. Pt reported he had been \"methed out\" the last few days after relapsing, had increased paranoia, hallucinations, and his ability to rationalize was decreasing.     Pt has been inpatient twice over the past couple of weeks and left due to \"rude staff\". He stated he was going to [facility] rehab in [place] tomorrow where he knows they are \"godly people\". Pt reported over the last couple of days he's started to believe the paranoia (used to think the voices were fake but \"now he's starting to believe they are real\"). He reports the \"feds\" have been following him and he has a  staying in his apartment. Pt seems to have some insight which is decreasing with continued use and no medication. Pt denied any SI, HI, or self harming.     Pt denied wanting inpatient due to going to rehab facility tomorrow and believes medication would be sufficient due to him being off meds \"for a few days now\". He has been prescribed Invega 6mg before which he reported helped. Therapist updated SELENA Mijares who will consult with doctor to provide medication.       Shira Woods Shira Howard CSW  07/10/21 0321    "

## 2021-07-10 NOTE — ED PROVIDER NOTES
[place]    EMERGENCY DEPARTMENT ENCOUNTER      Pt Name: Barak Rivera  MRN: [id number]  YOB: 1980  Date of evaluation: 7/9/2021  Provider: Michael Rodriguez DO    CHIEF COMPLAINT       Chief Complaint   Patient presents with   • METH RELAPSE         HISTORY OF PRESENT ILLNESS  (Location/Symptom, Timing/Onset, Context/Setting, Quality, Duration, Modifying Factors, Severity.)   Barak Rivera is a 40 y.o. male who presents to the emergency department for evaluation of a few different complaints.  He notes he relapsed again using methamphetamines is usually a few times per week, has been walking a lot lately in the hot weather states he might be dehydrated as well.  He presents today for evaluation as he is planning on going to rehab tomorrow morning at [facility].  Denies any suicidal homicidal ideation.  He does state he would like to be treated for STI secondary to recent possible exposures.  He denies any fever or chills, no nausea vomiting or diarrhea.  States he has had issues with methamphetamine abuse in the past and has undergone rehab therapy previously.  Patient denies any other acute systemic complaints this time.  He notes generalized anxiety at this time.  Denies any seizures.      Nursing notes were reviewed.    REVIEW OF SYSTEMS    (2-9 systems for level 4, 10 or more for level 5)   ROS:  General:  No fevers, no chills, + generalized weakness  Cardiovascular:  No chest pain, no palpitations  Respiratory:  No shortness of breath, no cough, no wheezing  Gastrointestinal:  No pain, no nausea, no vomiting, no diarrhea  Musculoskeletal:  No muscle pain  Skin:  No rash  Neurologic:  No headache  Psychiatric:  + anxiety  Genitourinary:  No dysuria, no hematuria    Except as noted above the remainder of the review of systems was reviewed and negative.       PAST MEDICAL HISTORY     Past Medical History:   Diagnosis Date   • ADD (attention deficit disorder)    • Anogenital HPV infection     • Anxiety    • Arthritis    • Bipolar disorder (CMS/HCC)    • Condyloma acuminatum in male    • Constipation    • COPD (chronic obstructive pulmonary disease) (CMS/HCC)    • Depression    • Fibromyalgia    • GERD (gastroesophageal reflux disease)    • Hepatitis B     antibodies   • Hepatitis C     antibodies   • Hyperlipidemia    • Migraines    • On home oxygen therapy     at hs per NC, 2.5 L   • Osteomyelitis hip (CMS/HCC)    • Osteoporosis    • Osteoporosis    • Paranoia (CMS/HCC)    • Suicidal ideations    • Tuberculosis 2021    Latent, chest x-ray neg   • Urinary tract infection          SURGICAL HISTORY       Past Surgical History:   Procedure Laterality Date   • BACK SURGERY  09/22/2016   • BRONCHOSCOPY THORACOTOMY Right 1/4/2021    Procedure: THORACOTOMY WITH DIAPHRAGM PLICATION RIGHT;  Surgeon: Corbin Waller MD;  Location: [facility];  Service: Cardiothoracic;  Laterality: Right;   • HAND SURGERY Right 06/2016         CURRENT MEDICATIONS       Current Facility-Administered Medications:   •  hydrOXYzine (ATARAX) tablet 25 mg, 25 mg, Oral, Once, Michael Rodriguez, DO  •  LORazepam (ATIVAN) injection 1 mg, 1 mg, Intravenous, Once, Michael Rodriguez, DO  •  ondansetron (ZOFRAN) injection 4 mg, 4 mg, Intravenous, Q30 Min PRN, Michael Rodriguez, DO    Current Outpatient Medications:   •  buprenorphine-naloxone (SUBOXONE) 8-2 MG film film, Place 1.5 films under the tongue Daily., Disp: , Rfl:   •  cloNIDine (CATAPRES) 0.3 MG tablet, Take 0.3 mg by mouth Every Night., Disp: , Rfl:   •  DESCOVY 200-25 MG per tablet, Take 1 tablet by mouth Daily., Disp: , Rfl: 5  •  linaclotide (Linzess) 290 MCG capsule capsule, Take 1 capsule by mouth Every Morning Before Breakfast., Disp: 30 capsule, Rfl: 0  •  Naloxegol Oxalate (Movantik) 25 MG tablet, Take 25 mg by mouth Every Morning., Disp: , Rfl:   •  OLANZapine zydis (ZyPREXA) 10 MG disintegrating tablet, Place 10 mg on the tongue Every Night., Disp: , Rfl:   •   QUEtiapine (SEROquel) 300 MG tablet, Take 600 mg by mouth Every Night., Disp: , Rfl:   •  traZODone (DESYREL) 100 MG tablet, Take 100 mg by mouth Every Night., Disp: , Rfl:     Facility-Administered Medications Ordered in Other Encounters:   •  Chlorhexidine Gluconate Cloth 2 % pads 1 application, 1 application, Topical, Q12H PRN, Josee Sanchez PA-C    ALLERGIES     Phenobarbital    FAMILY HISTORY       Family History   Problem Relation Age of Onset   • Hypertension Other    • Diabetes Other    • Depression Other    • Heart attack Mother    • Anxiety disorder Father    • Depression Father           SOCIAL HISTORY       Social History     Socioeconomic History   • Marital status: Single     Spouse name: Not on file   • Number of children: 0   • Years of education: Not on file   • Highest education level: Not on file   Tobacco Use   • Smoking status: Heavy Tobacco Smoker     Packs/day: 1.00     Years: 24.00     Pack years: 24.00     Types: Cigarettes   • Smokeless tobacco: Never Used   • Tobacco comment: pt demands nicotine patch, refuses counseling, Pt is smoking 5 cigs   Vaping Use   • Vaping Use: Never used   Substance and Sexual Activity   • Alcohol use: No   • Drug use: Yes     Types: Methamphetamines, Amphetamines, Benzodiazepines     Comment: TCA; 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         PHYSICAL EXAM    (up to 7 for level 4, 8 or more for level 5)     Vitals:    07/09/21 2307 07/10/21 0115 07/10/21 0123 07/10/21 0230   BP: (!) 69/54 108/76  116/78   BP Location: Left arm      Patient Position: Sitting      Pulse:  96 96 93   Resp:       Temp:       SpO2:   92%    Weight:       Height:           Physical Exam  General : Patient is slender male, awake, alert, oriented, in no acute distress, nontoxic appearing  HEENT: Pupils are equally round and reactive to light, EOMI, conjunctivae  clear  Neck: Neck is supple, full range of motion, trachea midline  Cardiac: Heart regular rate, rhythm  Lungs: Lungs are clear to auscultation  Abdomen: Abdomen is soft, nontender, nondistended.   Musculoskeletal: 5 out of 5 strength in all 4 extremities.  No focal muscle deficits are appreciated  Neuro: Motor intact, sensory intact, level of consciousness is normal  Dermatology: Skin is warm and dry  Psych: Mentation is grossly normal, cognition is grossly normal. Affect is appropriate.      DIAGNOSTIC RESULTS     EKG: All EKG's are interpreted by the Emergency Department Physician who either signs or Co-signs this chart in the absence of a cardiologist.    No orders to display       RADIOLOGY:   Non-plain film images such as CT, Ultrasound and MRI are read by the radiologist. Plain radiographic images are visualized and preliminarily interpreted by the emergency physician with the below findings:      [] Radiologist's Report Reviewed:  No orders to display         ED BEDSIDE ULTRASOUND:   Performed by ED Physician - none    LABS:    I have reviewed and interpreted all of the currently available lab results from this visit (if applicable):  Results for orders placed or performed during the hospital encounter of 07/09/21   Comprehensive Metabolic Panel    Specimen: Blood   Result Value Ref Range    Glucose 124 (H) 65 - 99 mg/dL    BUN 14 6 - 20 mg/dL    Creatinine 1.19 0.76 - 1.27 mg/dL    Sodium 133 (L) 136 - 145 mmol/L    Potassium 3.3 (L) 3.5 - 5.2 mmol/L    Chloride 96 (L) 98 - 107 mmol/L    CO2 25.0 22.0 - 29.0 mmol/L    Calcium 9.8 8.6 - 10.5 mg/dL    Total Protein 7.2 6.0 - 8.5 g/dL    Albumin 4.40 3.50 - 5.20 g/dL    ALT (SGPT) 11 1 - 41 U/L    AST (SGOT) 19 1 - 40 U/L    Alkaline Phosphatase 106 39 - 117 U/L    Total Bilirubin 0.6 0.0 - 1.2 mg/dL    eGFR Non African Amer 67 >60 mL/min/1.73    Globulin 2.8 gm/dL    A/G Ratio 1.6 g/dL    BUN/Creatinine Ratio 11.8 7.0 - 25.0    Anion Gap 12.0 5.0 - 15.0  mmol/L   CBC Auto Differential    Specimen: Blood   Result Value Ref Range    WBC 7.16 3.40 - 10.80 10*3/mm3    RBC 4.73 4.14 - 5.80 10*6/mm3    Hemoglobin 14.3 13.0 - 17.7 g/dL    Hematocrit 42.3 37.5 - 51.0 %    MCV 89.4 79.0 - 97.0 fL    MCH 30.2 26.6 - 33.0 pg    MCHC 33.8 31.5 - 35.7 g/dL    RDW 14.2 12.3 - 15.4 %    RDW-SD 46.1 37.0 - 54.0 fl    MPV 8.6 6.0 - 12.0 fL    Platelets 263 140 - 450 10*3/mm3    Neutrophil % 51.5 42.7 - 76.0 %    Lymphocyte % 34.2 19.6 - 45.3 %    Monocyte % 13.1 (H) 5.0 - 12.0 %    Eosinophil % 0.7 0.3 - 6.2 %    Basophil % 0.4 0.0 - 1.5 %    Immature Grans % 0.1 0.0 - 0.5 %    Neutrophils, Absolute 3.68 1.70 - 7.00 10*3/mm3    Lymphocytes, Absolute 2.45 0.70 - 3.10 10*3/mm3    Monocytes, Absolute 0.94 (H) 0.10 - 0.90 10*3/mm3    Eosinophils, Absolute 0.05 0.00 - 0.40 10*3/mm3    Basophils, Absolute 0.03 0.00 - 0.20 10*3/mm3    Immature Grans, Absolute 0.01 0.00 - 0.05 10*3/mm3    nRBC 0.0 0.0 - 0.2 /100 WBC   HIV-1 / O / 2 Ag / Antibody 4th Generation    Specimen: Blood   Result Value Ref Range    HIV-1/ HIV-2 Non-Reactive Non-Reactive        All other labs were within normal range or not returned as of this dictation.      EMERGENCY DEPARTMENT COURSE and DIFFERENTIAL DIAGNOSIS/MDM:   Vitals:    Vitals:    07/09/21 2307 07/10/21 0115 07/10/21 0123 07/10/21 0230   BP: (!) 69/54 108/76  116/78   BP Location: Left arm      Patient Position: Sitting      Pulse:  96 96 93   Resp:       Temp:       SpO2:   92%    Weight:       Height:                Patient with a history of polysubstance abuse with heroin and methamphetamines with a recent relapse.  He would like assistance as he feels dehydrated and has been walking outside multiple miles in the heat.  On arrival he is tachycardic, blood pressure is low on arrival to the ED is mentating well.  He does have some underlying paranoia likely associated with his methamphetamine abuse.  He does have good insight to his current issues and  would like some help with these moving forward.  We did obtain IV, patient was given multiple IV fluid boluses.  He stated he wanted treatment for underlying STIs, Rocephin and azithromycin given in the ED. blood work reviewed, patient was given IV fluids, he tolerated those well.  He did have a discussion with our behavioral health specialist where it was noted he has been recently and now the hospital for rehab and has since left prior to full treatments and therapies.  He denied any suicidal homicidal ideation to them, still would meet criteria for rehab given his relapse and underlying psychosis.  On reevaluation patient states he has someone picking him up in the morning to go to the [facility] behavioral health rehab facility, he is requesting some of his Linzess medication which we will prescribe him.  He was given 1 dose of Ativan while in the ED, he will follow up with the rehab facility in the morning as previously planned.  He again had denied any suicidal homicidal ideation to myself.  He does have good insight and does want to seek help which she has arranged. I encouraged the patient to return to the emergency department immediately for ANY concerns, worsening, new complaints, or if symptoms persist and unable to seek follow-up in a timely fashion.  The patient/family expressed understanding and agreement with this plan.  The patient will follow-up with their PCP in 1-2 days for reevaluation.       MEDICATIONS ADMINISTERED IN ED:  Medications   ondansetron (ZOFRAN) injection 4 mg (has no administration in time range)   hydrOXYzine (ATARAX) tablet 25 mg (25 mg Oral Not Given 7/10/21 0044)   LORazepam (ATIVAN) injection 1 mg (has no administration in time range)   sodium chloride 0.9 % bolus 2,000 mL (2,000 mL Intravenous New Bag 7/10/21 0045)   azithromycin (ZITHROMAX) tablet 1,000 mg (1,000 mg Oral Given 7/10/21 0116)   cefTRIAXone (ROCEPHIN) IVPB 1 g (1 g Intravenous New Bag 7/10/21 0116)        PROCEDURES:  Procedures    CRITICAL CARE TIME    Total Critical Care time was 0 minutes, excluding separately reportable procedures.   There was a high probability of clinically significant/life threatening deterioration in the patient's condition which required my urgent intervention.      FINAL IMPRESSION      1. Methamphetamine abuse (CMS/HCC)    2. Paranoia (psychosis) (CMS/HCC)          DISPOSITION/PLAN     ED Disposition     ED Disposition Condition Comment    Discharge Stable           PATIENT REFERRED TO:  Lupe Ramires APRN  [address] [place] [address] [phone]    In 2 days      [facility]  [address] [place] [id number] [phone]    If symptoms worsen    [facility]    Go today        DISCHARGE MEDICATIONS:     Medication List      START taking these medications    linaclotide 290 MCG capsule capsule  Commonly known as: Linzess  Take 1 capsule by mouth Every Morning Before Breakfast.        CONTINUE taking these medications    buprenorphine-naloxone 8-2 MG film film  Commonly known as: SUBOXONE     cloNIDine 0.3 MG tablet  Commonly known as: CATAPRES     Descovy 200-25 MG per tablet  Generic drug: Emtricitabine-Tenofovir AF     Movantik 25 MG tablet  Generic drug: Naloxegol Oxalate     OLANZapine zydis 10 MG disintegrating tablet  Commonly known as: ZyPREXA     QUEtiapine 300 MG tablet  Commonly known as: SEROquel     traZODone 100 MG tablet  Commonly known as: DESYREL           Where to Get Your Medications      These medications were sent to [facility] - [address] - [phone]  - [phone] [place] [address] [address] [place] [address]    Phone: [phone]   · linaclotide 290 MCG capsule capsule             Comment: Please note this report has been produced using speech recognition software.      Michael Rodriguez DO  Attending Emergency  Physician               Michael Rodriguez,   07/10/21 0352

## 2021-07-12 LAB — HIV1+2 AB SER QL: NORMAL

## 2021-07-29 ENCOUNTER — TELEPHONE (OUTPATIENT)
Dept: CARDIAC SURGERY | Facility: CLINIC | Age: 41
End: 2021-07-29

## 2021-09-11 ENCOUNTER — TELEMEDICINE (OUTPATIENT)
Dept: FAMILY MEDICINE CLINIC | Facility: TELEHEALTH | Age: 41
End: 2021-09-11

## 2021-09-11 DIAGNOSIS — G89.29 CHRONIC BILATERAL BACK PAIN, UNSPECIFIED BACK LOCATION: ICD-10-CM

## 2021-09-11 DIAGNOSIS — R06.00 DYSPNEA, UNSPECIFIED TYPE: Primary | ICD-10-CM

## 2021-09-11 DIAGNOSIS — Z20.822 SUSPECTED COVID-19 VIRUS INFECTION: ICD-10-CM

## 2021-09-11 DIAGNOSIS — M54.9 CHRONIC BILATERAL BACK PAIN, UNSPECIFIED BACK LOCATION: ICD-10-CM

## 2021-09-11 PROCEDURE — 99213 OFFICE O/P EST LOW 20 MIN: CPT | Performed by: NURSE PRACTITIONER

## 2021-09-11 RX ORDER — PREDNISONE 10 MG/1
TABLET ORAL DAILY
Qty: 21 EACH | Refills: 0 | OUTPATIENT
Start: 2021-09-11 | End: 2021-09-16

## 2021-09-11 RX ORDER — KETOROLAC TROMETHAMINE 10 MG/1
10 TABLET, FILM COATED ORAL 2 TIMES DAILY PRN
Qty: 4 TABLET | Refills: 0 | Status: SHIPPED | OUTPATIENT
Start: 2021-09-11 | End: 2021-09-13

## 2021-09-11 RX ORDER — ALBUTEROL SULFATE 90 UG/1
2 AEROSOL, METERED RESPIRATORY (INHALATION) EVERY 4 HOURS PRN
Qty: 18 G | Refills: 0 | Status: SHIPPED | OUTPATIENT
Start: 2021-09-11 | End: 2022-04-06 | Stop reason: SDUPTHER

## 2021-09-11 NOTE — PROGRESS NOTES
CHIEF COMPLAINT  Chief Complaint   Patient presents with   • Shortness of Breath   • Back Pain         HPI  Barak Rivera is a 40 y.o. male  presents with complaint of low back pain for several days and shortness of breath with sore throat and mild cough. He has recently just been tested for covid 19 and is awaiting the results.   He is requesting steroids due to his h/o emphysema and copd. He is also requesting medication for back pain which in reports as being chronic in nature due to Osteoporosis. He states most all NSAIDS do not help except Ketoralac. He also need an albuterol inhaler refill.     Review of Systems   Constitutional: Positive for fatigue. Negative for fever.   HENT: Positive for sore throat.    Eyes: Negative.    Respiratory: Positive for cough, shortness of breath and wheezing. Negative for chest tightness and stridor.    Cardiovascular: Negative.    Gastrointestinal: Negative.    Musculoskeletal: Positive for back pain and myalgias.   Allergic/Immunologic: Negative.    Neurological: Negative.    Hematological: Negative.    Psychiatric/Behavioral: Negative.        Past Medical History:   Diagnosis Date   • ADD (attention deficit disorder)    • Anogenital HPV infection    • Anxiety    • Arthritis    • Bipolar disorder (CMS/[facility])    • Condyloma acuminatum in male    • Constipation    • COPD (chronic obstructive pulmonary disease) (CMS/[facility])    • Depression    • Fibromyalgia    • GERD (gastroesophageal reflux disease)    • Hepatitis B     antibodies   • Hepatitis C     antibodies   • Hyperlipidemia    • Migraines    • On home oxygen therapy     at  per NC, 2.5 L   • Osteomyelitis hip (CMS/HCC)    • Osteoporosis    • Osteoporosis    • Paranoia (CMS/[facility])    • Suicidal ideations    • Tuberculosis 2021    Latent, chest x-ray neg   • Urinary tract infection        Family History   Problem Relation Age of Onset   • Hypertension Other    • Diabetes Other    • Depression Other    • Heart attack Mother     • Anxiety disorder Father    • Depression Father        Social History     Socioeconomic History   • Marital status: Single     Spouse name: Not on file   • Number of children: 0   • Years of education: Not on file   • Highest education level: Not on file   Tobacco Use   • Smoking status: Heavy Tobacco Smoker     Packs/day: 1.00     Years: 24.00     Pack years: 24.00     Types: Cigarettes   • Smokeless tobacco: Never Used   • Tobacco comment: pt demands nicotine patch, refuses counseling, Pt is smoking 5 cigs   Vaping Use   • Vaping Use: Never used   Substance and Sexual Activity   • Alcohol use: No   • Drug use: Yes     Types: Methamphetamines, Amphetamines, Benzodiazepines     Comment: TCA; 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         There were no vitals taken for this visit.    PHYSICAL EXAM  Physical Exam   Constitutional: He is oriented to person, place, and time. He appears well-developed and well-nourished. He does not have a sickly appearance. He does not appear ill. No distress.   HENT:   Head: Normocephalic and atraumatic.   Pulmonary/Chest: Effort normal. No stridor.  No respiratory distress. He no audible wheeze...  Neurological: He is alert and oriented to person, place, and time.   Psychiatric: He has a normal mood and affect.   Vitals reviewed.      Results for orders placed or performed during the hospital encounter of 07/09/21   Comprehensive Metabolic Panel    Specimen: Blood   Result Value Ref Range    Glucose 124 (H) 65 - 99 mg/dL    BUN 14 6 - 20 mg/dL    Creatinine 1.19 0.76 - 1.27 mg/dL    Sodium 133 (L) 136 - 145 mmol/L    Potassium 3.3 (L) 3.5 - 5.2 mmol/L    Chloride 96 (L) 98 - 107 mmol/L    CO2 25.0 22.0 - 29.0 mmol/L    Calcium 9.8 8.6 - 10.5 mg/dL    Total Protein 7.2 6.0 - 8.5 g/dL    Albumin 4.40 3.50 - 5.20 g/dL    ALT (SGPT) 11 1 - 41 U/L    AST (SGOT) 19 1 - 40  U/L    Alkaline Phosphatase 106 39 - 117 U/L    Total Bilirubin 0.6 0.0 - 1.2 mg/dL    eGFR Non African Amer 67 >60 mL/min/1.73    Globulin 2.8 gm/dL    A/G Ratio 1.6 g/dL    BUN/Creatinine Ratio 11.8 7.0 - 25.0    Anion Gap 12.0 5.0 - 15.0 mmol/L   CBC Auto Differential    Specimen: Blood   Result Value Ref Range    WBC 7.16 3.40 - 10.80 10*3/mm3    RBC 4.73 4.14 - 5.80 10*6/mm3    Hemoglobin 14.3 13.0 - 17.7 g/dL    Hematocrit 42.3 37.5 - 51.0 %    MCV 89.4 79.0 - 97.0 fL    MCH 30.2 26.6 - 33.0 pg    MCHC 33.8 31.5 - 35.7 g/dL    RDW 14.2 12.3 - 15.4 %    RDW-SD 46.1 37.0 - 54.0 fl    MPV 8.6 6.0 - 12.0 fL    Platelets 263 140 - 450 10*3/mm3    Neutrophil % 51.5 42.7 - 76.0 %    Lymphocyte % 34.2 19.6 - 45.3 %    Monocyte % 13.1 (H) 5.0 - 12.0 %    Eosinophil % 0.7 0.3 - 6.2 %    Basophil % 0.4 0.0 - 1.5 %    Immature Grans % 0.1 0.0 - 0.5 %    Neutrophils, Absolute 3.68 1.70 - 7.00 10*3/mm3    Lymphocytes, Absolute 2.45 0.70 - 3.10 10*3/mm3    Monocytes, Absolute 0.94 (H) 0.10 - 0.90 10*3/mm3    Eosinophils, Absolute 0.05 0.00 - 0.40 10*3/mm3    Basophils, Absolute 0.03 0.00 - 0.20 10*3/mm3    Immature Grans, Absolute 0.01 0.00 - 0.05 10*3/mm3    nRBC 0.0 0.0 - 0.2 /100 WBC   HIV-1 / O / 2 Ag / Antibody 4th Generation    Specimen: Blood   Result Value Ref Range    HIV-1/ HIV-2 Non-Reactive Non-Reactive       Diagnoses and all orders for this visit:    1. Dyspnea, unspecified type (Primary)  -     predniSONE (DELTASONE) 10 MG (21) dose pack; Take  by mouth Daily for 6 days.  Dispense: 21 each; Refill: 0    2. Chronic bilateral back pain, unspecified back location  -     ketorolac (TORADOL) 10 MG tablet; Take 1 tablet by mouth 2 (Two) Times a Day As Needed for Moderate Pain  for up to 2 days. Take with food.  Dispense: 4 tablet; Refill: 0  -     albuterol sulfate  (90 Base) MCG/ACT inhaler; Inhale 2 puffs Every 4 (Four) Hours As Needed for Wheezing.  Dispense: 18 g; Refill: 0    3. Suspected COVID-19 virus  infection  -     QUESTIONNAIRE SERIES    quarantine until covid 19 test results are available.   Increase fluids and rest.   Recommend smoking cessation       FOLLOW-UP  As discussed during visit with PCP/[facility] if no improvement or Urgent Care/Emergency Department if worsening of symptoms    Patient verbalizes understanding of medication dosage, comfort measures, instructions for treatment and follow-up.    Zina Jorge, APRN  09/11/2021  13:36 EDT    This visit was performed via Telehealth.  This patient has been instructed to follow-up with their primary care provider if their symptoms worsen or the treatment provided does not resolve their illness.

## 2021-09-11 NOTE — PATIENT INSTRUCTIONS
Shortness of Breath, Adult  Shortness of breath means you have trouble breathing. Shortness of breath could be a sign of a medical problem.  Follow these instructions at home:    · Watch for any changes in your symptoms.  · Do not use any products that contain nicotine or tobacco, such as cigarettes, e-cigarettes, and chewing tobacco.  · Do not smoke. Smoking can cause shortness of breath. If you need help to quit smoking, ask your doctor.  · Avoid things that can make it harder to breathe, such as:  ? Mold.  ? Dust.  ? Air pollution.  ? Chemical smells.  ? Things that can cause allergy symptoms (allergens), if you have allergies.  · Keep your living space clean. Use products that help remove mold and dust.  · Rest as needed. Slowly return to your normal activities.  · Take over-the-counter and prescription medicines only as told by your doctor. This includes oxygen therapy and inhaled medicines.  · Keep all follow-up visits as told by your doctor. This is important.  Contact a doctor if:  · Your condition does not get better as soon as expected.  · You have a hard time doing your normal activities, even after you rest.  · You have new symptoms.  Get help right away if:  · Your shortness of breath gets worse.  · You have trouble breathing when you are resting.  · You feel light-headed or you pass out (faint).  · You have a cough that is not helped by medicines.  · You cough up blood.  · You have pain with breathing.  · You have pain in your chest, arms, shoulders, or belly (abdomen).  · You have a fever.  · You cannot walk up stairs.  · You cannot exercise the way you normally do.  These symptoms may represent a serious problem that is an emergency. Do not wait to see if the symptoms will go away. Get medical help right away. Call your local emergency services (911 in the [place] Do not drive yourself to the hospital.  Summary  · Shortness of breath is when you have trouble breathing enough air. It can be a sign of a  medical problem.  · Avoid things that make it hard for you to breathe, such as smoking, pollution, mold, and dust.  · Watch for any changes in your symptoms. Contact your doctor if you do not get better or you get worse.  This information is not intended to replace advice given to you by your health care provider. Make sure you discuss any questions you have with your health care provider.  Document Revised: 05/20/2019 Document Reviewed: 05/20/2019  [employer] Patient Education © 2021 [employer]  Acute Back Pain, Adult  Acute back pain is sudden and usually short-lived. It is often caused by an injury to the muscles and tissues in the back. The injury may result from:  · A muscle or ligament getting overstretched or torn (strained). Ligaments are tissues that connect bones to each other. Lifting something improperly can cause a back strain.  · Wear and tear (degeneration) of the spinal disks. Spinal disks are circular tissue that provide cushioning between the bones of the spine (vertebrae).  · Twisting motions, such as while playing sports or doing yard work.  · A hit to the back.  · Arthritis.  You may have a physical exam, lab tests, and imaging tests to find the cause of your pain. Acute back pain usually goes away with rest and home care.  Follow these instructions at home:  Managing pain, stiffness, and swelling  · Treatment may include medicines for pain and inflammation that are taken by mouth or applied to the skin, prescription pain medicine, or muscle relaxants. Take over-the-counter and prescription medicines only as told by your health care provider.  · Your health care provider may recommend applying ice during the first 24-48 hours after your pain starts. To do this:  ? Put ice in a plastic bag.  ? Place a towel between your skin and the bag.  ? Leave the ice on for 20 minutes, 2-3 times a day.  · If directed, apply heat to the affected area as often as told by your health care provider. Use the heat  source that your health care provider recommends, such as a moist heat pack or a heating pad.  ? Place a towel between your skin and the heat source.  ? Leave the heat on for 20-30 minutes.  ? Remove the heat if your skin turns bright red. This is especially important if you are unable to feel pain, heat, or cold. You have a greater risk of getting burned.  Activity    · Do not stay in bed. Staying in bed for more than 1-2 days can delay your recovery.  · Sit up and stand up straight. Avoid leaning forward when you sit or hunching over when you stand.  ? If you work at a desk, sit close to it so you do not need to lean over. Keep your chin tucked in. Keep your neck drawn back, and keep your elbows bent at a 90-degree angle (right angle).  ? Sit high and close to the steering wheel when you drive. Add lower back (lumbar) support to your car seat, if needed.  · Take short walks on even surfaces as soon as you are able. Try to increase the length of time you walk each day.  · Do not sit, drive, or  one place for more than 30 minutes at a time. Sitting or standing for long periods of time can put stress on your back.  · Do not drive or use heavy machinery while taking prescription pain medicine.  · Use proper lifting techniques. When you bend and lift, use positions that put less stress on your back:  ? Bend your knees.  ? Keep the load close to your body.  ? Avoid twisting.  · Exercise regularly as told by your health care provider. Exercising helps your back heal faster and helps prevent back injuries by keeping muscles strong and flexible.  · Work with a physical therapist to make a safe exercise program, as recommended by your health care provider. Do any exercises as told by your physical therapist.    Lifestyle  · Maintain a healthy weight. Extra weight puts stress on your back and makes it difficult to have good posture.  · Avoid activities or situations that make you feel anxious or stressed. Stress and  anxiety increase muscle tension and can make back pain worse. Learn ways to manage anxiety and stress, such as through exercise.  General instructions  · Sleep on a firm mattress in a comfortable position. Try lying on your side with your knees slightly bent. If you lie on your back, put a pillow under your knees.  · Follow your treatment plan as told by your health care provider. This may include:  ? Cognitive or behavioral therapy.  ? Acupuncture or massage therapy.  ? Meditation or yoga.  Contact a health care provider if:  · You have pain that is not relieved with rest or medicine.  · You have increasing pain going down into your legs or buttocks.  · Your pain does not improve after 2 weeks.  · You have pain at night.  · You lose weight without trying.  · You have a fever or chills.  Get help right away if:  · You develop new bowel or bladder control problems.  · You have unusual weakness or numbness in your arms or legs.  · You develop nausea or vomiting.  · You develop abdominal pain.  · You feel faint.  Summary  · Acute back pain is sudden and usually short-lived.  · Use proper lifting techniques. When you bend and lift, use positions that put less stress on your back.  · Take over-the-counter and prescription medicines and apply heat or ice as directed by your health care provider.  This information is not intended to replace advice given to you by your health care provider. Make sure you discuss any questions you have with your health care provider.  Document Revised: 10/15/2020 Document Reviewed: 08/01/2018  [employer] Patient Education © 2021 [employer]  COVID-19: Quarantine vs. Isolation  QUARANTINE keeps someone who was in close contact with someone who has COVID-19 away from others.  If you had close contact with a person who has COVID-19  · The best way to protect yourself and others is to stay home for 14 days after your last contact. Check your local health department's website for information  "about options in your area to possibly shorten this quarantine period.  · Check your temperature twice a day and watch for symptoms of COVID-19.  · If possible, stay away from people who are at higher-risk for getting very sick from COVID-19.  ISOLATION keeps someone who is sick or tested positive for COVID-19 without symptoms away from others, even in their own home.  If you are sick and think or know you have COVID-19  · Stay home until after  ? At least 10 days since symptoms first appeared and  ? At least 24 hours with no fever without fever-reducing medication and  ? Symptoms have improved  If you tested positive for COVID-19 but do not have symptoms  · Stay home until after  ? 10 days have passed since your positive test  If you live with others, stay in a specific \"sick room\" or area and away from other people or animals, including pets. Use a separate bathroom, if available.  cdc.gov/coronavirus  12/17/2020  This information is not intended to replace advice given to you by your health care provider. Make sure you discuss any questions you have with your health care provider.  Document Revised: 07/13/2021 Document Reviewed: 07/13/2021  [employer] Patient Education © 2021 [employer]  10 Things You Can Do to Manage Your COVID-19 Symptoms at Home  If you have possible or confirmed COVID-19:  1. Stay home except to get medical care.  2. Monitor your symptoms carefully. If your symptoms get worse, call your healthcare provider immediately.  3. Get rest and stay hydrated.  4. If you have a medical appointment, call the healthcare provider ahead of time and tell them that you have or may have COVID-19.  5. For medical emergencies, call 911 and notify the dispatch personnel that you have or may have COVID-19.  6. Cover your cough and sneezes with a tissue or use the inside of your elbow.  7. Wash your hands often with soap and water for at least 20 seconds or clean your hands with an alcohol-based hand  " that contains at least 60% alcohol.  8. As much as possible, stay in a specific room and away from other people in your home. Also, you should use a separate bathroom, if available. If you need to be around other people in or outside of the home, wear a mask.  9. Avoid sharing personal items with other people in your household, like dishes, towels, and bedding.  10. Clean all surfaces that are touched often, like counters, tabletops, and doorknobs. Use household cleaning sprays or wipes according to the label instructions.  cdc.gov/coronavirus  07/16/2021  This information is not intended to replace advice given to you by your health care provider. Make sure you discuss any questions you have with your health care provider.  Document Revised: 08/04/2021 Document Reviewed: 08/04/2021  [employer] Patient Education © 2021 [employer]

## 2021-09-18 PROCEDURE — U0004 COV-19 TEST NON-CDC HGH THRU: HCPCS | Performed by: NURSE PRACTITIONER

## 2021-10-12 ENCOUNTER — TELEMEDICINE (OUTPATIENT)
Dept: FAMILY MEDICINE CLINIC | Facility: TELEHEALTH | Age: 41
End: 2021-10-12

## 2021-10-12 DIAGNOSIS — G47.9 SLEEP DISTURBANCE: ICD-10-CM

## 2021-10-12 DIAGNOSIS — R21 FACIAL RASH: Primary | ICD-10-CM

## 2021-10-12 PROCEDURE — 99213 OFFICE O/P EST LOW 20 MIN: CPT | Performed by: NURSE PRACTITIONER

## 2021-10-12 RX ORDER — RAMELTEON 8 MG/1
8 TABLET ORAL NIGHTLY
Qty: 30 TABLET | Refills: 0 | Status: SHIPPED | OUTPATIENT
Start: 2021-10-12 | End: 2021-12-20 | Stop reason: ALTCHOICE

## 2021-10-12 RX ORDER — TRIAMCINOLONE ACETONIDE 1 MG/G
1 CREAM TOPICAL 2 TIMES DAILY
Qty: 15 G | Refills: 0 | Status: SHIPPED | OUTPATIENT
Start: 2021-10-12 | End: 2022-04-06

## 2021-10-12 NOTE — PROGRESS NOTES
You have chosen to receive care through a telehealth visit.  Do you consent to use a video/audio connection for your medical care today? Yes     CHIEF COMPLAINT  Chief Complaint   Patient presents with   • Rash     face         HPI  Barak Rivera is a 40 y.o. male  presents with complaint of a red rash that has developed today on the bridge of the nose and cheeks. He reports the rash is painful with a burning sensation. He reports no itching or drainage from the rash. He reports no fever.He denies new facial products or cleansers.  He does state this has occurred one other time and he used a topical steroid cream to resolve this rash. He denies sun exposure that would of been considered lengthy. He denies new medications. He states his PCP is no longer available and would like a refill on his sleep medication Rozerem.     Review of Systems   Constitutional: Negative.    HENT: Negative.    Eyes: Negative.    Respiratory: Negative.    Cardiovascular: Negative.    Skin: Positive for color change and rash.        Red rash that is rough and covering the bridge of nose and cheeks bilaterally.    Neurological: Negative.    Hematological: Negative.    Psychiatric/Behavioral: Negative.        Past Medical History:   Diagnosis Date   • ADD (attention deficit disorder)    • Anogenital HPV infection    • Anxiety    • Arthritis    • Bipolar disorder (HCC)    • Condyloma acuminatum in male    • Constipation    • COPD (chronic obstructive pulmonary disease) (HCC)    • Depression    • Fibromyalgia    • GERD (gastroesophageal reflux disease)    • Hepatitis B     antibodies   • Hepatitis C     antibodies   • Hyperlipidemia    • Migraines    • On home oxygen therapy     at hs per [place], 2.5 L   • Osteomyelitis hip (HCC)    • Osteoporosis    • Osteoporosis    • Paranoia ([facility])    • Suicidal ideations    • Tuberculosis 2021    Latent, chest x-ray neg   • Urinary tract infection        Family History   Problem Relation Age of Onset   •  Hypertension Other    • Diabetes Other    • Depression Other    • Heart attack Mother    • Anxiety disorder Father    • Depression Father        Social History     Socioeconomic History   • Marital status: Single   • Number of children: 0   Tobacco Use   • Smoking status: Heavy Tobacco Smoker     Packs/day: 1.00     Years: 24.00     Pack years: 24.00     Types: Cigarettes   • Smokeless tobacco: Never Used   • Tobacco comment: pt demands nicotine patch, refuses counseling, Pt is smoking 5 cigs   Vaping Use   • Vaping Use: Never used   Substance and Sexual Activity   • Alcohol use: No   • Drug use: Yes     Types: Methamphetamines, Amphetamines, Benzodiazepines     Comment: TCA; 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         There were no vitals taken for this visit.    PHYSICAL EXAM  Physical Exam   Constitutional: He is oriented to person, place, and time. He appears well-developed and well-nourished. He does not have a sickly appearance. He does not appear ill. No distress.   HENT:   Head: Normocephalic and atraumatic.   Eyes: Conjunctivae are normal.   Neck: Neck normal appearance.  Pulmonary/Chest: Effort normal.  No respiratory distress.  Neurological: He is alert and oriented to person, place, and time.   Skin: Skin is warm, dry and intact. Burn and rash noted. Rash is macular. There is erythema.   Red macular rash noted on cheeks bilaterally and nasal bridge.    Psychiatric: He has a normal mood and affect.   Vitals reviewed.      Results for orders placed or performed during the hospital encounter of 09/18/21   COVID-19 PCR, [facility], NP SWAB IN [facility] VIRAL TRANSPORT MEDIA/ORAL SWISH 24-30 HR TAT - Swab, Nasopharynx    Specimen: Nasopharynx; Swab   Result Value Ref Range    SARS-CoV-2 HANNA Not Detected Not Detected       Diagnoses and all orders for this visit:    1. Facial rash (Primary)  -      triamcinolone (KENALOG) 0.1 % cream; Apply 1 application topically to the appropriate area as directed 2 (Two) Times a Day. Apply cream for 2 weeks only then d/c.  Dispense: 15 g; Refill: 0    2. Sleep disturbance  -     ramelteon (Rozerem) 8 MG tablet; Take 1 tablet by mouth Every Night.  Dispense: 30 tablet; Refill: 0    avoid harsh  and/or lotions.   Apply a thin layer bid to rash for 14 days only.   Follow up with virtual care prn no improvement or worsening s/s.       FOLLOW-UP  As discussed during visit with PCP/Virtual Care if no improvement or Urgent Care/Emergency Department if worsening of symptoms    Patient verbalizes understanding of medication dosage, comfort measures, instructions for treatment and follow-up.    CONTRERAS Macias  10/12/2021  14:38 EDT    This visit was performed via Telehealth.  This patient has been instructed to follow-up with their primary care provider if their symptoms worsen or the treatment provided does not resolve their illness.

## 2021-10-12 NOTE — PATIENT INSTRUCTIONS
Rash, Adult    A rash is a change in the color of your skin. A rash can also change the way your skin feels. There are many different conditions and factors that can cause a rash.  Follow these instructions at home:  The goal of treatment is to stop the itching and keep the rash from spreading. Watch for any changes in your symptoms. Let your doctor know about them. Follow these instructions to help with your condition:  Medicine  Take or apply over-the-counter and prescription medicines only as told by your doctor. These may include medicines:  · To treat red or swollen skin (corticosteroid creams).  · To treat itching.  · To treat an allergy (oral antihistamines).  · To treat very bad symptoms (oral corticosteroids).    Skin care  · Put cool cloths (compresses) on the affected areas.  · Do not scratch or rub your skin.  · Avoid covering the rash. Make sure that the rash is exposed to air as much as possible.  Managing itching and discomfort  · Avoid hot showers or baths. These can make itching worse. A cold shower may help.  · Try taking a bath with:  ? Epsom salts. You can get these at your local pharmacy or grocery store. Follow the instructions on the package.  ? Baking soda. Pour a small amount into the bath as told by your doctor.  ? Colloidal oatmeal. You can get this at your local pharmacy or grocery store. Follow the instructions on the package.  · Try putting baking soda paste onto your skin. Stir water into baking soda until it gets like a paste.  · Try putting on a lotion that relieves itchiness (calamine lotion).  · Keep cool and out of the sun. Sweating and being hot can make itching worse.  General instructions    · Rest as needed.  · Drink enough fluid to keep your pee (urine) pale yellow.  · Wear loose-fitting clothing.  · Avoid scented soaps, detergents, and perfumes. Use gentle soaps, detergents, perfumes, and other cosmetic products.  · Avoid anything that causes your rash. Keep a journal to  "help track what causes your rash. Write down:  ? What you eat.  ? What cosmetic products you use.  ? What you drink.  ? What you wear. This includes jewelry.  · Keep all follow-up visits as told by your doctor. This is important.    Contact a doctor if:  · You sweat at night.  · You lose weight.  · You pee (urinate) more than normal.  · You pee less than normal, or you notice that your pee is a darker color than normal.  · You feel weak.  · You throw up (vomit).  · Your skin or the whites of your eyes look yellow (jaundice).  · Your skin:  ? Tingles.  ? Is numb.  · Your rash:  ? Does not go away after a few days.  ? Gets worse.  · You are:  ? More thirsty than normal.  ? More tired than normal.  · You have:  ? New symptoms.  ? Pain in your belly (abdomen).  ? A fever.  ? Watery poop (diarrhea).  Get help right away if:  · You have a fever and your symptoms suddenly get worse.  · You start to feel mixed up (confused).  · You have a very bad headache or a stiff neck.  · You have very bad joint pains or stiffness.  · You have jerky movements that you cannot control (seizure).  · Your rash covers all or most of your body. The rash may or may not be painful.  · You have blisters that:  ? Are on top of the rash.  ? Grow larger.  ? Grow together.  ? Are painful.  ? Are inside your nose or mouth.  · You have a rash that:  ? Looks like purple pinprick-sized spots all over your body.  ? Has a \"bull's eye\" or looks like a target.  ? Is red and painful, causes your skin to peel, and is not from being in the sun too long.  Summary  · A rash is a change in the color of your skin. A rash can also change the way your skin feels.  · The goal of treatment is to stop the itching and keep the rash from spreading.  · Take or apply over-the-counter and prescription medicines only as told by your doctor.  · Contact a doctor if you have new symptoms or symptoms that get worse.  · Keep all follow-up visits as told by your doctor. This is " important.  This information is not intended to replace advice given to you by your health care provider. Make sure you discuss any questions you have with your health care provider.  Document Revised: 04/10/2020 Document Reviewed: 07/22/2019  [employer] Patient Education © 2021 [employer]

## 2021-11-01 ENCOUNTER — TRANSCRIBE ORDERS (OUTPATIENT)
Dept: PHYSICAL THERAPY | Facility: CLINIC | Age: 41
End: 2021-11-01

## 2021-11-01 DIAGNOSIS — K59.9 COLONIC INERTIA: Primary | ICD-10-CM

## 2021-12-20 ENCOUNTER — TELEMEDICINE (OUTPATIENT)
Dept: FAMILY MEDICINE CLINIC | Facility: TELEHEALTH | Age: 41
End: 2021-12-20

## 2021-12-20 DIAGNOSIS — J20.9 ACUTE BRONCHITIS, UNSPECIFIED ORGANISM: Primary | ICD-10-CM

## 2021-12-20 PROBLEM — F15.20 METHAMPHETAMINE USE DISORDER, SEVERE (HCC): Status: RESOLVED | Noted: 2021-06-22 | Resolved: 2021-12-20

## 2021-12-20 PROBLEM — F19.10 POLYSUBSTANCE ABUSE: Status: RESOLVED | Noted: 2020-03-08 | Resolved: 2021-12-20

## 2021-12-20 PROBLEM — F13.20 SEVERE BENZODIAZEPINE USE DISORDER: Status: RESOLVED | Noted: 2021-06-22 | Resolved: 2021-12-20

## 2021-12-20 PROBLEM — B19.20 HEPATITIS C: Status: RESOLVED | Noted: 2020-03-08 | Resolved: 2021-12-20

## 2021-12-20 PROBLEM — F11.20 OPIOID USE DISORDER, SEVERE, DEPENDENCE: Status: RESOLVED | Noted: 2021-06-22 | Resolved: 2021-12-20

## 2021-12-20 PROBLEM — R45.851 SUICIDAL IDEATIONS: Status: RESOLVED | Noted: 2020-03-08 | Resolved: 2021-12-20

## 2021-12-20 PROBLEM — F22 PARANOIA: Status: RESOLVED | Noted: 2020-03-08 | Resolved: 2021-12-20

## 2021-12-20 PROBLEM — B19.10 HEPATITIS B: Status: RESOLVED | Noted: 2020-03-08 | Resolved: 2021-12-20

## 2021-12-20 PROCEDURE — 99213 OFFICE O/P EST LOW 20 MIN: CPT | Performed by: NURSE PRACTITIONER

## 2021-12-20 RX ORDER — METHYLPREDNISOLONE 4 MG/1
TABLET ORAL
Qty: 1 EACH | Refills: 0 | Status: SHIPPED | OUTPATIENT
Start: 2021-12-20 | End: 2022-04-04

## 2021-12-21 NOTE — PROGRESS NOTES
"Mode of Visit: Video  Location of patient: home  You have chosen to receive care through a telehealth visit.  The patient has signed the video visit consent form.  The visit included audio and video interaction. No technical issues occurred during this visit.     Chief Complaint  Cough    Subjective          Barak Rivera presents to [facility]  Cough for over 2-3 weeks. He states that he has been Covid tested 3 times and has been negative. He took a Zpak but has not seen any improvement in the cough.He feels like \"he needs to cough up something but can't\". He has shortness of breath with extended periods of coughing, no wheezing, chest pain. He states that he has taken Mucinex DM but it is not helping. He has taken Zyrtec-D but it made his nose run.     He also complains of a sprained ankle after falling in his yard today. He went to see another provider and says that he was given a Torodol injection and was supposed to get Torodol pill prescription sent to the pharmacy but it was never sent. He asks if it could be sent for him. When asked why he didn't mention the cough while he was there, he said that \"he doesn't listen, he just tells me to get over it\". When asked if he had tried any Nsaids, he says that he has tried ibuprofen and aleve since he got the torodol shot.    Cough  The current episode started 1 to 4 weeks ago. The problem has been unchanged. The cough is non-productive. Associated symptoms include shortness of breath. Pertinent negatives include no chest pain, chills, fever, sore throat or wheezing. Risk factors for lung disease include smoking/tobacco exposure. His past medical history is significant for COPD.     Objective   Vital Signs:   There were no vitals taken for this visit.    Physical Exam   Constitutional: He appears well-developed and well-nourished. No distress.   Pulmonary/Chest: Effort normal.   Neurological: He is alert.     Result Review :            "      Assessment and Plan    Diagnoses and all orders for this visit:    1. Acute bronchitis, unspecified organism (Primary)  -     methylPREDNISolone (MEDROL) 4 MG dose pack; Take as directed on package instructions.  Dispense: 1 each; Refill: 0      He was instructed to follow up with the provider that treated him earlier today for his sprained ankle, for additional torodol. Discussed limits to NSAIDs in 24hr period.  He was argumentative about treatment and wanted torodol sent, then asked for Motrin 800 mg, he was made aware that his sprained ankle would not be treated during this visit and cautioned to appearance of doctor shopping and its ramifications.    I spent 20 minutes caring for Barak on this date of service. This time includes time spent by me in the following activities:preparing for the visit, obtaining and/or reviewing a separately obtained history, performing a medically appropriate examination and/or evaluation , counseling and educating the patient/family/caregiver, ordering medications, tests, or procedures, and documenting information in the medical record  Follow Up   No follow-ups on file.  Patient was given instructions and counseling regarding his condition or for health maintenance advice. Please see specific information pulled into the AVS if appropriate.

## 2021-12-21 NOTE — PATIENT INSTRUCTIONS
FOLLOW UP WITH PROVIDER WHO TREATED YOUR SPRAINED ANKLE REGARDING TORODOL. Follow up with PCP, if no improvement in cough after completion of steroid. Continue mucinex-dm.   Acute Bronchitis, Adult    Acute bronchitis is sudden or acute swelling of the air tubes (bronchi) in the lungs. Acute bronchitis causes these tubes to fill with mucus, which can make it hard to breathe. It can also cause coughing or wheezing.  In adults, acute bronchitis usually goes away within 2 weeks. A cough caused by bronchitis may last up to 3 weeks. Smoking, allergies, and asthma can make the condition worse.  What are the causes?  This condition can be caused by germs and by substances that irritate the lungs, including:  · Cold and flu viruses. The most common cause of this condition is the virus that causes the common cold.  · Bacteria.  · Substances that irritate the lungs, including:  ? Smoke from cigarettes and other forms of tobacco.  ? Dust and pollen.  ? Fumes from chemical products, gases, or burned fuel.  ? Other materials that pollute indoor or outdoor air.  · Close contact with someone who has acute bronchitis.  What increases the risk?  The following factors may make you more likely to develop this condition:  · A weak body's defense system, also called the immune system.  · A condition that affects your lungs and breathing, such as asthma.  What are the signs or symptoms?  Common symptoms of this condition include:  · Lung and breathing problems, such as:  ? Coughing. This may bring up clear, yellow, or green mucus from your lungs (sputum).  ? Wheezing.  ? Having too much mucus in your lungs (chest congestion).  ? Having shortness of breath.  · A fever.  · Chills.  · Aches and pains, including:  ? Tightness in your chest and other body aches.  ? A sore throat.  How is this diagnosed?  This condition is usually diagnosed based on:  · Your symptoms and medical history.  · A physical exam.  You may also have other tests,  including tests to rule out other conditions, such pneumonia. These tests include:  · A test of lung function.  · Test of a mucus sample to look for the presence of bacteria.  · Tests to check the oxygen level in your blood.  · Blood tests.  · Chest X-ray.  How is this treated?  Most cases of acute bronchitis clear up over time without treatment. Your health care provider may recommend:  · Drinking more fluids. This can thin your mucus, which may improve your breathing.  · Taking a medicine for a fever or cough.  · Using a device that gets medicine into your lungs (inhaler) to help improve breathing and control coughing.  · Using a vaporizer or a humidifier. These are machines that add water to the air to help you breathe better.  Follow these instructions at home:  Activity  · Get plenty of rest.  · Return to your normal activities as told by your health care provider. Ask your health care provider what activities are safe for you.  Lifestyle  · Drink enough fluid to keep your urine pale yellow.  · Do not drink alcohol.  · Do not use any products that contain nicotine or tobacco, such as cigarettes, e-cigarettes, and chewing tobacco. If you need help quitting, ask your health care provider. Be aware that:  ? Your bronchitis will get worse if you smoke or breathe in other people's smoke (secondhand smoke).  ? Your lungs will heal faster if you quit smoking.  General instructions    · Take over-the-counter and prescription medicines only as told by your health care provider.  · Use an inhaler, vaporizer, or humidifier as told by your health care provider.  · If you have a sore throat, gargle with a salt-water mixture 3-4 times a day or as needed. To make a salt-water mixture, completely dissolve ½-1 tsp (3-6 g) of salt in 1 cup (237 mL) of warm water.  · Keep all follow-up visits as told by your health care provider. This is important.    How is this prevented?  To lower your risk of getting this condition  again:  · Wash your hands often with soap and water. If soap and water are not available, use hand .  · Avoid contact with people who have cold symptoms.  · Try not to touch your mouth, nose, or eyes with your hands.  · Avoid places where there are fumes from chemicals. Breathing these fumes will make your condition worse.  · Get the flu shot every year.  Contact a health care provider if:  · Your symptoms do not improve after 2 weeks of treatment.  · You vomit more than once or twice.  · You have symptoms of dehydration such as:  ? Dark urine.  ? Dry skin or eyes.  ? Increased thirst.  ? Headaches.  ? Confusion.  ? Muscle cramps.  Get help right away if you:  · Cough up blood.  · Feel pain in your chest.  · Have severe shortness of breath.  · Faint or keep feeling like you are going to faint.  · Have a severe headache.  · Have fever or chills that get worse.  These symptoms may represent a serious problem that is an emergency. Do not wait to see if the symptoms will go away. Get medical help right away. Call your local emergency services (911 in the [place] Do not drive yourself to the hospital.  Summary  · Acute bronchitis is sudden (acute) inflammation of the air tubes (bronchi) between the windpipe and the lungs. In adults, acute bronchitis usually goes away within 2 weeks, although coughing may last 3 weeks or longer  · Take over-the-counter and prescription medicines only as told by your health care provider.  · Drink enough fluid to keep your urine pale yellow.  · Contact a health care provider if your symptoms do not improve after 2 weeks of treatment.  · Get help right away if you cough up blood, faint, or have chest pain or shortness of breath.  This information is not intended to replace advice given to you by your health care provider. Make sure you discuss any questions you have with your health care provider.  Document Revised: 08/31/2020 Document Reviewed: 07/10/2020  Elsevier Patient  Education © 2021 [employer]

## 2022-03-28 ENCOUNTER — HOSPITAL ENCOUNTER (EMERGENCY)
Facility: HOSPITAL | Age: 42
Discharge: LEFT BEFORE DISCHARGE | End: 2022-03-28

## 2022-03-28 VITALS
SYSTOLIC BLOOD PRESSURE: 130 MMHG | RESPIRATION RATE: 18 BRPM | HEIGHT: 68 IN | BODY MASS INDEX: 27.28 KG/M2 | TEMPERATURE: 98 F | DIASTOLIC BLOOD PRESSURE: 98 MMHG | HEART RATE: 109 BPM | WEIGHT: 180 LBS | OXYGEN SATURATION: 97 %

## 2022-03-28 LAB
HOLD SPECIMEN: NORMAL
WHOLE BLOOD HOLD SPECIMEN: NORMAL
WHOLE BLOOD HOLD SPECIMEN: NORMAL

## 2022-03-28 PROCEDURE — 99211 OFF/OP EST MAY X REQ PHY/QHP: CPT

## 2022-03-28 RX ORDER — SODIUM CHLORIDE 0.9 % (FLUSH) 0.9 %
10 SYRINGE (ML) INJECTION AS NEEDED
Status: DISCONTINUED | OUTPATIENT
Start: 2022-03-28 | End: 2022-03-28 | Stop reason: HOSPADM

## 2022-04-04 ENCOUNTER — TELEMEDICINE (OUTPATIENT)
Dept: FAMILY MEDICINE CLINIC | Facility: TELEHEALTH | Age: 42
End: 2022-04-04

## 2022-04-04 DIAGNOSIS — J44.1 COPD EXACERBATION: ICD-10-CM

## 2022-04-04 DIAGNOSIS — J30.1 SEASONAL ALLERGIC RHINITIS DUE TO POLLEN: Primary | ICD-10-CM

## 2022-04-04 PROCEDURE — 99212 OFFICE O/P EST SF 10 MIN: CPT | Performed by: NURSE PRACTITIONER

## 2022-04-04 RX ORDER — FLUTICASONE PROPIONATE 50 MCG
2 SPRAY, SUSPENSION (ML) NASAL DAILY
Qty: 16 G | Refills: 0 | Status: SHIPPED | OUTPATIENT
Start: 2022-04-04 | End: 2022-11-09

## 2022-04-04 RX ORDER — METHYLPREDNISOLONE 4 MG/1
TABLET ORAL
Qty: 1 EACH | Refills: 0 | Status: SHIPPED | OUTPATIENT
Start: 2022-04-04 | End: 2022-11-09

## 2022-04-04 NOTE — PROGRESS NOTES
Subjective   Barak Rivera is a 41 y.o. male.     He has congestion, and has trouble producing. This has been going on for a week. He tested negative for covid yesterday. He uses nasacort and has been on that for about 3 months but it has not been helping, he wants to try flonase. He takes zyrtec every day for allergies. PMH COPD, he has needed his rescue inhlaer more frequently this past week.       The following portions of the patient's history were reviewed and updated as appropriate: allergies, current medications, past family history, past medical history, past social history, past surgical history, and problem list.    Review of Systems   Constitutional: Negative for fever.   HENT: Positive for congestion, rhinorrhea and sinus pressure.    Respiratory: Positive for cough (green sputum), chest tightness, shortness of breath and wheezing.    Gastrointestinal: Negative.    Musculoskeletal: Negative for myalgias.   Allergic/Immunologic: Positive for environmental allergies.   Neurological: Negative for headache.       Objective   Physical Exam  Constitutional:       General: He is not in acute distress.     Appearance: He is well-developed. He is not diaphoretic.   Pulmonary:      Effort: Pulmonary effort is normal.   Neurological:      Mental Status: He is alert and oriented to person, place, and time.   Psychiatric:         Behavior: Behavior normal.           Assessment/Plan   Diagnoses and all orders for this visit:    1. Seasonal allergic rhinitis due to pollen (Primary)  -     fluticasone (Flonase) 50 MCG/ACT nasal spray; 2 sprays into the nostril(s) as directed by provider Daily.  Dispense: 16 g; Refill: 0  -     methylPREDNISolone (MEDROL) 4 MG dose pack; Take as directed on package instructions.  Dispense: 1 each; Refill: 0    2. COPD exacerbation (HCC)  -     methylPREDNISolone (MEDROL) 4 MG dose pack; Take as directed on package instructions.  Dispense: 1 each; Refill: 0                 The use of a  video visit has been reviewed with the patient and verbal informed consent has been obtained. Myself and Barak Rivera participated in this visit. The patient is located in [place], [place]. I am located in [place], [place]. Mychart and Zoom were utilized. I spent 13 minutes in the patient's chart for this visit.

## 2022-04-04 NOTE — PATIENT INSTRUCTIONS
For more information:    Quit Now [place]  1-800-QUIT-NOW  https://[place].quitlogix.org/en-[place]/  Steps to Quit Smoking  Smoking tobacco can be harmful to your health and can affect almost every organ in your body. Smoking puts you, and those around you, at risk for developing many serious chronic diseases. Quitting smoking is difficult, but it is one of the best things that you can do for your health. It is never too late to quit.  What are the benefits of quitting smoking?  When you quit smoking, you lower your risk of developing serious diseases and conditions, such as:  Lung cancer or lung disease, such as COPD.  Heart disease.  Stroke.  Heart attack.  Infertility.  Osteoporosis and bone fractures.  Additionally, symptoms such as coughing, wheezing, and shortness of breath may get better when you quit. You may also find that you get sick less often because your body is stronger at fighting off colds and infections. If you are pregnant, quitting smoking can help to reduce your chances of having a baby of low birth weight.  How do I get ready to quit?  When you decide to quit smoking, create a plan to make sure that you are successful. Before you quit:  Pick a date to quit. Set a date within the next two weeks to give you time to prepare.  Write down the reasons why you are quitting. Keep this list in places where you will see it often, such as on your bathroom mirror or in your car or wallet.  Identify the people, places, things, and activities that make you want to smoke (triggers) and avoid them. Make sure to take these actions:  Throw away all cigarettes at home, at work, and in your car.  Throw away smoking accessories, such as ashtrays and lighters.  Clean your car and make sure to empty the ashtray.  Clean your home, including curtains and carpets.  Tell your family, friends, and coworkers that you are quitting. Support from your loved ones can make quitting easier.  Talk with your health care provider  about your options for quitting smoking.  Find out what treatment options are covered by your health insurance.  What strategies can I use to quit smoking?  Talk with your healthcare provider about different strategies to quit smoking. Some strategies include:  Quitting smoking altogether instead of gradually lessening how much you smoke over a period of time. Research shows that quitting “cold turkey” is more successful than gradually quitting.  Attending in-person counseling to help you build problem-solving skills. You are more likely to have success in quitting if you attend several counseling sessions. Even short sessions of 10 minutes can be effective.  Finding resources and support systems that can help you to quit smoking and remain smoke-free after you quit. These resources are most helpful when you use them often. They can include:  Online chats with a counselor.  Telephone quitlines.  Printed self-help materials.  Support groups or group counseling.  Text messaging programs.  Mobile phone applications.  Taking medicines to help you quit smoking. (If you are pregnant or breastfeeding, talk with your health care provider first.) Some medicines contain nicotine and some do not. Both types of medicines help with cravings, but the medicines that include nicotine help to relieve withdrawal symptoms. Your health care provider may recommend:  Nicotine patches, gum, or lozenges.  Nicotine inhalers or sprays.  Non-nicotine medicine that is taken by mouth.  Talk with your health care provider about combining strategies, such as taking medicines while you are also receiving in-person counseling. Using these two strategies together makes you more likely to succeed in quitting than if you used either strategy on its own.  If you are pregnant or breastfeeding, talk with your health care provider about finding counseling or other support strategies to quit smoking. Do not take medicine to help you quit smoking unless  told to do so by your health care provider.  What things can I do to make it easier to quit?  Quitting smoking might feel overwhelming at first, but there is a lot that you can do to make it easier. Take these important actions:  Reach out to your family and friends and ask that they support and encourage you during this time. Call telephone quitlines, reach out to support groups, or work with a counselor for support.  Ask people who smoke to avoid smoking around you.  Avoid places that trigger you to smoke, such as bars, parties, or smoke-break areas at work.  Spend time around people who do not smoke.  Lessen stress in your life, because stress can be a smoking trigger for some people. To lessen stress, try:  Exercising regularly.  Deep-breathing exercises.  Yoga.  Meditating.  Performing a body scan. This involves closing your eyes, scanning your body from head to toe, and noticing which parts of your body are particularly tense. Purposefully relax the muscles in those areas.  Download or purchase mobile phone or tablet apps (applications) that can help you stick to your quit plan by providing reminders, tips, and encouragement. There are many free apps, such as QuitGuide from the CDC (Centers for Disease Control and Prevention). You can find other support for quitting smoking (smoking cessation) through smokefree.gov and other websites.  How will I feel when I quit smoking?  Within the first 24 hours of quitting smoking, you may start to feel some withdrawal symptoms. These symptoms are usually most noticeable 2-3 days after quitting, but they usually do not last beyond 2-3 weeks. Changes or symptoms that you might experience include:  Mood swings.  Restlessness, anxiety, or irritation.  Difficulty concentrating.  Dizziness.  Strong cravings for sugary foods in addition to nicotine.  Mild weight gain.  Constipation.  Nausea.  Coughing or a sore throat.  Changes in how your medicines work in your body.  A  depressed mood.  Difficulty sleeping (insomnia).  After the first 2-3 weeks of quitting, you may start to notice more positive results, such as:  Improved sense of smell and taste.  Decreased coughing and sore throat.  Slower heart rate.  Lower blood pressure.  Clearer skin.  The ability to breathe more easily.  Fewer sick days.  Quitting smoking is very challenging for most people. Do not get discouraged if you are not successful the first time. Some people need to make many attempts to quit before they achieve long-term success. Do your best to stick to your quit plan, and talk with your health care provider if you have any questions or concerns.  This information is not intended to replace advice given to you by your health care provider. Make sure you discuss any questions you have with your health care provider.  Document Released: 12/12/2002 Document Revised: 08/15/2017 Document Reviewed: 05/03/2016  Elsevier Interactive Patient Education © 2017 [employer]

## 2022-11-09 ENCOUNTER — OFFICE VISIT (OUTPATIENT)
Dept: FAMILY MEDICINE CLINIC | Facility: CLINIC | Age: 42
End: 2022-11-09

## 2022-11-09 ENCOUNTER — LAB (OUTPATIENT)
Dept: LAB | Facility: HOSPITAL | Age: 42
End: 2022-11-09

## 2022-11-09 VITALS
SYSTOLIC BLOOD PRESSURE: 108 MMHG | OXYGEN SATURATION: 96 % | DIASTOLIC BLOOD PRESSURE: 76 MMHG | TEMPERATURE: 97.8 F | HEART RATE: 80 BPM | WEIGHT: 163.2 LBS | BODY MASS INDEX: 25.62 KG/M2 | HEIGHT: 67 IN

## 2022-11-09 DIAGNOSIS — Z72.52 HIGH RISK HOMOSEXUAL BEHAVIOR: ICD-10-CM

## 2022-11-09 DIAGNOSIS — J44.9 CHRONIC OBSTRUCTIVE PULMONARY DISEASE, UNSPECIFIED COPD TYPE: ICD-10-CM

## 2022-11-09 DIAGNOSIS — Z00.00 PREVENTATIVE HEALTH CARE: ICD-10-CM

## 2022-11-09 DIAGNOSIS — F31.9 BIPOLAR AFFECTIVE DISORDER, REMISSION STATUS UNSPECIFIED: ICD-10-CM

## 2022-11-09 DIAGNOSIS — M79.7 FIBROMYALGIA: ICD-10-CM

## 2022-11-09 DIAGNOSIS — G62.9 PERIPHERAL POLYNEUROPATHY: ICD-10-CM

## 2022-11-09 DIAGNOSIS — Z72.52 HIGH RISK HOMOSEXUAL BEHAVIOR: Primary | ICD-10-CM

## 2022-11-09 DIAGNOSIS — Z51.81 ENCOUNTER FOR THERAPEUTIC DRUG MONITORING: ICD-10-CM

## 2022-11-09 PROBLEM — Z98.1 HISTORY OF SPINAL FUSION: Status: ACTIVE | Noted: 2018-08-23

## 2022-11-09 PROBLEM — M51.34 DEGENERATION OF THORACIC INTERVERTEBRAL DISC: Status: ACTIVE | Noted: 2018-08-23

## 2022-11-09 PROBLEM — Z22.7 LATENT TUBERCULOSIS: Status: ACTIVE | Noted: 2022-04-13

## 2022-11-09 PROBLEM — K59.03 DRUG INDUCED CONSTIPATION: Status: ACTIVE | Noted: 2022-01-03

## 2022-11-09 PROBLEM — F19.94 DRUG-INDUCED MOOD DISORDER(292.84): Status: ACTIVE | Noted: 2018-05-02

## 2022-11-09 PROBLEM — F13.90 BENZODIAZEPINE MISUSE: Status: ACTIVE | Noted: 2018-06-17

## 2022-11-09 PROBLEM — F20.0 PARANOID SCHIZOPHRENIA: Status: ACTIVE | Noted: 2022-04-13

## 2022-11-09 PROBLEM — J98.11 ATELECTASIS: Status: ACTIVE | Noted: 2018-05-14

## 2022-11-09 PROBLEM — Z78.9 OTHER SPECIFIED HEALTH STATUS: Status: ACTIVE | Noted: 2020-10-17

## 2022-11-09 PROBLEM — T14.8XXA FRACTURE OF BONE: Status: ACTIVE | Noted: 2018-03-29

## 2022-11-09 PROBLEM — M79.676 PAIN IN TOE: Status: ACTIVE | Noted: 2020-06-30

## 2022-11-09 PROBLEM — J30.2 SEASONAL ALLERGIC RHINITIS: Status: ACTIVE | Noted: 2022-11-09

## 2022-11-09 LAB
ALBUMIN SERPL-MCNC: 5 G/DL (ref 3.5–5.2)
ALBUMIN/GLOB SERPL: 2.2 G/DL
ALP SERPL-CCNC: 55 U/L (ref 39–117)
ALT SERPL W P-5'-P-CCNC: 10 U/L (ref 1–41)
ANION GAP SERPL CALCULATED.3IONS-SCNC: 12 MMOL/L (ref 5–15)
AST SERPL-CCNC: 22 U/L (ref 1–40)
BILIRUB SERPL-MCNC: 0.3 MG/DL (ref 0–1.2)
BUN SERPL-MCNC: 5 MG/DL (ref 6–20)
BUN/CREAT SERPL: 6.2 (ref 7–25)
CALCIUM SPEC-SCNC: 9.3 MG/DL (ref 8.6–10.5)
CHLORIDE SERPL-SCNC: 98 MMOL/L (ref 98–107)
CO2 SERPL-SCNC: 26 MMOL/L (ref 22–29)
CREAT SERPL-MCNC: 0.81 MG/DL (ref 0.76–1.27)
EGFRCR SERPLBLD CKD-EPI 2021: 112.9 ML/MIN/1.73
GLOBULIN UR ELPH-MCNC: 2.3 GM/DL
GLUCOSE SERPL-MCNC: 113 MG/DL (ref 65–99)
POTASSIUM SERPL-SCNC: 3.9 MMOL/L (ref 3.5–5.2)
PROT SERPL-MCNC: 7.3 G/DL (ref 6–8.5)
SODIUM SERPL-SCNC: 136 MMOL/L (ref 136–145)

## 2022-11-09 PROCEDURE — 99214 OFFICE O/P EST MOD 30 MIN: CPT | Performed by: PHYSICIAN ASSISTANT

## 2022-11-09 PROCEDURE — 80053 COMPREHEN METABOLIC PANEL: CPT

## 2022-11-09 PROCEDURE — 90471 IMMUNIZATION ADMIN: CPT | Performed by: PHYSICIAN ASSISTANT

## 2022-11-09 PROCEDURE — 86592 SYPHILIS TEST NON-TREP QUAL: CPT

## 2022-11-09 PROCEDURE — 87491 CHLMYD TRACH DNA AMP PROBE: CPT

## 2022-11-09 PROCEDURE — 90677 PCV20 VACCINE IM: CPT | Performed by: PHYSICIAN ASSISTANT

## 2022-11-09 PROCEDURE — 87340 HEPATITIS B SURFACE AG IA: CPT

## 2022-11-09 PROCEDURE — G0432 EIA HIV-1/HIV-2 SCREEN: HCPCS

## 2022-11-09 PROCEDURE — 87591 N.GONORRHOEAE DNA AMP PROB: CPT

## 2022-11-09 PROCEDURE — 85025 COMPLETE CBC W/AUTO DIFF WBC: CPT

## 2022-11-09 PROCEDURE — 86706 HEP B SURFACE ANTIBODY: CPT

## 2022-11-09 PROCEDURE — 86803 HEPATITIS C AB TEST: CPT

## 2022-11-09 RX ORDER — PREGABALIN 150 MG/1
CAPSULE ORAL
COMMUNITY
Start: 2022-09-22 | End: 2022-11-18 | Stop reason: SDUPTHER

## 2022-11-09 RX ORDER — OLOPATADINE HYDROCHLORIDE 2 MG/ML
SOLUTION/ DROPS OPHTHALMIC
COMMUNITY
Start: 2022-10-18 | End: 2023-02-06

## 2022-11-09 RX ORDER — DOCUSATE SODIUM, SENNOSIDES 50; 8.6 MG/1; MG/1
1 TABLET ORAL NIGHTLY
Qty: 30 TABLET | Refills: 2 | Status: SHIPPED | OUTPATIENT
Start: 2022-11-09 | End: 2023-02-06 | Stop reason: SDUPTHER

## 2022-11-09 RX ORDER — IBUPROFEN 800 MG/1
TABLET ORAL
COMMUNITY
Start: 2022-10-14 | End: 2023-02-06

## 2022-11-09 RX ORDER — BUPRENORPHINE 300 MG/1
SOLUTION SUBCUTANEOUS
COMMUNITY
Start: 2022-10-17 | End: 2022-11-09

## 2022-11-09 RX ORDER — BUDESONIDE, GLYCOPYRROLATE, AND FORMOTEROL FUMARATE 160; 9; 4.8 UG/1; UG/1; UG/1
AEROSOL, METERED RESPIRATORY (INHALATION)
COMMUNITY
Start: 2022-09-16 | End: 2022-11-09 | Stop reason: SDUPTHER

## 2022-11-09 RX ORDER — LANOLIN ALCOHOL/MO/W.PET/CERES
325 CREAM (GRAM) TOPICAL
COMMUNITY
End: 2023-02-06

## 2022-11-09 RX ORDER — CALCIUM ACETATE 667 MG/1
1334 CAPSULE ORAL
COMMUNITY
End: 2023-02-06

## 2022-11-09 RX ORDER — VENLAFAXINE HYDROCHLORIDE 150 MG/1
150 CAPSULE, EXTENDED RELEASE ORAL DAILY
Qty: 30 CAPSULE | Refills: 2 | Status: SHIPPED | OUTPATIENT
Start: 2022-11-09 | End: 2022-12-13 | Stop reason: SDUPTHER

## 2022-11-09 RX ORDER — PRAZOSIN HYDROCHLORIDE 5 MG/1
5 CAPSULE ORAL NIGHTLY
Qty: 30 CAPSULE | Refills: 2 | Status: SHIPPED | OUTPATIENT
Start: 2022-11-09 | End: 2022-12-13

## 2022-11-09 RX ORDER — BUDESONIDE, GLYCOPYRROLATE, AND FORMOTEROL FUMARATE 160; 9; 4.8 UG/1; UG/1; UG/1
2 AEROSOL, METERED RESPIRATORY (INHALATION) 2 TIMES DAILY
Qty: 10.7 G | Refills: 2 | Status: SHIPPED | OUTPATIENT
Start: 2022-11-09 | End: 2023-02-06

## 2022-11-09 RX ORDER — PRAZOSIN HYDROCHLORIDE 5 MG/1
CAPSULE ORAL
COMMUNITY
Start: 2022-09-05 | End: 2022-11-09 | Stop reason: SDUPTHER

## 2022-11-09 RX ORDER — ACYCLOVIR 400 MG/1
TABLET ORAL
COMMUNITY
Start: 2022-09-12 | End: 2023-02-06

## 2022-11-09 RX ORDER — TESTOSTERONE CYPIONATE 200 MG/ML
100 INJECTION, SOLUTION INTRAMUSCULAR
COMMUNITY
Start: 2022-09-22

## 2022-11-09 RX ORDER — TRAZODONE HYDROCHLORIDE 100 MG/1
100 TABLET ORAL
COMMUNITY
End: 2022-11-09 | Stop reason: SDUPTHER

## 2022-11-09 RX ORDER — RAMELTEON 8 MG/1
TABLET ORAL
COMMUNITY
Start: 2022-10-06 | End: 2022-11-09

## 2022-11-09 RX ORDER — QUETIAPINE FUMARATE 300 MG/1
300 TABLET, FILM COATED ORAL NIGHTLY
Qty: 30 TABLET | Refills: 2 | Status: SHIPPED | OUTPATIENT
Start: 2022-11-09 | End: 2022-11-20 | Stop reason: ALTCHOICE

## 2022-11-09 RX ORDER — VENLAFAXINE HYDROCHLORIDE 150 MG/1
CAPSULE, EXTENDED RELEASE ORAL
COMMUNITY
Start: 2022-09-15 | End: 2022-11-09 | Stop reason: SDUPTHER

## 2022-11-09 RX ORDER — SYRINGE WITH NEEDLE, 1 ML 25GX5/8"
SYRINGE, EMPTY DISPOSABLE MISCELLANEOUS
COMMUNITY
Start: 2022-10-18

## 2022-11-09 RX ORDER — TRAZODONE HYDROCHLORIDE 100 MG/1
100 TABLET ORAL NIGHTLY
Qty: 30 TABLET | Refills: 2 | Status: SHIPPED | OUTPATIENT
Start: 2022-11-09 | End: 2022-12-13

## 2022-11-09 RX ORDER — SOLIFENACIN SUCCINATE 5 MG/1
TABLET, FILM COATED ORAL
COMMUNITY
Start: 2022-09-07 | End: 2023-02-06

## 2022-11-09 NOTE — PROGRESS NOTES
"    Chief Complaint   Patient presents with   • Establish Care   • Fibromyalgia   • Leg Pain     Bilateral       HPI     Barak Rivera is a pleasant 42 y.o. male with a PMH of opioid abuse, bipolar disorder, depression, COPD, asthma, tobacco dependence, fibromyalgia, peripheral neuropathy, pancreatitis, and osteoporosis who presents for initial visit.     The patient states he moved from [place], [place] back to [place] 3 months ago. He moved to \"get out of [facility].\" He was previously followed by Dr. Alan Del Toro in [place], [place]. He was last seen there for a follow-up and medication refills \"over the summer.\" He states he has been out of several of his medications and is requesting refills.      He has been treated with PrEP since 2014. He requests to try apretude injections. He switched over to Descovy a few years ago. Truvada game him osteoporosis. He is treated with alendronate, Prolia, and testosterone at the [facility].    [facility] [place] [facility] and his bipolar disorder but their behavioral health clinic closed. He states he has been off of the sublocade injections for the past 1 month. He reports addiction to pain pills after back surgery 4 years ago. He denies taking pain pills for 2 years. He feels he is doing well off of sublocade and does not want to resume it. He states his bipolar disorder is controlled. He does not feel depressed. He denies a history of pino. He mentions he had osteomyelitis in [place], [place] in 2016. He denies IV drug use. Currently sexually active with 1 partner who is HIV positive. He does not use protection.     Fibromyalgia: dx in 2018 by neurology in [place], [place]. He was having pins and needles in his legs and they were \"catching on fire.\" He does admit to being diagnosed with idiopathic neuropathy. He did have nerve conduction test. In the past he was treated with gabapentin but the Lyrica works better. He is treated " with 150 mg bid.This is tolerated well without side-effects.           Past Medical History:   Diagnosis Date   • ADD (attention deficit disorder)    • Anogenital HPV infection    • Anxiety    • Arthritis    • Bipolar disorder (HCC)    • Condyloma acuminatum in male    • Constipation    • COPD (chronic obstructive pulmonary disease) (HCC)    • Depression    • Fibromyalgia    • GERD (gastroesophageal reflux disease)    • Hepatitis B     antibodies   • Hepatitis C     antibodies   • Hyperlipidemia    • Migraines    • On home oxygen therapy     at hs per [place], 2.5 L   • Osteomyelitis hip (HCC)    • Osteoporosis    • Osteoporosis    • Paranoia (HCC)    • Suicidal ideations    • Tuberculosis 2021    Latent, chest x-ray neg   • Urinary tract infection        Past Surgical History:   Procedure Laterality Date   • BACK SURGERY  09/22/2016   • BRONCHOSCOPY THORACOTOMY Right 1/4/2021    Procedure: THORACOTOMY WITH DIAPHRAGM PLICATION RIGHT;  Surgeon: Corbin Waller MD;  Location: [facility];  Service: Cardiothoracic;  Laterality: Right;   • HAND SURGERY Right 06/2016       Family History   Problem Relation Age of Onset   • Hypertension Other    • Diabetes Other    • Depression Other    • Heart attack Mother    • Anxiety disorder Father    • Depression Father        Social History     Socioeconomic History   • Marital status: Single   • Number of children: 0   Tobacco Use   • Smoking status: Heavy Smoker     Packs/day: 1.00     Years: 24.00     Pack years: 24.00     Types: Cigarettes   • Smokeless tobacco: Never   • Tobacco comments:     pt demands nicotine patch, refuses counseling, Pt is smoking 5 cigs   Vaping Use   • Vaping Use: Never used   Substance and Sexual Activity   • Alcohol use: No   • Drug use: Yes     Types: Methamphetamines, Amphetamines, Benzodiazepines     Comment: TCA; 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    • Sexual activity: Yes     Partners: Male     Comment: currently had unprotected sex  6/21/21 consential.       Allergies   Allergen Reactions   • Phenobarbital Nausea And Vomiting and Other (See Comments)     N/V       ROS    Review of Systems   Constitutional: Negative.  Negative for appetite change, chills, diaphoresis, fatigue, fever, unexpected weight gain and unexpected weight loss.   HENT: Negative.  Negative for ear pain, hearing loss, nosebleeds, rhinorrhea, sore throat, trouble swallowing and voice change.    Eyes: Negative.  Negative for pain, redness, itching and visual disturbance.   Respiratory: Positive for cough, shortness of breath and wheezing.    Cardiovascular: Negative.  Negative for chest pain, palpitations and leg swelling.   Gastrointestinal: Positive for constipation. Negative for abdominal pain, blood in stool, diarrhea, nausea, vomiting and GERD.   Endocrine: Negative.  Negative for cold intolerance and heat intolerance.   Genitourinary: Positive for frequency. Negative for dysuria, hematuria, urgency and urinary incontinence.   Musculoskeletal: Positive for arthralgias and myalgias. Negative for gait problem and joint swelling.   Skin: Negative.  Negative for color change, rash and skin lesions.   Allergic/Immunologic: Negative.    Neurological: Negative.  Negative for dizziness, seizures, syncope, weakness, light-headedness and numbness.   Hematological: Negative.  Negative for adenopathy. Does not bruise/bleed easily.   Psychiatric/Behavioral: Positive for sleep disturbance and depressed mood. Negative for behavioral problems and suicidal ideas. The patient is nervous/anxious.        Vitals:    11/09/22 1230   BP: 108/76   Pulse: 80   Temp: 97.8 °F (36.6 °C)   SpO2: 96%     Body mass index is 25.55 kg/m².      Current Outpatient Medications:   •  acyclovir (ZOVIRAX) 400 MG tablet, , Disp: , Rfl:   •  albuterol sulfate  (90 Base) MCG/ACT inhaler, Inhale 2 puffs Every 4 (Four) Hours As Needed for Wheezing., Disp: 18 g, Rfl: 0  •  alendronate (FOSAMAX) 70 MG tablet,  ", Disp: , Rfl:   •  B-D 3CC LUER-BRYCE SYR 23GX1\" 23G X 1\" 3 ML misc, , Disp: , Rfl:   •  Breztri Aerosphere 160-9-4.8 MCG/ACT aerosol inhaler, Inhale 2 puffs 2 (Two) Times a Day., Disp: 10.7 g, Rfl: 2  •  calcium acetate (PHOS BINDER,) 667 MG capsule capsule, Take 2 capsules by mouth., Disp: , Rfl:   •  Calcium-Vitamin D-Vitamin K ([employer] Calcium Soft Chews) 650-12.5-40 MG-MCG-MCG chewable tablet, Chew 2 tablets Daily., Disp: , Rfl:   •  cloNIDine (CATAPRES) 0.3 MG tablet, Take 0.3 mg by mouth Every Night., Disp: , Rfl:   •  denosumab (PROLIA) 60 MG/ML solution prefilled syringe syringe, Inject 60 mg under the skin into the appropriate area as directed., Disp: , Rfl:   •  ergocalciferol (ERGOCALCIFEROL) 1.25 MG (46517 UT) capsule, Take 50,000 Units by mouth Every 30 (Thirty) Days., Disp: , Rfl:   •  ferrous sulfate 325 (65 FE) MG EC tablet, Take 1 tablet by mouth., Disp: , Rfl:   •  ibuprofen (ADVIL,MOTRIN) 800 MG tablet, , Disp: , Rfl:   •  Lactobacillus-Inulin ([facility]) capsule, , Disp: , Rfl:   •  olopatadine (PATADAY) 0.2 % solution ophthalmic solution, , Disp: , Rfl:   •  prazosin (MINIPRESS) 5 MG capsule, Take 1 capsule by mouth Every Night., Disp: 30 capsule, Rfl: 2  •  Senna-Plus 8.6-50 MG per tablet, Take 1 tablet by mouth Every Night., Disp: 30 tablet, Rfl: 2  •  solifenacin (VESICARE) 5 MG tablet, , Disp: , Rfl:   •  Testosterone Cypionate (DEPOTESTOTERONE CYPIONATE) 200 MG/ML injection, Inject 0.5 mL into the appropriate muscle as directed by prescriber., Disp: , Rfl:   •  traZODone (DESYREL) 100 MG tablet, Take 1 tablet by mouth Every Night., Disp: 30 tablet, Rfl: 2  •  venlafaxine XR (EFFEXOR-XR) 150 MG 24 hr capsule, Take 1 capsule by mouth Daily., Disp: 30 capsule, Rfl: 2  •  Descovy 200-25 MG per tablet, Take 1 tablet by mouth Daily. Indications: HIV Infection Risk Reduction Before Potential Exposure, Disp: 90 tablet, Rfl: 0  •  predniSONE (DELTASONE) 20 MG tablet, Take 1 tablet by " mouth 2 (Two) Times a Day for 7 days., Disp: 14 tablet, Rfl: 0  •  pregabalin (LYRICA) 150 MG capsule, Take 1 capsule by mouth 2 (Two) Times a Day., Disp: 60 capsule, Rfl: 2  •  QUEtiapine (SEROquel) 100 MG tablet, , Disp: , Rfl:   No current facility-administered medications for this visit.    Facility-Administered Medications Ordered in Other Visits:   •  Chlorhexidine Gluconate Cloth 2 % pads 1 application, 1 application, Topical, Q12H PRN, Josee Sanchez PA-C PE    Physical Exam  Vitals reviewed.   Constitutional:       General: He is not in acute distress.     Appearance: He is well-developed.   HENT:      Head: Normocephalic and atraumatic.   Eyes:      Conjunctiva/sclera: Conjunctivae normal.   Cardiovascular:      Rate and Rhythm: Normal rate and regular rhythm.      Heart sounds: Normal heart sounds. No murmur heard.  Pulmonary:      Effort: Pulmonary effort is normal.      Breath sounds: Normal breath sounds.   Musculoskeletal:      Cervical back: Normal range of motion.   Skin:     General: Skin is warm and dry.   Neurological:      Mental Status: He is alert.      Gait: Gait normal.   Psychiatric:         Speech: Speech normal.         Behavior: Behavior normal.          A/P    Problem List Items Addressed This Visit        Mental Health    Bipolar disorder (HCC)  -We will refer to behavioral health to establish care.  -I agreed to refill his medications only temporarily until he sees psychiatry.    Relevant Medications    venlafaxine XR (EFFEXOR-XR) 150 MG 24 hr capsule    traZODone (DESYREL) 100 MG tablet    QUEtiapine (SEROquel) 100 MG tablet       Pulmonary and Pneumonias    COPD (chronic obstructive pulmonary disease) ([facility])  -Refilled Breztri and albuterol    Relevant Medications    albuterol sulfate  (90 Base) MCG/ACT inhaler    Breztri Aerosphere 160-9-4.8 MCG/ACT aerosol inhaler   Other Visit Diagnoses     High risk homosexual behavior    -  Primary  -I explained to the patient I do  not have familiarity with aptitude injections. He is interested in this so I will initiate a referral to [facility] infectious disease.  In the meantime he would like to continue on Descovy.  We will monitor renal and liver function as well as STI screening today.   -RTC in 3 months.       Peripheral polyneuropathy      -Lyrica improves generalized and neuropathy pain.   -This patient is on a controlled substance which improves symptoms/quality of life and is aware of the risks, benefits and possible side-effects current treatment. The patient denies any medication side-effects at this time. A controlled substance agreement will be obtained or is currently on file. We reviewed required monitoring for controlled substances including but not limited to quarterly follow-up visits, annual depression screening, and urine drug screens to which the patient is agreeable. A [facility] report has been or shortly will be reviewed. There are no signs of deviation or misuse.       Fibromyalgia        Preventative health care        Encounter for therapeutic drug monitoring        Relevant Orders    Compliance Drug Analysis, Ur - Urine, Clean Catch          Plan of care was reviewed with patient at the conclusion of today's visit. Education was provided regarding diagnoses, management, prescribed or recommended OTC products, and the importance of compliance with follow-up appointments. The patient was counseled regarding the risks, benefits, and possible side-effects of treatment. I advised the patient to keep me informed of any acute changes in their status including new, worsening, or persistent symptoms. Patient expresses understanding and agreement with the management plan.        CAROL Prajapati

## 2022-11-10 ENCOUNTER — TELEPHONE (OUTPATIENT)
Dept: FAMILY MEDICINE CLINIC | Facility: CLINIC | Age: 42
End: 2022-11-10

## 2022-11-10 LAB
BASOPHILS # BLD AUTO: 0.04 10*3/MM3 (ref 0–0.2)
BASOPHILS NFR BLD AUTO: 0.5 % (ref 0–1.5)
DEPRECATED RDW RBC AUTO: 48.7 FL (ref 37–54)
EOSINOPHIL # BLD AUTO: 0.1 10*3/MM3 (ref 0–0.4)
EOSINOPHIL NFR BLD AUTO: 1.2 % (ref 0.3–6.2)
ERYTHROCYTE [DISTWIDTH] IN BLOOD BY AUTOMATED COUNT: 15 % (ref 12.3–15.4)
HBV SURFACE AB SER RIA-ACNC: REACTIVE
HBV SURFACE AG SERPL QL IA: NORMAL
HCT VFR BLD AUTO: 46.1 % (ref 37.5–51)
HCV AB SER DONR QL: REACTIVE
HGB BLD-MCNC: 15.9 G/DL (ref 13–17.7)
HIV1+2 AB SER QL: NORMAL
IMM GRANULOCYTES # BLD AUTO: 0.02 10*3/MM3 (ref 0–0.05)
IMM GRANULOCYTES NFR BLD AUTO: 0.2 % (ref 0–0.5)
LYMPHOCYTES # BLD AUTO: 2.98 10*3/MM3 (ref 0.7–3.1)
LYMPHOCYTES NFR BLD AUTO: 37 % (ref 19.6–45.3)
MCH RBC QN AUTO: 30.6 PG (ref 26.6–33)
MCHC RBC AUTO-ENTMCNC: 34.5 G/DL (ref 31.5–35.7)
MCV RBC AUTO: 88.8 FL (ref 79–97)
MONOCYTES # BLD AUTO: 0.68 10*3/MM3 (ref 0.1–0.9)
MONOCYTES NFR BLD AUTO: 8.4 % (ref 5–12)
NEUTROPHILS NFR BLD AUTO: 4.24 10*3/MM3 (ref 1.7–7)
NEUTROPHILS NFR BLD AUTO: 52.7 % (ref 42.7–76)
NRBC BLD AUTO-RTO: 0 /100 WBC (ref 0–0.2)
PLATELET # BLD AUTO: 239 10*3/MM3 (ref 140–450)
PMV BLD AUTO: 9.1 FL (ref 6–12)
RBC # BLD AUTO: 5.19 10*6/MM3 (ref 4.14–5.8)
RPR SER QL: NORMAL
WBC NRBC COR # BLD: 8.06 10*3/MM3 (ref 3.4–10.8)

## 2022-11-10 NOTE — TELEPHONE ENCOUNTER
Advised patient that UDS typically take longer for finalized results. He verbalized understanding and requesting short term supply of Lyrica until UDS is completed

## 2022-11-10 NOTE — TELEPHONE ENCOUNTER
Caller: Barak Rivera    Relationship: Self    Best call back number: [phone]    Requested Prescriptions:   Requested Prescriptions      No prescriptions requested or ordered in this encounter      pregabalin (LYRICA) 150 MG capsule    Pharmacy where request should be sent:      [facility]  [place], [place] - [address] - [phone]  - [phone] FX     Additional details provided by patient:     PATIENT TOOK A DRUG TEST YESTERDAY     Does the patient have less than a 3 day supply:  [x] Yes  [] No    Rebecca Ramey Rep   11/10/22 12:37 EST

## 2022-11-11 DIAGNOSIS — F31.9 BIPOLAR AFFECTIVE DISORDER, REMISSION STATUS UNSPECIFIED: ICD-10-CM

## 2022-11-11 DIAGNOSIS — R76.8 HEPATITIS C ANTIBODY TEST POSITIVE: ICD-10-CM

## 2022-11-11 DIAGNOSIS — R73.9 HYPERGLYCEMIA: Primary | ICD-10-CM

## 2022-11-11 LAB
C TRACH RRNA SPEC QL NAA+PROBE: NEGATIVE
N GONORRHOEA RRNA SPEC QL NAA+PROBE: NEGATIVE

## 2022-11-15 ENCOUNTER — TELEPHONE (OUTPATIENT)
Dept: FAMILY MEDICINE CLINIC | Facility: CLINIC | Age: 42
End: 2022-11-15

## 2022-11-15 NOTE — TELEPHONE ENCOUNTER
Caller: Barak Rivera    Relationship: Self    Best call back number: [phone]    What test was performed: DRUG TEST    When was the test performed: 11/09/22    Where was the test performed: IN OFFICE    Additional notes: PATIENT WAS CALLING TO CHECK ON RESULTS AND WANTED TO KNOW IF HE MEDICATION WAS GOING TO BE SENT INTO PHARMACY AS WELL    PLEASE ADVISE

## 2022-11-15 NOTE — TELEPHONE ENCOUNTER
Contacted [facility] to check status. I was advised that the specimen was received 11/10 testing began 11/11. There is a standard  7 business day turnaround time. Patient notified      He states he received Seroquel 100 mg from pharmacy which is incorrect dosage. Contacted pharmacy to clarify, pharmacist has made error and will send appropriate dosage to patient today.

## 2022-11-16 LAB — DRUGS UR: NORMAL

## 2022-11-18 ENCOUNTER — PRIOR AUTHORIZATION (OUTPATIENT)
Dept: FAMILY MEDICINE CLINIC | Facility: CLINIC | Age: 42
End: 2022-11-18

## 2022-11-18 DIAGNOSIS — G89.29 CHRONIC BILATERAL BACK PAIN, UNSPECIFIED BACK LOCATION: Primary | ICD-10-CM

## 2022-11-18 DIAGNOSIS — M54.9 CHRONIC BILATERAL BACK PAIN, UNSPECIFIED BACK LOCATION: Primary | ICD-10-CM

## 2022-11-18 RX ORDER — PREGABALIN 150 MG/1
150 CAPSULE ORAL 2 TIMES DAILY
Qty: 60 CAPSULE | Refills: 2 | Status: SHIPPED | OUTPATIENT
Start: 2022-11-18 | End: 2023-02-06 | Stop reason: SDUPTHER

## 2022-11-18 RX ORDER — EMTRICITABINE AND TENOFOVIR ALAFENAMIDE 200; 25 MG/1; MG/1
1 TABLET ORAL DAILY
Qty: 90 TABLET | Refills: 0 | Status: SHIPPED | OUTPATIENT
Start: 2022-11-18 | End: 2023-02-06 | Stop reason: SDUPTHER

## 2022-11-18 NOTE — TELEPHONE ENCOUNTER
Caller: Barak Rivera    Relationship: Self    Best call back number: [phone]    What test was performed: LAB    When was the test performed: 11/9    Where was the test performed: OFFICE

## 2022-11-18 NOTE — TELEPHONE ENCOUNTER
Rx Refill Note  Requested Prescriptions     Pending Prescriptions Disp Refills   • pregabalin (LYRICA) 150 MG capsule        Last office visit with prescribing clinician: 11/9/2022      Next office visit with prescribing clinician: 2/9/2023     rachel 11-9-22  luis 11-15-22       Lizzeth Mcfadden MA  11/18/22, 09:19 EST

## 2022-11-18 NOTE — TELEPHONE ENCOUNTER
Caller: Barak Rivera    Relationship: Self        Requested Prescriptions:   Requested Prescriptions     Pending Prescriptions Disp Refills   • pregabalin (LYRICA) 150 MG capsule          Pharmacy where request should be sent: [facility] [place], [place] - [address]  - [phone]  - [phone] FX     Does the patient have less than a 3 day supply:  [x] Yes  [] No    Rebecca Wheeler Rep   11/18/22 08:04 EST

## 2022-11-20 ENCOUNTER — TELEMEDICINE (OUTPATIENT)
Dept: FAMILY MEDICINE CLINIC | Facility: TELEHEALTH | Age: 42
End: 2022-11-20

## 2022-11-20 DIAGNOSIS — J44.1 ACUTE EXACERBATION OF CHRONIC OBSTRUCTIVE PULMONARY DISEASE (COPD): Primary | ICD-10-CM

## 2022-11-20 PROCEDURE — 99213 OFFICE O/P EST LOW 20 MIN: CPT | Performed by: NURSE PRACTITIONER

## 2022-11-20 RX ORDER — QUETIAPINE FUMARATE 100 MG/1
TABLET, FILM COATED ORAL
COMMUNITY
Start: 2022-11-09 | End: 2022-12-13

## 2022-11-20 RX ORDER — LEVOFLOXACIN 500 MG/1
500 TABLET, FILM COATED ORAL DAILY
Qty: 5 TABLET | Refills: 0 | Status: SHIPPED | OUTPATIENT
Start: 2022-11-20 | End: 2022-11-25

## 2022-11-20 RX ORDER — PREDNISONE 20 MG/1
20 TABLET ORAL 2 TIMES DAILY
Qty: 14 TABLET | Refills: 0 | Status: SHIPPED | OUTPATIENT
Start: 2022-11-20 | End: 2022-11-27

## 2022-11-20 NOTE — PROGRESS NOTES
Chief Complaint   Patient presents with   • Cough       Subjective   Barak Rivera is a 41 y.o. male.     History of Present Illness  Cough for over a week. Exposed to Flu in the household. He has sinus congestion, runny nose but no fever, chills, but has had wheezing worse at night. Shortness of air with exertion. He has COPD and wears oxygen as needed, but also smokes. He states that he usually gets Levaquin and steroid and feels better quickly. He has a tendency to get worsening exacerbation of COPD if not treated quickly.  Cough  This is a new problem. The current episode started in the past 7 days (over a week). The problem has been gradually worsening. The problem occurs every few minutes. The cough is productive of sputum. Associated symptoms include nasal congestion, postnasal drip, shortness of breath and wheezing. Pertinent negatives include no chest pain, chills, ear pain, fever or sore throat. He has tried a beta-agonist inhaler for the symptoms. The treatment provided no relief. His past medical history is significant for COPD.       The following portions of the patient's history were reviewed and updated as appropriate: allergies, current medications, past medical history, and problem list.      Past Medical History:   Diagnosis Date   • ADD (attention deficit disorder)    • Anogenital HPV infection    • Anxiety    • Arthritis    • Bipolar disorder ([facility])    • Condyloma acuminatum in male    • Constipation    • COPD (chronic obstructive pulmonary disease) ([facility])    • Depression    • Fibromyalgia    • GERD (gastroesophageal reflux disease)    • Hepatitis B     antibodies   • Hepatitis C     antibodies   • Hyperlipidemia    • Migraines    • On home oxygen therapy     at hs per [place], 2.5 L   • Osteomyelitis hip ([facility])    • Osteoporosis    • Osteoporosis    • Paranoia ([facility])    • Suicidal ideations    • Tuberculosis 2021    Latent, chest x-ray neg   • Urinary tract infection      Social History  "    Socioeconomic History   • Marital status: Single   • Number of children: 0   Tobacco Use   • Smoking status: Heavy Smoker     Packs/day: 1.00     Years: 24.00     Pack years: 24.00     Types: Cigarettes   • Smokeless tobacco: Never   • Tobacco comments:     pt demands nicotine patch, refuses counseling, Pt is smoking 5 cigs   Vaping Use   • Vaping Use: Never used   Substance and Sexual Activity   • Alcohol use: No   • Drug use: Yes     Types: Methamphetamines, Amphetamines, Benzodiazepines     Comment: TCA; 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     medication documentation: reviewed and updated with patient and   Current Outpatient Medications:   •  acyclovir (ZOVIRAX) 400 MG tablet, , Disp: , Rfl:   •  albuterol sulfate  (90 Base) MCG/ACT inhaler, Inhale 2 puffs Every 4 (Four) Hours As Needed for Wheezing., Disp: 18 g, Rfl: 0  •  alendronate (FOSAMAX) 70 MG tablet, , Disp: , Rfl:   •  B-D 3CC LUER-BRYCE SYR 23GX1\" 23G X 1\" 3 ML misc, , Disp: , Rfl:   •  Breztri Aerosphere 160-9-4.8 MCG/ACT aerosol inhaler, Inhale 2 puffs 2 (Two) Times a Day., Disp: 10.7 g, Rfl: 2  •  calcium acetate (PHOS BINDER,) 667 MG capsule capsule, Take 2 capsules by mouth., Disp: , Rfl:   •  Calcium-Vitamin D-Vitamin K ([employer] Calcium Soft Chews) 650-12.5-40 MG-MCG-MCG chewable tablet, Chew 2 tablets Daily., Disp: , Rfl:   •  cloNIDine (CATAPRES) 0.3 MG tablet, Take 0.3 mg by mouth Every Night., Disp: , Rfl:   •  denosumab (PROLIA) 60 MG/ML solution prefilled syringe syringe, Inject 60 mg under the skin into the appropriate area as directed., Disp: , Rfl:   •  Descovy 200-25 MG per tablet, Take 1 tablet by mouth Daily. Indications: HIV Infection Risk Reduction Before Potential Exposure, Disp: 90 tablet, Rfl: 0  •  ergocalciferol (ERGOCALCIFEROL) 1.25 MG (09383 UT) capsule, Take 50,000 Units by mouth Every 30 (Thirty) " Days., Disp: , Rfl:   •  ferrous sulfate 325 (65 FE) MG EC tablet, Take 1 tablet by mouth., Disp: , Rfl:   •  ibuprofen (ADVIL,MOTRIN) 800 MG tablet, , Disp: , Rfl:   •  Lactobacillus-Inulin ([facility]) capsule, , Disp: , Rfl:   •  levoFLOXacin (LEVAQUIN) 500 MG tablet, Take 1 tablet by mouth Daily for 5 days., Disp: 5 tablet, Rfl: 0  •  olopatadine (PATADAY) 0.2 % solution ophthalmic solution, , Disp: , Rfl:   •  prazosin (MINIPRESS) 5 MG capsule, Take 1 capsule by mouth Every Night., Disp: 30 capsule, Rfl: 2  •  predniSONE (DELTASONE) 20 MG tablet, Take 1 tablet by mouth 2 (Two) Times a Day for 7 days., Disp: 14 tablet, Rfl: 0  •  pregabalin (LYRICA) 150 MG capsule, Take 1 capsule by mouth 2 (Two) Times a Day., Disp: 60 capsule, Rfl: 2  •  QUEtiapine (SEROquel) 100 MG tablet, , Disp: , Rfl:   •  Senna-Plus 8.6-50 MG per tablet, Take 1 tablet by mouth Every Night., Disp: 30 tablet, Rfl: 2  •  solifenacin (VESICARE) 5 MG tablet, , Disp: , Rfl:   •  Testosterone Cypionate (DEPOTESTOTERONE CYPIONATE) 200 MG/ML injection, Inject 0.5 mL into the appropriate muscle as directed by prescriber., Disp: , Rfl:   •  traZODone (DESYREL) 100 MG tablet, Take 1 tablet by mouth Every Night., Disp: 30 tablet, Rfl: 2  •  venlafaxine XR (EFFEXOR-XR) 150 MG 24 hr capsule, Take 1 capsule by mouth Daily., Disp: 30 capsule, Rfl: 2  No current facility-administered medications for this visit.    Facility-Administered Medications Ordered in Other Visits:   •  Chlorhexidine Gluconate Cloth 2 % pads 1 application, 1 application, Topical, Q12H PRN, Josee Sanchez PA-C  Review of Systems   Constitutional: Positive for fatigue. Negative for chills and fever.   HENT: Positive for congestion, postnasal drip and sinus pressure. Negative for ear pain, sore throat and trouble swallowing.    Respiratory: Positive for cough, chest tightness, shortness of breath and wheezing.    Cardiovascular: Negative for chest pain.    Gastrointestinal: Negative for diarrhea, nausea and vomiting.   Neurological: Negative for dizziness and light-headedness.       Objective   Physical Exam  Constitutional:       General: He is not in acute distress.     Appearance: He is not ill-appearing.   Pulmonary:      Effort: Pulmonary effort is normal.      Comments: Infrequent cough, not wearing O2  Neurological:      Mental Status: He is alert.   Psychiatric:         Speech: Speech normal.         Assessment & Plan   Diagnoses and all orders for this visit:    1. Acute exacerbation of chronic obstructive pulmonary disease (COPD) ([facility]) (Primary)  -     predniSONE (DELTASONE) 20 MG tablet; Take 1 tablet by mouth 2 (Two) Times a Day for 7 days.  Dispense: 14 tablet; Refill: 0  -     levoFLOXacin (LEVAQUIN) 500 MG tablet; Take 1 tablet by mouth Daily for 5 days.  Dispense: 5 tablet; Refill: 0                    Follow Up:  If your symptoms are not resolving by the completion of your treatment or are worsening, see your primary care provider for follow up. If you don't have a primary care provider, you may go to any Urgent Care for re-evaluation. If you develop any life threatening symptoms, go to the nearest Emergency Department immediately or call EMS.               The use of  Video Visit was utilized during this visit, using both MyChart and Zoom. The use of a video visit has been reviewed with the patient and verbal informed consent has been obtained. No technical difficulties occurred during the visit.    is located at [facility] [address] [address]  Provider is located at [place], [place]

## 2022-11-21 ENCOUNTER — PATIENT MESSAGE (OUTPATIENT)
Dept: FAMILY MEDICINE CLINIC | Facility: CLINIC | Age: 42
End: 2022-11-21

## 2022-11-21 DIAGNOSIS — N48.6 PEYRONIE'S DISEASE: Primary | ICD-10-CM

## 2022-11-23 NOTE — TELEPHONE ENCOUNTER
From: Barak Rivera  To: CAROL Prajapati  Sent: 11/21/2022 7:54 PM EST  Subject: Urologist     Tomas,  I forgot to mention it during our visit but can you refer me to a urologist that deals with peyroniesdisease?    All the best,    Barak

## 2022-11-28 ENCOUNTER — TELEMEDICINE (OUTPATIENT)
Dept: FAMILY MEDICINE CLINIC | Facility: TELEHEALTH | Age: 42
End: 2022-11-28

## 2022-11-28 DIAGNOSIS — J20.9 ACUTE BRONCHITIS, UNSPECIFIED ORGANISM: Primary | ICD-10-CM

## 2022-11-28 PROCEDURE — 99213 OFFICE O/P EST LOW 20 MIN: CPT | Performed by: NURSE PRACTITIONER

## 2022-11-28 RX ORDER — DOXYCYCLINE HYCLATE 100 MG/1
100 CAPSULE ORAL 2 TIMES DAILY
Qty: 14 CAPSULE | Refills: 0 | Status: SHIPPED | OUTPATIENT
Start: 2022-11-28 | End: 2022-12-05

## 2022-11-28 RX ORDER — PREDNISONE 20 MG/1
20 TABLET ORAL DAILY
Qty: 7 TABLET | Refills: 0 | Status: SHIPPED | OUTPATIENT
Start: 2022-11-28 | End: 2022-12-05

## 2022-11-28 NOTE — PROGRESS NOTES
Chief Complaint   Patient presents with   • Cough       Subjective   Barak Rivera is a 42 y.o. male.     History of Present Illness  Continued cough for over 2 weeks. He was seen on 11/20/2022 and felt to have an exacerbation of COPD and was given Levaquin and Prednisone which he says that he finished and felt better but as soon as he finished the prednisone the cough returned. He feels that he needs another antibiotic and steroid as he typically gets better. He has no fever, chills or wheezing. He still smokes.  Cough  The current episode started 1 to 4 weeks ago. Associated symptoms include shortness of breath. Pertinent negatives include no chills, fever or wheezing.       The following portions of the patient's history were reviewed and updated as appropriate: allergies, current medications, past medical history, and problem list.      Past Medical History:   Diagnosis Date   • ADD (attention deficit disorder)    • Anogenital HPV infection    • Anxiety    • Arthritis    • Bipolar disorder ([facility])    • Condyloma acuminatum in male    • Constipation    • COPD (chronic obstructive pulmonary disease) ([facility])    • Depression    • Fibromyalgia    • GERD (gastroesophageal reflux disease)    • Hepatitis B     antibodies   • Hepatitis C     antibodies   • Hyperlipidemia    • Migraines    • On home oxygen therapy     at  per [place], 2.5 L   • Osteomyelitis hip ([facility])    • Osteoporosis    • Osteoporosis    • Paranoia ([facility])    • Suicidal ideations    • Tuberculosis 2021    Latent, chest x-ray neg   • Urinary tract infection      Social History     Socioeconomic History   • Marital status: Single   • Number of children: 0   Tobacco Use   • Smoking status: Heavy Smoker     Packs/day: 1.00     Years: 24.00     Pack years: 24.00     Types: Cigarettes   • Smokeless tobacco: Never   • Tobacco comments:     pt demands nicotine patch, refuses counseling, Pt is smoking 5 cigs   Vaping Use   • Vaping Use: Never used   Substance and  "Sexual Activity   • Alcohol use: No   • Drug use: Yes     Types: Methamphetamines, Amphetamines, Benzodiazepines     Comment: TCA; 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     medication documentation: reviewed and updated with patient and   Current Outpatient Medications:   •  acyclovir (ZOVIRAX) 400 MG tablet, , Disp: , Rfl:   •  albuterol sulfate  (90 Base) MCG/ACT inhaler, Inhale 2 puffs Every 4 (Four) Hours As Needed for Wheezing., Disp: 18 g, Rfl: 0  •  alendronate (FOSAMAX) 70 MG tablet, , Disp: , Rfl:   •  B-D 3CC LUER-BRYCE SYR 23GX1\" 23G X 1\" 3 ML misc, , Disp: , Rfl:   •  Breztri Aerosphere 160-9-4.8 MCG/ACT aerosol inhaler, Inhale 2 puffs 2 (Two) Times a Day., Disp: 10.7 g, Rfl: 2  •  calcium acetate (PHOS BINDER,) 667 MG capsule capsule, Take 2 capsules by mouth., Disp: , Rfl:   •  Calcium-Vitamin D-Vitamin K ([employer] Calcium Soft Chews) 650-12.5-40 MG-MCG-MCG chewable tablet, Chew 2 tablets Daily., Disp: , Rfl:   •  cloNIDine (CATAPRES) 0.3 MG tablet, Take 0.3 mg by mouth Every Night., Disp: , Rfl:   •  denosumab (PROLIA) 60 MG/ML solution prefilled syringe syringe, Inject 60 mg under the skin into the appropriate area as directed., Disp: , Rfl:   •  Descovy 200-25 MG per tablet, Take 1 tablet by mouth Daily. Indications: HIV Infection Risk Reduction Before Potential Exposure, Disp: 90 tablet, Rfl: 0  •  doxycycline (VIBRAMYCIN) 100 MG capsule, Take 1 capsule by mouth 2 (Two) Times a Day for 7 days., Disp: 14 capsule, Rfl: 0  •  ergocalciferol (ERGOCALCIFEROL) 1.25 MG (79790 UT) capsule, Take 50,000 Units by mouth Every 30 (Thirty) Days., Disp: , Rfl:   •  ferrous sulfate 325 (65 FE) MG EC tablet, Take 1 tablet by mouth., Disp: , Rfl:   •  ibuprofen (ADVIL,MOTRIN) 800 MG tablet, , Disp: , Rfl:   •  Lactobacillus-Inulin ([facility]) capsule, , Disp: , Rfl:   •  " olopatadine (PATADAY) 0.2 % solution ophthalmic solution, , Disp: , Rfl:   •  prazosin (MINIPRESS) 5 MG capsule, Take 1 capsule by mouth Every Night., Disp: 30 capsule, Rfl: 2  •  predniSONE (DELTASONE) 20 MG tablet, Take 1 tablet by mouth Daily for 7 days., Disp: 7 tablet, Rfl: 0  •  pregabalin (LYRICA) 150 MG capsule, Take 1 capsule by mouth 2 (Two) Times a Day., Disp: 60 capsule, Rfl: 2  •  QUEtiapine (SEROquel) 100 MG tablet, , Disp: , Rfl:   •  Senna-Plus 8.6-50 MG per tablet, Take 1 tablet by mouth Every Night., Disp: 30 tablet, Rfl: 2  •  solifenacin (VESICARE) 5 MG tablet, , Disp: , Rfl:   •  Testosterone Cypionate (DEPOTESTOTERONE CYPIONATE) 200 MG/ML injection, Inject 0.5 mL into the appropriate muscle as directed by prescriber., Disp: , Rfl:   •  traZODone (DESYREL) 100 MG tablet, Take 1 tablet by mouth Every Night., Disp: 30 tablet, Rfl: 2  •  venlafaxine XR (EFFEXOR-XR) 150 MG 24 hr capsule, Take 1 capsule by mouth Daily., Disp: 30 capsule, Rfl: 2  No current facility-administered medications for this visit.    Facility-Administered Medications Ordered in Other Visits:   •  Chlorhexidine Gluconate Cloth 2 % pads 1 application, 1 application, Topical, Q12H PRN, Josee Sanchez PA-C  Review of Systems   Constitutional: Negative for chills and fever.   Respiratory: Positive for cough, chest tightness and shortness of breath. Negative for wheezing.        Objective   Physical Exam  Constitutional:       General: He is not in acute distress.     Appearance: He is not ill-appearing.   Pulmonary:      Effort: Pulmonary effort is normal.   Neurological:      Mental Status: He is alert.   Psychiatric:         Speech: Speech normal.         Assessment & Plan   Diagnoses and all orders for this visit:    1. Acute bronchitis, unspecified organism (Primary)  -     doxycycline (VIBRAMYCIN) 100 MG capsule; Take 1 capsule by mouth 2 (Two) Times a Day for 7 days.  Dispense: 14 capsule; Refill: 0  -     predniSONE  (DELTASONE) 20 MG tablet; Take 1 tablet by mouth Daily for 7 days.  Dispense: 7 tablet; Refill: 0      Discussed that cough can linger for 3 weeks or more after acute illness, but he is adamant that he needs another antibiotic and steroid. I have encouraged him to use cough suppression and antihistamine for drainage along with this.             Follow Up:  If your symptoms are not resolving by the completion of your treatment or are worsening, see your primary care provider for follow up. If you don't have a primary care provider, you may go to any Urgent Care for re-evaluation. If you develop any life threatening symptoms, go to the nearest Emergency Department immediately or call EMS.               The use of  Video Visit was utilized during this visit, using both MyChart and Zoom. The use of a video visit has been reviewed with the patient and verbal informed consent has been obtained. No technical difficulties occurred during the visit.    is located at [facility] [address] [facility] [address]  Provider is located at [place], [place]

## 2022-12-13 ENCOUNTER — TELEMEDICINE (OUTPATIENT)
Dept: PSYCHIATRY | Facility: CLINIC | Age: 42
End: 2022-12-13

## 2022-12-13 DIAGNOSIS — F25.1 SCHIZOAFFECTIVE DISORDER, DEPRESSIVE TYPE: Primary | ICD-10-CM

## 2022-12-13 DIAGNOSIS — F41.1 GAD (GENERALIZED ANXIETY DISORDER): ICD-10-CM

## 2022-12-13 DIAGNOSIS — F99 INSOMNIA DUE TO OTHER MENTAL DISORDER: ICD-10-CM

## 2022-12-13 DIAGNOSIS — F51.05 INSOMNIA DUE TO OTHER MENTAL DISORDER: ICD-10-CM

## 2022-12-13 PROCEDURE — 90792 PSYCH DIAG EVAL W/MED SRVCS: CPT | Performed by: NURSE PRACTITIONER

## 2022-12-13 RX ORDER — CLONIDINE HYDROCHLORIDE 0.3 MG/1
0.3 TABLET ORAL NIGHTLY
Qty: 30 TABLET | Refills: 6 | Status: SHIPPED | OUTPATIENT
Start: 2022-12-13

## 2022-12-13 RX ORDER — QUETIAPINE FUMARATE 300 MG/1
300 TABLET, FILM COATED ORAL NIGHTLY
Qty: 30 TABLET | Refills: 6 | Status: SHIPPED | OUTPATIENT
Start: 2022-12-13

## 2022-12-13 RX ORDER — ESZOPICLONE 3 MG/1
3 TABLET, FILM COATED ORAL NIGHTLY PRN
Qty: 30 TABLET | Refills: 2 | Status: SHIPPED | OUTPATIENT
Start: 2022-12-13 | End: 2023-12-13

## 2022-12-13 RX ORDER — VENLAFAXINE HYDROCHLORIDE 150 MG/1
150 CAPSULE, EXTENDED RELEASE ORAL DAILY
Qty: 30 CAPSULE | Refills: 6 | Status: SHIPPED | OUTPATIENT
Start: 2022-12-13

## 2022-12-13 NOTE — PROGRESS NOTES
Patient Name: Barak Rivera  MRN: [id number]   :  1980     Referring Physician: Tomas Yates PA    This provider is located at the [facility] (through [facility]), [address], [address] using a secure [employer] Video Visit through [employer]. Patient is being seen remotely via telehealth at their home address in [place], and stated they are in a secure environment for this session. The patient's condition being diagnosed/treated is appropriate for telemedicine. The provider identified herself as well as her credentials.   The patient, and/or patients guardian, consent to be seen remotely, and when consent is given they understand that the consent allows for patient identifiable information to be sent to a third party as needed.   They may refuse to be seen remotely at any time. The electronic data is encrypted and password protected, and the patient and/or guardian has been advised of the potential risks to privacy not withstanding such measures.    You have chosen to receive care through a telehealth visit.  Do you consent to use a video/audio connection for your medical care today? Yes    Chief Complaint:     ICD-10-CM ICD-9-CM   1. Schizoaffective disorder, depressive type (HCC)  F25.1 295.70   2. NEREIDA (generalized anxiety disorder)  F41.1 300.02   3. Insomnia due to other mental disorder  F51.05 300.9    F99 327.02       HPI:   Barak Rivera is a 42 y.o. male who is here today for initial evaluation of Anxiety , Insomnia  and Schizoaffective Disorder Depressed Type.  Patient states his previous provider moved away and is needing someone new to prescribe his medication.  Patient states he is pretty stable on his current medication except for his insomnia medication.  States he had been taking Lunesta however was changed to trazodone, clonidine, and prazosin.  States the clonidine helps with his anxiety however the trazodone and prazosin do not do  anything to help.  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  Patient is getting his last Sublocade injection tomorrow.  Patient currently works at [employer].    Past Medical History:   Past Medical History:   Diagnosis Date   • ADD (attention deficit disorder)    • Anogenital HPV infection    • Anxiety    • Arthritis    • Bipolar disorder (HCC)    • Condyloma acuminatum in male    • Constipation    • COPD (chronic obstructive pulmonary disease) (HCC)    • Depression    • Fibromyalgia    • GERD (gastroesophageal reflux disease)    • Hepatitis B     antibodies   • Hepatitis C     antibodies   • Hyperlipidemia    • Migraines    • On home oxygen therapy     at  per [place], 2.5 L   • Osteomyelitis hip (HCC)    • Osteoporosis    • Osteoporosis    • Paranoia (HCC)    • Suicidal ideations    • Tuberculosis 2021    Latent, chest x-ray neg   • Urinary tract infection        Past Surgical History:   Past Surgical History:   Procedure Laterality Date   • BACK SURGERY  09/22/2016   • BRONCHOSCOPY THORACOTOMY Right 1/4/2021    Procedure: THORACOTOMY WITH DIAPHRAGM PLICATION RIGHT;  Surgeon: Corbin Waller MD;  Location: [facility];  Service: Cardiothoracic;  Laterality: Right;   • HAND SURGERY Right 06/2016       Social History:   Social History     Socioeconomic History   • Marital status: Single   • Number of children: 0   Tobacco Use   • Smoking status: Heavy Smoker     Packs/day: 1.00     Years: 24.00     Pack years: 24.00     Types: Cigarettes   • Smokeless tobacco: Never   • Tobacco comments:     pt demands nicotine patch, refuses counseling, Pt is smoking 5 cigs   Vaping Use   • Vaping Use: Never used   Substance and Sexual Activity   • Alcohol use: No   • Drug use: Yes     Types: Methamphetamines, Amphetamines, Benzodiazepines     Comment: TCA; 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       Family History:  Family History  "  Problem Relation Age of Onset   • Hypertension Other    • Diabetes Other    • Depression Other    • Heart attack Mother    • Anxiety disorder Father    • Depression Father        Allergy:  Allergies   Allergen Reactions   • Phenobarbital Nausea And Vomiting and Other (See Comments)     N/V       Current Medications:   Current Outpatient Medications   Medication Sig Dispense Refill   • cloNIDine (CATAPRES) 0.3 MG tablet Take 1 tablet by mouth Every Night. Indications: High Blood Pressure Disorder 30 tablet 6   • venlafaxine XR (EFFEXOR-XR) 150 MG 24 hr capsule Take 1 capsule by mouth Daily. 30 capsule 6   • acyclovir (ZOVIRAX) 400 MG tablet      • albuterol sulfate  (90 Base) MCG/ACT inhaler Inhale 2 puffs Every 4 (Four) Hours As Needed for Wheezing. 18 g 0   • alendronate (FOSAMAX) 70 MG tablet      • B-D 3CC LUER-BRYCE SYR 23GX1\" 23G X 1\" 3 ML misc      • Breztri Aerosphere 160-9-4.8 MCG/ACT aerosol inhaler Inhale 2 puffs 2 (Two) Times a Day. 10.7 g 2   • calcium acetate (PHOS BINDER,) 667 MG capsule capsule Take 2 capsules by mouth.     • Calcium-Vitamin D-Vitamin K ([facility] Calcium Soft Chews) 650-12.5-40 MG-MCG-MCG chewable tablet Chew 2 tablets Daily.     • denosumab (PROLIA) 60 MG/ML solution prefilled syringe syringe Inject 60 mg under the skin into the appropriate area as directed.     • Descovy 200-25 MG per tablet Take 1 tablet by mouth Daily. Indications: HIV Infection Risk Reduction Before Potential Exposure 90 tablet 0   • ergocalciferol (ERGOCALCIFEROL) 1.25 MG (00810 UT) capsule Take 50,000 Units by mouth Every 30 (Thirty) Days.     • eszopiclone (Lunesta) 3 MG tablet Take 1 tablet by mouth At Night As Needed for Sleep. Take immediately before bedtime 30 tablet 2   • ferrous sulfate 325 (65 FE) MG EC tablet Take 1 tablet by mouth.     • ibuprofen (ADVIL,MOTRIN) 800 MG tablet      • Lactobacillus-Inulin ([facility]) capsule      • olopatadine (PATADAY) 0.2 % solution ophthalmic " solution      • Paliperidone Palmitate  MG/2.63ML suspension prefilled syringe Inject 819 mg into the appropriate muscle as directed by prescriber Every 3 (Three) Months. 2.63 mL 8   • pregabalin (LYRICA) 150 MG capsule Take 1 capsule by mouth 2 (Two) Times a Day. 60 capsule 2   • QUEtiapine (SEROquel) 300 MG tablet Take 1 tablet by mouth Every Night. 30 tablet 6   • Senna-Plus 8.6-50 MG per tablet Take 1 tablet by mouth Every Night. 30 tablet 2   • solifenacin (VESICARE) 5 MG tablet      • Testosterone Cypionate (DEPOTESTOTERONE CYPIONATE) 200 MG/ML injection Inject 0.5 mL into the appropriate muscle as directed by prescriber.       No current facility-administered medications for this visit.     Facility-Administered Medications Ordered in Other Visits   Medication Dose Route Frequency Provider Last Rate Last Admin   • Chlorhexidine Gluconate Cloth 2 % pads 1 application  1 application Topical Q12H PRN Josee Sanchez PA-C           Lab Results:   Orders Only on 11/09/2022   Component Date Value Ref Range Status   • Report Summary 11/09/2022 FINAL   Final    Comment: ====================================================================  [facility] COMP DRUG ANALYSIS,UR  ====================================================================  Test                             Result       Flag       Units  Drug Present and Declared for Prescription Verification    Venlafaxine                    PRESENT      EXPECTED    Desmethylvenlafaxine           PRESENT      EXPECTED     Desmethylvenlafaxine is an expected metabolite of venlafaxine.    Quetiapine                     PRESENT      EXPECTED    Clonidine                      PRESENT      EXPECTED  Drug Present not Declared for Prescription Verification    Buprenorphine                  158          UNEXPECTED ng/mg creat    Norbuprenorphine               130          UNEXPECTED ng/mg creat     Source of buprenorphine is a scheduled prescription medication.      Norbuprenorphine is an expected metabolite of buprenorphine.  Drug Absent but Declared for Prescription Verification    Pregabalin                                                Not Detected UNEXPECTED    Trazodone                      Not Detected UNEXPECTED    Ibuprofen                      Not Detected UNEXPECTED     Ibuprofen, as indicated in the declared medication list, is not     always detected even when used as directed.  ====================================================================  Test                      Result    Flag   Units      Ref Range    Creatinine              66               mg/dL      >=20  ====================================================================  Declared Medications:   The flagging and interpretation on this report are based on the   following declared medications.  Unexpected results may arise from   inaccuracies in the declared medications.   **Note: The testing scope of this panel includes these medications:   Clonidine   Pregabalin   Quetiapine   Trazodone   Venlafaxine   **Note: The testing scope of this panel does not include small to   moderate amounts of these reported medications:   Ibuprofen   **Note:                            The testing scope of this panel does not include following   reported medications:   Acyclovir (Zovirax)   Albuterol   Alendronate   Budesonide (Breztri)   Calcium   Denosumab (Prolia)   Emtricitabine (Descovy)   Formoterol (Breztri)   Glycopyrrolate (Breztri)   Iron (Ferrous Sulfate)   Olopatadine   Prazosin   Sennosides (Senna)   Solifenacin   Supplement (Probiotic)   Tenofovir (Descovy)   Testosterone   Vitamin D2 (Ergocalciferol)  ====================================================================  For clinical consultation, please call [phone].  ====================================================================     Lab on 11/09/2022   Component Date Value Ref Range Status   • Glucose 11/09/2022 113 (H)  65 - 99 mg/dL Final    • BUN 11/09/2022 5 (L)  6 - 20 mg/dL Final   • Creatinine 11/09/2022 0.81  0.76 - 1.27 mg/dL Final   • Sodium 11/09/2022 136  136 - 145 mmol/L Final   • Potassium 11/09/2022 3.9  3.5 - 5.2 mmol/L Final   • Chloride 11/09/2022 98  98 - 107 mmol/L Final   • CO2 11/09/2022 26.0  22.0 - 29.0 mmol/L Final   • Calcium 11/09/2022 9.3  8.6 - 10.5 mg/dL Final   • Total Protein 11/09/2022 7.3  6.0 - 8.5 g/dL Final   • Albumin 11/09/2022 5.00  3.50 - 5.20 g/dL Final   • ALT (SGPT) 11/09/2022 10  1 - 41 U/L Final   • AST (SGOT) 11/09/2022 22  1 - 40 U/L Final   • Alkaline Phosphatase 11/09/2022 55  39 - 117 U/L Final   • Total Bilirubin 11/09/2022 0.3  0.0 - 1.2 mg/dL Final   • Globulin 11/09/2022 2.3  gm/dL Final   • A/G Ratio 11/09/2022 2.2  g/dL Final   • BUN/Creatinine Ratio 11/09/2022 6.2 (L)  7.0 - 25.0 Final   • Anion Gap 11/09/2022 12.0  5.0 - 15.0 mmol/L Final   • eGFR 11/09/2022 112.9  >60.0 mL/min/1.73 Final    National Kidney Foundation and American Society of Nephrology (ASN) Task Force recommended calculation based on the Chronic Kidney Disease Epidemiology Collaboration (CKD-EPI) equation refit without adjustment for race.   • Chlamydia trachomatis, HANNA 11/09/2022 Negative  Negative Final   • Neisseria gonorrhoeae, HANNA 11/09/2022 Negative  Negative Final   • HIV-1/ HIV-2 11/09/2022 Non-Reactive  Non-Reactive Final    A non-reactive test result does not preclude the possibility of exposure to HIV or infection with HIV. An antibody response to recent exposure may take several months to reach detectable levels.   • RPR 11/09/2022 Non-Reactive  Non-Reactive Final   • Hepatitis C Ab 11/09/2022 Reactive (A)  Non-Reactive Final   • Hepatitis B Surface Ag 11/09/2022 Non-Reactive  Non-Reactive Final   • Hep B S Ab 11/09/2022 Reactive (A)  Non-Reactive Final   • WBC 11/09/2022 8.06  3.40 - 10.80 10*3/mm3 Final   • RBC 11/09/2022 5.19  4.14 - 5.80 10*6/mm3 Final   • Hemoglobin 11/09/2022 15.9  13.0 - 17.7 g/dL Final   •  Hematocrit 11/09/2022 46.1  37.5 - 51.0 % Final   • MCV 11/09/2022 88.8  79.0 - 97.0 fL Final   • MCH 11/09/2022 30.6  26.6 - 33.0 pg Final   • MCHC 11/09/2022 34.5  31.5 - 35.7 g/dL Final   • RDW 11/09/2022 15.0  12.3 - 15.4 % Final   • RDW-SD 11/09/2022 48.7  37.0 - 54.0 fl Final   • MPV 11/09/2022 9.1  6.0 - 12.0 fL Final   • Platelets 11/09/2022 239  140 - 450 10*3/mm3 Final   • Neutrophil % 11/09/2022 52.7  42.7 - 76.0 % Final   • Lymphocyte % 11/09/2022 37.0  19.6 - 45.3 % Final   • Monocyte % 11/09/2022 8.4  5.0 - 12.0 % Final   • Eosinophil % 11/09/2022 1.2  0.3 - 6.2 % Final   • Basophil % 11/09/2022 0.5  0.0 - 1.5 % Final   • Immature Grans % 11/09/2022 0.2  0.0 - 0.5 % Final   • Neutrophils, Absolute 11/09/2022 4.24  1.70 - 7.00 10*3/mm3 Final   • Lymphocytes, Absolute 11/09/2022 2.98  0.70 - 3.10 10*3/mm3 Final   • Monocytes, Absolute 11/09/2022 0.68  0.10 - 0.90 10*3/mm3 Final   • Eosinophils, Absolute 11/09/2022 0.10  0.00 - 0.40 10*3/mm3 Final   • Basophils, Absolute 11/09/2022 0.04  0.00 - 0.20 10*3/mm3 Final   • Immature Grans, Absolute 11/09/2022 0.02  0.00 - 0.05 10*3/mm3 Final   • nRBC 11/09/2022 0.0  0.0 - 0.2 /100 WBC Final       Review of Symptoms:   Review of Systems   Constitutional: Negative for activity change, appetite change, fatigue, unexpected weight gain and unexpected weight loss.   Respiratory: Negative for shortness of breath and wheezing.    Gastrointestinal: Negative for constipation, diarrhea, nausea and vomiting.   Musculoskeletal: Negative for gait problem.   Skin: Negative for dry skin and rash.   Neurological: Negative for dizziness, speech difficulty, weakness, light-headedness, headache, memory problem and confusion.   Psychiatric/Behavioral: Positive for sleep disturbance. Negative for agitation, behavioral problems, decreased concentration, dysphoric mood, hallucinations, self-injury, suicidal ideas, negative for hyperactivity, depressed mood and stress. The patient is  not nervous/anxious.        Physical Exam:   Physical Exam  Constitutional:       General: He is not in acute distress.     Appearance: He is well-developed. He is not diaphoretic.   HENT:      Head: Normocephalic and atraumatic.   Eyes:      Conjunctiva/sclera: Conjunctivae normal.   Pulmonary:      Effort: Pulmonary effort is normal. No respiratory distress.   Musculoskeletal:         General: Normal range of motion.      Cervical back: Full passive range of motion without pain and normal range of motion.   Neurological:      Mental Status: He is alert and oriented to person, place, and time.   Psychiatric:         Mood and Affect: Mood is not anxious or depressed. Affect is not labile, blunt, angry or inappropriate.         Speech: Speech is not rapid and pressured or tangential.         Behavior: Behavior normal. Behavior is not agitated, slowed, aggressive, withdrawn, hyperactive or combative. Behavior is cooperative.         Thought Content: Thought content normal. Thought content is not paranoid or delusional. Thought content does not include homicidal or suicidal ideation. Thought content does not include homicidal or suicidal plan.         Judgment: Judgment normal.       There were no vitals taken for this visit.  There is no height or weight on file to calculate BMI. Video appt unable to obtain.     Mental Status Exam:   Appearance: appropriate  Hygiene:   good  Cooperation:  Cooperative  Eye Contact:  Good  Psychomotor Behavior:  Appropriate  Mood:within normal limits  Affect:  Appropriate  Hopelessness: Denies  Speech:  Normal  Thought Process:  Goal directed  Thought Content:  Normal  Suicidal:  None  Homicidal:  None  Hallucinations:  None  Delusion:  None  Memory:  Intact  Orientation:  Person, Place, Time and Situation  Reliability:  good  Insight:  Good  Judgement:  Good  Impulse Control:  Good  Physical/Medical Issues:  No     PHQ-9 Depression Screening  Little interest or pleasure in doing  "things?     Feeling down, depressed, or hopeless?     Trouble falling or staying asleep, or sleeping too much?     Feeling tired or having little energy?     Poor appetite or overeating?     Feeling bad about yourself - or that you are a failure or have let yourself or your family down?     Trouble concentrating on things, such as reading the newspaper or watching television?     Moving or speaking so slowly that other people could have noticed? Or the opposite - being so fidgety or restless that you have been moving around a lot more than usual?     Thoughts that you would be better off dead, or of hurting yourself in some way?     PHQ-9 Total Score     If you checked off any problems, how difficult have these problems made it for you to do your work, take care of things at home, or get along with other people?        Reviewed ANDRIA # [id number]    Controlled Substance Medication Contract    Controlled substance medications (i.e. benzodiazepines, opioids, amphetamines) are very useful, but have a high potential for misuse and are, therefore, closely controlled by local, state, and federal government(s). As a patient of [facility], you agree and understand the followin. I am responsible for the controlled substance medications prescribed to me. If my prescription is misplaced, stolen, or if \"I run out early,\" I understand this medication will not be replaced regardless of the circumstances.  2. Refills of controlled substance medications: (a) Will be made only during regular office hours Monday-Thursday once a month and during a scheduled virtual appointment. Refills will not be made at night, weekends, or on holidays. (b) Will not be made if \"I lost my prescriptions,\" \"ran out early,\" or \"misplaced my medication\". I am solely responsible for taking the medication as prescribed and for keeping track of the remaining.   3. I agree to comply with urine drug testing and pill counts " at the provider's discretion, thereby, documenting the proper use of any medications. If alcohol abuse is suspected, a breathalyzer or blood alcohol level may be ordered. Unannounced urine or serum toxicology specimens may be requested and my cooperation is required.  4. I understand that if I violate any of the above conditions, my prescriptions for controlled medications my be terminated. If the violation involves obtaining these medications from another individual, or the concomitant use of non-prescription illicit (illegal) drugs, I may also be reported to other providers, pharmacies, medical facilities and the appropriate authorities.   5. I further understand that if I violate this controlled substance contract due to non-compliance of medical directions, such as the failure in taking medications as prescribed, utilizing other illicit drugs, or abuse of controlled medications, the prescription for controlled medications may be terminated.   6. I agree to keep my scheduled appointments and conduct myself in a courteous manner.  7. I agree not to sell, share, or give any medication to another person. I understand that such mishandling of my medication is a serious violation of this agreement and would result in my treatment being terminated without any recourse for appeal.   8. I agree not to take my medication from any physicians, nurse practitioners, pharmacies or other sources without telling my prescriber.  9. I agree to take my medication as my prescriber has instructed and not to alter the way I take my medication without first consulting my prescriber.  10. I agree to abstain from problematic alcohol usage, opioids, marijuana, cocaine, and other addictive substances.   11. If I am legally involved related to legal or illegal drugs, including alcohol, refill of controlled substances will not be given until a re-evaluation of my chemical dependency treatment plan has been completed. Refills are at the  discretion of the prescriber.   12. I agree to fill all of my controlled medications at an in-state ([place]) pharmacy.   13. I understand that [facility] utilizes the [place] All Schedule Prescription Electronic Reporting ([employer]) system and will monitor my prescription history via this source.  14. Benzodiazepines are drugs prescribed to treat conditions like anxiety, insomnia, and seizures. Examples of these drugs include: alprazolam, clonazepam, diazepam, and lorazepam. The FDA has applied a Black Box Warning (one of the strictest warnings) that the use of opioids and benzodiazepines together have serious risks to include unusual dizziness or lightheadedness, extreme sleepiness, slowed or difficult breathing, coma and death. There is an added risk if alcohol is also ingested. It is the policy of [facility] to NOT prescribe benzodiazepines to patients who also use opioids. If a patient already presents already prescribed both, the prescriber my direct the patient to their previous provider who prescribed it or taper the benzodiazepine as part of the treatment plan. These patients must be monitored at appropriate intervals and so visits may be more frequent.     This APRN has discussed and reviewed this information with the patient and/or guardian. The patient and/or guardian verbally agreed (no signatures are obtained during today's visit as they are a telehealth patient and is unable to print and sign this document, therefore, verbal agreement has been obtained).     Assessment/Plan:   Diagnoses and all orders for this visit:    1. Schizoaffective disorder, depressive type (HCC) (Primary)  -     QUEtiapine (SEROquel) 300 MG tablet; Take 1 tablet by mouth Every Night.  Dispense: 30 tablet; Refill: 6  -     Paliperidone Palmitate  MG/2.63ML suspension prefilled syringe; Inject 819 mg into the appropriate muscle as directed by prescriber Every 3  (Three) Months.  Dispense: 2.63 mL; Refill: 8    2. NEREIDA (generalized anxiety disorder)  -     venlafaxine XR (EFFEXOR-XR) 150 MG 24 hr capsule; Take 1 capsule by mouth Daily.  Dispense: 30 capsule; Refill: 6    3. Insomnia due to other mental disorder  -     cloNIDine (CATAPRES) 0.3 MG tablet; Take 1 tablet by mouth Every Night. Indications: High Blood Pressure Disorder  Dispense: 30 tablet; Refill: 6  -     eszopiclone (Lunesta) 3 MG tablet; Take 1 tablet by mouth At Night As Needed for Sleep. Take immediately before bedtime  Dispense: 30 tablet; Refill: 2    Patient appears to be stable on his current medications.  States the Invega Trinza helps his paranoia.  Will go ahead and discontinue trazodone and prazosin and prescribe Lunesta since patient's last Sublocade injection is tomorrow.  We will follow-up in a month.    A psychological evaluation was conducted in order to assess past and current level of functioning. Areas assessed included, but were not limited to: perception of social support, perception of ability to face and deal with challenges in life (positive functioning), anxiety symptoms, depressive symptoms, perspective on beliefs/belief system, coping skills for stress, intelligence level,  Therapeutic rapport was established. Interventions conducted today were geared towards incorporating medication management along with support for continued therapy. Education was also provided as to the med management with this provider and what to expect in subsequent sessions.    We discussed risks, benefits,goals and side effects of the above medication and the patient was agreeable with the plan.Patient was educated on the importance of compliance with treatment and follow-up appointments. Patient is aware to contact the [facility] [phone] with any worsening of symptoms. To call for questions or concerns and return early if necessary. Patent is agreeable to go to the Emergency  Department or call 911 should they begin SI/HI.     Part of this note may be an electronic transcription/translation of spoken language to printed text using the Dragon Dictation System.    Return in about 5 weeks (around 1/17/2023).    Lizzeth Sol, APRN

## 2022-12-29 ENCOUNTER — TELEPHONE (OUTPATIENT)
Dept: PSYCHIATRY | Facility: CLINIC | Age: 42
End: 2022-12-29

## 2022-12-29 DIAGNOSIS — F25.1 SCHIZOAFFECTIVE DISORDER, DEPRESSIVE TYPE: Primary | ICD-10-CM

## 2022-12-29 DIAGNOSIS — F25.1 SCHIZOAFFECTIVE DISORDER, DEPRESSIVE TYPE: ICD-10-CM

## 2022-12-29 RX ORDER — PALIPERIDONE 6 MG/1
6 TABLET, EXTENDED RELEASE ORAL EVERY MORNING
Qty: 30 TABLET | Refills: 1 | Status: SHIPPED | OUTPATIENT
Start: 2022-12-29

## 2022-12-29 RX ORDER — PALIPERIDONE 6 MG/1
6 TABLET, EXTENDED RELEASE ORAL EVERY MORNING
Qty: 30 TABLET | Refills: 1 | Status: SHIPPED | OUTPATIENT
Start: 2022-12-29 | End: 2022-12-29 | Stop reason: SDUPTHER

## 2022-12-29 NOTE — TELEPHONE ENCOUNTER
Patient needs the Palperidone Tablets to go to a different pharmacy. I will cancel at earlier pharmacy, please resend to pharmacy tagged in this note.      Thank You

## 2023-01-02 ENCOUNTER — APPOINTMENT (OUTPATIENT)
Dept: CT IMAGING | Facility: HOSPITAL | Age: 43
End: 2023-01-02
Payer: MEDICAID

## 2023-01-02 ENCOUNTER — APPOINTMENT (OUTPATIENT)
Dept: GENERAL RADIOLOGY | Facility: HOSPITAL | Age: 43
End: 2023-01-02
Payer: MEDICAID

## 2023-01-02 ENCOUNTER — HOSPITAL ENCOUNTER (EMERGENCY)
Facility: HOSPITAL | Age: 43
Discharge: HOME | End: 2023-01-03
Attending: EMERGENCY MEDICINE | Admitting: EMERGENCY MEDICINE
Payer: MEDICAID

## 2023-01-02 VITALS
HEART RATE: 119 BPM | HEIGHT: 68 IN | TEMPERATURE: 97.8 F | DIASTOLIC BLOOD PRESSURE: 53 MMHG | WEIGHT: 170 LBS | SYSTOLIC BLOOD PRESSURE: 114 MMHG | OXYGEN SATURATION: 92 % | RESPIRATION RATE: 18 BRPM | BODY MASS INDEX: 25.76 KG/M2

## 2023-01-02 DIAGNOSIS — S09.90XA INJURY OF HEAD, INITIAL ENCOUNTER: ICD-10-CM
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
## 2023-01-02 DIAGNOSIS — S62.326A CLOSED DISPLACED FRACTURE OF SHAFT OF FIFTH METACARPAL BONE OF RIGHT HAND, INITIAL ENCOUNTER: ICD-10-CM

## 2023-01-02 DIAGNOSIS — S06.0X0A CONCUSSION WITHOUT LOSS OF CONSCIOUSNESS, INITIAL ENCOUNTER: ICD-10-CM

## 2023-01-02 DIAGNOSIS — S01.81XA FOREHEAD LACERATION, INITIAL ENCOUNTER: ICD-10-CM

## 2023-01-02 PROCEDURE — 70450 CT HEAD/BRAIN W/O DYE: CPT

## 2023-01-02 PROCEDURE — 90471 IMMUNIZATION ADMIN: CPT | Performed by: EMERGENCY MEDICINE

## 2023-01-02 PROCEDURE — 73130 X-RAY EXAM OF HAND: CPT

## 2023-01-02 PROCEDURE — 90715 TDAP VACCINE 7 YRS/> IM: CPT | Performed by: EMERGENCY MEDICINE

## 2023-01-02 PROCEDURE — 25010000002 TETANUS-DIPHTH-ACELL PERTUSSIS 5-2.5-18.5 LF-MCG/0.5 SUSPENSION PREFILLED SYRINGE: Performed by: EMERGENCY MEDICINE

## 2023-01-02 PROCEDURE — 99283 EMERGENCY DEPT VISIT LOW MDM: CPT

## 2023-01-02 RX ORDER — OXYCODONE HYDROCHLORIDE AND ACETAMINOPHEN 5; 325 MG/1; MG/1
1 TABLET ORAL ONCE
Status: COMPLETED | OUTPATIENT
Start: 2023-01-02 | End: 2023-01-03

## 2023-01-02 RX ORDER — BACITRACIN, NEOMYCIN, POLYMYXIN B 400; 3.5; 5 [USP'U]/G; MG/G; [USP'U]/G
1 OINTMENT TOPICAL ONCE
Status: COMPLETED | OUTPATIENT
Start: 2023-01-02 | End: 2023-01-02

## 2023-01-02 RX ORDER — KETOROLAC TROMETHAMINE 30 MG/ML
30 INJECTION, SOLUTION INTRAMUSCULAR; INTRAVENOUS ONCE
Status: COMPLETED | OUTPATIENT
Start: 2023-01-02 | End: 2023-01-03

## 2023-01-02 RX ORDER — HYDROCODONE BITARTRATE AND ACETAMINOPHEN 5; 325 MG/1; MG/1
1 TABLET ORAL ONCE
Status: COMPLETED | OUTPATIENT
Start: 2023-01-02 | End: 2023-01-02

## 2023-01-02 RX ORDER — OXYCODONE HYDROCHLORIDE AND ACETAMINOPHEN 5; 325 MG/1; MG/1
1 TABLET ORAL EVERY 6 HOURS PRN
Qty: 8 TABLET | Refills: 0 | Status: SHIPPED | OUTPATIENT
Start: 2023-01-02 | End: 2023-02-06

## 2023-01-02 RX ORDER — LIDOCAINE HYDROCHLORIDE 10 MG/ML
5 INJECTION, SOLUTION EPIDURAL; INFILTRATION; INTRACAUDAL; PERINEURAL ONCE
Status: COMPLETED | OUTPATIENT
Start: 2023-01-02 | End: 2023-01-02

## 2023-01-02 RX ADMIN — LIDOCAINE HYDROCHLORIDE 5 ML: 10 INJECTION, SOLUTION EPIDURAL; INFILTRATION; INTRACAUDAL; PERINEURAL at 23:47

## 2023-01-02 RX ADMIN — BACITRACIN, NEOMYCIN, POLYMYXIN B 1 APPLICATION: 400; 3.5; 5 OINTMENT TOPICAL at 23:49

## 2023-01-02 RX ADMIN — HYDROCODONE BITARTRATE AND ACETAMINOPHEN 1 TABLET: 5; 325 TABLET ORAL at 22:14

## 2023-01-02 RX ADMIN — TETANUS TOXOID, REDUCED DIPHTHERIA TOXOID AND ACELLULAR PERTUSSIS VACCINE, ADSORBED 0.5 ML: 5; 2.5; 8; 8; 2.5 SUSPENSION INTRAMUSCULAR at 22:53

## 2023-01-02 RX ADMIN — Medication 3 ML: at 21:47

## 2023-01-02 NOTE — Clinical Note
[facility] EMERGENCY DEPARTMENT  [address] [address]  Phone: [phone]    Barak Rivera was seen and treated in our emergency department on 1/2/2023.  He may return to work on 01/05/2023.         Thank you for choosing [facility].    Dennis Vega, DO

## 2023-01-03 ENCOUNTER — TELEPHONE (OUTPATIENT)
Dept: FAMILY MEDICINE CLINIC | Facility: CLINIC | Age: 43
End: 2023-01-03
Payer: MEDICAID

## 2023-01-03 PROCEDURE — 25010000002 KETOROLAC TROMETHAMINE PER 15 MG: Performed by: EMERGENCY MEDICINE

## 2023-01-03 PROCEDURE — 96372 THER/PROPH/DIAG INJ SC/IM: CPT

## 2023-01-03 RX ADMIN — OXYCODONE HYDROCHLORIDE AND ACETAMINOPHEN 1 TABLET: 5; 325 TABLET ORAL at 00:00

## 2023-01-03 RX ADMIN — KETOROLAC TROMETHAMINE 30 MG: 30 INJECTION, SOLUTION INTRAMUSCULAR; INTRAVENOUS at 00:00

## 2023-01-12 ENCOUNTER — TELEMEDICINE (OUTPATIENT)
Dept: FAMILY MEDICINE CLINIC | Facility: TELEHEALTH | Age: 43
End: 2023-01-12
Payer: MEDICAID

## 2023-01-12 DIAGNOSIS — J01.00 ACUTE NON-RECURRENT MAXILLARY SINUSITIS: Primary | ICD-10-CM

## 2023-01-12 PROCEDURE — 99213 OFFICE O/P EST LOW 20 MIN: CPT | Performed by: NURSE PRACTITIONER

## 2023-01-12 RX ORDER — AMOXICILLIN AND CLAVULANATE POTASSIUM 875; 125 MG/1; MG/1
1 TABLET, FILM COATED ORAL 2 TIMES DAILY
Qty: 20 TABLET | Refills: 0 | Status: SHIPPED | OUTPATIENT
Start: 2023-01-12 | End: 2023-02-06

## 2023-01-12 RX ORDER — PREDNISONE 20 MG/1
40 TABLET ORAL DAILY
Qty: 14 TABLET | Refills: 0 | Status: SHIPPED | OUTPATIENT
Start: 2023-01-12 | End: 2023-01-19

## 2023-01-12 RX ORDER — DEXTROMETHORPHAN HYDROBROMIDE AND PROMETHAZINE HYDROCHLORIDE 15; 6.25 MG/5ML; MG/5ML
5 SYRUP ORAL NIGHTLY PRN
Qty: 100 ML | Refills: 0 | Status: SHIPPED | OUTPATIENT
Start: 2023-01-12 | End: 2023-02-06

## 2023-01-13 NOTE — PROGRESS NOTES
You have chosen to receive care through a telehealth visit.  Do you consent to use a video/audio connection for your medical care today? Yes     CHIEF COMPLAINT  Chief Complaint   Patient presents with   • Sinusitis         HPI  Barak Rivera is a 42 y.o. male  presents with complaint of sinus pain and pressure with yellow nasal mucus.  He states that he has a cough but can't cough anything up.  He has been drinking water to help. Symptoms started 1 week ago.    Review of Systems   HENT: Positive for congestion, postnasal drip, sinus pressure and sinus pain.    Respiratory: Positive for cough.    All other systems reviewed and are negative.      Past Medical History:   Diagnosis Date   • ADD (attention deficit disorder)    • Anogenital HPV infection    • Anxiety    • Arthritis    • Bipolar disorder ([facility])    • Condyloma acuminatum in male    • Constipation    • COPD (chronic obstructive pulmonary disease) ([facility])    • Depression    • Fibromyalgia    • GERD (gastroesophageal reflux disease)    • Hepatitis B     antibodies   • Hepatitis C     antibodies   • Hyperlipidemia    • Migraines    • On home oxygen therapy     at hs per NC, 2.5 L   • Osteomyelitis hip ([facility])    • Osteoporosis    • Osteoporosis    • Paranoia ([facility])    • Suicidal ideations    • Tuberculosis 2021    Latent, chest x-ray neg   • Urinary tract infection        Family History   Problem Relation Age of Onset   • Hypertension Other    • Diabetes Other    • Depression Other    • Heart attack Mother    • Anxiety disorder Father    • Depression Father        Social History     Socioeconomic History   • Marital status: Single   • Number of children: 0   Tobacco Use   • Smoking status: Heavy Smoker     Packs/day: 1.00     Years: 24.00     Pack years: 24.00     Types: Cigarettes   • Smokeless tobacco: Never   • Tobacco comments:     pt demands nicotine patch, refuses counseling, Pt is smoking 5 cigs   Vaping Use   • Vaping Use: Never used   Substance and Sexual  Activity   • Alcohol use: No   • Drug use: Yes     Types: Methamphetamines, Amphetamines, Benzodiazepines     Comment: TCA; 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       Barak Rivera  reports that he has been smoking cigarettes. He has a 24.00 pack-year smoking history. He has never used smokeless tobacco.. I have educated him on the risk of diseases from using tobacco products such as cancer, COPD and heart disease.     I advised him to quit and he is not willing to quit.    I spent 3  minutes counseling the patient.              There were no vitals taken for this visit.    PHYSICAL EXAM  Physical Exam   Constitutional: He appears well-developed and well-nourished.   HENT:   Head: Normocephalic.   Eyes: Pupils are equal, round, and reactive to light.   Pulmonary/Chest: Effort normal.   Musculoskeletal: Normal range of motion.   Neurological: He is alert.   Psychiatric: He has a normal mood and affect.       Results for orders placed or performed in visit on 11/09/22   Compliance Drug Analysis, Ur -   Result Value Ref Range    Report Summary FINAL        Diagnoses and all orders for this visit:    1. Acute non-recurrent maxillary sinusitis (Primary)  -     amoxicillin-clavulanate (Augmentin) 875-125 MG per tablet; Take 1 tablet by mouth 2 (Two) Times a Day.  Dispense: 20 tablet; Refill: 0  -     predniSONE (DELTASONE) 20 MG tablet; Take 2 tablets by mouth Daily for 7 days.  Dispense: 14 tablet; Refill: 0  -     promethazine-dextromethorphan (PROMETHAZINE-DM) 6.25-15 MG/5ML syrup; Take 5 mL by mouth At Night As Needed for Cough.  Dispense: 100 mL; Refill: 0          FOLLOW-UP  As discussed during visit with PCP/[facility] if no improvement or Urgent Care/Emergency Department if worsening of symptoms    Patient verbalizes understanding of medication dosage, comfort measures, instructions for  treatment and follow-up.    Mami Velasco, APRN  01/12/2023  21:49 EST    The use of a video visit has been reviewed with the patient and verbal informed consent has been obtained. Myself and Barak Rivera participated in this visit. The patient is located in [place] [id number].    I am located in [place], [place]. Mychart and Twilio were utilized. I spent 20 minutes in the patient's chart for this visit.

## 2023-01-17 ENCOUNTER — TELEMEDICINE (OUTPATIENT)
Dept: PSYCHIATRY | Facility: CLINIC | Age: 43
End: 2023-01-17
Payer: MEDICAID

## 2023-01-17 DIAGNOSIS — F25.1 SCHIZOAFFECTIVE DISORDER, DEPRESSIVE TYPE: Primary | ICD-10-CM

## 2023-01-17 DIAGNOSIS — F41.1 GAD (GENERALIZED ANXIETY DISORDER): ICD-10-CM

## 2023-01-17 DIAGNOSIS — F99 INSOMNIA DUE TO OTHER MENTAL DISORDER: ICD-10-CM

## 2023-01-17 DIAGNOSIS — F51.05 INSOMNIA DUE TO OTHER MENTAL DISORDER: ICD-10-CM

## 2023-01-17 PROCEDURE — 99214 OFFICE O/P EST MOD 30 MIN: CPT | Performed by: NURSE PRACTITIONER

## 2023-01-17 NOTE — PROGRESS NOTES
Patient Name: Barak Rivera  MRN: [id number]   :  1980     This provider is located at the [facility] [address], [address] using a secure [email or web address] Video Visit through [employer]. Patient is being seen remotely via telehealth at their home address in [place], and stated they are in a secure environment for this session. The patient's condition being diagnosed/treated is appropriate for telemedicine. The provider identified herself as well as her credentials.   The patient, and/or patients guardian, consent to be seen remotely, and when consent is given they understand that the consent allows for patient identifiable information to be sent to a third party as needed.   They may refuse to be seen remotely at any time. The electronic data is encrypted and password protected, and the patient and/or guardian has been advised of the potential risks to privacy not withstanding such measures.    You have chosen to receive care through a telehealth visit.  Do you consent to use a video/audio connection for your medical care today? Yes    Chief Complaint:      ICD-10-CM ICD-9-CM   1. Schizoaffective disorder, depressive type (HCC)  F25.1 295.70   2. NEREIDA (generalized anxiety disorder)  F41.1 300.02   3. Insomnia due to other mental disorder  F51.05 300.9    F99 327.02       History of Present Illness: Barak Rivera is a 42 y.o. male is here today for medication management follow up.  Patient doing very well.  Patient states he was able to get his injection so he is able to stay on track with that.  Denies any mood or anxiety issues.    The following portions of the patient's history were reviewed and updated as appropriate: allergies, current medications, past family history, past medical history, past social history, past surgical history and problem list.    Review of Systems;;  Review of Systems   Constitutional: Negative for activity  change, appetite change, fatigue, unexpected weight gain and unexpected weight loss.   Respiratory: Negative for shortness of breath and wheezing.    Gastrointestinal: Negative for constipation, diarrhea, nausea and vomiting.   Musculoskeletal: Negative for gait problem.   Skin: Negative for dry skin and rash.   Neurological: Negative for dizziness, speech difficulty, weakness, light-headedness, headache, memory problem and confusion.   Psychiatric/Behavioral: Negative for agitation, behavioral problems, decreased concentration, dysphoric mood, hallucinations, self-injury, sleep disturbance, suicidal ideas, negative for hyperactivity, depressed mood and stress. The patient is not nervous/anxious.        Physical Exam;;  Physical Exam  Constitutional:       General: He is not in acute distress.     Appearance: He is well-developed. He is not diaphoretic.   HENT:      Head: Normocephalic and atraumatic.   Eyes:      Conjunctiva/sclera: Conjunctivae normal.   Pulmonary:      Effort: Pulmonary effort is normal. No respiratory distress.   Musculoskeletal:         General: Normal range of motion.      Cervical back: Full passive range of motion without pain and normal range of motion.   Neurological:      Mental Status: He is alert and oriented to person, place, and time.   Psychiatric:         Mood and Affect: Mood is not anxious or depressed. Affect is not labile, blunt, angry or inappropriate.         Speech: Speech is not rapid and pressured or tangential.         Behavior: Behavior normal. Behavior is not agitated, slowed, aggressive, withdrawn, hyperactive or combative. Behavior is cooperative.         Thought Content: Thought content normal. Thought content is not paranoid or delusional. Thought content does not include homicidal or suicidal ideation. Thought content does not include homicidal or suicidal plan.         Judgment: Judgment normal.       There were no vitals taken for this visit.  There is no height  "or weight on file to calculate BMI. Video appt unable to obtain.     Current Medications;;    Current Outpatient Medications:   •  acyclovir (ZOVIRAX) 400 MG tablet, , Disp: , Rfl:   •  albuterol sulfate  (90 Base) MCG/ACT inhaler, Inhale 2 puffs Every 4 (Four) Hours As Needed for Wheezing., Disp: 18 g, Rfl: 0  •  alendronate (FOSAMAX) 70 MG tablet, , Disp: , Rfl:   •  amoxicillin-clavulanate (Augmentin) 875-125 MG per tablet, Take 1 tablet by mouth 2 (Two) Times a Day., Disp: 20 tablet, Rfl: 0  •  B-D 3CC LUER-BRYCE SYR 23GX1\" 23G X 1\" 3 ML misc, , Disp: , Rfl:   •  Breztri Aerosphere 160-9-4.8 MCG/ACT aerosol inhaler, Inhale 2 puffs 2 (Two) Times a Day., Disp: 10.7 g, Rfl: 2  •  calcium acetate (PHOS BINDER,) 667 MG capsule capsule, Take 2 capsules by mouth., Disp: , Rfl:   •  Calcium-Vitamin D-Vitamin K ([employer] Calcium Soft Chews) 650-12.5-40 MG-MCG-MCG chewable tablet, Chew 2 tablets Daily., Disp: , Rfl:   •  cloNIDine (CATAPRES) 0.3 MG tablet, Take 1 tablet by mouth Every Night. Indications: High Blood Pressure Disorder, Disp: 30 tablet, Rfl: 6  •  denosumab (PROLIA) 60 MG/ML solution prefilled syringe syringe, Inject 60 mg under the skin into the appropriate area as directed., Disp: , Rfl:   •  Descovy 200-25 MG per tablet, Take 1 tablet by mouth Daily. Indications: HIV Infection Risk Reduction Before Potential Exposure, Disp: 90 tablet, Rfl: 0  •  ergocalciferol (ERGOCALCIFEROL) 1.25 MG (14017 UT) capsule, Take 50,000 Units by mouth Every 30 (Thirty) Days., Disp: , Rfl:   •  eszopiclone (Lunesta) 3 MG tablet, Take 1 tablet by mouth At Night As Needed for Sleep. Take immediately before bedtime, Disp: 30 tablet, Rfl: 2  •  ferrous sulfate 325 (65 FE) MG EC tablet, Take 1 tablet by mouth., Disp: , Rfl:   •  ibuprofen (ADVIL,MOTRIN) 800 MG tablet, , Disp: , Rfl:   •  Lactobacillus-Inulin ([facility]) capsule, , Disp: , Rfl:   •  olopatadine (PATADAY) 0.2 % solution ophthalmic solution, , Disp: " , Rfl:   •  oxyCODONE-acetaminophen (PERCOCET) 5-325 MG per tablet, Take 1 tablet by mouth Every 6 (Six) Hours As Needed for Moderate Pain or Severe Pain., Disp: 8 tablet, Rfl: 0  •  paliperidone (Invega) 6 MG 24 hr tablet, Take 1 tablet by mouth Every Morning., Disp: 30 tablet, Rfl: 1  •  Paliperidone Palmitate  MG/2.63ML suspension prefilled syringe, Inject 819 mg into the appropriate muscle as directed by prescriber Every 3 (Three) Months., Disp: 2.63 mL, Rfl: 8  •  predniSONE (DELTASONE) 20 MG tablet, Take 2 tablets by mouth Daily for 7 days., Disp: 14 tablet, Rfl: 0  •  pregabalin (LYRICA) 150 MG capsule, Take 1 capsule by mouth 2 (Two) Times a Day., Disp: 60 capsule, Rfl: 2  •  promethazine-dextromethorphan (PROMETHAZINE-DM) 6.25-15 MG/5ML syrup, Take 5 mL by mouth At Night As Needed for Cough., Disp: 100 mL, Rfl: 0  •  QUEtiapine (SEROquel) 300 MG tablet, Take 1 tablet by mouth Every Night., Disp: 30 tablet, Rfl: 6  •  Senna-Plus 8.6-50 MG per tablet, Take 1 tablet by mouth Every Night., Disp: 30 tablet, Rfl: 2  •  solifenacin (VESICARE) 5 MG tablet, , Disp: , Rfl:   •  Testosterone Cypionate (DEPOTESTOTERONE CYPIONATE) 200 MG/ML injection, Inject 0.5 mL into the appropriate muscle as directed by prescriber., Disp: , Rfl:   •  venlafaxine XR (EFFEXOR-XR) 150 MG 24 hr capsule, Take 1 capsule by mouth Daily., Disp: 30 capsule, Rfl: 6  No current facility-administered medications for this visit.    Facility-Administered Medications Ordered in Other Visits:   •  Chlorhexidine Gluconate Cloth 2 % pads 1 application, 1 application, Topical, Q12H PRN, Josee Sanchez PA-C    Lab Results:   Orders Only on 11/09/2022   Component Date Value Ref Range Status   • Report Summary 11/09/2022 FINAL   Final    Comment: ====================================================================  [facility] COMP DRUG ANALYSIS,UR  ====================================================================  Test                              Result       Flag       Units  Drug Present and Declared for Prescription Verification    Venlafaxine                    PRESENT      EXPECTED    Desmethylvenlafaxine           PRESENT      EXPECTED     Desmethylvenlafaxine is an expected metabolite of venlafaxine.    Quetiapine                     PRESENT      EXPECTED    Clonidine                      PRESENT      EXPECTED  Drug Present not Declared for Prescription Verification    Buprenorphine                  158          UNEXPECTED ng/mg creat    Norbuprenorphine               130          UNEXPECTED ng/mg creat     Source of buprenorphine is a scheduled prescription medication.     Norbuprenorphine is an expected metabolite of buprenorphine.  Drug Absent but Declared for Prescription Verification    Pregabalin                                                Not Detected UNEXPECTED    Trazodone                      Not Detected UNEXPECTED    Ibuprofen                      Not Detected UNEXPECTED     Ibuprofen, as indicated in the declared medication list, is not     always detected even when used as directed.  ====================================================================  Test                      Result    Flag   Units      Ref Range    Creatinine              66               mg/dL      >=20  ====================================================================  Declared Medications:   The flagging and interpretation on this report are based on the   following declared medications.  Unexpected results may arise from   inaccuracies in the declared medications.   **Note: The testing scope of this panel includes these medications:   Clonidine   Pregabalin   Quetiapine   Trazodone   Venlafaxine   **Note: The testing scope of this panel does not include small to   moderate amounts of these reported medications:   Ibuprofen   **Note:                            The testing scope of this panel does not include following   reported medications:   Acyclovir  (Zovirax)   Albuterol   Alendronate   Budesonide (Breztri)   Calcium   Denosumab (Prolia)   Emtricitabine (Descovy)   Formoterol (Breztri)   Glycopyrrolate (Breztri)   Iron (Ferrous Sulfate)   Olopatadine   Prazosin   Sennosides (Senna)   Solifenacin   Supplement (Probiotic)   Tenofovir (Descovy)   Testosterone   Vitamin D2 (Ergocalciferol)  ====================================================================  For clinical consultation, please call [phone].  ====================================================================     Lab on 11/09/2022   Component Date Value Ref Range Status   • Glucose 11/09/2022 113 (H)  65 - 99 mg/dL Final   • BUN 11/09/2022 5 (L)  6 - 20 mg/dL Final   • Creatinine 11/09/2022 0.81  0.76 - 1.27 mg/dL Final   • Sodium 11/09/2022 136  136 - 145 mmol/L Final   • Potassium 11/09/2022 3.9  3.5 - 5.2 mmol/L Final   • Chloride 11/09/2022 98  98 - 107 mmol/L Final   • CO2 11/09/2022 26.0  22.0 - 29.0 mmol/L Final   • Calcium 11/09/2022 9.3  8.6 - 10.5 mg/dL Final   • Total Protein 11/09/2022 7.3  6.0 - 8.5 g/dL Final   • Albumin 11/09/2022 5.00  3.50 - 5.20 g/dL Final   • ALT (SGPT) 11/09/2022 10  1 - 41 U/L Final   • AST (SGOT) 11/09/2022 22  1 - 40 U/L Final   • Alkaline Phosphatase 11/09/2022 55  39 - 117 U/L Final   • Total Bilirubin 11/09/2022 0.3  0.0 - 1.2 mg/dL Final   • Globulin 11/09/2022 2.3  gm/dL Final   • A/G Ratio 11/09/2022 2.2  g/dL Final   • BUN/Creatinine Ratio 11/09/2022 6.2 (L)  7.0 - 25.0 Final   • Anion Gap 11/09/2022 12.0  5.0 - 15.0 mmol/L Final   • eGFR 11/09/2022 112.9  >60.0 mL/min/1.73 Final    National Kidney Foundation and American Society of Nephrology (ASN) Task Force recommended calculation based on the Chronic Kidney Disease Epidemiology Collaboration (CKD-EPI) equation refit without adjustment for race.   • Chlamydia trachomatis, HANNA 11/09/2022 Negative  Negative Final   • Neisseria gonorrhoeae, HANNA 11/09/2022 Negative  Negative Final   • HIV-1/ HIV-2  11/09/2022 Non-Reactive  Non-Reactive Final    A non-reactive test result does not preclude the possibility of exposure to HIV or infection with HIV. An antibody response to recent exposure may take several months to reach detectable levels.   • RPR 11/09/2022 Non-Reactive  Non-Reactive Final   • Hepatitis C Ab 11/09/2022 Reactive (A)  Non-Reactive Final   • Hepatitis B Surface Ag 11/09/2022 Non-Reactive  Non-Reactive Final   • Hep B S Ab 11/09/2022 Reactive (A)  Non-Reactive Final   • WBC 11/09/2022 8.06  3.40 - 10.80 10*3/mm3 Final   • RBC 11/09/2022 5.19  4.14 - 5.80 10*6/mm3 Final   • Hemoglobin 11/09/2022 15.9  13.0 - 17.7 g/dL Final   • Hematocrit 11/09/2022 46.1  37.5 - 51.0 % Final   • MCV 11/09/2022 88.8  79.0 - 97.0 fL Final   • MCH 11/09/2022 30.6  26.6 - 33.0 pg Final   • MCHC 11/09/2022 34.5  31.5 - 35.7 g/dL Final   • RDW 11/09/2022 15.0  12.3 - 15.4 % Final   • RDW-SD 11/09/2022 48.7  37.0 - 54.0 fl Final   • MPV 11/09/2022 9.1  6.0 - 12.0 fL Final   • Platelets 11/09/2022 239  140 - 450 10*3/mm3 Final   • Neutrophil % 11/09/2022 52.7  42.7 - 76.0 % Final   • Lymphocyte % 11/09/2022 37.0  19.6 - 45.3 % Final   • Monocyte % 11/09/2022 8.4  5.0 - 12.0 % Final   • Eosinophil % 11/09/2022 1.2  0.3 - 6.2 % Final   • Basophil % 11/09/2022 0.5  0.0 - 1.5 % Final   • Immature Grans % 11/09/2022 0.2  0.0 - 0.5 % Final   • Neutrophils, Absolute 11/09/2022 4.24  1.70 - 7.00 10*3/mm3 Final   • Lymphocytes, Absolute 11/09/2022 2.98  0.70 - 3.10 10*3/mm3 Final   • Monocytes, Absolute 11/09/2022 0.68  0.10 - 0.90 10*3/mm3 Final   • Eosinophils, Absolute 11/09/2022 0.10  0.00 - 0.40 10*3/mm3 Final   • Basophils, Absolute 11/09/2022 0.04  0.00 - 0.20 10*3/mm3 Final   • Immature Grans, Absolute 11/09/2022 0.02  0.00 - 0.05 10*3/mm3 Final   • nRBC 11/09/2022 0.0  0.0 - 0.2 /100 WBC Final       Mental Status Exam:   Hygiene:   good  Cooperation:  Cooperative  Eye Contact:  Good  Psychomotor Behavior:   Appropriate  Mood:within normal limits  Affect:  Appropriate  Hopelessness: Denies  Speech:  Normal  Thought Process:  Goal directed  Thought Content:  Normal  Suicidal:  None  Homicidal:  None  Hallucinations:  None  Delusion:  None  Memory:  Intact  Orientation:  Person, Place, Time and Situation  Reliability:  good  Insight:  Good  Judgement:  Good  Impulse Control:  Good    PHQ-9 Depression Screening  Little interest or pleasure in doing things?     Feeling down, depressed, or hopeless?     Trouble falling or staying asleep, or sleeping too much?     Feeling tired or having little energy?     Poor appetite or overeating?     Feeling bad about yourself - or that you are a failure or have let yourself or your family down?     Trouble concentrating on things, such as reading the newspaper or watching television?     Moving or speaking so slowly that other people could have noticed? Or the opposite - being so fidgety or restless that you have been moving around a lot more than usual?     Thoughts that you would be better off dead, or of hurting yourself in some way?     PHQ-9 Total Score     If you checked off any problems, how difficult have these problems made it for you to do your work, take care of things at home, or get along with other people?          Assessment/Plan:  Diagnoses and all orders for this visit:    1. Schizoaffective disorder, depressive type (HCC) (Primary)    2. NEREIDA (generalized anxiety disorder)    3. Insomnia due to other mental disorder      Patient continues to do well with current medication.  We will follow-up in 2 months at patient's request.  No refills are needed at this time.    A psychological evaluation was conducted in order to assess past and current level of functioning. Areas assessed included, but were not limited to: perception of social support, perception of ability to face and deal with challenges in life (positive functioning), anxiety symptoms, depressive symptoms,  perspective on beliefs/belief system, coping skills for stress, intelligence level,  Therapeutic rapport was established. Interventions conducted today were geared towards incorporating medication management along with support for continued therapy. Education was also provided as to the med management with this provider and what to expect in subsequent sessions.    We discussed risks, benefits,goals and side effects of the above medication and the patient was agreeable with the plan.Patient was educated on the importance of compliance with treatment and follow-up appointments. Patient is aware to contact the [facility] [phone] with any worsening of symptoms. To call for questions or concerns and return early if necessary. Patent is agreeable to go to the Emergency Department or call 911 should they begin SI/HI.     Part of this note may be an electronic transcription/translation of spoken language to printed text using the Dragon Dictation System.    Return in about 2 months (around 3/17/2023) for Follow Up 30 min, Video visit.    CONTRERAS Rivera
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
## 2023-02-06 ENCOUNTER — OFFICE VISIT (OUTPATIENT)
Dept: FAMILY MEDICINE CLINIC | Facility: CLINIC | Age: 43
End: 2023-02-06
Payer: MEDICAID

## 2023-02-06 ENCOUNTER — PRIOR AUTHORIZATION (OUTPATIENT)
Dept: FAMILY MEDICINE CLINIC | Facility: CLINIC | Age: 43
End: 2023-02-06
Payer: MEDICAID

## 2023-02-06 ENCOUNTER — LAB (OUTPATIENT)
Dept: LAB | Facility: HOSPITAL | Age: 43
End: 2023-02-06
Payer: MEDICAID

## 2023-02-06 VITALS
SYSTOLIC BLOOD PRESSURE: 120 MMHG | WEIGHT: 174.9 LBS | BODY MASS INDEX: 26.51 KG/M2 | TEMPERATURE: 97.5 F | OXYGEN SATURATION: 98 % | DIASTOLIC BLOOD PRESSURE: 82 MMHG | HEIGHT: 68 IN | HEART RATE: 116 BPM

## 2023-02-06 DIAGNOSIS — R76.8 HEPATITIS C ANTIBODY TEST POSITIVE: ICD-10-CM

## 2023-02-06 DIAGNOSIS — G62.9 PERIPHERAL POLYNEUROPATHY: ICD-10-CM

## 2023-02-06 DIAGNOSIS — M54.9 CHRONIC BILATERAL BACK PAIN, UNSPECIFIED BACK LOCATION: ICD-10-CM

## 2023-02-06 DIAGNOSIS — G89.29 CHRONIC BILATERAL BACK PAIN, UNSPECIFIED BACK LOCATION: ICD-10-CM

## 2023-02-06 DIAGNOSIS — Z51.81 ENCOUNTER FOR MEDICATION MONITORING: ICD-10-CM

## 2023-02-06 DIAGNOSIS — M54.6 ACUTE LEFT-SIDED THORACIC BACK PAIN: ICD-10-CM

## 2023-02-06 DIAGNOSIS — R00.0 TACHYCARDIA: ICD-10-CM

## 2023-02-06 DIAGNOSIS — R73.9 HYPERGLYCEMIA: ICD-10-CM

## 2023-02-06 DIAGNOSIS — Z72.52 HIGH RISK HOMOSEXUAL BEHAVIOR: ICD-10-CM

## 2023-02-06 DIAGNOSIS — Z72.52 HIGH RISK HOMOSEXUAL BEHAVIOR: Primary | ICD-10-CM

## 2023-02-06 PROCEDURE — 93000 ELECTROCARDIOGRAM COMPLETE: CPT | Performed by: PHYSICIAN ASSISTANT

## 2023-02-06 PROCEDURE — 99214 OFFICE O/P EST MOD 30 MIN: CPT | Performed by: PHYSICIAN ASSISTANT

## 2023-02-06 PROCEDURE — 86706 HEP B SURFACE ANTIBODY: CPT

## 2023-02-06 PROCEDURE — 87591 N.GONORRHOEAE DNA AMP PROB: CPT

## 2023-02-06 PROCEDURE — G0432 EIA HIV-1/HIV-2 SCREEN: HCPCS

## 2023-02-06 PROCEDURE — 87491 CHLMYD TRACH DNA AMP PROBE: CPT

## 2023-02-06 PROCEDURE — 83036 HEMOGLOBIN GLYCOSYLATED A1C: CPT

## 2023-02-06 PROCEDURE — 86592 SYPHILIS TEST NON-TREP QUAL: CPT

## 2023-02-06 PROCEDURE — 87522 HEPATITIS C REVRS TRNSCRPJ: CPT

## 2023-02-06 PROCEDURE — 87340 HEPATITIS B SURFACE AG IA: CPT

## 2023-02-06 PROCEDURE — 80053 COMPREHEN METABOLIC PANEL: CPT

## 2023-02-06 RX ORDER — BUPRENORPHINE 100 MG/1
SOLUTION SUBCUTANEOUS
COMMUNITY
Start: 2023-02-01

## 2023-02-06 RX ORDER — PRAZOSIN HYDROCHLORIDE 5 MG/1
5 CAPSULE ORAL NIGHTLY
COMMUNITY
Start: 2022-12-26

## 2023-02-06 RX ORDER — EMTRICITABINE AND TENOFOVIR ALAFENAMIDE 200; 25 MG/1; MG/1
1 TABLET ORAL DAILY
Qty: 30 TABLET | Refills: 2 | OUTPATIENT
Start: 2023-02-06 | End: 2023-02-25

## 2023-02-06 RX ORDER — BUSPIRONE HYDROCHLORIDE 10 MG/1
10 TABLET ORAL 3 TIMES DAILY
COMMUNITY
Start: 2023-01-20

## 2023-02-06 RX ORDER — DOCUSATE SODIUM, SENNOSIDES 50; 8.6 MG/1; MG/1
1 TABLET ORAL NIGHTLY
Qty: 30 TABLET | Refills: 5 | Status: SHIPPED | OUTPATIENT
Start: 2023-02-06

## 2023-02-06 RX ORDER — PREGABALIN 150 MG/1
150 CAPSULE ORAL 2 TIMES DAILY
Qty: 60 CAPSULE | Refills: 2 | Status: CANCELLED | OUTPATIENT
Start: 2023-02-06

## 2023-02-06 RX ORDER — METHOCARBAMOL 500 MG/1
500 TABLET, FILM COATED ORAL 3 TIMES DAILY PRN
Qty: 30 TABLET | Refills: 0 | Status: SHIPPED | OUTPATIENT
Start: 2023-02-06
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
## 2023-02-06 NOTE — TELEPHONE ENCOUNTER
KEY:[id number]  Med:Descovy 200-25mg  Status:PA not needed,active auth on file   Created:02/06/2023

## 2023-02-07 LAB
ALBUMIN SERPL-MCNC: 4.9 G/DL (ref 3.5–5.2)
ALBUMIN/GLOB SERPL: 2.3 G/DL
ALP SERPL-CCNC: 55 U/L (ref 39–117)
ALT SERPL W P-5'-P-CCNC: 10 U/L (ref 1–41)
ANION GAP SERPL CALCULATED.3IONS-SCNC: 10 MMOL/L (ref 5–15)
AST SERPL-CCNC: 18 U/L (ref 1–40)
BILIRUB SERPL-MCNC: 0.4 MG/DL (ref 0–1.2)
BUN SERPL-MCNC: 9 MG/DL (ref 6–20)
BUN/CREAT SERPL: 9.2 (ref 7–25)
CALCIUM SPEC-SCNC: 9.9 MG/DL (ref 8.6–10.5)
CHLORIDE SERPL-SCNC: 99 MMOL/L (ref 98–107)
CO2 SERPL-SCNC: 27 MMOL/L (ref 22–29)
CREAT SERPL-MCNC: 0.98 MG/DL (ref 0.76–1.27)
EGFRCR SERPLBLD CKD-EPI 2021: 98.1 ML/MIN/1.73
GLOBULIN UR ELPH-MCNC: 2.1 GM/DL
GLUCOSE SERPL-MCNC: 84 MG/DL (ref 65–99)
HBA1C MFR BLD: 5.8 % (ref 4.8–5.6)
HBV SURFACE AB SER RIA-ACNC: REACTIVE
HBV SURFACE AG SERPL QL IA: NORMAL
HIV1+2 AB SER QL: NORMAL
POTASSIUM SERPL-SCNC: 4 MMOL/L (ref 3.5–5.2)
PROT SERPL-MCNC: 7 G/DL (ref 6–8.5)
RPR SER QL: NORMAL
SODIUM SERPL-SCNC: 136 MMOL/L (ref 136–145)

## 2023-02-07 RX ORDER — PREGABALIN 150 MG/1
150 CAPSULE ORAL 2 TIMES DAILY
Qty: 60 CAPSULE | Refills: 2 | Status: SHIPPED | OUTPATIENT
Start: 2023-02-07

## 2023-02-09 LAB
C TRACH RRNA SPEC QL NAA+PROBE: NEGATIVE
N GONORRHOEA RRNA SPEC QL NAA+PROBE: NEGATIVE

## 2023-02-10 LAB
HCV RNA SERPL NAA+PROBE-ACNC: NORMAL IU/ML
TEST INFORMATION: NORMAL

## 2023-02-20 ENCOUNTER — TELEPHONE (OUTPATIENT)
Dept: FAMILY MEDICINE CLINIC | Facility: CLINIC | Age: 43
End: 2023-02-20
Payer: MEDICAID

## 2023-02-20 DIAGNOSIS — J44.9 CHRONIC OBSTRUCTIVE PULMONARY DISEASE, UNSPECIFIED COPD TYPE: Primary | ICD-10-CM

## 2023-02-20 NOTE — TELEPHONE ENCOUNTER
Attempted to contact, no answer left message asking for return call    Barak, your lab results still show prediabetes. For this I recommend decreasing carbohydrates in your diet and increasing exercise. Your other results do not show any significant abnormalities which is good news. The positive hepatitis B surface antibody indicates immunity to the hepatitis B virus most likely from prior immunization.  Please let me know if you have any questions.  Hub can relay and document.

## 2023-02-21 ENCOUNTER — PATIENT MESSAGE (OUTPATIENT)
Dept: FAMILY MEDICINE CLINIC | Facility: CLINIC | Age: 43
End: 2023-02-21
Payer: MEDICAID

## 2023-02-21 NOTE — TELEPHONE ENCOUNTER
Contacted patient to notify. Verbalized understanding, and agreed to follow medical advice    He is requesting referral to pulmonology for management of COPD

## 2023-02-23 ENCOUNTER — PATIENT MESSAGE (OUTPATIENT)
Dept: FAMILY MEDICINE CLINIC | Facility: CLINIC | Age: 43
End: 2023-02-23
Payer: MEDICAID

## 2023-02-24 RX ORDER — SILDENAFIL 100 MG/1
50-100 TABLET, FILM COATED ORAL DAILY PRN
Qty: 10 TABLET | Refills: 5 | Status: SHIPPED | OUTPATIENT
Start: 2023-02-24

## 2023-02-25 ENCOUNTER — HOSPITAL ENCOUNTER (EMERGENCY)
Facility: HOSPITAL | Age: 43
Discharge: HOME | End: 2023-02-25
Attending: EMERGENCY MEDICINE | Admitting: EMERGENCY MEDICINE
Payer: MEDICAID

## 2023-02-25 VITALS
BODY MASS INDEX: 25.76 KG/M2 | TEMPERATURE: 98.5 F | HEART RATE: 120 BPM | SYSTOLIC BLOOD PRESSURE: 104 MMHG | HEIGHT: 68 IN | RESPIRATION RATE: 18 BRPM | DIASTOLIC BLOOD PRESSURE: 74 MMHG | OXYGEN SATURATION: 96 % | WEIGHT: 170 LBS

## 2023-02-25 DIAGNOSIS — Z20.6 HIV EXPOSURE FROM BODY FLUIDS: ICD-10-CM

## 2023-02-25 DIAGNOSIS — Z20.6 HIV EXPOSURE: Primary | ICD-10-CM

## 2023-02-25 LAB
ALBUMIN SERPL-MCNC: 4.7 G/DL (ref 3.5–5.2)
ALBUMIN/GLOB SERPL: 1.8 G/DL
ALP SERPL-CCNC: 58 U/L (ref 39–117)
ALT SERPL W P-5'-P-CCNC: 27 U/L (ref 1–41)
ANION GAP SERPL CALCULATED.3IONS-SCNC: 11 MMOL/L (ref 5–15)
AST SERPL-CCNC: 73 U/L (ref 1–40)
BILIRUB SERPL-MCNC: 0.5 MG/DL (ref 0–1.2)
BUN SERPL-MCNC: 12 MG/DL (ref 6–20)
BUN/CREAT SERPL: 13.6 (ref 7–25)
CALCIUM SPEC-SCNC: 9.4 MG/DL (ref 8.6–10.5)
CHLORIDE SERPL-SCNC: 98 MMOL/L (ref 98–107)
CO2 SERPL-SCNC: 28 MMOL/L (ref 22–29)
CREAT SERPL-MCNC: 0.88 MG/DL (ref 0.76–1.27)
DEPRECATED RDW RBC AUTO: 42.8 FL (ref 37–54)
EGFRCR SERPLBLD CKD-EPI 2021: 109.4 ML/MIN/1.73
ERYTHROCYTE [DISTWIDTH] IN BLOOD BY AUTOMATED COUNT: 13 % (ref 12.3–15.4)
GLOBULIN UR ELPH-MCNC: 2.6 GM/DL
GLUCOSE SERPL-MCNC: 129 MG/DL (ref 65–99)
HBV SURFACE AB SER RIA-ACNC: REACTIVE
HBV SURFACE AG SERPL QL IA: NORMAL
HCT VFR BLD AUTO: 49.4 % (ref 37.5–51)
HCV AB SER DONR QL: REACTIVE
HGB BLD-MCNC: 16.9 G/DL (ref 13–17.7)
HIV1+2 AB SER QL: NORMAL
MCH RBC QN AUTO: 30.8 PG (ref 26.6–33)
MCHC RBC AUTO-ENTMCNC: 34.2 G/DL (ref 31.5–35.7)
MCV RBC AUTO: 90 FL (ref 79–97)
PLATELET # BLD AUTO: 226 10*3/MM3 (ref 140–450)
PMV BLD AUTO: 8.9 FL (ref 6–12)
POTASSIUM SERPL-SCNC: 3.6 MMOL/L (ref 3.5–5.2)
PROT SERPL-MCNC: 7.3 G/DL (ref 6–8.5)
RBC # BLD AUTO: 5.49 10*6/MM3 (ref 4.14–5.8)
SODIUM SERPL-SCNC: 137 MMOL/L (ref 136–145)
WBC NRBC COR # BLD: 15.66 10*3/MM3 (ref 3.4–10.8)

## 2023-02-25 PROCEDURE — 25010000002 CEFTRIAXONE PER 250 MG: Performed by: PHYSICIAN ASSISTANT

## 2023-02-25 PROCEDURE — 96372 THER/PROPH/DIAG INJ SC/IM: CPT

## 2023-02-25 PROCEDURE — 80053 COMPREHEN METABOLIC PANEL: CPT | Performed by: PHYSICIAN ASSISTANT

## 2023-02-25 PROCEDURE — 87491 CHLMYD TRACH DNA AMP PROBE: CPT | Performed by: PHYSICIAN ASSISTANT

## 2023-02-25 PROCEDURE — 86592 SYPHILIS TEST NON-TREP QUAL: CPT | Performed by: PHYSICIAN ASSISTANT

## 2023-02-25 PROCEDURE — 86803 HEPATITIS C AB TEST: CPT | Performed by: PHYSICIAN ASSISTANT

## 2023-02-25 PROCEDURE — 85027 COMPLETE CBC AUTOMATED: CPT | Performed by: PHYSICIAN ASSISTANT

## 2023-02-25 PROCEDURE — G0432 EIA HIV-1/HIV-2 SCREEN: HCPCS | Performed by: PHYSICIAN ASSISTANT

## 2023-02-25 PROCEDURE — 86706 HEP B SURFACE ANTIBODY: CPT | Performed by: PHYSICIAN ASSISTANT

## 2023-02-25 PROCEDURE — 99283 EMERGENCY DEPT VISIT LOW MDM: CPT

## 2023-02-25 PROCEDURE — 87340 HEPATITIS B SURFACE AG IA: CPT | Performed by: PHYSICIAN ASSISTANT

## 2023-02-25 PROCEDURE — 87591 N.GONORRHOEAE DNA AMP PROB: CPT | Performed by: PHYSICIAN ASSISTANT

## 2023-02-25 RX ORDER — EMTRICITABINE AND TENOFOVIR DISOPROXIL FUMARATE 200; 300 MG/1; MG/1
1 TABLET, FILM COATED ORAL ONCE
Status: COMPLETED | OUTPATIENT
Start: 2023-02-25 | End: 2023-02-25

## 2023-02-25 RX ORDER — EMTRICITABINE AND TENOFOVIR DISOPROXIL FUMARATE 200; 300 MG/1; MG/1
1 TABLET, FILM COATED ORAL DAILY
Qty: 30 TABLET | Refills: 0 | Status: SHIPPED | OUTPATIENT
Start: 2023-02-25

## 2023-02-25 RX ORDER — DOXYCYCLINE 100 MG/1
100 CAPSULE ORAL 2 TIMES DAILY
Qty: 20 CAPSULE | Refills: 0 | Status: SHIPPED | OUTPATIENT
Start: 2023-02-25

## 2023-02-25 RX ADMIN — DOLUTEGRAVIR SODIUM 50 MG: 50 TABLET, FILM COATED ORAL at 14:55

## 2023-02-25 RX ADMIN — LIDOCAINE HYDROCHLORIDE 500 MG: 10 INJECTION, SOLUTION EPIDURAL; INFILTRATION; INTRACAUDAL; PERINEURAL at 14:57

## 2023-02-25 RX ADMIN — EMTRICITABINE AND TENOFOVIR DISOPROXIL FUMARATE 1 TABLET: 200; 300 TABLET, FILM COATED ORAL at 14:56

## 2023-02-26 LAB — RPR SER QL: NORMAL

## 2023-02-28 ENCOUNTER — OFFICE VISIT (OUTPATIENT)
Dept: PULMONOLOGY | Facility: CLINIC | Age: 43
End: 2023-02-28
Payer: MEDICAID

## 2023-02-28 ENCOUNTER — TELEPHONE (OUTPATIENT)
Dept: EMERGENCY DEPT | Facility: HOSPITAL | Age: 43
End: 2023-02-28
Payer: MEDICAID

## 2023-02-28 VITALS
OXYGEN SATURATION: 92 % | HEIGHT: 68 IN | TEMPERATURE: 98.9 F | BODY MASS INDEX: 26.37 KG/M2 | DIASTOLIC BLOOD PRESSURE: 82 MMHG | HEART RATE: 88 BPM | SYSTOLIC BLOOD PRESSURE: 118 MMHG | WEIGHT: 174 LBS

## 2023-02-28 DIAGNOSIS — J98.6 DIAPHRAGM PARALYSIS: ICD-10-CM

## 2023-02-28 DIAGNOSIS — J44.9 CHRONIC OBSTRUCTIVE PULMONARY DISEASE, UNSPECIFIED COPD TYPE: Primary | ICD-10-CM

## 2023-02-28 PROBLEM — Z72.0 TOBACCO ABUSE: Status: ACTIVE | Noted: 2023-02-28

## 2023-02-28 PROBLEM — R94.2 RESTRICTIVE PATTERN PRESENT ON PULMONARY FUNCTION TESTING: Status: ACTIVE | Noted: 2023-02-28

## 2023-02-28 PROBLEM — R06.09 DOE (DYSPNEA ON EXERTION): Status: ACTIVE | Noted: 2023-02-28

## 2023-02-28 LAB
C TRACH RRNA SPEC QL NAA+PROBE: NEGATIVE
N GONORRHOEA RRNA SPEC QL NAA+PROBE: NEGATIVE

## 2023-02-28 PROCEDURE — 99215 OFFICE O/P EST HI 40 MIN: CPT | Performed by: INTERNAL MEDICINE

## 2023-02-28 RX ORDER — BUPROPION HYDROCHLORIDE 150 MG/1
TABLET, EXTENDED RELEASE ORAL
COMMUNITY
Start: 2023-02-15

## 2023-02-28 RX ORDER — TRIAMCINOLONE ACETONIDE 55 UG/1
SPRAY, METERED NASAL
COMMUNITY
Start: 2023-02-27

## 2023-02-28 NOTE — PROGRESS NOTES
PULMONARY  NOTE    Chief Complaint     Dyspnea on exertion, tobacco abuse, right diaphragm paralysis status post plication, scoliosis with prior Hernandez delia surgery    History of Present Illness     42-year-old male referred for pulmonary care as he has moved to the [place] area  We saw him in his postoperative period in the intensive care unit after having undergone a diaphragmatic plication by Dr. Waller in 2021    Patient has long history of tobacco abuse which is ongoing  He has tried various nicotine products including a Nicotrol inhaler and nicotine nasal spray  He is unable to tolerate Chantix and is currently on Wellbutrin at 150 mg a day  He has not set a smoking cessation date, yet    He has dyspnea on exertion  He has been treated with Breztri and albuterol in the past  Is not really sure if they have helped  In general he does pretty well from the standpoint of shortness of breath about once every 3 weeks he will have an episode of dyspnea    He has intermittent cough  No hemoptysis    He has been prescribed supplemental oxygen at night but he does not use it    He has a history of idiopathic right diaphragm paralysis  Although, he had extensive back surgery in the past with bilateral thoracic Hernandez rods placed for scoliosis  In 2021 he underwent a diaphragmatic plication by Dr. Waller  The patient's not really sure if his shortness of breath is improved since that time    He has followed with Dr. Ji in the past but is now moved to [place]  He has been told that he had COPD in the past and has been on the inhalers as noted above    Patient Active Problem List   Diagnosis   • Nicotine use disorder   • Anxiety   • Bipolar disorder (HCC)   • Rt Diaphragm Paralysis (S/P plication)   • Hyperlipidemia   • GERD (gastroesophageal reflux disease)   • Hyperglycemia   • Atelectasis   • Benzodiazepine misuse   • Degeneration of thoracic intervertebral disc   • Drug-induced mood disorder(292.84)   •  Fracture of bone   • Drug induced constipation   • History of spinal fusion   • Latent tuberculosis   • Other specified health status   • Pain in toe   • Paranoid schizophrenia (HCC)   • Seasonal allergic rhinitis   • Tobacco abuse   • CHAMBERLAIN (dyspnea on exertion)   • 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 Indications: High Blood Pressure Disorder, Disp: 30 tablet, Rfl: 6  •  denosumab (PROLIA) 60 MG/ML solution prefilled syringe syringe, Inject 1 mL under the skin into the appropriate area as directed., Disp: , Rfl:   •  dolutegravir (TIVICAY) 50 MG tablet, Take 1 tablet by mouth Daily., Disp: 30 tablet, Rfl: 0  •  doxycycline (MONODOX) 100 MG capsule, Take 1 capsule by mouth 2 (Two) Times a Day., Disp: 20 capsule, Rfl: 0  •  emtricitabine-tenofovir (Truvada) 200-300 MG per tablet, Take 1 tablet by mouth Daily., Disp: 30 tablet, Rfl: 0  •  eszopiclone (Lunesta) 3 MG tablet, Take 1 tablet by mouth At Night As Needed for Sleep. Take immediately before bedtime, Disp: 30 tablet, Rfl: 2  •  methocarbamol (Robaxin) 500 MG tablet, Take 1 tablet by mouth 3 (Three) Times a Day As Needed (moderate back pain)., Disp: 30 tablet, Rfl: 0  •  paliperidone (Invega) 6 MG 24 hr tablet, Take 1 tablet by mouth Every Morning., Disp: 30 tablet, Rfl: 1  •  pregabalin (LYRICA) 150 MG capsule, Take 1 capsule by mouth 2 (Two) Times a Day., Disp: 60 capsule, Rfl: 2  •  QUEtiapine (SEROquel) 300 MG tablet, Take 1 tablet by mouth Every Night., Disp: 30 tablet, Rfl: 6  •  Senna-Plus 8.6-50 MG per tablet, Take 1 tablet by mouth Every Night., Disp: 30 tablet, Rfl: 5  •  sildenafil (Viagra) 100 MG tablet, Take 0.5-1 tablets by mouth Daily As Needed for  "Erectile Dysfunction., Disp: 10 tablet, Rfl: 5  •  Testosterone Cypionate (DEPOTESTOTERONE CYPIONATE) 200 MG/ML injection, Inject 0.5 mL into the appropriate muscle as directed by prescriber., Disp: , Rfl:   •  Triamcinolone Acetonide (NASACORT) 55 MCG/ACT nasal inhaler, , Disp: , Rfl:   •  venlafaxine XR (EFFEXOR-XR) 150 MG 24 hr capsule, Take 1 capsule by mouth Daily., Disp: 30 capsule, Rfl: 6  •  B-D 3CC LUER-BRYCE SYR 23GX1\" 23G X 1\" 3 ML misc, , Disp: , Rfl:   •  busPIRone (BUSPAR) 10 MG tablet, Take 1 tablet by mouth 3 (Three) Times a Day., Disp: , Rfl:   •  prazosin (MINIPRESS) 5 MG capsule, Take 1 capsule by mouth Every Night., Disp: , Rfl:   •  Sublocade 100 MG/0.5ML solution prefilled syringe, , Disp: , Rfl:   No current facility-administered medications for this visit.    Facility-Administered Medications Ordered in Other Visits:   •  Chlorhexidine Gluconate Cloth 2 % pads 1 application, 1 application, Topical, Q12H PRN, Josee Sanchez PA-C  MEDICATION LIST AND ALLERGIES REVIEWED.    Family History   Problem Relation Age of Onset   • Hypertension Other    • Diabetes Other    • Depression Other    • Heart attack Mother    • Anxiety disorder Father    • Depression Father      Social History     Tobacco Use   • Smoking status: Heavy Smoker     Packs/day: 1.00     Years: 24.00     Pack years: 24.00     Types: Cigarettes   • Smokeless tobacco: Never   • Tobacco comments:     pt demands nicotine patch, refuses counseling, Pt is smoking 5 cigs   Vaping Use   • Vaping Use: Never used   Substance Use Topics   • Alcohol use: No   • Drug use: Yes     Types: Methamphetamines, Amphetamines, Benzodiazepines     Comment: TCA; 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    Review of Systems  ALSO REFER TO SCANNED ROS SHEET " "FROM SAME DATE.    /82 (BP Location: Right arm, Patient Position: Sitting, Cuff Size: Adult)   Pulse 88   Temp 98.9 °F (37.2 °C) (Infrared)   Ht 172.7 cm (68\")   Wt 78.9 kg (174 lb)   SpO2 92% Comment: room air at rest  BMI 26.46 kg/m²   Physical Exam  Vitals and nursing note reviewed.   Constitutional:       General: He is not in acute distress.     Appearance: He is well-developed. He is not diaphoretic.   HENT:      Head: Normocephalic and atraumatic.   Neck:      Thyroid: No thyromegaly.   Cardiovascular:      Rate and Rhythm: Normal rate and regular rhythm.      Heart sounds: Normal heart sounds. No murmur heard.  Pulmonary:      Effort: Pulmonary effort is normal.      Breath sounds: No stridor.      Comments: Decreased breath sounds on the right compared to the left without wheezing  Lymphadenopathy:      Cervical: No cervical adenopathy.      Upper Body:      Right upper body: No supraclavicular or epitrochlear adenopathy.      Left upper body: No supraclavicular or epitrochlear adenopathy.   Skin:     General: Skin is warm and dry.   Neurological:      Mental Status: He is alert and oriented to person, place, and time.   Psychiatric:         Behavior: Behavior normal.         Results     X-ray today reveals elevation of the right hemidiaphragm and Hernandez rods in the thoracic spine    Previous PFTs from Dr. Ji's office reviewed  No airway obstruction but a restrictive defect    Previous CT scans of the chest reviewed revealing right lower lobe atelectasis elevation of the right hemidiaphragm and some bronchiectasis with clear left lung    Immunization History   Administered Date(s) Administered   • COVID-19 (PFIZER) BIVALENT BOOSTER 12+YRS 10/17/2022   • COVID-19 (PFIZER) PURPLE CAP 02/25/2021, 03/18/2021, 09/14/2021   • Flu Vaccine Intradermal Quad 18-64YR 08/28/2022   • Flu Vaccine Split Quad 09/06/2020   • Flublok 18+yrs 10/01/2021   • Hep B, Unspecified 04/29/2021   • Hepatitis A " 07/03/2018, 01/15/2019   • Hepatitis B 04/29/2021   • Hpv9 04/30/2021, 07/02/2021, 11/03/2021   • Pneumococcal Conjugate 20-Valent (PCV20) 11/09/2022   • Pneumococcal Polysaccharide (PPSV23) 09/06/2020   • Tdap 12/13/2015, 04/28/2021, 01/02/2023   • flucelvax quad pfs =>4 YRS 10/12/2019     Problem List       ICD-10-CM ICD-9-CM   1. Chronic obstructive pulmonary disease, unspecified COPD type (HCC)  J44.9 496   2. Rt Diaphragm Paralysis (S/P plication)  J98.6 519.4       Discussion     We reviewed his test results  I reassured the patient that he does not have COPD  He could have underlying asthma but there is been no evidence of airway obstruction on prior spirometry    We discussed the need for smoking cessation we discussed smoking cessation strategies  He is going to continue to work with his PCP and is hoping to have his Wellbutrin dosing increased to 300 mg a day in the future    We will check 2 nights of nocturnal pulse oximetry  It would not be unexpected that he would desaturate at night  If so then supplemental oxygen use at night would be highly recommended    We discussed the etiology of his dyspnea in detail  His primary problem is restriction related to his diaphragm dysfunction and prior thoracic surgery including his spinal surgery  At this point he has no evidence of obstructive or interstitial lung disease  He is already had a diaphragmatic plication and at this point there is no other intervention  I recommended efforts at regular exercise and of course ongoing efforts at smoking cessation    Plan to see him back in 6 months    He is interested in screening for lung cancer  He does not meet current criteria for LDCT screening  On return we will discuss getting a CT scan of the chest    Level of service justified based on 52 minutes spent in patient care on this date of service including, but not limited to: preparing to see the patient, obtaining and/or reviewing history, performing medically  appropriate examination, ordering tests/medicine/procedures, independently interpreting results, documenting clinical information in EHR, and counseling/education of patient/family/caregiver (excluding time spent on other separate services such as performing procedures or test interpretation, if applicable). (Level 4 30-39 minutes; Level 5 40-54 minutes)    Corbin Johnson MD  Note electronically signed    CC: Tomas Yates, PA

## 2023-03-09 ENCOUNTER — TELEPHONE (OUTPATIENT)
Dept: FAMILY MEDICINE CLINIC | Facility: CLINIC | Age: 43
End: 2023-03-09
Payer: MEDICAID

## 2023-03-09 NOTE — TELEPHONE ENCOUNTER
Caller: Barak Rivera    Relationship: Self    Best call back number: [phone]    What medication are you requesting: NICOTINE PATCHES 21 MG     What are your current symptoms: CAN NOT SMOKE       Have you had these symptoms before:    [x] Yes  [] No    Have you been treated for these symptoms before:   [x] Yes  [] No    If a prescription is needed, what is your preferred pharmacy and phone number: [facility] [place], [place] - [address] [place] [address] - [phone]  - [phone] FX     Additional notes: PATIENT GOT HIS TEETH REMOVED AND CAN NOT SMOKE. PATIENT IS REQUESTING NICOTINE PATCHES. HE IS ALSO REQUESTING A CALL BACK FROM CAROL MARTE OR HIS NURSE

## 2023-03-10 RX ORDER — NICOTINE 21 MG/24HR
1 PATCH, TRANSDERMAL 24 HOURS TRANSDERMAL EVERY 24 HOURS
Qty: 14 PATCH | Refills: 0 | Status: SHIPPED | OUTPATIENT
Start: 2023-03-10 | End: 2023-03-24

## 2023-03-10 RX ORDER — NICOTINE 21 MG/24HR
1 PATCH, TRANSDERMAL 24 HOURS TRANSDERMAL EVERY 24 HOURS
Qty: 42 PATCH | Refills: 0 | Status: SHIPPED | OUTPATIENT
Start: 2023-03-10 | End: 2023-04-21

## 2023-03-10 NOTE — TELEPHONE ENCOUNTER
I sent nicotine patches to [facility] pharmacy.  Advised the patient not to smoke while he is using the patches.

## 2023-04-06 ENCOUNTER — PRIOR AUTHORIZATION (OUTPATIENT)
Dept: PSYCHIATRY | Facility: CLINIC | Age: 43
End: 2023-04-06
Payer: MEDICAID

## 2023-04-17 ENCOUNTER — TELEMEDICINE (OUTPATIENT)
Dept: PSYCHIATRY | Facility: CLINIC | Age: 43
End: 2023-04-17
Payer: MEDICAID

## 2023-04-17 DIAGNOSIS — F51.05 INSOMNIA DUE TO OTHER MENTAL DISORDER: ICD-10-CM

## 2023-04-17 DIAGNOSIS — F25.1 SCHIZOAFFECTIVE DISORDER, DEPRESSIVE TYPE: Primary | ICD-10-CM

## 2023-04-17 DIAGNOSIS — F41.1 GAD (GENERALIZED ANXIETY DISORDER): ICD-10-CM

## 2023-04-17 DIAGNOSIS — F99 INSOMNIA DUE TO OTHER MENTAL DISORDER: ICD-10-CM

## 2023-04-17 PROCEDURE — 99214 OFFICE O/P EST MOD 30 MIN: CPT | Performed by: NURSE PRACTITIONER

## 2023-04-17 PROCEDURE — 1159F MED LIST DOCD IN RCRD: CPT | Performed by: NURSE PRACTITIONER

## 2023-04-17 PROCEDURE — 1160F RVW MEDS BY RX/DR IN RCRD: CPT | Performed by: NURSE PRACTITIONER

## 2023-04-17 RX ORDER — ESZOPICLONE 3 MG/1
3 TABLET, FILM COATED ORAL NIGHTLY PRN
Qty: 30 TABLET | Refills: 2 | Status: SHIPPED | OUTPATIENT
Start: 2023-04-17 | End: 2024-04-16

## 2023-04-17 NOTE — PROGRESS NOTES
Patient Name: Barak Rivera  MRN: [id number]   :  1980     This provider is located at her home office through the [facility] (through [facility]), [address], [address] using a secure [email or web address] Video Visit through [employer]. Patient is being seen remotely via telehealth at their home address in [place], and stated they are in a secure environment for this session. The patient's condition being diagnosed/treated is appropriate for telemedicine. The provider identified herself as well as her credentials.   The patient, and/or patients guardian, consent to be seen remotely, and when consent is given they understand that the consent allows for patient identifiable information to be sent to a third party as needed.   They may refuse to be seen remotely at any time. The electronic data is encrypted and password protected, and the patient and/or guardian has been advised of the potential risks to privacy not withstanding such measures.    You have chosen to receive care through a telehealth visit.  Do you consent to use a video/audio connection for your medical care today? Yes    Chief Complaint:      ICD-10-CM ICD-9-CM   1. Schizoaffective disorder, depressive type  F25.1 295.70   2. NEREIDA (generalized anxiety disorder)  F41.1 300.02   3. Insomnia due to other mental disorder  F51.05 300.9    F99 327.02       History of Present Illness: Barak Rivera is a 42 y.o. male is here today for medication management follow up.  Patient states he is doing well with his quarterly injection for schizoaffective disorder.  States he needs a refill of his Lunesta.  Everything else is stable.    The following portions of the patient's history were reviewed and updated as appropriate: allergies, current medications, past family history, past medical history, past social history, past surgical history and problem list.    Review of Systems;;  Review of Systems    Constitutional: Negative for activity change, appetite change, fatigue, unexpected weight gain and unexpected weight loss.   Respiratory: Negative for shortness of breath and wheezing.    Gastrointestinal: Negative for constipation, diarrhea, nausea and vomiting.   Musculoskeletal: Negative for gait problem.   Skin: Negative for dry skin and rash.   Neurological: Negative for dizziness, speech difficulty, weakness, light-headedness, headache, memory problem and confusion.   Psychiatric/Behavioral: Negative for agitation, behavioral problems, decreased concentration, dysphoric mood, hallucinations, self-injury, sleep disturbance, suicidal ideas, negative for hyperactivity, depressed mood and stress. The patient is not nervous/anxious.        Physical Exam;;  Physical Exam  Constitutional:       General: He is not in acute distress.     Appearance: He is well-developed. He is not diaphoretic.   HENT:      Head: Normocephalic and atraumatic.   Eyes:      Conjunctiva/sclera: Conjunctivae normal.   Pulmonary:      Effort: Pulmonary effort is normal. No respiratory distress.   Musculoskeletal:         General: Normal range of motion.      Cervical back: Full passive range of motion without pain and normal range of motion.   Neurological:      Mental Status: He is alert and oriented to person, place, and time.   Psychiatric:         Mood and Affect: Mood is not anxious or depressed. Affect is not labile, blunt, angry or inappropriate.         Speech: Speech is not rapid and pressured or tangential.         Behavior: Behavior normal. Behavior is not agitated, slowed, aggressive, withdrawn, hyperactive or combative. Behavior is cooperative.         Thought Content: Thought content normal. Thought content is not paranoid or delusional. Thought content does not include homicidal or suicidal ideation. Thought content does not include homicidal or suicidal plan.         Judgment: Judgment normal.       There were no vitals  "taken for this visit.  There is no height or weight on file to calculate BMI. Video appt unable to obtain.     Current Medications;;    Current Outpatient Medications:   •  eszopiclone (Lunesta) 3 MG tablet, Take 1 tablet by mouth At Night As Needed for Sleep. Take immediately before bedtime, Disp: 30 tablet, Rfl: 2  •  B-D 3CC LUER-BRYCE SYR 23GX1\" 23G X 1\" 3 ML misc, , Disp: , Rfl:   •  buPROPion SR (WELLBUTRIN SR) 150 MG 12 hr tablet, , Disp: , Rfl:   •  busPIRone (BUSPAR) 10 MG tablet, Take 1 tablet by mouth 3 (Three) Times a Day., Disp: , Rfl:   •  cloNIDine (CATAPRES) 0.3 MG tablet, Take 1 tablet by mouth Every Night. Indications: High Blood Pressure Disorder, Disp: 30 tablet, Rfl: 6  •  denosumab (PROLIA) 60 MG/ML solution prefilled syringe syringe, Inject 1 mL under the skin into the appropriate area as directed., Disp: , Rfl:   •  dolutegravir (TIVICAY) 50 MG tablet, Take 1 tablet by mouth Daily., Disp: 30 tablet, Rfl: 0  •  doxycycline (MONODOX) 100 MG capsule, Take 1 capsule by mouth 2 (Two) Times a Day., Disp: 20 capsule, Rfl: 0  •  emtricitabine-tenofovir (Truvada) 200-300 MG per tablet, Take 1 tablet by mouth Daily., Disp: 30 tablet, Rfl: 0  •  methocarbamol (Robaxin) 500 MG tablet, Take 1 tablet by mouth 3 (Three) Times a Day As Needed (moderate back pain)., Disp: 30 tablet, Rfl: 0  •  paliperidone (Invega) 6 MG 24 hr tablet, Take 1 tablet by mouth Every Morning., Disp: 30 tablet, Rfl: 1  •  paliperidone palmitate (INVEGA SUSTENNA) 234 MG/1.5ML suspension prefilled syringe IM injection, Inject 1.5 mL into the appropriate muscle as directed by prescriber Every 3 (Three) Months., Disp: 1.5 mL, Rfl: 4  •  prazosin (MINIPRESS) 5 MG capsule, Take 1 capsule by mouth Every Night., Disp: , Rfl:   •  pregabalin (LYRICA) 150 MG capsule, Take 1 capsule by mouth 2 (Two) Times a Day., Disp: 60 capsule, Rfl: 2  •  QUEtiapine (SEROquel) 300 MG tablet, Take 1 tablet by mouth Every Night., Disp: 30 tablet, Rfl: 6  •  " Senna-Plus 8.6-50 MG per tablet, Take 1 tablet by mouth Every Night., Disp: 30 tablet, Rfl: 5  •  sildenafil (Viagra) 100 MG tablet, Take 0.5-1 tablets by mouth Daily As Needed for Erectile Dysfunction., Disp: 10 tablet, Rfl: 5  •  Sublocade 100 MG/0.5ML solution prefilled syringe, , Disp: , Rfl:   •  Testosterone Cypionate (DEPOTESTOTERONE CYPIONATE) 200 MG/ML injection, Inject 0.5 mL into the appropriate muscle as directed by prescriber., Disp: , Rfl:   •  Triamcinolone Acetonide (NASACORT) 55 MCG/ACT nasal inhaler, , Disp: , Rfl:   •  venlafaxine XR (EFFEXOR-XR) 150 MG 24 hr capsule, Take 1 capsule by mouth Daily., Disp: 30 capsule, Rfl: 6  No current facility-administered medications for this visit.    Facility-Administered Medications Ordered in Other Visits:   •  Chlorhexidine Gluconate Cloth 2 % pads 1 application, 1 application, Topical, Q12H PRN, Josee Sanchez PA-C    Lab Results:   Admission on 02/25/2023, Discharged on 02/25/2023   Component Date Value Ref Range Status   • Hepatitis B Surface Ag 02/25/2023 Non-Reactive  Non-Reactive Final   • Hep B S Ab 02/25/2023 Reactive (A)  Non-Reactive Final   • Hepatitis C Ab 02/25/2023 Reactive (A)  Non-Reactive Final   • HIV-1/ HIV-2 02/25/2023 Non-Reactive  Non-Reactive Final   • Glucose 02/25/2023 129 (H)  65 - 99 mg/dL Final   • BUN 02/25/2023 12  6 - 20 mg/dL Final   • Creatinine 02/25/2023 0.88  0.76 - 1.27 mg/dL Final   • Sodium 02/25/2023 137  136 - 145 mmol/L Final   • Potassium 02/25/2023 3.6  3.5 - 5.2 mmol/L Final    Slight hemolysis detected by analyzer. Results may be affected.   • Chloride 02/25/2023 98  98 - 107 mmol/L Final   • CO2 02/25/2023 28.0  22.0 - 29.0 mmol/L Final   • Calcium 02/25/2023 9.4  8.6 - 10.5 mg/dL Final   • Total Protein 02/25/2023 7.3  6.0 - 8.5 g/dL Final   • Albumin 02/25/2023 4.7  3.5 - 5.2 g/dL Final   • ALT (SGPT) 02/25/2023 27  1 - 41 U/L Final   • AST (SGOT) 02/25/2023 73 (H)  1 - 40 U/L Final   • Alkaline Phosphatase  02/25/2023 58  39 - 117 U/L Final   • Total Bilirubin 02/25/2023 0.5  0.0 - 1.2 mg/dL Final   • Globulin 02/25/2023 2.6  gm/dL Final    Calculated Result   • A/G Ratio 02/25/2023 1.8  g/dL Final   • BUN/Creatinine Ratio 02/25/2023 13.6  7.0 - 25.0 Final   • Anion Gap 02/25/2023 11.0  5.0 - 15.0 mmol/L Final   • eGFR 02/25/2023 109.4  >60.0 mL/min/1.73 Final   • WBC 02/25/2023 15.66 (H)  3.40 - 10.80 10*3/mm3 Final   • RBC 02/25/2023 5.49  4.14 - 5.80 10*6/mm3 Final   • Hemoglobin 02/25/2023 16.9  13.0 - 17.7 g/dL Final   • Hematocrit 02/25/2023 49.4  37.5 - 51.0 % Final   • MCV 02/25/2023 90.0  79.0 - 97.0 fL Final   • MCH 02/25/2023 30.8  26.6 - 33.0 pg Final   • MCHC 02/25/2023 34.2  31.5 - 35.7 g/dL Final   • RDW 02/25/2023 13.0  12.3 - 15.4 % Final   • RDW-SD 02/25/2023 42.8  37.0 - 54.0 fl Final   • MPV 02/25/2023 8.9  6.0 - 12.0 fL Final   • Platelets 02/25/2023 226  140 - 450 10*3/mm3 Final   • Chlamydia trachomatis, HANNA 02/25/2023 Negative  Negative Final   • Neisseria gonorrhoeae, HANNA 02/25/2023 Negative  Negative Final   • RPR 02/25/2023 Non-Reactive  Non-Reactive Final   Lab on 02/06/2023   Component Date Value Ref Range Status   • Chlamydia trachomatis, HANNA 02/06/2023 Negative  Negative Final   • Neisseria gonorrhoeae, HANNA 02/06/2023 Negative  Negative Final   • Hepatitis C Quantitation 02/06/2023 HCV Not Detected  IU/mL Final   • Test Information 02/06/2023 Comment   Final    The quantitative range of this assay is 15 IU/mL to 100 million IU/mL.   • HIV-1/ HIV-2 02/06/2023 Non-Reactive  Non-Reactive Final    A non-reactive test result does not preclude the possibility of exposure to HIV or infection with HIV. An antibody response to recent exposure may take several months to reach detectable levels.   • RPR 02/06/2023 Non-Reactive  Non-Reactive Final   • Hepatitis B Surface Ag 02/06/2023 Non-Reactive  Non-Reactive Final   • Hep B S Ab 02/06/2023 Reactive (A)  Non-Reactive Final   • Glucose 02/06/2023 84   65 - 99 mg/dL Final   • BUN 02/06/2023 9  6 - 20 mg/dL Final   • Creatinine 02/06/2023 0.98  0.76 - 1.27 mg/dL Final   • Sodium 02/06/2023 136  136 - 145 mmol/L Final   • Potassium 02/06/2023 4.0  3.5 - 5.2 mmol/L Final    Slight hemolysis detected by analyzer. Results may be affected.   • Chloride 02/06/2023 99  98 - 107 mmol/L Final   • CO2 02/06/2023 27.0  22.0 - 29.0 mmol/L Final   • Calcium 02/06/2023 9.9  8.6 - 10.5 mg/dL Final   • Total Protein 02/06/2023 7.0  6.0 - 8.5 g/dL Final   • Albumin 02/06/2023 4.9  3.5 - 5.2 g/dL Final   • ALT (SGPT) 02/06/2023 10  1 - 41 U/L Final   • AST (SGOT) 02/06/2023 18  1 - 40 U/L Final   • Alkaline Phosphatase 02/06/2023 55  39 - 117 U/L Final   • Total Bilirubin 02/06/2023 0.4  0.0 - 1.2 mg/dL Final   • Globulin 02/06/2023 2.1  gm/dL Final   • A/G Ratio 02/06/2023 2.3  g/dL Final   • BUN/Creatinine Ratio 02/06/2023 9.2  7.0 - 25.0 Final   • Anion Gap 02/06/2023 10.0  5.0 - 15.0 mmol/L Final   • eGFR 02/06/2023 98.1  >60.0 mL/min/1.73 Final   • Hemoglobin A1C 02/06/2023 5.80 (H)  4.80 - 5.60 % Final       Mental Status Exam:   Hygiene:   good  Cooperation:  Cooperative  Eye Contact:  Good  Psychomotor Behavior:  Appropriate  Mood:within normal limits  Affect:  Appropriate  Hopelessness: Denies  Speech:  Normal  Thought Process:  Goal directed  Thought Content:  Normal  Suicidal:  None  Homicidal:  None  Hallucinations:  None  Delusion:  None  Memory:  Intact  Orientation:  Person, Place, Time and Situation  Reliability:  good  Insight:  Good  Judgement:  Good  Impulse Control:  Good    PHQ-9 Depression Screening  Little interest or pleasure in doing things? (P) 2-->more than half the days   Feeling down, depressed, or hopeless? (P) 2-->more than half the days   Trouble falling or staying asleep, or sleeping too much? (P) 3-->nearly every day   Feeling tired or having little energy? (P) 1-->several days   Poor appetite or overeating? (P) 0-->not at all   Feeling bad about  yourself - or that you are a failure or have let yourself or your family down? (P) 1-->several days   Trouble concentrating on things, such as reading the newspaper or watching television? (P) 3-->nearly every day   Moving or speaking so slowly that other people could have noticed? Or the opposite - being so fidgety or restless that you have been moving around a lot more than usual? (P) 0-->not at all   Thoughts that you would be better off dead, or of hurting yourself in some way? (P) 0-->not at all   PHQ-9 Total Score (P) 12   If you checked off any problems, how difficult have these problems made it for you to do your work, take care of things at home, or get along with other people? (P) very difficult        Assessment/Plan:  Diagnoses and all orders for this visit:    1. Schizoaffective disorder, depressive type (Primary)  -     paliperidone palmitate (INVEGA SUSTENNA) 234 MG/1.5ML suspension prefilled syringe IM injection; Inject 1.5 mL into the appropriate muscle as directed by prescriber Every 3 (Three) Months.  Dispense: 1.5 mL; Refill: 4    2. NEREIDA (generalized anxiety disorder)    3. Insomnia due to other mental disorder  -     eszopiclone (Lunesta) 3 MG tablet; Take 1 tablet by mouth At Night As Needed for Sleep. Take immediately before bedtime  Dispense: 30 tablet; Refill: 2      Patient doing well with current medications.  We will continue as ordered and follow-up in 3 months.    A psychological evaluation was conducted in order to assess past and current level of functioning. Areas assessed included, but were not limited to: perception of social support, perception of ability to face and deal with challenges in life (positive functioning), anxiety symptoms, depressive symptoms, perspective on beliefs/belief system, coping skills for stress, intelligence level,  Therapeutic rapport was established. Interventions conducted today were geared towards incorporating medication management along with support  for continued therapy. Education was also provided as to the med management with this provider and what to expect in subsequent sessions.    We discussed risks, benefits,goals and side effects of the above medication and the patient was agreeable with the plan.Patient was educated on the importance of compliance with treatment and follow-up appointments. Patient is aware to contact the [facility] [phone] with any worsening of symptoms. To call for questions or concerns and return early if necessary. Patent is agreeable to go to the Emergency Department or call 911 should they begin SI/HI.     Part of this note may be an electronic transcription/translation of spoken language to printed text using the Dragon Dictation System.    Return in about 3 months (around 7/17/2023) for Follow Up 30 min, Video visit.    CONTRERAS Rivera

## 2023-04-25 ENCOUNTER — TELEPHONE (OUTPATIENT)
Dept: PSYCHIATRY | Facility: CLINIC | Age: 43
End: 2023-04-25
Payer: MEDICAID

## 2023-04-25 NOTE — TELEPHONE ENCOUNTER
Called patient back he did  Lunesta on April 17th so he will call the office next month when finishing up the Lunesta script and see if he can try the Queiviq.      Thank You

## 2023-04-25 NOTE — TELEPHONE ENCOUNTER
Patient called and would like to try Queiviq 50mg. Instead of Lunesta. He read that it has less side effects and that it works really well.      Please Advise?      Thank You

## 2023-05-08 ENCOUNTER — TELEPHONE (OUTPATIENT)
Dept: PSYCHIATRY | Facility: CLINIC | Age: 43
End: 2023-05-08
Payer: MEDICAID

## 2023-05-09 ENCOUNTER — PRIOR AUTHORIZATION (OUTPATIENT)
Dept: PSYCHIATRY | Facility: CLINIC | Age: 43
End: 2023-05-09
Payer: MEDICAID

## 2023-05-09 DIAGNOSIS — F51.05 INSOMNIA DUE TO OTHER MENTAL DISORDER: Primary | ICD-10-CM

## 2023-05-09 DIAGNOSIS — F99 INSOMNIA DUE TO OTHER MENTAL DISORDER: Primary | ICD-10-CM

## 2023-05-09 RX ORDER — DARIDOREXANT 50 MG/1
50 TABLET, FILM COATED ORAL NIGHTLY
Qty: 30 TABLET | Refills: 1 | Status: SHIPPED | OUTPATIENT
Start: 2023-05-09

## 2023-05-22 ENCOUNTER — TELEPHONE (OUTPATIENT)
Dept: PSYCHIATRY | Facility: CLINIC | Age: 43
End: 2023-05-22
Payer: MEDICAID

## 2023-05-22 DIAGNOSIS — F99 INSOMNIA DUE TO OTHER MENTAL DISORDER: Primary | ICD-10-CM

## 2023-05-22 DIAGNOSIS — F51.05 INSOMNIA DUE TO OTHER MENTAL DISORDER: Primary | ICD-10-CM

## 2023-05-22 RX ORDER — ESZOPICLONE 3 MG/1
3 TABLET, FILM COATED ORAL NIGHTLY PRN
Qty: 30 TABLET | Refills: 2 | Status: SHIPPED | OUTPATIENT
Start: 2023-05-22 | End: 2023-05-28

## 2023-05-22 NOTE — TELEPHONE ENCOUNTER
Pt. States he would like to stop taking the new sleeping medication, and go back to [facility]. States that he has had sleep paralysis, issues sleeping, headaches, nausea, and restlessness.     Pt would like to know what to do with new medication. Rather he can return them to the pharmacy for disposal, or what provider recommends.

## 2023-05-28 ENCOUNTER — TELEMEDICINE (OUTPATIENT)
Dept: FAMILY MEDICINE CLINIC | Facility: TELEHEALTH | Age: 43
End: 2023-05-28
Payer: MEDICAID

## 2023-05-28 DIAGNOSIS — J20.9 ACUTE BRONCHITIS, UNSPECIFIED ORGANISM: Primary | ICD-10-CM

## 2023-05-28 DIAGNOSIS — R21 RASH: ICD-10-CM

## 2023-05-28 DIAGNOSIS — H10.13 ALLERGIC CONJUNCTIVITIS OF BOTH EYES: ICD-10-CM

## 2023-05-28 RX ORDER — ALBUTEROL SULFATE 90 UG/1
AEROSOL, METERED RESPIRATORY (INHALATION)
COMMUNITY
Start: 2023-04-14

## 2023-05-28 RX ORDER — BUDESONIDE, GLYCOPYRROLATE, AND FORMOTEROL FUMARATE 160; 9; 4.8 UG/1; UG/1; UG/1
AEROSOL, METERED RESPIRATORY (INHALATION)
COMMUNITY
Start: 2023-03-01 | End: 2023-05-28

## 2023-05-28 RX ORDER — PALIPERIDONE PALMITATE 819 MG/2.625ML
INJECTION, SUSPENSION, EXTENDED RELEASE INTRAMUSCULAR
COMMUNITY
Start: 2023-04-06

## 2023-05-28 RX ORDER — EMTRICITABINE AND TENOFOVIR ALAFENAMIDE 200; 25 MG/1; MG/1
TABLET ORAL
COMMUNITY
Start: 2023-04-24

## 2023-05-28 RX ORDER — METHYLPREDNISOLONE 4 MG/1
TABLET ORAL
Qty: 1 EACH | Refills: 0 | Status: SHIPPED | OUTPATIENT
Start: 2023-05-28 | End: 2023-05-28

## 2023-05-28 RX ORDER — LACTULOSE 10 G/15ML
SOLUTION ORAL; RECTAL
COMMUNITY
Start: 2023-04-02 | End: 2023-05-28

## 2023-05-28 RX ORDER — LINACLOTIDE 290 UG/1
CAPSULE, GELATIN COATED ORAL
COMMUNITY
Start: 2023-05-14

## 2023-05-28 RX ORDER — ASPIRIN 81 MG
TABLET, DELAYED RELEASE (ENTERIC COATED) ORAL
COMMUNITY
Start: 2023-03-25

## 2023-05-28 RX ORDER — IBUPROFEN 800 MG/1
TABLET ORAL
COMMUNITY
Start: 2022-12-26 | End: 2023-05-28

## 2023-05-28 RX ORDER — OLOPATADINE HYDROCHLORIDE 2 MG/ML
1 SOLUTION/ DROPS OPHTHALMIC DAILY
Qty: 2.5 ML | Refills: 0 | Status: SHIPPED | OUTPATIENT
Start: 2023-05-28 | End: 2023-07-17

## 2023-05-28 RX ORDER — BUPRENORPHINE HYDROCHLORIDE AND NALOXONE HYDROCHLORIDE 11.4; 2.9 MG/1; MG/1
TABLET, ORALLY DISINTEGRATING SUBLINGUAL
COMMUNITY
Start: 2023-05-04

## 2023-05-28 RX ORDER — BUPRENORPHINE HYDROCHLORIDE AND NALOXONE HYDROCHLORIDE DIHYDRATE 8; 2 MG/1; MG/1
TABLET SUBLINGUAL
COMMUNITY
Start: 2023-03-16

## 2023-05-28 RX ORDER — BUPROPION HYDROCHLORIDE 300 MG/1
TABLET ORAL
COMMUNITY
Start: 2023-05-03

## 2023-05-28 RX ORDER — PREDNISONE 20 MG/1
20 TABLET ORAL 2 TIMES DAILY
Qty: 14 TABLET | Refills: 0 | Status: SHIPPED | OUTPATIENT
Start: 2023-05-28 | End: 2023-06-04

## 2023-05-28 RX ORDER — BUPRENORPHINE 300 MG/1
SOLUTION SUBCUTANEOUS
COMMUNITY
Start: 2023-05-15

## 2023-05-28 RX ORDER — VENLAFAXINE HYDROCHLORIDE 225 MG/1
TABLET, EXTENDED RELEASE ORAL
COMMUNITY
Start: 2023-04-18

## 2023-05-28 NOTE — PROGRESS NOTES
Chief Complaint   Patient presents with    Cough    Rash    Nasal Congestion     Watery Eyes       Video Visit Reason:   Free Text Description: Cough and eye watering  Subjective   Barak Rivera is a 42 y.o. male.     History of Present Illness  Productive cough with green sputum that has become increasingly more productive over the last few days. He is a heavy smoker. He has allergies and sinus pressure too. Eyes are watering. He has used Pataday in the past and requests refill of the prescription. He has a rash on his lower leg that is itching and red, present for about a week with no known exposure. He has no fever, chills or chest pain.  Cough  The problem has been gradually worsening. The problem occurs every few minutes. The cough is Productive of sputum. Associated symptoms include nasal congestion, postnasal drip, a rash and wheezing. Pertinent negatives include no chest pain, chills, ear congestion, ear pain, fever, rhinorrhea, sore throat or shortness of breath. Risk factors for lung disease include smoking/tobacco exposure. He has tried OTC cough suppressant for the symptoms. His past medical history is significant for COPD.   Rash  This is a new problem. The current episode started in the past 7 days. The affected locations include the right lower leg. The rash is characterized by redness and itchiness. Associated symptoms include congestion and coughing. Pertinent negatives include no fever, rhinorrhea, shortness of breath or sore throat. Past treatments include nothing.     The following portions of the patient's history were reviewed and updated as appropriate: allergies, current medications, past medical history, and problem list.      Past Medical History:   Diagnosis Date    ADD (attention deficit disorder)     Anogenital HPV infection     Anxiety     Arthritis     Bipolar disorder     Condyloma acuminatum in male     Constipation     COPD (chronic obstructive pulmonary disease)     Depression   "   Fibromyalgia     GERD (gastroesophageal reflux disease)     Hepatitis B     antibodies    Hepatitis C     antibodies    Hyperlipidemia     Migraines     On home oxygen therapy     at hs per NC, 2.5 L    Osteomyelitis hip     Osteoporosis     Osteoporosis     Paranoia     Suicidal ideations     Tuberculosis 2021    Latent, chest x-ray neg    Urinary tract infection      Social History     Socioeconomic History    Marital status:     Number of children: 0   Tobacco Use    Smoking status: Heavy Smoker     Packs/day: 1.00     Years: 24.00     Pack years: 24.00     Types: Cigarettes    Smokeless tobacco: Never    Tobacco comments:     pt demands nicotine patch, refuses counseling, Pt is smoking 5 cigs   Vaping Use    Vaping Use: Never used   Substance and Sexual Activity    Alcohol use: No    Drug use: Yes     Types: Methamphetamines, Amphetamines, Benzodiazepines     Comment: TCA; 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     medication documentation: reviewed and updated with patient and   Current Outpatient Medications:     B-D 3CC LUER-BRYCE SYR 23GX1\" 23G X 1\" 3 ML misc, , Disp: , Rfl:     buprenorphine-naloxone (SUBOXONE) 8-2 MG per SL tablet, , Disp: , Rfl:     buPROPion XL (WELLBUTRIN XL) 300 MG 24 hr tablet, , Disp: , Rfl:     busPIRone (BUSPAR) 10 MG tablet, Take 1 tablet by mouth 3 (Three) Times a Day., Disp: , Rfl:     cloNIDine (CATAPRES) 0.3 MG tablet, Take 1 tablet by mouth Every Night. Indications: High Blood Pressure Disorder, Disp: 30 tablet, Rfl: 6    Daridorexant HCl (Quviviq) 50 MG tablet, Take 1 tablet by mouth Every Night., Disp: 30 tablet, Rfl: 1    Descovy 200-25 MG per tablet, , Disp: , Rfl:     Docusate Sodium 100 MG capsule, , Disp: , Rfl:     Invega Trinza 819 MG/2.63ML suspension prefilled syringe, , Disp: , Rfl:     Linzess 290 MCG capsule capsule, , Disp: , Rfl:     " pregabalin (LYRICA) 150 MG capsule, Take 1 capsule by mouth 2 (Two) Times a Day., Disp: 60 capsule, Rfl: 2    QUEtiapine (SEROquel) 300 MG tablet, Take 1 tablet by mouth Every Night., Disp: 30 tablet, Rfl: 6    Senna-Plus 8.6-50 MG per tablet, Take 1 tablet by mouth Every Night., Disp: 30 tablet, Rfl: 5    sildenafil (Viagra) 100 MG tablet, Take 0.5-1 tablets by mouth Daily As Needed for Erectile Dysfunction., Disp: 10 tablet, Rfl: 5    Sublocade 300 MG/1.5ML solution prefilled syringe, , Disp: , Rfl:     Testosterone Cypionate (DEPOTESTOTERONE CYPIONATE) 200 MG/ML injection, Inject 0.5 mL into the appropriate muscle as directed by prescriber., Disp: , Rfl:     Triamcinolone Acetonide (NASACORT) 55 MCG/ACT nasal inhaler, , Disp: , Rfl:     venlafaxine 225 MG tablet sustained-release 24 hour 24 hr tablet, , Disp: , Rfl:     Ventolin  (90 Base) MCG/ACT inhaler, , Disp: , Rfl:     Zubsolv 11.4-2.9 MG sublingual tablet, , Disp: , Rfl:     methylPREDNISolone (MEDROL) 4 MG dose pack, Take as directed on package instructions., Disp: 1 each, Rfl: 0    olopatadine (PATADAY) 0.2 % solution ophthalmic solution, Administer 1 drop to both eyes Daily for 50 days., Disp: 2.5 mL, Rfl: 0    triamcinolone (KENALOG) 0.1 % ointment, Apply 1 application topically to the appropriate area as directed 2 (Two) Times a Day for 28 days., Disp: 56 g, Rfl: 0  No current facility-administered medications for this visit.    Facility-Administered Medications Ordered in Other Visits:     Chlorhexidine Gluconate Cloth 2 % pads 1 application, 1 application, Topical, Q12H PRN, Josee Sanchez PA-C  Review of Systems   Constitutional:  Negative for activity change, chills and fever.   HENT:  Positive for congestion, postnasal drip and sinus pressure. Negative for ear pain, rhinorrhea, sinus pain, sore throat and voice change.    Respiratory:  Positive for cough, chest tightness and wheezing. Negative for shortness of breath.    Cardiovascular:   Negative for chest pain.   Skin:  Positive for rash.     Objective   Physical Exam  Constitutional:       General: He is not in acute distress.     Appearance: He is not ill-appearing.   HENT:      Nose: Congestion present.      Mouth/Throat:      Pharynx: Oropharynx is clear.   Pulmonary:      Effort: Pulmonary effort is normal.      Comments: Coarse sounding cough, no SOA with conversation  Psychiatric:         Mood and Affect: Mood normal.       Assessment & Plan   Diagnoses and all orders for this visit:    1. Acute bronchitis, unspecified organism (Primary)  -     methylPREDNISolone (MEDROL) 4 MG dose pack; Take as directed on package instructions.  Dispense: 1 each; Refill: 0    2. Allergic conjunctivitis of both eyes  -     olopatadine (PATADAY) 0.2 % solution ophthalmic solution; Administer 1 drop to both eyes Daily for 50 days.  Dispense: 2.5 mL; Refill: 0    3. Rash    Other orders  -     triamcinolone (KENALOG) 0.1 % ointment; Apply 1 application topically to the appropriate area as directed 2 (Two) Times a Day for 28 days.  Dispense: 56 g; Refill: 0                    Follow Up:  If your symptoms are not resolving by the completion of your treatment or are worsening, see your primary care provider for follow up. If you don't have a primary care provider, you may go to any Urgent Care for re-evaluation. If you develop any life threatening symptoms, go to the nearest Emergency Department immediately or call EMS.               The use of  Video Visit was utilized during this visit, using both YaSabe and [email or web address]. The use of a video visit has been reviewed with the patient and verbal informed consent has been obtained. No technical difficulties occurred during the visit.    is located at [facility] [address] [place] [address]  Provider is located at [place], [place]

## 2023-05-29 ENCOUNTER — PATIENT MESSAGE (OUTPATIENT)
Dept: FAMILY MEDICINE CLINIC | Facility: CLINIC | Age: 43
End: 2023-05-29

## 2023-05-31 ENCOUNTER — OFFICE VISIT (OUTPATIENT)
Dept: FAMILY MEDICINE CLINIC | Facility: CLINIC | Age: 43
End: 2023-05-31

## 2023-05-31 ENCOUNTER — LAB (OUTPATIENT)
Dept: LAB | Facility: HOSPITAL | Age: 43
End: 2023-05-31

## 2023-05-31 VITALS
SYSTOLIC BLOOD PRESSURE: 118 MMHG | OXYGEN SATURATION: 98 % | BODY MASS INDEX: 25.31 KG/M2 | WEIGHT: 167 LBS | DIASTOLIC BLOOD PRESSURE: 80 MMHG | HEIGHT: 68 IN | HEART RATE: 98 BPM

## 2023-05-31 DIAGNOSIS — Z11.3 ROUTINE SCREENING FOR STI (SEXUALLY TRANSMITTED INFECTION): ICD-10-CM

## 2023-05-31 DIAGNOSIS — Z20.6 HIV EXPOSURE: Primary | ICD-10-CM

## 2023-05-31 DIAGNOSIS — Z72.52 HIGH RISK HOMOSEXUAL BEHAVIOR: ICD-10-CM

## 2023-05-31 DIAGNOSIS — G89.29 CHRONIC BILATERAL BACK PAIN, UNSPECIFIED BACK LOCATION: ICD-10-CM

## 2023-05-31 DIAGNOSIS — M54.9 CHRONIC BILATERAL BACK PAIN, UNSPECIFIED BACK LOCATION: ICD-10-CM

## 2023-05-31 DIAGNOSIS — Z20.6 HIV EXPOSURE: ICD-10-CM

## 2023-05-31 LAB — HIV1+2 AB SER QL: NORMAL

## 2023-05-31 PROCEDURE — G0432 EIA HIV-1/HIV-2 SCREEN: HCPCS

## 2023-05-31 PROCEDURE — 86592 SYPHILIS TEST NON-TREP QUAL: CPT

## 2023-05-31 PROCEDURE — 87661 TRICHOMONAS VAGINALIS AMPLIF: CPT | Performed by: NURSE PRACTITIONER

## 2023-05-31 PROCEDURE — 87591 N.GONORRHOEAE DNA AMP PROB: CPT | Performed by: NURSE PRACTITIONER

## 2023-05-31 PROCEDURE — 87491 CHLMYD TRACH DNA AMP PROBE: CPT | Performed by: NURSE PRACTITIONER

## 2023-05-31 RX ORDER — PREGABALIN 150 MG/1
150 CAPSULE ORAL 2 TIMES DAILY
Qty: 60 CAPSULE | Refills: 2 | Status: SHIPPED | OUTPATIENT
Start: 2023-05-31

## 2023-05-31 RX ORDER — EMTRICITABINE AND TENOFOVIR ALAFENAMIDE 200; 25 MG/1; MG/1
1 TABLET ORAL DAILY
Qty: 30 TABLET | Refills: 2 | Status: SHIPPED | OUTPATIENT
Start: 2023-05-31

## 2023-05-31 NOTE — PROGRESS NOTES
"Chief Complaint  Fibromyalgia (Pt needs refills on his lyrica and his descovy)    Subjective        Barak Rivera presents to [facility]  History of Present Illness  Pt is here today for 3 month follow up for Lyrica. He needs refills for Lyrica and Descovy. He takes Lyrica for fibromyalgia.     Pt reports he was exposed to HIV 3 months ago. He was treated prophylactically at ER and took medication for 28 days. Was advised to get tested in 3 months. He takes Descovy currently. Will order routine STI testing today. He would also testing for syphilis, gonorrhea, and chlamydia.   Fibromyalgia        Objective   Vital Signs:  /80   Pulse 98   Ht 172.7 cm (67.99\")   Wt 75.8 kg (167 lb)   SpO2 98%   BMI 25.40 kg/m²   Estimated body mass index is 25.4 kg/m² as calculated from the following:    Height as of this encounter: 172.7 cm (67.99\").    Weight as of this encounter: 75.8 kg (167 lb).             Physical Exam  Vitals reviewed.   Constitutional:       Appearance: Normal appearance.   HENT:      Nose: Nose normal.      Mouth/Throat:      Pharynx: Oropharynx is clear.   Eyes:      Conjunctiva/sclera: Conjunctivae normal.   Cardiovascular:      Rate and Rhythm: Normal rate and regular rhythm.      Heart sounds: Normal heart sounds.   Pulmonary:      Effort: Pulmonary effort is normal.      Breath sounds: Normal breath sounds.   Musculoskeletal:         General: Normal range of motion.      Cervical back: Normal range of motion.   Skin:     General: Skin is warm.   Neurological:      Mental Status: He is alert and oriented to person, place, and time.   Psychiatric:         Mood and Affect: Mood normal.         Behavior: Behavior normal.         Thought Content: Thought content normal.        Result Review :                   Assessment and Plan   Diagnoses and all orders for this visit:    1. HIV exposure (Primary)  -     HIV-1 / O / 2 Ag / Antibody 4th Generation; Future    2. " Chronic bilateral back pain, unspecified back location  -     pregabalin (LYRICA) 150 MG capsule; Take 1 capsule by mouth 2 (Two) Times a Day.  Dispense: 60 capsule; Refill: 2    3. Routine screening for STI (sexually transmitted infection)  -     RPR; Future  -     Chlamydia trachomatis, Neisseria gonorrhoeae, Trichomonas vaginalis, PCR - Urine, Urine, Clean Catch; Future  -     Chlamydia trachomatis, Neisseria gonorrhoeae, Trichomonas vaginalis, PCR - Urine, Urine, Clean Catch    4. High risk homosexual behavior  -     Emtricitabine-Tenofovir AF (Descovy) 200-25 MG per tablet; Take 1 tablet by mouth Daily.  Dispense: 30 tablet; Refill: 2             Follow Up   Return in about 3 months (around 8/31/2023) for Annual and follow-up for medications. .  Patient was given instructions and counseling regarding his condition or for health maintenance advice. Please see specific information pulled into the AVS if appropriate.

## 2023-06-01 LAB — RPR SER QL: NORMAL

## 2023-06-02 LAB
C TRACH RRNA SPEC QL NAA+PROBE: NEGATIVE
N GONORRHOEA RRNA SPEC QL NAA+PROBE: NEGATIVE
T VAGINALIS RRNA SPEC QL NAA+PROBE: NEGATIVE

## 2023-06-06 RX ORDER — EMTRICITABINE AND TENOFOVIR ALAFENAMIDE 200; 25 MG/1; MG/1
TABLET ORAL
Qty: 30 TABLET | Refills: 1 | OUTPATIENT
Start: 2023-06-06

## 2023-06-19 ENCOUNTER — PRIOR AUTHORIZATION (OUTPATIENT)
Dept: PSYCHIATRY | Facility: CLINIC | Age: 43
End: 2023-06-19
Payer: MEDICAID

## 2023-07-17 ENCOUNTER — TELEPHONE (OUTPATIENT)
Dept: ENDOCRINOLOGY | Age: 43
End: 2023-07-17

## 2023-07-17 NOTE — TELEPHONE ENCOUNTER
Caller: Barak Rivera    Relationship to patient: Self    Best call back number: [phone]    Patient is needing: PT WOULD LIKE TO CHANGE APPT ON 8/1 TO A VIDEO APPT. HE LIVES 3 HOURS AWAY. PLEASE CALL PT OT ADVISE ON WHAT CAN BE DONE.

## 2023-07-25 ENCOUNTER — TELEPHONE (OUTPATIENT)
Dept: PSYCHIATRY | Facility: CLINIC | Age: 43
End: 2023-07-25
Payer: MEDICAID

## 2023-07-25 NOTE — TELEPHONE ENCOUNTER
Pt states that the new medication has been working really well, and he hasn't had a panic attack since taking the medication.

## 2023-07-31 ENCOUNTER — PRIOR AUTHORIZATION (OUTPATIENT)
Dept: PSYCHIATRY | Facility: CLINIC | Age: 43
End: 2023-07-31
Payer: MEDICAID

## 2023-08-01 ENCOUNTER — TELEPHONE (OUTPATIENT)
Dept: ENDOCRINOLOGY | Age: 43
End: 2023-08-01

## 2023-08-01 ENCOUNTER — OFFICE VISIT (OUTPATIENT)
Dept: ENDOCRINOLOGY | Age: 43
End: 2023-08-01
Payer: COMMERCIAL

## 2023-08-01 VITALS
SYSTOLIC BLOOD PRESSURE: 118 MMHG | DIASTOLIC BLOOD PRESSURE: 76 MMHG | BODY MASS INDEX: 26.46 KG/M2 | WEIGHT: 174.6 LBS | HEIGHT: 68 IN | TEMPERATURE: 96.9 F | OXYGEN SATURATION: 95 % | HEART RATE: 86 BPM

## 2023-08-01 DIAGNOSIS — M81.0 OSTEOPOROSIS, UNSPECIFIED OSTEOPOROSIS TYPE, UNSPECIFIED PATHOLOGICAL FRACTURE PRESENCE: ICD-10-CM

## 2023-08-01 DIAGNOSIS — E23.0 HYPOGONADOTROPIC HYPOGONADISM: Primary | ICD-10-CM

## 2023-08-01 RX ORDER — IBUPROFEN 800 MG/1
TABLET ORAL
COMMUNITY
Start: 2023-06-19

## 2023-08-01 RX ORDER — SYRINGE WITH NEEDLE, 1 ML 25GX5/8"
SYRINGE, EMPTY DISPOSABLE MISCELLANEOUS
Qty: 6 EACH | Refills: 3 | Status: SHIPPED | OUTPATIENT
Start: 2023-08-01

## 2023-08-01 RX ORDER — OLOPATADINE HYDROCHLORIDE 1 MG/ML
SOLUTION/ DROPS OPHTHALMIC
COMMUNITY
Start: 2023-07-07

## 2023-08-01 RX ORDER — TESTOSTERONE CYPIONATE 200 MG/ML
250 INJECTION, SOLUTION INTRAMUSCULAR
Qty: 10 ML | Refills: 3 | Status: SHIPPED | OUTPATIENT
Start: 2023-08-01

## 2023-08-01 NOTE — TELEPHONE ENCOUNTER
Patient came in. He had his insurance information written but did not have the card.    ID: [id number]  Group: [id number]  PCN: [id number]  [id number]     Medicaid ID: [id number]    Phone Number: [phone]

## 2023-08-02 ENCOUNTER — TELEPHONE (OUTPATIENT)
Dept: ENDOCRINOLOGY | Age: 43
End: 2023-08-02

## 2023-08-02 RX ORDER — DENOSUMAB 60 MG/ML
INJECTION SUBCUTANEOUS
Qty: 1 EACH | Refills: 0 | Status: SHIPPED | OUTPATIENT
Start: 2023-08-02

## 2023-08-02 NOTE — TELEPHONE ENCOUNTER
Hub staff attempted to follow warm transfer process and was unsuccessful     Caller: Barak Rivera    Relationship to patient: Self    Best call back number: [phone]    Patient is needing: PT WANTING TO SPEAK TO LOKI REGARDING THEIR COMMUNICATION ON MY CHART, HE IS CONFUSED ABOUT WHAT SHE IS ASKING. CAN CALL BACK ANYTIME

## 2023-08-03 ENCOUNTER — PATIENT ROUNDING (BHMG ONLY) (OUTPATIENT)
Dept: ENDOCRINOLOGY | Age: 43
End: 2023-08-03
Payer: MEDICAID

## 2023-08-04 ENCOUNTER — PRIOR AUTHORIZATION (OUTPATIENT)
Dept: ENDOCRINOLOGY | Age: 43
End: 2023-08-04
Payer: MEDICAID

## 2023-08-04 ENCOUNTER — PATIENT ROUNDING (BHMG ONLY) (OUTPATIENT)
Dept: ENDOCRINOLOGY | Age: 43
End: 2023-08-04
Payer: MEDICAID

## 2023-08-14 DIAGNOSIS — F99 INSOMNIA DUE TO OTHER MENTAL DISORDER: ICD-10-CM

## 2023-08-14 DIAGNOSIS — F25.1 SCHIZOAFFECTIVE DISORDER, DEPRESSIVE TYPE: ICD-10-CM

## 2023-08-14 DIAGNOSIS — F51.05 INSOMNIA DUE TO OTHER MENTAL DISORDER: ICD-10-CM

## 2023-08-14 DIAGNOSIS — F41.1 GAD (GENERALIZED ANXIETY DISORDER): ICD-10-CM

## 2023-08-14 RX ORDER — CLONAZEPAM 1 MG/1
1 TABLET ORAL DAILY PRN
Qty: 30 TABLET | Refills: 0 | Status: SHIPPED | OUTPATIENT
Start: 2023-08-14 | End: 2023-08-17 | Stop reason: SDUPTHER

## 2023-08-14 NOTE — TELEPHONE ENCOUNTER
Pt called requested refill. Pt stated that he lost his apartment and living in a shelter right now.Pt stated that the medication is working and he would like to use it for another month.Please advise

## 2023-08-15 ENCOUNTER — PATIENT MESSAGE (OUTPATIENT)
Dept: FAMILY MEDICINE CLINIC | Facility: CLINIC | Age: 43
End: 2023-08-15
Payer: MEDICAID

## 2023-08-15 RX ORDER — MODAFINIL 200 MG/1
200 TABLET ORAL DAILY
Qty: 30 TABLET | Refills: 2 | Status: SHIPPED | OUTPATIENT
Start: 2023-08-15

## 2023-08-15 RX ORDER — ESZOPICLONE 3 MG/1
3 TABLET, FILM COATED ORAL NIGHTLY PRN
Qty: 30 TABLET | Refills: 2 | Status: SHIPPED | OUTPATIENT
Start: 2023-08-15 | End: 2024-08-14

## 2023-08-15 RX ORDER — PALIPERIDONE PALMITATE 819 MG/2.625ML
819 INJECTION, SUSPENSION, EXTENDED RELEASE INTRAMUSCULAR
Qty: 2.63 ML | Refills: 4 | Status: SHIPPED | OUTPATIENT
Start: 2023-08-15

## 2023-08-15 NOTE — TELEPHONE ENCOUNTER
PT WOULD  LIKE TO TAKE THIS MEDICATION TWICE A DAY    Rx Refill Note  Requested Prescriptions     Pending Prescriptions Disp Refills    Senna-Plus 8.6-50 MG per tablet 30 tablet 5     Sig: Take 1 tablet by mouth Every Night.      Last office visit with prescribing clinician: 2/6/2023   Last telemedicine visit with prescribing clinician: Visit date not found   Next office visit with prescribing clinician: 9/14/2023                         Would you like a call back once the refill request has been completed: [] Yes [] No    If the office needs to give you a call back, can they leave a voicemail: [] Yes [] No    Lizzeth Mcfadden MA  08/15/23, 10:44 EDT

## 2023-08-16 RX ORDER — ASPIRIN 81 MG
100 TABLET, DELAYED RELEASE (ENTERIC COATED) ORAL 2 TIMES DAILY PRN
Qty: 60 TABLET | Refills: 1 | Status: SHIPPED | OUTPATIENT
Start: 2023-08-16

## 2023-08-16 RX ORDER — DOCUSATE SODIUM, SENNOSIDES 50; 8.6 MG/1; MG/1
1 TABLET ORAL NIGHTLY
Qty: 30 TABLET | Refills: 5 | OUTPATIENT
Start: 2023-08-16

## 2023-08-16 NOTE — TELEPHONE ENCOUNTER
We have not previously discussed constipation and other medications for constipation are on his med list. Patient needs an appointment.

## 2023-08-16 NOTE — TELEPHONE ENCOUNTER
From: Barak Rivera  To: Tomas Yates  Sent: 8/15/2023 4:48 PM EDT  Subject: La Nena Marin,    Please call my Senna in for twice a day instead of once. Once a day isn't helping very well.    Best Regards,    Barak Rivera

## 2023-08-17 ENCOUNTER — TELEMEDICINE (OUTPATIENT)
Dept: PSYCHIATRY | Facility: CLINIC | Age: 43
End: 2023-08-17
Payer: MEDICAID

## 2023-08-17 DIAGNOSIS — F51.05 INSOMNIA DUE TO OTHER MENTAL DISORDER: ICD-10-CM

## 2023-08-17 DIAGNOSIS — F25.1 SCHIZOAFFECTIVE DISORDER, DEPRESSIVE TYPE: Primary | ICD-10-CM

## 2023-08-17 DIAGNOSIS — F99 INSOMNIA DUE TO OTHER MENTAL DISORDER: ICD-10-CM

## 2023-08-17 DIAGNOSIS — F41.1 GAD (GENERALIZED ANXIETY DISORDER): ICD-10-CM

## 2023-08-17 PROCEDURE — 99214 OFFICE O/P EST MOD 30 MIN: CPT | Performed by: NURSE PRACTITIONER

## 2023-08-17 PROCEDURE — 1160F RVW MEDS BY RX/DR IN RCRD: CPT | Performed by: NURSE PRACTITIONER

## 2023-08-17 PROCEDURE — 1159F MED LIST DOCD IN RCRD: CPT | Performed by: NURSE PRACTITIONER

## 2023-08-17 RX ORDER — QUETIAPINE FUMARATE 400 MG/1
400 TABLET, FILM COATED ORAL NIGHTLY
Qty: 30 TABLET | Refills: 3 | Status: SHIPPED | OUTPATIENT
Start: 2023-08-17

## 2023-08-17 RX ORDER — CLONAZEPAM 1 MG/1
1 TABLET ORAL DAILY PRN
Qty: 30 TABLET | Refills: 2 | Status: SHIPPED | OUTPATIENT
Start: 2023-08-17 | End: 2024-08-16

## 2023-08-17 NOTE — PROGRESS NOTES
Patient Name: Barak Rivera  MRN: [id number]   :  1980     This provider is located at her home office through the [facility] (through [facility]), [address], [address] using a secure [employer] Video Visit through [email or web address]. Patient is being seen remotely via telehealth at their home address in [place], and stated they are in a secure environment for this session. The patient's condition being diagnosed/treated is appropriate for telemedicine. The provider identified herself as well as her credentials.   The patient, and/or patients guardian, consent to be seen remotely, and when consent is given they understand that the consent allows for patient identifiable information to be sent to a third party as needed.   They may refuse to be seen remotely at any time. The electronic data is encrypted and password protected, and the patient and/or guardian has been advised of the potential risks to privacy not withstanding such measures.    You have chosen to receive care through a telehealth visit.  Do you consent to use a video/audio connection for your medical care today? Yes    Chief Complaint:      ICD-10-CM ICD-9-CM   1. Schizoaffective disorder, depressive type  F25.1 295.70   2. NEREIDA (generalized anxiety disorder)  F41.1 300.02   3. Insomnia due to other mental disorder  F51.05 300.9    F99 327.02       History of Present Illness: Barak Rivera is a 42 y.o. male is here today for medication management follow up.  Patient states he continues to have anxiety.  Would like his Klonopin increased.  Patient is now living in a shelter in [facility].  States it costs $12,000 to bury his .  Struggling.    The following portions of the patient's history were reviewed and updated as appropriate: allergies, current medications, past family history, past medical history, past social history, past surgical history, and problem list.    Review of  Systems;;  Review of Systems   Constitutional:  Negative for activity change, appetite change, fatigue, unexpected weight gain and unexpected weight loss.   Respiratory:  Negative for shortness of breath and wheezing.    Gastrointestinal:  Negative for constipation, diarrhea, nausea and vomiting.   Musculoskeletal:  Negative for gait problem.   Skin:  Negative for dry skin and rash.   Neurological:  Negative for dizziness, speech difficulty, weakness, light-headedness, headache, memory problem and confusion.   Psychiatric/Behavioral:  Positive for sleep disturbance. Negative for agitation, behavioral problems, decreased concentration, dysphoric mood, hallucinations, self-injury, suicidal ideas, negative for hyperactivity, depressed mood and stress. The patient is nervous/anxious.      Physical Exam;;  Physical Exam  Constitutional:       General: He is not in acute distress.     Appearance: He is well-developed. He is not diaphoretic.   HENT:      Head: Normocephalic and atraumatic.   Eyes:      Conjunctiva/sclera: Conjunctivae normal.   Pulmonary:      Effort: Pulmonary effort is normal. No respiratory distress.   Musculoskeletal:         General: Normal range of motion.      Cervical back: Full passive range of motion without pain and normal range of motion.   Neurological:      Mental Status: He is alert and oriented to person, place, and time.   Psychiatric:         Mood and Affect: Mood is anxious. Mood is not depressed. Affect is not labile, blunt, angry or inappropriate.         Speech: Speech is not rapid and pressured or tangential.         Behavior: Behavior normal. Behavior is not agitated, slowed, aggressive, withdrawn, hyperactive or combative. Behavior is cooperative.         Thought Content: Thought content normal. Thought content is not paranoid or delusional. Thought content does not include homicidal or suicidal ideation. Thought content does not include homicidal or suicidal plan.          "Judgment: Judgment normal.     There were no vitals taken for this visit.  There is no height or weight on file to calculate BMI.Video appt unable to obtain.     Current Medications;;    Current Outpatient Medications:     clonazePAM (KlonoPIN) 1 MG tablet, Take 1 tablet by mouth Daily As Needed for Anxiety., Disp: 30 tablet, Rfl: 2    B-D 3CC LUER-BRYCE SYR 23GX1\" 23G X 1\" 3 ML misc, Use 1 every 2 weeks for testosterone injections, Disp: 6 each, Rfl: 3    buPROPion XL (WELLBUTRIN XL) 300 MG 24 hr tablet, , Disp: , Rfl:     busPIRone (BUSPAR) 10 MG tablet, Take 1 tablet by mouth 3 (Three) Times a Day., Disp: , Rfl:     cloNIDine (CATAPRES) 0.3 MG tablet, Take 1 tablet by mouth Every Night., Disp: 30 tablet, Rfl: 6    denosumab (Prolia) 60 MG/ML solution prefilled syringe syringe, 60 mg subcutaneously every 6 months., Disp: 1 each, Rfl: 0    Docusate Sodium 100 MG capsule, Take 100 mg by mouth 2 (Two) Times a Day As Needed for Constipation., Disp: 60 tablet, Rfl: 1    Emtricitabine-Tenofovir AF (Descovy) 200-25 MG per tablet, Take 1 tablet by mouth Daily., Disp: 30 tablet, Rfl: 2    eszopiclone (Lunesta) 3 MG tablet, Take 1 tablet by mouth At Night As Needed for Sleep. Take immediately before bedtime, Disp: 30 tablet, Rfl: 2    ibuprofen (ADVIL,MOTRIN) 800 MG tablet, , Disp: , Rfl:     Invega Trinza 819 MG/2.63ML suspension prefilled syringe, Inject 819 mg into the appropriate muscle as directed by prescriber Every 3 (Three) Months., Disp: 2.63 mL, Rfl: 4    Linzess 290 MCG capsule capsule, , Disp: , Rfl:     modafinil (Provigil) 200 MG tablet, Take 1 tablet by mouth Daily., Disp: 30 tablet, Rfl: 2    olopatadine (PATANOL) 0.1 % ophthalmic solution, , Disp: , Rfl:     pregabalin (LYRICA) 150 MG capsule, Take 1 capsule by mouth 2 (Two) Times a Day., Disp: 60 capsule, Rfl: 2    QUEtiapine (SEROquel) 400 MG tablet, Take 1 tablet by mouth Every Night., Disp: 30 tablet, Rfl: 3    Senna-Plus 8.6-50 MG per tablet, Take 1 " "tablet by mouth Every Night., Disp: 30 tablet, Rfl: 5    sildenafil (Viagra) 100 MG tablet, Take 0.5-1 tablets by mouth Daily As Needed for Erectile Dysfunction., Disp: 10 tablet, Rfl: 5    Syringe 18G X 1-1/2\" 3 ML misc, Use 1 every 2 weeks for testosterone injection, Disp: 6 each, Rfl: 3    Testosterone Cypionate (DEPOTESTOTERONE CYPIONATE) 200 MG/ML injection, Inject 1.25 mL into the appropriate muscle as directed by prescriber Every 14 (Fourteen) Days., Disp: 10 mL, Rfl: 3    Triamcinolone Acetonide (NASACORT) 55 MCG/ACT nasal inhaler, , Disp: , Rfl:     venlafaxine 225 MG tablet sustained-release 24 hour 24 hr tablet, , Disp: , Rfl:     Ventolin  (90 Base) MCG/ACT inhaler, , Disp: , Rfl:   No current facility-administered medications for this visit.    Facility-Administered Medications Ordered in Other Visits:     Chlorhexidine Gluconate Cloth 2 % pads 1 application, 1 application , Topical, Q12H PRN, Josee Sanchez PA-C    Lab Results:   Lab on 05/31/2023   Component Date Value Ref Range Status    HIV-1/ HIV-2 05/31/2023 Non-Reactive  Non-Reactive Final    A non-reactive test result does not preclude the possibility of exposure to HIV or infection with HIV. An antibody response to recent exposure may take several months to reach detectable levels.    RPR 05/31/2023 Non-Reactive  Non-Reactive Final   Office Visit on 05/31/2023   Component Date Value Ref Range Status    Chlamydia trachomatis, HANNA 05/31/2023 Negative  Negative Final    Gonococcus by HANNA 05/31/2023 Negative  Negative Final    Trichomonas vaginosis 05/31/2023 Negative  Negative Final       Mental Status Exam:   Hygiene:   good  Cooperation:  Cooperative  Eye Contact:  Good  Psychomotor Behavior:  Appropriate  Mood:anxious  Affect:  Appropriate  Hopelessness: Denies  Speech:  Normal  Thought Process:  Goal directed  Thought Content:  Normal  Suicidal:  None  Homicidal:  None  Hallucinations:  None  Delusion:  None  Memory:  " Intact  Orientation:  Person, Place, Time, and Situation  Reliability:  good  Insight:  Good  Judgement:  Good  Impulse Control:  Good    PHQ-9 Depression Screening  Little interest or pleasure in doing things?     Feeling down, depressed, or hopeless?     Trouble falling or staying asleep, or sleeping too much?     Feeling tired or having little energy?     Poor appetite or overeating?     Feeling bad about yourself - or that you are a failure or have let yourself or your family down?     Trouble concentrating on things, such as reading the newspaper or watching television?     Moving or speaking so slowly that other people could have noticed? Or the opposite - being so fidgety or restless that you have been moving around a lot more than usual?     Thoughts that you would be better off dead, or of hurting yourself in some way?     PHQ-9 Total Score     If you checked off any problems, how difficult have these problems made it for you to do your work, take care of things at home, or get along with other people?          Assessment/Plan:  Diagnoses and all orders for this visit:    1. Schizoaffective disorder, depressive type (Primary)  -     QUEtiapine (SEROquel) 400 MG tablet; Take 1 tablet by mouth Every Night.  Dispense: 30 tablet; Refill: 3    2. NEREIDA (generalized anxiety disorder)  -     clonazePAM (KlonoPIN) 1 MG tablet; Take 1 tablet by mouth Daily As Needed for Anxiety.  Dispense: 30 tablet; Refill: 2    3. Insomnia due to other mental disorder      Patient asked for increase in Klonopin.  Instructed patient I would not be increasing her Klonopin as it is supposed to be temporary.  Patient stated he is also having difficulty with sleep.  Seroquel was increased to 400 mg.  Patient would like to start Vyvanse.  Explained to patient he would need to be evaluated for ADHD or show his previous testing.  Patient not able to do that.  We can discuss ADHD at a later date.    A psychological evaluation was conducted  in order to assess past and current level of functioning. Areas assessed included, but were not limited to: perception of social support, perception of ability to face and deal with challenges in life (positive functioning), anxiety symptoms, depressive symptoms, perspective on beliefs/belief system, coping skills for stress, intelligence level,  Therapeutic rapport was established. Interventions conducted today were geared towards incorporating medication management along with support for continued therapy. Education was also provided as to the med management with this provider and what to expect in subsequent sessions.    We discussed risks, benefits,goals and side effects of the above medication and the patient was agreeable with the plan.Patient was educated on the importance of compliance with treatment and follow-up appointments. Patient is aware to contact the [facility] [phone] with any worsening of symptoms. To call for questions or concerns and return early if necessary. Patent is agreeable to go to the Emergency Department or call 911 should they begin SI/HI.     Part of this note may be an electronic transcription/translation of spoken language to printed text using the Dragon Dictation System.    Return in about 3 months (around 11/17/2023) for Follow Up 30 min, Video visit.    CONTRERAS Rivera

## 2023-08-25 RX ORDER — TRIAMCINOLONE ACETONIDE 55 UG/1
SPRAY, METERED NASAL
Qty: 16.5 G | Status: CANCELLED | OUTPATIENT
Start: 2023-08-25

## 2023-08-25 RX ORDER — IBUPROFEN 800 MG/1
TABLET ORAL
OUTPATIENT
Start: 2023-08-25

## 2023-08-25 RX ORDER — FLUTICASONE PROPIONATE 50 MCG
2 SPRAY, SUSPENSION (ML) NASAL DAILY
Qty: 16 G | Refills: 11 | Status: SHIPPED | OUTPATIENT
Start: 2023-08-25

## 2023-08-25 RX ORDER — SILDENAFIL 100 MG/1
50-100 TABLET, FILM COATED ORAL DAILY PRN
Qty: 10 TABLET | Refills: 5 | Status: SHIPPED | OUTPATIENT
Start: 2023-08-25

## 2023-08-25 RX ORDER — OLOPATADINE HYDROCHLORIDE 1 MG/ML
1 SOLUTION/ DROPS OPHTHALMIC 2 TIMES DAILY PRN
Qty: 5 ML | Refills: 11 | Status: SHIPPED | OUTPATIENT
Start: 2023-08-25

## 2023-08-25 NOTE — TELEPHONE ENCOUNTER
CALLED AND SPOKE WITH PT AND HE STATED THAT HE WANTS TO SWITCH HIS APPOINTMENT TO A VIDEO VISIT ON 09/21/2023. IS THAT OKAY? HE ALSO WANTED ME TO LET YOU KNOW THAT HE HAS DOUBLE PNEUMONIA AND COVID.

## 2023-08-25 NOTE — TELEPHONE ENCOUNTER
Rx Refill Note  Requested Prescriptions     Pending Prescriptions Disp Refills    sildenafil (Viagra) 100 MG tablet 10 tablet 5     Sig: Take 0.5-1 tablets by mouth Daily As Needed for Erectile Dysfunction.    Triamcinolone Acetonide (NASACORT) 55 MCG/ACT nasal inhaler 16.5 g     ibuprofen (ADVIL,MOTRIN) 800 MG tablet      olopatadine (PATANOL) 0.1 % ophthalmic solution        Last office visit with prescribing clinician: 2/6/2023   Last telemedicine visit with prescribing clinician: Visit date not found   Next office visit with prescribing clinician: 9/21/2023                         Would you like a call back once the refill request has been completed: [] Yes [] No    If the office needs to give you a call back, can they leave a voicemail: [] Yes [] No    Lizzeth Mcfadden MA  08/25/23, 09:19 EDT

## 2023-08-29 ENCOUNTER — PATIENT MESSAGE (OUTPATIENT)
Dept: FAMILY MEDICINE CLINIC | Facility: CLINIC | Age: 43
End: 2023-08-29
Payer: MEDICAID

## 2023-08-30 NOTE — TELEPHONE ENCOUNTER
Problem: Adult Inpatient Plan of Care  Goal: Plan of Care Review  Outcome: Ongoing, Progressing  Flowsheets (Taken 8/30/2023 1511)  Progress: no change  Plan of Care Reviewed With: patient   Goal Outcome Evaluation:  Plan of Care Reviewed With: patient        Progress: no change          Pt has been alert and oriented today. Pt had a head CT done along with carotid us. Pt had a FESS done at bedside and still showing aspiration. Pt is still NPO but can have ice chips sparingly after oral care. Pt potassium and calcium have been replaced. Pt still getting IV thiamine and folic acid. Pt is still getting IV fluids. Daughter at bedside discussing with pt what they want to do in terms of nutrition at this time. Pt is now on room air. No complaints of pain. VSS   Patient called from the sober living home [facility]. He stated that a nurse there was giving him the injection of Palipridone Palmitate and she spilled the entire thing. He said this is a $9,000 injection so he cannot have another one and wonders if you would provide script for the pills instead.     I have a call in to [facility] to confirm but have not received a call back.     Please Advise?      Thank You

## 2023-08-30 NOTE — TELEPHONE ENCOUNTER
Unfortunately it looks like the patient is on multiple other controlled substance medications from other prescribers and was in the ER for use of a non-prescribed controlled substance in June. I am unable to continue his Lyrica here but am happy to see him for his other medical problems.

## 2023-09-01 ENCOUNTER — OFFICE VISIT (OUTPATIENT)
Dept: PULMONOLOGY | Facility: CLINIC | Age: 43
End: 2023-09-01
Payer: COMMERCIAL

## 2023-09-01 VITALS
HEART RATE: 99 BPM | DIASTOLIC BLOOD PRESSURE: 70 MMHG | TEMPERATURE: 98.2 F | SYSTOLIC BLOOD PRESSURE: 120 MMHG | HEIGHT: 68 IN | OXYGEN SATURATION: 95 % | BODY MASS INDEX: 26.78 KG/M2 | WEIGHT: 176.7 LBS

## 2023-09-01 DIAGNOSIS — J98.11 ATELECTASIS: ICD-10-CM

## 2023-09-01 DIAGNOSIS — R06.09 DOE (DYSPNEA ON EXERTION): ICD-10-CM

## 2023-09-01 DIAGNOSIS — R94.2 RESTRICTIVE PATTERN PRESENT ON PULMONARY FUNCTION TESTING: Primary | ICD-10-CM

## 2023-09-01 DIAGNOSIS — R06.02 SHORTNESS OF BREATH: ICD-10-CM

## 2023-09-01 DIAGNOSIS — J98.6 DIAPHRAGM PARALYSIS: ICD-10-CM

## 2023-09-01 DIAGNOSIS — Z72.0 TOBACCO ABUSE: ICD-10-CM

## 2023-09-01 NOTE — PROGRESS NOTES
"  PULMONARY  NOTE    Chief Complaint     Dyspnea on exertion, tobacco abuse, right diaphragm paralysis status post plication, scoliosis with prior Hernandez delia surgery    History of Present Illness     42-year-old male returns today for follow-up  Last saw him 2/28/2023    He has ongoing tobacco abuse    He has dyspnea on exertion  Prior spirometry have been consistent with a restrictive defect    He has idiopathic right diaphragm paralysis  He has had extensive back surgery in the past  He underwent a diaphragmatic plication by Dr. Waller in 2021  Spirometry as noted below    Patient Active Problem List   Diagnosis    Nicotine use disorder    Anxiety    Bipolar disorder    Rt Diaphragm Paralysis (S/P plication)    Hyperlipidemia    GERD (gastroesophageal reflux disease)    Hyperglycemia    Atelectasis    Benzodiazepine misuse    Degeneration of thoracic intervertebral disc    Drug-induced mood disorder(292.84)    Fracture of bone    Drug induced constipation    History of spinal fusion    Latent tuberculosis    Other specified health status    Pain in toe    Paranoid schizophrenia    Seasonal allergic rhinitis    Tobacco abuse    CHAMBERLAIN (dyspnea on exertion)    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      Allergies   Allergen Reactions    Phenobarbital Nausea And Vomiting and Other (See Comments)     N/V       Current Outpatient Medications:     B-D 3CC LUER-BRYCE SYR 23GX1\" 23G X 1\" 3 ML misc, Use 1 every 2 weeks for testosterone injections, Disp: 6 each, Rfl: 3    buPROPion XL (WELLBUTRIN XL) 300 MG 24 hr tablet, , Disp: , Rfl:     busPIRone (BUSPAR) 10 MG tablet, Take 1 tablet by mouth 3 (Three) Times a Day., Disp: , Rfl:     clonazePAM (KlonoPIN) 1 MG tablet, Take 1 tablet by mouth Daily As Needed for Anxiety., Disp: 30 tablet, Rfl: 2    cloNIDine (CATAPRES) 0.3 MG tablet, Take 1 tablet by mouth Every Night., Disp: 30 tablet, Rfl: 6    denosumab (Prolia) 60 MG/ML solution prefilled syringe syringe, 60 mg " "subcutaneously every 6 months., Disp: 1 each, Rfl: 0    Docusate Sodium 100 MG capsule, Take 100 mg by mouth 2 (Two) Times a Day As Needed for Constipation., Disp: 60 tablet, Rfl: 1    Emtricitabine-Tenofovir AF (Descovy) 200-25 MG per tablet, Take 1 tablet by mouth Daily., Disp: 30 tablet, Rfl: 2    eszopiclone (Lunesta) 3 MG tablet, Take 1 tablet by mouth At Night As Needed for Sleep. Take immediately before bedtime, Disp: 30 tablet, Rfl: 2    fluticasone (FLONASE) 50 MCG/ACT nasal spray, 2 sprays into the nostril(s) as directed by provider Daily., Disp: 16 g, Rfl: 11    ibuprofen (ADVIL,MOTRIN) 800 MG tablet, , Disp: , Rfl:     Invega Trinza 819 MG/2.63ML suspension prefilled syringe, Inject 819 mg into the appropriate muscle as directed by prescriber Every 3 (Three) Months., Disp: 2.63 mL, Rfl: 4    Linzess 290 MCG capsule capsule, , Disp: , Rfl:     modafinil (Provigil) 200 MG tablet, Take 1 tablet by mouth Daily., Disp: 30 tablet, Rfl: 2    olopatadine (PATANOL) 0.1 % ophthalmic solution, Apply 1 drop to eye(s) as directed by provider 2 (Two) Times a Day As Needed for Allergies., Disp: 5 mL, Rfl: 11    pregabalin (LYRICA) 150 MG capsule, Take 1 capsule by mouth 2 (Two) Times a Day., Disp: 60 capsule, Rfl: 2    QUEtiapine (SEROquel) 400 MG tablet, Take 1 tablet by mouth Every Night., Disp: 30 tablet, Rfl: 3    Senna-Plus 8.6-50 MG per tablet, Take 1 tablet by mouth Every Night., Disp: 30 tablet, Rfl: 5    sildenafil (Viagra) 100 MG tablet, Take 0.5-1 tablets by mouth Daily As Needed for Erectile Dysfunction., Disp: 10 tablet, Rfl: 5    Syringe 18G X 1-1/2\" 3 ML misc, Use 1 every 2 weeks for testosterone injection, Disp: 6 each, Rfl: 3    Testosterone Cypionate (DEPOTESTOTERONE CYPIONATE) 200 MG/ML injection, Inject 1.25 mL into the appropriate muscle as directed by prescriber Every 14 (Fourteen) Days., Disp: 10 mL, Rfl: 3    Triamcinolone Acetonide (NASACORT) 55 MCG/ACT nasal inhaler, , Disp: , Rfl:     " "venlafaxine 225 MG tablet sustained-release 24 hour 24 hr tablet, , Disp: , Rfl:     Ventolin  (90 Base) MCG/ACT inhaler, , Disp: , Rfl:   No current facility-administered medications for this visit.    Facility-Administered Medications Ordered in Other Visits:     Chlorhexidine Gluconate Cloth 2 % pads 1 application, 1 application , Topical, Q12H PRN, Josee Sanchez PA-C  MEDICATION LIST AND ALLERGIES REVIEWED.    Family History   Problem Relation Age of Onset    Hypertension Other     Diabetes Other     Depression Other     Heart attack Mother     Anxiety disorder Father     Depression Father      Social History     Tobacco Use    Smoking status: Heavy Smoker     Packs/day: 1.00     Years: 24.00     Pack years: 24.00     Types: Cigarettes    Smokeless tobacco: Never    Tobacco comments:     pt demands nicotine patch, refuses counseling, Pt is smoking 5 cigs   Vaping Use    Vaping Use: Never used   Substance Use Topics    Alcohol use: No    Drug use: Yes     Types: Amphetamines, Benzodiazepines, Methamphetamines     Comment: TCA; 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    Review of Systems  IF PRESENT REFER TO SCANNED ROS SHEET FROM SAME DATE  OTHERWISE ROS OBTAINED AND NON-CONTRIBUTORY OVER HPI.    /70 (BP Location: Left arm, Patient Position: Sitting, Cuff Size: Adult)   Pulse 99   Temp 98.2 øF (36.8 øC) (Infrared)   Ht 172.7 cm (67.99\")   Wt 80.2 kg (176 lb 11.2 oz)   SpO2 95% Comment: resting room air  BMI 26.88 kg/mý   Physical Exam  Vitals and nursing note reviewed.   Constitutional:       General: He is not in acute distress.     Appearance: He is well-developed. He is not diaphoretic.   HENT:      Head: Normocephalic and atraumatic.   Neck:      Thyroid: No thyromegaly. "   Cardiovascular:      Rate and Rhythm: Normal rate and regular rhythm.      Heart sounds: No murmur heard.  Pulmonary:      Effort: Pulmonary effort is normal.      Breath sounds: No stridor.      Comments: Decreased breath sounds in the right base compared to the left, no wheezes  Abdominal:      General: Bowel sounds are normal.   Lymphadenopathy:      Cervical: No cervical adenopathy.      Upper Body:      Right upper body: No supraclavicular or epitrochlear adenopathy.      Left upper body: No supraclavicular or epitrochlear adenopathy.   Skin:     General: Skin is warm and dry.   Neurological:      Mental Status: He is alert and oriented to person, place, and time.   Psychiatric:         Behavior: Behavior normal.       Results     Spirometry reveals no airway obstruction, and no restriction  FVC and FEV1 are stable or improved in comparison to preoperative spirometry from 2021    Chest x-ray today continues to show Hernandez rods, elevation of the right hemidiaphragm, and atelectasis on the right with no change in comparison to prior film    Immunization History   Administered Date(s) Administered    COVID-19 (PFIZER) BIVALENT 12+YRS 10/17/2022    COVID-19 (PFIZER) Purple Cap Monovalent 02/25/2021, 03/18/2021, 09/14/2021    Flu Vaccine Intradermal Quad 18-64YR 08/28/2022    Flu Vaccine Split Quad 09/06/2020    Flublok 18+yrs 10/01/2021    Hep B, Unspecified 04/29/2021    Hepatitis A 07/03/2018, 01/15/2019, 07/06/2023    Hepatitis B Adult/Adolescent IM 04/29/2021    Hpv9 04/30/2021, 07/02/2021, 11/03/2021    Pneumococcal Conjugate 20-Valent (PCV20) 11/09/2022    Pneumococcal Polysaccharide (PPSV23) 09/06/2020    Tdap 12/13/2015, 04/28/2021, 01/02/2023    flucelvax quad pfs =>4 YRS 10/12/2019     Problem List       ICD-10-CM ICD-9-CM   1. 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 Shortness of breath  R06.02 786.05   3. CHAMBERLAIN (dyspnea on exertion)  R06.09 786.09   4. Atelectasis  J98.11  518.0   5. Rt Diaphragm Paralysis (S/P plication)  J98.6 519.4   6. Tobacco abuse  Z72.0 305.1       Discussion     He was recently diagnosed with a pneumonia but chest x-ray looks the same  I suspect he just has acute bronchitis  I encouraged him to complete the antibiotics and steroids and have been prescribed    Would recommend continued efforts at smoking cessation    Would recommend efforts at regular exercise and weight loss    He will remain on the same inhalers    He is asking for a release so he does not have to use a breathalyzer on his car  The paperwork indicates that he needs to be able to at least generate either 1.0 or 1.2 L  Based on spirometry today he can exceed that    We will obtain a CT scan of the chest to evaluate his postoperative changes and localized atelectasis    I will plan to see him back in 6 months or earlier if there are any problems in the meantime    Level of service justified based on 45 minutes spent in patient care on this date of service including, but not limited to: preparing to see the patient, obtaining and/or reviewing history, performing medically appropriate examination, ordering tests/medicine/procedures, independently interpreting results, documenting clinical information in EHR, and counseling/education of patient/family/caregiver (excluding time spent on other separate services such as performing procedures or test interpretation, if applicable). (Level 4 30-39 minutes; Level 5 40-54 minutes)    Corbin Johnson MD  Note electronically signed    CC: Tomas Yates PA

## 2023-09-01 NOTE — TELEPHONE ENCOUNTER
"Spoke with patient and let him know he violated the controlled substance agreement. He can still be seen here for all primary care needs, but will not be prescribed controlled substances. He said \"no thank you\".   "

## 2023-09-05 DIAGNOSIS — J98.11 ATELECTASIS: Primary | ICD-10-CM

## 2023-09-05 DIAGNOSIS — J98.6 DIAPHRAGM PARALYSIS: ICD-10-CM

## 2023-09-11 DIAGNOSIS — M54.9 CHRONIC BILATERAL BACK PAIN, UNSPECIFIED BACK LOCATION: ICD-10-CM

## 2023-09-11 DIAGNOSIS — G89.29 CHRONIC BILATERAL BACK PAIN, UNSPECIFIED BACK LOCATION: ICD-10-CM

## 2023-09-11 RX ORDER — PREGABALIN 150 MG/1
CAPSULE ORAL
Qty: 60 CAPSULE | Refills: 1 | OUTPATIENT
Start: 2023-09-11

## 2023-09-13 DIAGNOSIS — E23.0 HYPOGONADOTROPIC HYPOGONADISM: ICD-10-CM

## 2023-09-13 RX ORDER — TESTOSTERONE CYPIONATE 200 MG/ML
125 INJECTION, SOLUTION INTRAMUSCULAR
Qty: 10 ML | Refills: 0 | Status: SHIPPED | OUTPATIENT
Start: 2023-09-13

## 2023-09-14 ENCOUNTER — TELEPHONE (OUTPATIENT)
Dept: ENDOCRINOLOGY | Age: 43
End: 2023-09-14

## 2023-09-14 DIAGNOSIS — J98.11 ATELECTASIS: Primary | ICD-10-CM

## 2023-09-14 NOTE — TELEPHONE ENCOUNTER
PT CALLED AND WANTED TO TALK TO KRISHNA ABOUT HIS TESTOSTERONE DOSE. HE STATES THAT IT SHOULD  BUT WHAT'S BEEN CALLED IN IS .63. HE WOULD LIKE TO GET THIS STRAIGHTEN OUT PLEASE.        SHERRY LEES [phone]

## 2023-09-15 DIAGNOSIS — J44.9 CHRONIC OBSTRUCTIVE PULMONARY DISEASE, UNSPECIFIED COPD TYPE: ICD-10-CM

## 2023-09-15 DIAGNOSIS — R06.09 DOE (DYSPNEA ON EXERTION): Primary | ICD-10-CM

## 2023-09-21 ENCOUNTER — TELEMEDICINE (OUTPATIENT)
Dept: PSYCHIATRY | Facility: CLINIC | Age: 43
End: 2023-09-21
Payer: COMMERCIAL

## 2023-09-21 DIAGNOSIS — F90.2 ATTENTION DEFICIT HYPERACTIVITY DISORDER (ADHD), COMBINED TYPE: ICD-10-CM

## 2023-09-21 DIAGNOSIS — F51.05 INSOMNIA DUE TO OTHER MENTAL DISORDER: ICD-10-CM

## 2023-09-21 DIAGNOSIS — F25.1 SCHIZOAFFECTIVE DISORDER, DEPRESSIVE TYPE: Primary | ICD-10-CM

## 2023-09-21 DIAGNOSIS — F41.1 GAD (GENERALIZED ANXIETY DISORDER): ICD-10-CM

## 2023-09-21 DIAGNOSIS — F99 INSOMNIA DUE TO OTHER MENTAL DISORDER: ICD-10-CM

## 2023-09-21 PROCEDURE — 99214 OFFICE O/P EST MOD 30 MIN: CPT | Performed by: NURSE PRACTITIONER

## 2023-09-21 PROCEDURE — 1159F MED LIST DOCD IN RCRD: CPT | Performed by: NURSE PRACTITIONER

## 2023-09-21 PROCEDURE — 1160F RVW MEDS BY RX/DR IN RCRD: CPT | Performed by: NURSE PRACTITIONER

## 2023-09-21 RX ORDER — VENLAFAXINE HYDROCHLORIDE 225 MG/1
225 TABLET, EXTENDED RELEASE ORAL
Qty: 30 EACH | Refills: 6 | Status: SHIPPED | OUTPATIENT
Start: 2023-09-21 | End: 2023-09-26 | Stop reason: SDUPTHER

## 2023-09-21 RX ORDER — DEXTROAMPHETAMINE SACCHARATE, AMPHETAMINE ASPARTATE, DEXTROAMPHETAMINE SULFATE AND AMPHETAMINE SULFATE 3.75; 3.75; 3.75; 3.75 MG/1; MG/1; MG/1; MG/1
15 TABLET ORAL EVERY MORNING
Qty: 30 TABLET | Refills: 0 | Status: SHIPPED | OUTPATIENT
Start: 2023-09-21 | End: 2023-09-27 | Stop reason: DRUGHIGH

## 2023-09-21 NOTE — PROGRESS NOTES
Patient Name: Barak Rivera  MRN: [id number]   :  1980     This provider is located at her home office through the [facility] (through [facility]), [address], [address] using a secure [employer] Video Visit through [employer]. Patient is being seen remotely via telehealth at their home address in [place], and stated they are in a secure environment for this session. The patient's condition being diagnosed/treated is appropriate for telemedicine. The provider identified herself as well as her credentials.   The patient, and/or patients guardian, consent to be seen remotely, and when consent is given they understand that the consent allows for patient identifiable information to be sent to a third party as needed.   They may refuse to be seen remotely at any time. The electronic data is encrypted and password protected, and the patient and/or guardian has been advised of the potential risks to privacy not withstanding such measures.    You have chosen to receive care through a telehealth visit.  Do you consent to use a video/audio connection for your medical care today? Yes    Chief Complaint:      ICD-10-CM ICD-9-CM   1. Schizoaffective disorder, depressive type  F25.1 295.70   2. NEREIDA (generalized anxiety disorder)  F41.1 300.02   3. Insomnia due to other mental disorder  F51.05 300.9    F99 327.02   4. Attention deficit hyperactivity disorder (ADHD), combined type  F90.2 314.01       History of Present Illness: Barak Rivera is a 42 y.o. male is here today for medication management follow up.  Patient states the camera broke on his phone.  Patient states overall he has been pretty stable.  Patient states he was diagnosed with ADHD in .  Patient has tried Vyvanse, Adderall XR, Concerta, and Adderall short acting.    The following portions of the patient's history were reviewed and updated as appropriate: allergies, current medications, past family  history, past medical history, past social history, past surgical history, and problem list.    Review of Systems;;  Review of Systems   Constitutional:  Negative for activity change, appetite change, fatigue, unexpected weight gain and unexpected weight loss.   Respiratory:  Negative for shortness of breath and wheezing.    Gastrointestinal:  Negative for constipation, diarrhea, nausea and vomiting.   Musculoskeletal:  Negative for gait problem.   Skin:  Negative for dry skin and rash.   Neurological:  Negative for dizziness, speech difficulty, weakness, light-headedness, headache, memory problem and confusion.   Psychiatric/Behavioral:  Positive for decreased concentration. Negative for agitation, behavioral problems, dysphoric mood, hallucinations, self-injury, sleep disturbance, suicidal ideas, negative for hyperactivity, depressed mood and stress. The patient is not nervous/anxious.      Physical Exam;;  Physical Exam  Constitutional:       General: He is not in acute distress.     Appearance: He is well-developed. He is not diaphoretic.   HENT:      Head: Normocephalic and atraumatic.   Eyes:      Conjunctiva/sclera: Conjunctivae normal.   Pulmonary:      Effort: Pulmonary effort is normal. No respiratory distress.   Musculoskeletal:         General: Normal range of motion.      Cervical back: Full passive range of motion without pain and normal range of motion.   Neurological:      Mental Status: He is alert and oriented to person, place, and time.   Psychiatric:         Mood and Affect: Mood is not anxious or depressed. Affect is not labile, blunt, angry or inappropriate.         Speech: Speech is not rapid and pressured or tangential.         Behavior: Behavior normal. Behavior is not agitated, slowed, aggressive, withdrawn, hyperactive or combative. Behavior is cooperative.         Thought Content: Thought content normal. Thought content is not paranoid or delusional. Thought content does not include  "homicidal or suicidal ideation. Thought content does not include homicidal or suicidal plan.         Judgment: Judgment normal.     There were no vitals taken for this visit.  There is no height or weight on file to calculate BMI. Video appt unable to obtain.     Current Medications;;    Current Outpatient Medications:     amphetamine-dextroamphetamine XR (Adderall XR) 20 MG 24 hr capsule, Take 1 capsule by mouth Every Morning, Disp: 30 capsule, Rfl: 0    B-D 3CC LUER-BRYCE SYR 23GX1\" 23G X 1\" 3 ML misc, Use 1 every 2 weeks for testosterone injections, Disp: 6 each, Rfl: 3    clonazePAM (KlonoPIN) 1 MG tablet, Take 1 tablet by mouth Daily As Needed for Anxiety., Disp: 30 tablet, Rfl: 2    cloNIDine (CATAPRES) 0.3 MG tablet, Take 1 tablet by mouth Every Night., Disp: 90 tablet, Rfl: 1    denosumab (Prolia) 60 MG/ML solution prefilled syringe syringe, 60 mg subcutaneously every 6 months., Disp: 1 each, Rfl: 0    Docusate Sodium 100 MG capsule, Take 100 mg by mouth 2 (Two) Times a Day As Needed for Constipation., Disp: 60 tablet, Rfl: 1    Emtricitabine-Tenofovir AF (Descovy) 200-25 MG per tablet, Take 1 tablet by mouth Daily., Disp: 30 tablet, Rfl: 2    eszopiclone (Lunesta) 3 MG tablet, Take 1 tablet by mouth At Night As Needed for Sleep. Take immediately before bedtime, Disp: 30 tablet, Rfl: 2    fluticasone (FLONASE) 50 MCG/ACT nasal spray, 2 sprays into the nostril(s) as directed by provider Daily., Disp: 16 g, Rfl: 11    ibuprofen (ADVIL,MOTRIN) 800 MG tablet, , Disp: , Rfl:     Invega Trinza 819 MG/2.63ML suspension prefilled syringe, Inject 819 mg into the appropriate muscle as directed by prescriber Every 3 (Three) Months., Disp: 2.63 mL, Rfl: 4    Linzess 290 MCG capsule capsule, , Disp: , Rfl:     olopatadine (PATANOL) 0.1 % ophthalmic solution, Apply 1 drop to eye(s) as directed by provider 2 (Two) Times a Day As Needed for Allergies., Disp: 5 mL, Rfl: 11    pregabalin (LYRICA) 150 MG capsule, Take 1 capsule " "by mouth 2 (Two) Times a Day., Disp: 60 capsule, Rfl: 2    QUEtiapine (SEROquel) 400 MG tablet, Take 1 tablet by mouth Every Night., Disp: 90 tablet, Rfl: 1    Senna-Plus 8.6-50 MG per tablet, Take 1 tablet by mouth Every Night., Disp: 30 tablet, Rfl: 5    sildenafil (Viagra) 100 MG tablet, Take 0.5-1 tablets by mouth Daily As Needed for Erectile Dysfunction., Disp: 10 tablet, Rfl: 5    Syringe 18G X 1-1/2\" 3 ML misc, Use 1 every 2 weeks for testosterone injection, Disp: 6 each, Rfl: 3    Testosterone Cypionate (DEPOTESTOTERONE CYPIONATE) 200 MG/ML injection, Inject 0.63 mL into the appropriate muscle as directed by prescriber Every 7 (Seven) Days., Disp: 10 mL, Rfl: 0    Triamcinolone Acetonide (NASACORT) 55 MCG/ACT nasal inhaler, , Disp: , Rfl:     venlafaxine 225 MG tablet sustained-release 24 hour 24 hr tablet, Take 1 tablet by mouth Daily With Breakfast., Disp: 90 each, Rfl: 1    Ventolin  (90 Base) MCG/ACT inhaler, , Disp: , Rfl:   No current facility-administered medications for this visit.    Facility-Administered Medications Ordered in Other Visits:     Chlorhexidine Gluconate Cloth 2 % pads 1 application, 1 application , Topical, Q12H PRN, Josee Sanchez PA-C    Lab Results:   No visits with results within 3 Month(s) from this visit.   Latest known visit with results is:   Lab on 05/31/2023   Component Date Value Ref Range Status    HIV-1/ HIV-2 05/31/2023 Non-Reactive  Non-Reactive Final    A non-reactive test result does not preclude the possibility of exposure to HIV or infection with HIV. An antibody response to recent exposure may take several months to reach detectable levels.    RPR 05/31/2023 Non-Reactive  Non-Reactive Final       Mental Status Exam:   Hygiene:   good  Cooperation:  Cooperative  Eye Contact:  Fair  Psychomotor Behavior:  Appropriate  Mood:within normal limits  Affect:  Appropriate  Hopelessness: Denies  Speech:  Normal  Thought Process:  Goal directed  Thought Content:  " Normal  Suicidal:  None  Homicidal:  None  Hallucinations:  None  Delusion:  None  Memory:  Intact  Orientation:  Person, Place, Time, and Situation  Reliability:  good  Insight:  Good  Judgement:  Good  Impulse Control:  Good    PHQ-9 Depression Screening  Little interest or pleasure in doing things?     Feeling down, depressed, or hopeless?     Trouble falling or staying asleep, or sleeping too much?     Feeling tired or having little energy?     Poor appetite or overeating?     Feeling bad about yourself - or that you are a failure or have let yourself or your family down?     Trouble concentrating on things, such as reading the newspaper or watching television?     Moving or speaking so slowly that other people could have noticed? Or the opposite - being so fidgety or restless that you have been moving around a lot more than usual?     Thoughts that you would be better off dead, or of hurting yourself in some way?     PHQ-9 Total Score     If you checked off any problems, how difficult have these problems made it for you to do your work, take care of things at home, or get along with other people?          Assessment/Plan:  Diagnoses and all orders for this visit:    1. Schizoaffective disorder, depressive type (Primary)    2. NEREIDA (generalized anxiety disorder)  -     Discontinue: venlafaxine 225 MG tablet sustained-release 24 hour 24 hr tablet; Take 1 tablet by mouth Daily With Breakfast.  Dispense: 30 each; Refill: 6    3. Insomnia due to other mental disorder    4. Attention deficit hyperactivity disorder (ADHD), combined type  -     Discontinue: amphetamine-dextroamphetamine (Adderall) 15 MG tablet; Take 1 tablet by mouth Every Morning. Take 15 mg orally every afternoon.  Dispense: 30 tablet; Refill: 0      Patient has a history of ADHD.  We will start Adderall 15 mg every morning.  Patient already has a urine drug screen.    A psychological evaluation was conducted in order to assess past and current level  of functioning. Areas assessed included, but were not limited to: perception of social support, perception of ability to face and deal with challenges in life (positive functioning), anxiety symptoms, depressive symptoms, perspective on beliefs/belief system, coping skills for stress, intelligence level,  Therapeutic rapport was established. Interventions conducted today were geared towards incorporating medication management along with support for continued therapy. Education was also provided as to the med management with this provider and what to expect in subsequent sessions.    We discussed risks, benefits,goals and side effects of the above medication and the patient was agreeable with the plan.Patient was educated on the importance of compliance with treatment and follow-up appointments. Patient is aware to contact the [facility] [phone] with any worsening of symptoms. To call for questions or concerns and return early if necessary. Patent is agreeable to go to the Emergency Department or call 911 should they begin SI/HI.     Part of this note may be an electronic transcription/translation of spoken language to printed text using the Dragon Dictation System.    Return in about 4 weeks (around 10/19/2023) for Follow Up 30 min, Video visit.    CONTRERAS Rivera

## 2023-09-26 DIAGNOSIS — F51.05 INSOMNIA DUE TO OTHER MENTAL DISORDER: ICD-10-CM

## 2023-09-26 DIAGNOSIS — F41.1 GAD (GENERALIZED ANXIETY DISORDER): ICD-10-CM

## 2023-09-26 DIAGNOSIS — F99 INSOMNIA DUE TO OTHER MENTAL DISORDER: ICD-10-CM

## 2023-09-26 DIAGNOSIS — F25.1 SCHIZOAFFECTIVE DISORDER, DEPRESSIVE TYPE: ICD-10-CM

## 2023-09-26 RX ORDER — VENLAFAXINE HYDROCHLORIDE 225 MG/1
225 TABLET, EXTENDED RELEASE ORAL
Qty: 90 EACH | Refills: 1 | Status: SHIPPED | OUTPATIENT
Start: 2023-09-26

## 2023-09-26 RX ORDER — QUETIAPINE FUMARATE 400 MG/1
400 TABLET, FILM COATED ORAL NIGHTLY
Qty: 90 TABLET | Refills: 1 | Status: SHIPPED | OUTPATIENT
Start: 2023-09-26

## 2023-09-26 RX ORDER — CLONIDINE HYDROCHLORIDE 0.3 MG/1
0.3 TABLET ORAL NIGHTLY
Qty: 90 TABLET | Refills: 1 | Status: SHIPPED | OUTPATIENT
Start: 2023-09-26

## 2023-09-26 NOTE — TELEPHONE ENCOUNTER
Patient would like to know if provider can send in a 90 day supply of medications.  Please advise.

## 2023-09-27 ENCOUNTER — TELEPHONE (OUTPATIENT)
Dept: PSYCHIATRY | Facility: CLINIC | Age: 43
End: 2023-09-27
Payer: COMMERCIAL

## 2023-09-27 DIAGNOSIS — F90.2 ATTENTION DEFICIT HYPERACTIVITY DISORDER (ADHD), COMBINED TYPE: Primary | ICD-10-CM

## 2023-09-27 RX ORDER — DEXTROAMPHETAMINE SACCHARATE, AMPHETAMINE ASPARTATE MONOHYDRATE, DEXTROAMPHETAMINE SULFATE AND AMPHETAMINE SULFATE 5; 5; 5; 5 MG/1; MG/1; MG/1; MG/1
20 CAPSULE, EXTENDED RELEASE ORAL EVERY MORNING
Qty: 30 CAPSULE | Refills: 0 | Status: SHIPPED | OUTPATIENT
Start: 2023-09-27

## 2023-09-27 NOTE — TELEPHONE ENCOUNTER
Pt called stating that their Adderall is not as effective around noon. Pt is wondering if he can take two adderall a day, 1 in the morning and 1 in the evening. And if so, can he finish his prescription and a new one be called in to do them until their next appt on 10/19/2023.    Please advise

## 2023-09-27 NOTE — TELEPHONE ENCOUNTER
Pharmacy called asking if patient needs to turn in the adderall 15mg before getting the adderall xr 20mg?  Please advise.

## 2023-09-27 NOTE — TELEPHONE ENCOUNTER
Pt stated he would like to do the Adderall XR but he is also wondering if it can be sent in for 25MG?

## 2023-10-03 LAB
TESTOST FREE SERPL-MCNC: 9.5 PG/ML (ref 6.8–21.5)
TESTOST SERPL-MCNC: 693.2 NG/DL (ref 264–916)

## 2023-10-04 ENCOUNTER — TELEPHONE (OUTPATIENT)
Dept: ENDOCRINOLOGY | Age: 43
End: 2023-10-04

## 2023-10-04 DIAGNOSIS — E23.0 HYPOGONADOTROPIC HYPOGONADISM: ICD-10-CM

## 2023-10-04 RX ORDER — TESTOSTERONE CYPIONATE 200 MG/ML
150 INJECTION, SOLUTION INTRAMUSCULAR
Qty: 10 ML | Refills: 0 | Status: SHIPPED | OUTPATIENT
Start: 2023-10-04 | End: 2023-10-05 | Stop reason: SDUPTHER

## 2023-10-04 NOTE — TELEPHONE ENCOUNTER
PT CALLED TO STATE THE MED FOR IS TESTOSTERONE WAS SENT IN INCORRECTLY. ACCORDING TO DR MUKHERJEE NOTE IT SHOULD BE 1.5 ML EVERY 14 DAYS. THE RX IS STATING .75ML EVERY 14 DAYS. PT IS ALSO REQUESTING PER THE PHARMACY THAT THE DISPENSE QTY. COME IN 1ML BOTTLES INSTEAD OF THE 10ML BOTTLES PLEASE.          Testosterone Cypionate (DEPOTESTOTERONE CYPIONATE) 200 MG/ML injection [[id number]]         GOING TO:  [facility] [place] [place] [facility] [address] [phone] [phone] FX

## 2023-10-05 DIAGNOSIS — E23.0 HYPOGONADOTROPIC HYPOGONADISM: ICD-10-CM

## 2023-10-05 RX ORDER — TESTOSTERONE CYPIONATE 200 MG/ML
300 INJECTION, SOLUTION INTRAMUSCULAR
Qty: 10 ML | Refills: 0 | Status: SHIPPED | OUTPATIENT
Start: 2023-10-05 | End: 2023-10-05 | Stop reason: SDUPTHER

## 2023-10-05 RX ORDER — TESTOSTERONE CYPIONATE 200 MG/ML
300 INJECTION, SOLUTION INTRAMUSCULAR
Qty: 4 ML | Refills: 5 | Status: SHIPPED | OUTPATIENT
Start: 2023-10-05

## 2023-10-05 NOTE — TELEPHONE ENCOUNTER
Hub staff attempted to follow warm transfer process and was unsuccessful     Caller: Barak Rivera    Relationship to patient: Self    Best call back number: [phone]  Patient is needing: PT IS CALLING TO CHECK THE STATUS ON HIS REFILL REQUEST. PLEASE CALLL PT TO ADVISE. HIGH PRIORITY

## 2023-10-13 ENCOUNTER — TELEPHONE (OUTPATIENT)
Dept: PSYCHIATRY | Facility: CLINIC | Age: 43
End: 2023-10-13
Payer: COMMERCIAL

## 2023-10-13 NOTE — TELEPHONE ENCOUNTER
Patient feels that the Lunesta is beginning to loose it's effectiveness and he would like to know if provider will switch him to restoril 30mg.  Please advise.

## 2023-10-13 NOTE — TELEPHONE ENCOUNTER
Patient called back and would like to know if prescriber will try ambien or sonota.  Patient expresses that the Lunesta is not working for him.

## 2023-10-16 DIAGNOSIS — F51.05 INSOMNIA DUE TO OTHER MENTAL DISORDER: Primary | ICD-10-CM

## 2023-10-16 DIAGNOSIS — F99 INSOMNIA DUE TO OTHER MENTAL DISORDER: Primary | ICD-10-CM

## 2023-10-16 RX ORDER — ZOLPIDEM TARTRATE 10 MG/1
10 TABLET ORAL NIGHTLY PRN
Qty: 30 TABLET | Refills: 0 | Status: SHIPPED | OUTPATIENT
Start: 2023-10-16

## 2023-10-19 ENCOUNTER — TELEMEDICINE (OUTPATIENT)
Dept: PSYCHIATRY | Facility: CLINIC | Age: 43
End: 2023-10-19
Payer: COMMERCIAL

## 2023-10-19 ENCOUNTER — PRIOR AUTHORIZATION (OUTPATIENT)
Dept: PSYCHIATRY | Facility: CLINIC | Age: 43
End: 2023-10-19

## 2023-10-19 DIAGNOSIS — F99 INSOMNIA DUE TO OTHER MENTAL DISORDER: ICD-10-CM

## 2023-10-19 DIAGNOSIS — F51.05 INSOMNIA DUE TO OTHER MENTAL DISORDER: ICD-10-CM

## 2023-10-19 DIAGNOSIS — F41.1 GAD (GENERALIZED ANXIETY DISORDER): ICD-10-CM

## 2023-10-19 DIAGNOSIS — F90.2 ATTENTION DEFICIT HYPERACTIVITY DISORDER (ADHD), COMBINED TYPE: ICD-10-CM

## 2023-10-19 DIAGNOSIS — F25.1 SCHIZOAFFECTIVE DISORDER, DEPRESSIVE TYPE: Primary | ICD-10-CM

## 2023-10-19 PROCEDURE — 99214 OFFICE O/P EST MOD 30 MIN: CPT | Performed by: NURSE PRACTITIONER

## 2023-10-19 PROCEDURE — 1160F RVW MEDS BY RX/DR IN RCRD: CPT | Performed by: NURSE PRACTITIONER

## 2023-10-19 PROCEDURE — 1159F MED LIST DOCD IN RCRD: CPT | Performed by: NURSE PRACTITIONER

## 2023-10-19 RX ORDER — DEXTROAMPHETAMINE SACCHARATE, AMPHETAMINE ASPARTATE MONOHYDRATE, DEXTROAMPHETAMINE SULFATE AND AMPHETAMINE SULFATE 7.5; 7.5; 7.5; 7.5 MG/1; MG/1; MG/1; MG/1
30 CAPSULE, EXTENDED RELEASE ORAL EVERY MORNING
Qty: 30 CAPSULE | Refills: 0 | Status: SHIPPED | OUTPATIENT
Start: 2023-10-19

## 2023-10-19 NOTE — PROGRESS NOTES
Patient Name: Barak Rivera  MRN: [id number]   :  1980     This provider is located at her home office through the [facility] (through [facility]), [address], [address] using a secure [employer] Video Visit through "[email or web address]. Patient is being seen remotely via telehealth at their home address in [place], and stated they are in a secure environment for this session. The patient's condition being diagnosed/treated is appropriate for telemedicine. The provider identified herself as well as her credentials.   The patient, and/or patients guardian, consent to be seen remotely, and when consent is given they understand that the consent allows for patient identifiable information to be sent to a third party as needed.   They may refuse to be seen remotely at any time. The electronic data is encrypted and password protected, and the patient and/or guardian has been advised of the potential risks to privacy not withstanding such measures.    You have chosen to receive care through a telehealth visit.  Do you consent to use a video/audio connection for your medical care today? Yes    Chief Complaint:      ICD-10-CM ICD-9-CM   1. Schizoaffective disorder, depressive type  F25.1 295.70   2. NEREIDA (generalized anxiety disorder)  F41.1 300.02   3. Attention deficit hyperactivity disorder (ADHD), combined type  F90.2 314.01   4. Insomnia due to other mental disorder  F51.05 300.9    F99 327.02       History of Present Illness: Barak Rivera is a 42 y.o. male is here today for medication management follow up.  Patient states Ambien is working for insomnia.  Patient states he is having trouble with focus and task completion.    The following portions of the patient's history were reviewed and updated as appropriate: allergies, current medications, past family history, past medical history, past social history, past surgical history, and problem list.    Review of  Systems;;  Review of Systems   Constitutional:  Negative for activity change, appetite change, fatigue, unexpected weight gain and unexpected weight loss.   Respiratory:  Negative for shortness of breath and wheezing.    Gastrointestinal:  Negative for constipation, diarrhea, nausea and vomiting.   Musculoskeletal:  Negative for gait problem.   Skin:  Negative for dry skin and rash.   Neurological:  Negative for dizziness, speech difficulty, weakness, light-headedness, headache, memory problem and confusion.   Psychiatric/Behavioral:  Negative for agitation, behavioral problems, decreased concentration, dysphoric mood, hallucinations, self-injury, sleep disturbance, suicidal ideas, negative for hyperactivity, depressed mood and stress. The patient is not nervous/anxious.        Physical Exam;;  Physical Exam  Constitutional:       General: He is not in acute distress.     Appearance: He is well-developed. He is not diaphoretic.   HENT:      Head: Normocephalic and atraumatic.   Eyes:      Conjunctiva/sclera: Conjunctivae normal.   Pulmonary:      Effort: Pulmonary effort is normal. No respiratory distress.   Musculoskeletal:         General: Normal range of motion.      Cervical back: Full passive range of motion without pain and normal range of motion.   Neurological:      Mental Status: He is alert and oriented to person, place, and time.   Psychiatric:         Mood and Affect: Mood is not anxious or depressed. Affect is not labile, blunt, angry or inappropriate.         Speech: Speech is not rapid and pressured or tangential.         Behavior: Behavior normal. Behavior is not agitated, slowed, aggressive, withdrawn, hyperactive or combative. Behavior is cooperative.         Thought Content: Thought content normal. Thought content is not paranoid or delusional. Thought content does not include homicidal or suicidal ideation. Thought content does not include homicidal or suicidal plan.         Judgment: Judgment  "normal.       There were no vitals taken for this visit.  There is no height or weight on file to calculate BMI. Video appt unable to obtain.     Current Medications;;    Current Outpatient Medications:     amphetamine-dextroamphetamine XR (Adderall XR) 30 MG 24 hr capsule, Take 1 capsule by mouth Every Morning, Disp: 30 capsule, Rfl: 0    B-D 3CC LUER-BRYCE SYR 23GX1\" 23G X 1\" 3 ML misc, Use 1 every 2 weeks for testosterone injections, Disp: 6 each, Rfl: 3    clonazePAM (KlonoPIN) 1 MG tablet, Take 1 tablet by mouth Daily As Needed for Anxiety., Disp: 30 tablet, Rfl: 2    cloNIDine (CATAPRES) 0.3 MG tablet, Take 1 tablet by mouth Every Night., Disp: 90 tablet, Rfl: 1    denosumab (Prolia) 60 MG/ML solution prefilled syringe syringe, 60 mg subcutaneously every 6 months., Disp: 1 each, Rfl: 0    Docusate Sodium 100 MG capsule, Take 100 mg by mouth 2 (Two) Times a Day As Needed for Constipation., Disp: 60 tablet, Rfl: 1    Emtricitabine-Tenofovir AF (Descovy) 200-25 MG per tablet, Take 1 tablet by mouth Daily., Disp: 30 tablet, Rfl: 2    eszopiclone (Lunesta) 3 MG tablet, Take 1 tablet by mouth At Night As Needed for Sleep. Take immediately before bedtime, Disp: 30 tablet, Rfl: 1    fluticasone (FLONASE) 50 MCG/ACT nasal spray, 2 sprays into the nostril(s) as directed by provider Daily., Disp: 16 g, Rfl: 11    ibuprofen (ADVIL,MOTRIN) 800 MG tablet, , Disp: , Rfl:     Invega Trinza 819 MG/2.63ML suspension prefilled syringe, Inject 819 mg into the appropriate muscle as directed by prescriber Every 3 (Three) Months., Disp: 2.63 mL, Rfl: 4    Linzess 290 MCG capsule capsule, , Disp: , Rfl:     olopatadine (PATANOL) 0.1 % ophthalmic solution, Apply 1 drop to eye(s) as directed by provider 2 (Two) Times a Day As Needed for Allergies., Disp: 5 mL, Rfl: 11    pregabalin (LYRICA) 150 MG capsule, Take 1 capsule by mouth 2 (Two) Times a Day., Disp: 60 capsule, Rfl: 2    QUEtiapine (SEROquel) 400 MG tablet, Take 1 tablet by mouth " "Every Night., Disp: 90 tablet, Rfl: 1    Senna-Plus 8.6-50 MG per tablet, Take 1 tablet by mouth Every Night., Disp: 30 tablet, Rfl: 5    sildenafil (Viagra) 100 MG tablet, Take 0.5-1 tablets by mouth Daily As Needed for Erectile Dysfunction., Disp: 10 tablet, Rfl: 5    Syringe 18G X 1-1/2\" 3 ML misc, Use 1 every 2 weeks for testosterone injection, Disp: 6 each, Rfl: 3    Testosterone Cypionate (DEPOTESTOTERONE CYPIONATE) 200 MG/ML injection, Inject 1.5 mL into the appropriate muscle as directed by prescriber Every 14 (Fourteen) Days., Disp: 4 mL, Rfl: 5    Triamcinolone Acetonide (NASACORT) 55 MCG/ACT nasal inhaler, , Disp: , Rfl:     venlafaxine 225 MG tablet sustained-release 24 hour 24 hr tablet, Take 1 tablet by mouth Daily With Breakfast., Disp: 90 each, Rfl: 1    Ventolin  (90 Base) MCG/ACT inhaler, , Disp: , Rfl:   No current facility-administered medications for this visit.    Facility-Administered Medications Ordered in Other Visits:     Chlorhexidine Gluconate Cloth 2 % pads 1 application, 1 application , Topical, Q12H PRN, Josee Sanchez PA-C    Lab Results:   Orders Only on 09/25/2023   Component Date Value Ref Range Status    Testosterone, Total 09/25/2023 693.2  264.0 - 916.0 ng/dL Final    Comment: This [facility] LC/MS-MS method is currently certified by the CDC  Hormone Standardization Program (HoSt). Adult male reference  interval is based on a population of healthy nonobese males  (BMI <30) between 19 and 39 years old. Donovan et.al. JCEM  2017,102;4254-0904. PMID: 04892922.      Testosterone, Free 09/25/2023 9.5  6.8 - 21.5 pg/mL Final       Mental Status Exam:   Hygiene:   good  Cooperation:  Cooperative  Eye Contact:  Good  Psychomotor Behavior:  Appropriate  Mood:within normal limits  Affect:  Appropriate  Hopelessness: Denies  Speech:  Normal  Thought Process:  Goal directed  Thought Content:  Normal  Suicidal:  None  Homicidal:  None  Hallucinations:  None  Delusion:  None  Memory:  " Intact  Orientation:  Person, Place, Time, and Situation  Reliability:  fair  Insight:  Fair  Judgement:  Fair  Impulse Control:  Fair    PHQ-9 Depression Screening  Little interest or pleasure in doing things?     Feeling down, depressed, or hopeless?     Trouble falling or staying asleep, or sleeping too much?     Feeling tired or having little energy?     Poor appetite or overeating?     Feeling bad about yourself - or that you are a failure or have let yourself or your family down?     Trouble concentrating on things, such as reading the newspaper or watching television?     Moving or speaking so slowly that other people could have noticed? Or the opposite - being so fidgety or restless that you have been moving around a lot more than usual?     Thoughts that you would be better off dead, or of hurting yourself in some way?     PHQ-9 Total Score     If you checked off any problems, how difficult have these problems made it for you to do your work, take care of things at home, or get along with other people?        Reviewed [facility] # [id number]     Assessment/Plan:  Diagnoses and all orders for this visit:    1. Schizoaffective disorder, depressive type (Primary)    2. NEREIDA (generalized anxiety disorder)    3. Attention deficit hyperactivity disorder (ADHD), combined type  -     amphetamine-dextroamphetamine XR (Adderall XR) 30 MG 24 hr capsule; Take 1 capsule by mouth Every Morning  Dispense: 30 capsule; Refill: 0    4. Insomnia due to other mental disorder      Patient states Ambien is working well for insomnia.  Struggling some with focus and task completion.  We will increase Adderall XR to 30 mg every morning.    A psychological evaluation was conducted in order to assess past and current level of functioning. Areas assessed included, but were not limited to: perception of social support, perception of ability to face and deal with challenges in life (positive functioning), anxiety symptoms, depressive  symptoms, perspective on beliefs/belief system, coping skills for stress, intelligence level,  Therapeutic rapport was established. Interventions conducted today were geared towards incorporating medication management along with support for continued therapy. Education was also provided as to the med management with this provider and what to expect in subsequent sessions.    We discussed risks, benefits,goals and side effects of the above medication and the patient was agreeable with the plan.Patient was educated on the importance of compliance with treatment and follow-up appointments. Patient is aware to contact the [facility] [phone] with any worsening of symptoms. To call for questions or concerns and return early if necessary. Patent is agreeable to go to the Emergency Department or call 911 should they begin SI/HI.     Part of this note may be an electronic transcription/translation of spoken language to printed text using the Dragon Dictation System.    Return in about 4 weeks (around 11/16/2023) for Follow Up 30 min, Video visit.    CONTRERAS Rivera

## 2023-10-24 ENCOUNTER — DOCUMENTATION (OUTPATIENT)
Dept: PULMONOLOGY | Facility: CLINIC | Age: 43
End: 2023-10-24
Payer: COMMERCIAL

## 2023-10-24 DIAGNOSIS — J98.11 ATELECTASIS: ICD-10-CM

## 2023-10-24 DIAGNOSIS — J98.6 DIAPHRAGM PARALYSIS: ICD-10-CM

## 2023-10-24 NOTE — PROGRESS NOTES
I contacted Mr. Rivera about his CT scan report that showed an elevated right diaphragm with right lung base atelectasis and no other discrete pulmonary findings.  He will follow-up with Dr. Johnson in March as previously suggested.

## 2023-10-26 ENCOUNTER — TELEPHONE (OUTPATIENT)
Dept: PSYCHIATRY | Facility: CLINIC | Age: 43
End: 2023-10-26
Payer: COMMERCIAL

## 2023-10-26 DIAGNOSIS — F99 INSOMNIA DUE TO OTHER MENTAL DISORDER: Primary | ICD-10-CM

## 2023-10-26 DIAGNOSIS — F51.05 INSOMNIA DUE TO OTHER MENTAL DISORDER: Primary | ICD-10-CM

## 2023-10-26 DIAGNOSIS — E23.0 HYPOGONADOTROPIC HYPOGONADISM: ICD-10-CM

## 2023-10-26 RX ORDER — SYRINGE WITH NEEDLE, 1 ML 25GX5/8"
SYRINGE, EMPTY DISPOSABLE MISCELLANEOUS
Qty: 6 EACH | Refills: 3 | Status: SHIPPED | OUTPATIENT
Start: 2023-10-26 | End: 2023-10-27 | Stop reason: SDUPTHER

## 2023-10-26 RX ORDER — ESZOPICLONE 3 MG/1
3 TABLET, FILM COATED ORAL NIGHTLY PRN
Qty: 30 TABLET | Refills: 1 | Status: SHIPPED | OUTPATIENT
Start: 2023-10-26 | End: 2024-10-25

## 2023-10-26 NOTE — TELEPHONE ENCOUNTER
Patient called stating the ambein is causing some visual hallucinations as well as auditory hallucinations.  Patient is not having any SI or HI.  Patient would like to be switched back to lunesta or sonota.  Please advise.

## 2023-10-26 NOTE — TELEPHONE ENCOUNTER
Supervisor has reviewed Controlled Substance agreement policy.Policy states on section 13. I will take my medication as instructed and prescribed, and I will not exceed the maximum prescribed dose. Any change in dosage must be approved by the PROVIDER or a physician within the PRACTICE.

## 2023-10-26 NOTE — TELEPHONE ENCOUNTER
Pharmacist Deep contacted this  and reported that he read the note on the prescription that patient must bring in remaining ambien to be destroyed before picking up lunesta prescription.  Patient asked pharmacist what if he was a little off.  Pharmacist asked him how bad off and he said like 5 pills.  Pharmacist asked him why he was off 5 pills and patient told him that on some days he took more than one.  Pharmacist told him without the correct number of remaining Ambien he could not fill the Lunesta medication.  Patient hung up with pharmacist and later called pharmacist back.  At this point patient told pharmacist that his flushed the remaining ambien when he told him patient would not be taking the medication any longer.  Patient asked the pharmacist if he could still get the lunesta and the pharmacist told him not without the provider's ok.

## 2023-10-26 NOTE — TELEPHONE ENCOUNTER
Patient called back stating that father threw medication away and he can't take it to the pharmacy. Please advise.

## 2023-10-27 ENCOUNTER — TELEPHONE (OUTPATIENT)
Dept: ENDOCRINOLOGY | Age: 43
End: 2023-10-27
Payer: COMMERCIAL

## 2023-10-27 DIAGNOSIS — E23.0 HYPOGONADOTROPIC HYPOGONADISM: ICD-10-CM

## 2023-10-27 RX ORDER — SYRINGE WITH NEEDLE, 1 ML 25GX5/8"
SYRINGE, EMPTY DISPOSABLE MISCELLANEOUS
Qty: 6 EACH | Refills: 3 | Status: SHIPPED | OUTPATIENT
Start: 2023-10-27

## 2023-10-27 NOTE — TELEPHONE ENCOUNTER
"[facility] CALL[facility] AND STATED ONE OF THE RX'S THAT WAS SENT OVER WAS DELETED AND WANTED TO HAVE THE RX'S RESENT OVER PLEASE.    Syringe 18G X 1-1/2\" 3 ML misc [[id number]] (Order [id number])   B-D 3CC LUER-RBYCE SYR 23GX1\" 23G X 1\" 3 ML misc [[id number]] (Order [id number])         GOING TO:    [facility] - [place], [place] [address] [phone] [facility] [phone] FX   "

## 2023-11-01 ENCOUNTER — TELEPHONE (OUTPATIENT)
Dept: PSYCHIATRY | Facility: CLINIC | Age: 43
End: 2023-11-01
Payer: COMMERCIAL

## 2023-11-01 NOTE — TELEPHONE ENCOUNTER
Pt called stating that the Ambien wasn't working for him.Pt stated that he told the staff to throw the Ambien away.Pt request for Lunesta sent in.I read the message to the patient from the previous telephone note.Pt stated he needs something.Please advise

## 2023-11-13 DIAGNOSIS — F51.05 INSOMNIA DUE TO OTHER MENTAL DISORDER: ICD-10-CM

## 2023-11-13 DIAGNOSIS — F99 INSOMNIA DUE TO OTHER MENTAL DISORDER: ICD-10-CM

## 2023-11-13 RX ORDER — ESZOPICLONE 3 MG/1
3 TABLET, FILM COATED ORAL NIGHTLY PRN
Qty: 30 TABLET | Refills: 1 | Status: SHIPPED | OUTPATIENT
Start: 2023-11-13 | End: 2024-11-12

## 2023-11-14 DIAGNOSIS — F90.2 ATTENTION DEFICIT HYPERACTIVITY DISORDER (ADHD), COMBINED TYPE: ICD-10-CM

## 2023-11-14 RX ORDER — DEXTROAMPHETAMINE SACCHARATE, AMPHETAMINE ASPARTATE MONOHYDRATE, DEXTROAMPHETAMINE SULFATE AND AMPHETAMINE SULFATE 7.5; 7.5; 7.5; 7.5 MG/1; MG/1; MG/1; MG/1
30 CAPSULE, EXTENDED RELEASE ORAL EVERY MORNING
Qty: 30 CAPSULE | Refills: 0 | Status: SHIPPED | OUTPATIENT
Start: 2023-11-14

## 2023-11-28 ENCOUNTER — TELEMEDICINE (OUTPATIENT)
Dept: PSYCHIATRY | Facility: CLINIC | Age: 43
End: 2023-11-28
Payer: COMMERCIAL

## 2023-11-28 DIAGNOSIS — F41.1 GAD (GENERALIZED ANXIETY DISORDER): ICD-10-CM

## 2023-11-28 DIAGNOSIS — F99 INSOMNIA DUE TO OTHER MENTAL DISORDER: ICD-10-CM

## 2023-11-28 DIAGNOSIS — F51.05 INSOMNIA DUE TO OTHER MENTAL DISORDER: ICD-10-CM

## 2023-11-28 DIAGNOSIS — F25.1 SCHIZOAFFECTIVE DISORDER, DEPRESSIVE TYPE: ICD-10-CM

## 2023-11-28 DIAGNOSIS — F90.2 ATTENTION DEFICIT HYPERACTIVITY DISORDER (ADHD), COMBINED TYPE: Primary | ICD-10-CM

## 2023-11-28 PROCEDURE — 1160F RVW MEDS BY RX/DR IN RCRD: CPT | Performed by: NURSE PRACTITIONER

## 2023-11-28 PROCEDURE — 1159F MED LIST DOCD IN RCRD: CPT | Performed by: NURSE PRACTITIONER

## 2023-11-28 PROCEDURE — 99214 OFFICE O/P EST MOD 30 MIN: CPT | Performed by: NURSE PRACTITIONER

## 2023-11-28 RX ORDER — DEXTROAMPHETAMINE SACCHARATE, AMPHETAMINE ASPARTATE MONOHYDRATE, DEXTROAMPHETAMINE SULFATE AND AMPHETAMINE SULFATE 7.5; 7.5; 7.5; 7.5 MG/1; MG/1; MG/1; MG/1
30 CAPSULE, EXTENDED RELEASE ORAL EVERY MORNING
Qty: 30 CAPSULE | Refills: 0 | Status: SHIPPED | OUTPATIENT
Start: 2023-11-28

## 2023-11-28 RX ORDER — CLONAZEPAM 1 MG/1
1 TABLET ORAL DAILY PRN
Qty: 30 TABLET | Refills: 0 | Status: SHIPPED | OUTPATIENT
Start: 2023-11-28 | End: 2024-11-27

## 2023-11-28 NOTE — PROGRESS NOTES
Patient Name: Barak Rivera  MRN: [id number]   :  1980     This provider is located at her home office through the [facility] (through [facility]), [address], [address] using a secure [employer] Video Visit through [employer]. Patient is being seen remotely via telehealth at their home address in [place], and stated they are in a secure environment for this session. The patient's condition being diagnosed/treated is appropriate for telemedicine. The provider identified herself as well as her credentials.   The patient, and/or patients guardian, consent to be seen remotely, and when consent is given they understand that the consent allows for patient identifiable information to be sent to a third party as needed.   They may refuse to be seen remotely at any time. The electronic data is encrypted and password protected, and the patient and/or guardian has been advised of the potential risks to privacy not withstanding such measures.    You have chosen to receive care through a telehealth visit.  Do you consent to use a video/audio connection for your medical care today? Yes    Chief Complaint:      ICD-10-CM ICD-9-CM   1. Attention deficit hyperactivity disorder (ADHD), combined type  F90.2 314.01   2. Schizoaffective disorder, depressive type  F25.1 295.70   3. NEREIDA (generalized anxiety disorder)  F41.1 300.02   4. Insomnia due to other mental disorder  F51.05 300.9    F99 327.02       History of Present Illness: Barak Rivera is a 43 y.o. male is here today for medication management follow up.  Spoke with patient and informed him I will no longer be prescribing controlled substances for him.  Instructed him I would give him 1 month's worth of Lunesta, Klonopin, and Adderall to allow him time to find another provider.  This is based on the following phone messages:  10/26/23 11:16 AM  Note     Patient called stating the ambein is causing some visual  hallucinations as well as auditory hallucinations.  Patient is not having any SI or HI.  Patient would like to be switched back to lunesta or sonota.  Please advise.       10/26/23  1:07 PM  Pt must be willing to take the rest of his ambien to the pharmacy to be destroyed. And the count must match appropriately. Im not sending sonata. I will again send lunesta and we will be sticking with it.   10/26/23  1:10 PM  Note     Patient states he is willing to take Ambein to the pharmacy and Lunesta will be fine.       10/26/23  1:31 PM  Note     Patient called back stating that father threw medication away and he can't take it to the pharmacy. Please advise.       10/26/23  1:42 PM  Then unfortuately he can not  a new script. Please call pharmacy and ask them to DC the Lunesta as pt can't produce the Ambien. He will have to wait until another script is do.   10/26/23  2:01 PM  Note        Pharmacist Deep contacted this  and reported that he read the note on the prescription that patient must bring in remaining ambien to be destroyed before picking up lunesta prescription.  Patient asked pharmacist what if he was a little off.  Pharmacist asked him how bad off and he said like 5 pills.  Pharmacist asked him why he was off 5 pills and patient told him that on some days he took more than one.  Pharmacist told him without the correct number of remaining Ambien he could not fill the Lunesta medication.  Patient hung up with pharmacist and later called pharmacist back.  At this point patient told pharmacist that his flushed the remaining ambien when he told him patient would not be taking the medication any longer.  Patient asked the pharmacist if he could still get the lunesta and the pharmacist told him not without the provider's ok.       11/1/23  3:05 PM  Note     Pt called stating that the Ambien wasn't working for him.Pt stated that he told the staff to throw the Ambien away.Pt request for Lunesta  sent in.I read the message to the patient from the previous telephone note.Pt stated he needs something.Please advise        Pt told office staff two different explanations of what happened to his Ambien. Pt told the pharmacy yet a 3rd explanation of what happened to the ambien.  Instructed patient I would be more than happy to continue prescribing all of his noncontrolled medications indefinitely however he would only get a month of his 3 controlled substances and that was it.  Patient is to wean from Klonopin with his last 30-day prescription.  Patient did not deny that he had given 3 different explanations of what happened to his Ambien.    The following portions of the patient's history were reviewed and updated as appropriate: allergies, current medications, past family history, past medical history, past social history, past surgical history, and problem list.    Review of Systems;;  Review of Systems   Constitutional:  Negative for activity change, appetite change, fatigue, unexpected weight gain and unexpected weight loss.   Respiratory:  Negative for shortness of breath and wheezing.    Gastrointestinal:  Negative for constipation, diarrhea, nausea and vomiting.   Musculoskeletal:  Negative for gait problem.   Skin:  Negative for dry skin and rash.   Neurological:  Negative for dizziness, speech difficulty, weakness, light-headedness, headache, memory problem and confusion.   Psychiatric/Behavioral:  Negative for agitation, behavioral problems, decreased concentration, dysphoric mood, hallucinations, self-injury, sleep disturbance, suicidal ideas, negative for hyperactivity, depressed mood and stress. The patient is not nervous/anxious.        Physical Exam;;  Physical Exam  Constitutional:       General: He is not in acute distress.     Appearance: He is well-developed. He is not diaphoretic.   HENT:      Head: Normocephalic and atraumatic.   Eyes:      Conjunctiva/sclera: Conjunctivae normal.   Pulmonary:  "     Effort: Pulmonary effort is normal. No respiratory distress.   Musculoskeletal:         General: Normal range of motion.      Cervical back: Full passive range of motion without pain and normal range of motion.   Neurological:      Mental Status: He is alert and oriented to person, place, and time.   Psychiatric:         Mood and Affect: Mood is not anxious or depressed. Affect is not labile, blunt, angry or inappropriate.         Speech: Speech is not rapid and pressured or tangential.         Behavior: Behavior normal. Behavior is not agitated, slowed, aggressive, withdrawn, hyperactive or combative. Behavior is cooperative.         Thought Content: Thought content normal. Thought content is not paranoid or delusional. Thought content does not include homicidal or suicidal ideation. Thought content does not include homicidal or suicidal plan.         Judgment: Judgment normal.       There were no vitals taken for this visit.  There is no height or weight on file to calculate BMI. Video appt unable to obtain.     Current Medications;;    Current Outpatient Medications:     amphetamine-dextroamphetamine XR (Adderall XR) 30 MG 24 hr capsule, Take 1 capsule by mouth Every Morning, Disp: 30 capsule, Rfl: 0    clonazePAM (KlonoPIN) 1 MG tablet, Take 1 tablet by mouth Daily As Needed for Anxiety., Disp: 30 tablet, Rfl: 0    B-D 3CC LUER-BRYCE SYR 23GX1\" 23G X 1\" 3 ML misc, Use 1 every 2 weeks for testosterone injections, Disp: 6 each, Rfl: 3    cloNIDine (CATAPRES) 0.3 MG tablet, Take 1 tablet by mouth Every Night., Disp: 90 tablet, Rfl: 1    denosumab (Prolia) 60 MG/ML solution prefilled syringe syringe, 60 mg subcutaneously every 6 months., Disp: 1 each, Rfl: 0    Docusate Sodium 100 MG capsule, Take 100 mg by mouth 2 (Two) Times a Day As Needed for Constipation., Disp: 60 tablet, Rfl: 1    Emtricitabine-Tenofovir AF (Descovy) 200-25 MG per tablet, Take 1 tablet by mouth Daily., Disp: 30 tablet, Rfl: 2    " "eszopiclone (Lunesta) 3 MG tablet, Take 1 tablet by mouth At Night As Needed for Sleep. Take immediately before bedtime, Disp: 30 tablet, Rfl: 1    ibuprofen (ADVIL,MOTRIN) 800 MG tablet, , Disp: , Rfl:     Invega Trinza 819 MG/2.63ML suspension prefilled syringe, Inject 819 mg into the appropriate muscle as directed by prescriber Every 3 (Three) Months., Disp: 2.63 mL, Rfl: 4    olopatadine (PATANOL) 0.1 % ophthalmic solution, Apply 1 drop to eye(s) as directed by provider 2 (Two) Times a Day As Needed for Allergies., Disp: 5 mL, Rfl: 11    pregabalin (LYRICA) 150 MG capsule, Take 1 capsule by mouth 2 (Two) Times a Day., Disp: 60 capsule, Rfl: 2    QUEtiapine (SEROquel) 400 MG tablet, Take 1 tablet by mouth Every Night., Disp: 90 tablet, Rfl: 1    Senna-Plus 8.6-50 MG per tablet, Take 1 tablet by mouth Every Night., Disp: 30 tablet, Rfl: 5    sildenafil (Viagra) 100 MG tablet, Take 0.5-1 tablets by mouth Daily As Needed for Erectile Dysfunction., Disp: 10 tablet, Rfl: 5    Syringe 18G X 1-1/2\" 3 ML misc, Use 1 every 2 weeks for testosterone injection, Disp: 6 each, Rfl: 3    Testosterone Cypionate (DEPOTESTOTERONE CYPIONATE) 200 MG/ML injection, Inject 1.5 mL into the appropriate muscle as directed by prescriber Every 14 (Fourteen) Days., Disp: 4 mL, Rfl: 5    Triamcinolone Acetonide (NASACORT) 55 MCG/ACT nasal inhaler, , Disp: , Rfl:     venlafaxine 225 MG tablet sustained-release 24 hour 24 hr tablet, Take 1 tablet by mouth Daily With Breakfast., Disp: 90 each, Rfl: 1    Ventolin  (90 Base) MCG/ACT inhaler, , Disp: , Rfl:   No current facility-administered medications for this visit.    Facility-Administered Medications Ordered in Other Visits:     Chlorhexidine Gluconate Cloth 2 % pads 1 application, 1 application , Topical, Q12H PRN, Josee Sanchez PA-C    Lab Results:   Orders Only on 09/25/2023   Component Date Value Ref Range Status    Testosterone, Total 09/25/2023 693.2  264.0 - 916.0 ng/dL Final "    Comment: This [facility] LC/MS-MS method is currently certified by the CDC  Hormone Standardization Program (HoSt). Adult male reference  interval is based on a population of healthy nonobese males  (BMI <30) between 19 and 39 years old. Donovan et.al. JCEM  2017,102;8464-7561. PMID: 60530416.      Testosterone, Free 09/25/2023 9.5  6.8 - 21.5 pg/mL Final       Mental Status Exam:   Hygiene:   good  Cooperation:  Cooperative  Eye Contact:  Closed  Psychomotor Behavior:  Restless  Mood:anxious, depressed, and irritable  Affect:  Appropriate  Hopelessness: Denies  Speech:  Normal  Thought Process:  Goal directed  Thought Content:  Normal  Suicidal:  None  Homicidal:  None  Hallucinations:  None  Delusion:  None  Memory:  Intact  Orientation:  Person, Place, Time, and Situation  Reliability:  poor  Insight:  Poor  Judgement:  Poor  Impulse Control:  Poor    PHQ-9 Depression Screening  Little interest or pleasure in doing things? (P) 1-->several days   Feeling down, depressed, or hopeless? (P) 1-->several days   Trouble falling or staying asleep, or sleeping too much? (P) 1-->several days   Feeling tired or having little energy? (P) 1-->several days   Poor appetite or overeating? (P) 1-->several days   Feeling bad about yourself - or that you are a failure or have let yourself or your family down? (P) 1-->several days   Trouble concentrating on things, such as reading the newspaper or watching television? (P) 1-->several days   Moving or speaking so slowly that other people could have noticed? Or the opposite - being so fidgety or restless that you have been moving around a lot more than usual? (P) 1-->several days   Thoughts that you would be better off dead, or of hurting yourself in some way? (P) 0-->not at all   PHQ-9 Total Score (P) 8   If you checked off any problems, how difficult have these problems made it for you to do your work, take care of things at home, or get along with other people? (P) very difficult       Reviewed [facility] # [id number]     Assessment/Plan:  Diagnoses and all orders for this visit:    1. Attention deficit hyperactivity disorder (ADHD), combined type (Primary)  -     amphetamine-dextroamphetamine XR (Adderall XR) 30 MG 24 hr capsule; Take 1 capsule by mouth Every Morning  Dispense: 30 capsule; Refill: 0    2. Schizoaffective disorder, depressive type    3. NEREIDA (generalized anxiety disorder)  -     clonazePAM (KlonoPIN) 1 MG tablet; Take 1 tablet by mouth Daily As Needed for Anxiety.  Dispense: 30 tablet; Refill: 0    4. Insomnia due to other mental disorder      Patient attempted to get provider to reconsider controlled substances.  Told patient again that he was only getting 1 month of each controlled substance to allow him time to find another provider.  Again informed him I would be happy to prescribe indefinitely his noncontrolled substance medications.  Patient stated he would agree to a follow-up in 3 months.    A psychological evaluation was conducted in order to assess past and current level of functioning. Areas assessed included, but were not limited to: perception of social support, perception of ability to face and deal with challenges in life (positive functioning), anxiety symptoms, depressive symptoms, perspective on beliefs/belief system, coping skills for stress, intelligence level,  Therapeutic rapport was established. Interventions conducted today were geared towards incorporating medication management along with support for continued therapy. Education was also provided as to the med management with this provider and what to expect in subsequent sessions.    We discussed risks, benefits,goals and side effects of the above medication and the patient was agreeable with the plan.Patient was educated on the importance of compliance with treatment and follow-up appointments. Patient is aware to contact the [facility] [phone] with any worsening of  symptoms. To call for questions or concerns and return early if necessary. Patent is agreeable to go to the Emergency Department or call 911 should they begin SI/HI.     Part of this note may be an electronic transcription/translation of spoken language to printed text using the Dragon Dictation System.    Return in about 3 months (around 2/28/2024) for Follow Up 30 min, Video visit.    Lizzeth Sol, APRN

## 2023-11-29 ENCOUNTER — OFFICE VISIT (OUTPATIENT)
Dept: PULMONOLOGY | Facility: CLINIC | Age: 43
End: 2023-11-29
Payer: COMMERCIAL

## 2023-11-29 VITALS
SYSTOLIC BLOOD PRESSURE: 128 MMHG | HEIGHT: 68 IN | HEART RATE: 88 BPM | DIASTOLIC BLOOD PRESSURE: 78 MMHG | OXYGEN SATURATION: 92 % | BODY MASS INDEX: 29.77 KG/M2 | TEMPERATURE: 97.8 F | WEIGHT: 196.4 LBS

## 2023-11-29 DIAGNOSIS — J98.6 DIAPHRAGM PARALYSIS: ICD-10-CM

## 2023-11-29 DIAGNOSIS — R94.2 RESTRICTIVE PATTERN PRESENT ON PULMONARY FUNCTION TESTING: Primary | ICD-10-CM

## 2023-11-29 DIAGNOSIS — Z72.0 TOBACCO ABUSE: ICD-10-CM

## 2023-11-29 NOTE — PROGRESS NOTES
"[facility] Pulmonary Follow up    CHIEF COMPLAINT    Shortness of breath    HISTORY OF PRESENT ILLNESS    Barak Rivera is a 43 y.o.male here today for follow-up.  He is here today for full PFTs.  He was last seen in the office by Dr. Johnson in September.  He denies any respiratory illnesses since his last appointment.    He had a CT scan of his chest performed since his last appointment.  The results are below.    He has a history of idiopathic right diaphragm paralysis and had back surgery in the past.  He underwent a diaphragmatic plication and by Dr. Waller in 2021.    He continues to be short of breath with exertion.    He denies any sputum production or hemoptysis.  He denies any chest pain or palpitations.  Denies any lower extremity edema or calf tenderness.    He continues to smoke and has a 24-pack-year history.    Patient Active Problem List   Diagnosis    Nicotine use disorder    Anxiety    Bipolar disorder    Rt Diaphragm Paralysis (S/P plication)    Hyperlipidemia    GERD (gastroesophageal reflux disease)    Hyperglycemia    Atelectasis    Benzodiazepine misuse    Degeneration of thoracic intervertebral disc    Drug-induced mood disorder(292.84)    Fracture of bone    Drug induced constipation    History of spinal fusion    Latent tuberculosis    Other specified health status    Pain in toe    Paranoid schizophrenia    Seasonal allergic rhinitis    Tobacco abuse    CHAMBERLAIN (dyspnea on exertion)    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, Disp: 90 tablet, Rfl: 1    denosumab (Prolia) 60 MG/ML solution prefilled syringe syringe, 60 mg subcutaneously every 6 months., Disp: 1 each, Rfl: 0    Docusate Sodium 100 MG capsule, Take 100 mg by mouth 2 (Two) Times a Day As Needed for Constipation., Disp: 60 tablet, Rfl: 1    Emtricitabine-Tenofovir AF (Descovy) 200-25 MG per tablet, Take 1 tablet by mouth Daily., Disp: 30 tablet, Rfl: 2    eszopiclone (Lunesta) 3 MG tablet, Take 1 tablet by mouth At Night As Needed for Sleep. Take immediately before bedtime, Disp: 30 tablet, Rfl: 1    ibuprofen (ADVIL,MOTRIN) 800 MG tablet, , Disp: , Rfl:     Invega Trinza 819 MG/2.63ML suspension prefilled syringe, Inject 819 mg into the appropriate muscle as directed by prescriber Every 3 (Three) Months., Disp: 2.63 mL, Rfl: 4    olopatadine (PATANOL) 0.1 % ophthalmic solution, Apply 1 drop to eye(s) as directed by provider 2 (Two) Times a Day As Needed for Allergies., Disp: 5 mL, Rfl: 11    pregabalin (LYRICA) 150 MG capsule, Take 1 capsule by mouth 2 (Two) Times a Day., Disp: 60 capsule, Rfl: 2    QUEtiapine (SEROquel) 400 MG tablet, Take 1 tablet by mouth Every Night., Disp: 90 tablet, Rfl: 1    Senna-Plus 8.6-50 MG per tablet, Take 1 tablet by mouth Every Night., Disp: 30 tablet, Rfl: 5    sildenafil (Viagra) 100 MG tablet, Take 0.5-1 tablets by mouth Daily As Needed for Erectile Dysfunction., Disp: 10 tablet, Rfl: 5    Syringe 18G X 1-1/2\" 3 ML misc, Use 1 every 2 weeks for testosterone injection, Disp: 6 each, Rfl: 3    Testosterone Cypionate (DEPOTESTOTERONE CYPIONATE) 200 MG/ML injection, Inject 1.5 mL into the appropriate muscle as directed by prescriber Every 14 (Fourteen) Days., Disp: 4 mL, Rfl: 5    Triamcinolone Acetonide (NASACORT) 55 MCG/ACT nasal inhaler, , Disp: , Rfl:     venlafaxine 225 MG tablet sustained-release 24 hour 24 hr " tablet, Take 1 tablet by mouth Daily With Breakfast., Disp: 90 each, Rfl: 1    Ventolin  (90 Base) MCG/ACT inhaler, , Disp: , Rfl:   No current facility-administered medications for this visit.    Facility-Administered Medications Ordered in Other Visits:     Chlorhexidine Gluconate Cloth 2 % pads 1 application, 1 application , Topical, Q12H PRN, Josee Sanchez PA-C  MEDICATION LIST AND ALLERGIES REVIEWED.    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    Review of Systems   Constitutional:  Negative for activity change, appetite change, fatigue, fever and unexpected weight change.   HENT:  Negative for congestion, postnasal drip, rhinorrhea, sinus pressure, sore throat and voice change.    Eyes:  Negative for visual disturbance.   Respiratory:  Positive for shortness of breath. Negative for cough, chest tightness and wheezing.    Cardiovascular:  Negative for chest pain, palpitations and leg swelling.   Gastrointestinal:  Negative for abdominal distention, abdominal pain, nausea and vomiting.   Endocrine: Negative for cold intolerance and heat intolerance.   Genitourinary:  Negative for difficulty urinating and urgency.   Musculoskeletal:  Negative for arthralgias, back pain and neck pain.   Skin:  Negative for color change and pallor.   Allergic/Immunologic: Negative for environmental allergies and food allergies.   Neurological:  Negative for dizziness, syncope,  "weakness and light-headedness.   Hematological:  Negative for adenopathy. Does not bruise/bleed easily.   Psychiatric/Behavioral:  Negative for agitation and behavioral problems.    .    /78   Pulse 88   Temp 97.8 °F (36.6 °C)   Ht 172.7 cm (67.99\")   Wt 89.1 kg (196 lb 6.4 oz)   SpO2 92% Comment: resting, room air  BMI 29.87 kg/m²     Immunization History   Administered Date(s) Administered    31-influenza Vac Quardvalent Preservativ 08/14/2019    COVID-19 (PFIZER) BIVALENT 12+YRS 10/17/2022    COVID-19 (PFIZER) Purple Cap Monovalent 02/25/2021, 03/18/2021, 09/14/2021    COVID-19 F23 (MODERNA) 12YRS+ (SPIKEVAX) 11/01/2023    Flu Vaccine Intradermal Quad 18-64YR 08/28/2022    Flu Vaccine Split Quad 09/06/2020    Flublok 18+yrs 10/01/2021    Fluzone (or Fluarix & Flulaval for VFC) >6mos 10/30/2023    Hep B, Unspecified 04/29/2021    Hepatitis A 07/03/2018, 01/15/2019, 07/06/2023    Hepatitis B Adult/Adolescent IM 04/29/2021    Hpv9 04/30/2021, 07/02/2021, 11/03/2021    Influenza, Unspecified 11/03/2023    Pneumococcal Conjugate 20-Valent (PCV20) 11/09/2022    Pneumococcal Polysaccharide (PPSV23) 09/06/2020    Tdap 12/13/2015, 04/28/2021, 01/02/2023    flucelvax quad pfs =>4 YRS 10/12/2019       Physical Exam  Vitals and nursing note reviewed.   Constitutional:       Appearance: He is well-developed. He is not diaphoretic.   HENT:      Head: Normocephalic and atraumatic.   Eyes:      Pupils: Pupils are equal, round, and reactive to light.   Neck:      Thyroid: No thyromegaly.   Cardiovascular:      Rate and Rhythm: Normal rate and regular rhythm.      Heart sounds: Normal heart sounds. No murmur heard.     No friction rub. No gallop.   Pulmonary:      Effort: Pulmonary effort is normal. No respiratory distress.      Breath sounds: Normal breath sounds. No wheezing or rales.   Chest:      Chest wall: No tenderness.   Abdominal:      General: Bowel sounds are normal.      Palpations: Abdomen is soft.      " Tenderness: There is no abdominal tenderness.   Musculoskeletal:         General: No swelling. Normal range of motion.      Cervical back: Normal range of motion and neck supple.   Lymphadenopathy:      Cervical: No cervical adenopathy.   Skin:     General: Skin is warm and dry.      Capillary Refill: Capillary refill takes less than 2 seconds.   Neurological:      Mental Status: He is alert and oriented to person, place, and time.   Psychiatric:         Mood and Affect: Mood normal.         Behavior: Behavior normal.           RESULTS    PFTS in the office today, read by me, FVC 2.33 48% predicted, FEV1 1.65 43% predicted, FEV1/FVC 71% predicted, TLC 4.09 64% predicted, DLCO 67% predicted, no obstruction, mild restriction and reduced DLCO.    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 Rivera was here for follow-up after he had full PFTs.  He is trying to qualify for disability.  I did review his PFTs in the office today and he has a mild restrictive airway pattern.  This is due to his diaphragm dysfunction.    We also reviewed his chest CT scan findings that showed an elevated right diaphragm with a right lung base atelectasis.    Did discuss smoking cessation in the office for 3 minutes.  Due to his age she does not qualify for CT lung screenings until the age 49.    He will follow-up with Dr. Johnson in February as previously scheduled.    I personally spent a total of 31 minutes on patient visit today including chart review, face to face with the patient obtaining the history and physical  exam, review of pertinent images and tests, counseling and discussion and/or coordination of care as described above, and documentation.  Total time excludes time spent on other separate services such as performing procedures or test interpretation, if applicable.        Payal Lowry, APRN 11/29/202315:35 EST  Electronically signed     Please note that portions of this note were completed with a voice recognition program.        CC: Tomas Yates, PA

## 2023-12-21 DIAGNOSIS — F41.1 GAD (GENERALIZED ANXIETY DISORDER): ICD-10-CM

## 2023-12-21 DIAGNOSIS — F51.05 INSOMNIA DUE TO OTHER MENTAL DISORDER: ICD-10-CM

## 2023-12-21 DIAGNOSIS — F99 INSOMNIA DUE TO OTHER MENTAL DISORDER: ICD-10-CM

## 2023-12-21 DIAGNOSIS — F25.1 SCHIZOAFFECTIVE DISORDER, DEPRESSIVE TYPE: ICD-10-CM

## 2023-12-21 DIAGNOSIS — F90.2 ATTENTION DEFICIT HYPERACTIVITY DISORDER (ADHD), COMBINED TYPE: ICD-10-CM

## 2023-12-21 RX ORDER — QUETIAPINE FUMARATE 400 MG/1
400 TABLET, FILM COATED ORAL NIGHTLY
Qty: 90 TABLET | Refills: 1 | Status: SHIPPED | OUTPATIENT
Start: 2023-12-21

## 2023-12-21 RX ORDER — ESZOPICLONE 3 MG/1
3 TABLET, FILM COATED ORAL NIGHTLY PRN
Qty: 30 TABLET | Refills: 1 | Status: CANCELLED | OUTPATIENT
Start: 2023-12-21 | End: 2024-12-20

## 2023-12-21 RX ORDER — CLONAZEPAM 1 MG/1
1 TABLET ORAL DAILY PRN
Qty: 30 TABLET | Refills: 0 | Status: CANCELLED | OUTPATIENT
Start: 2023-12-21 | End: 2024-12-20

## 2023-12-21 RX ORDER — DEXTROAMPHETAMINE SACCHARATE, AMPHETAMINE ASPARTATE MONOHYDRATE, DEXTROAMPHETAMINE SULFATE AND AMPHETAMINE SULFATE 7.5; 7.5; 7.5; 7.5 MG/1; MG/1; MG/1; MG/1
30 CAPSULE, EXTENDED RELEASE ORAL EVERY MORNING
Qty: 30 CAPSULE | Refills: 0 | Status: CANCELLED | OUTPATIENT
Start: 2023-12-21

## 2023-12-21 RX ORDER — CLONIDINE HYDROCHLORIDE 0.3 MG/1
0.3 TABLET ORAL NIGHTLY
Qty: 90 TABLET | Refills: 1 | Status: SHIPPED | OUTPATIENT
Start: 2023-12-21

## 2023-12-21 NOTE — TELEPHONE ENCOUNTER
Pt called stating that he can't get into his new provider til Feb 9.Pt has request refill for his medications. Pt stated that he also tried with his pcp they will not fill his medications.Please advise

## 2023-12-27 ENCOUNTER — TELEMEDICINE (OUTPATIENT)
Dept: FAMILY MEDICINE CLINIC | Facility: TELEHEALTH | Age: 43
End: 2023-12-27
Payer: COMMERCIAL

## 2023-12-27 DIAGNOSIS — Z20.6 EXPOSURE TO HIV: Primary | ICD-10-CM

## 2023-12-27 NOTE — PROGRESS NOTES
You have chosen to receive care through a telehealth visit.  Do you consent to use a video/audio connection for your medical care today? Yes     CHIEF COMPLAINT  No chief complaint on file.        HPI  Barak Rivera is a 43 y.o. male  presents with complaint of shared a needle with HIV positive patient 2 days ago, wants to take PEP, but refuses to go to the ER.     Review of Systems  See HPI    Past Medical History:   Diagnosis Date    ADD (attention deficit disorder)     Anogenital HPV infection     Anxiety     Arthritis     Asthma 2019    Bipolar disorder     Condyloma acuminatum in male     Constipation     COPD (chronic obstructive pulmonary disease)     Depression     Erectile dysfunction     Fibromyalgia     GERD (gastroesophageal reflux disease)     Hepatitis B     antibodies    Hepatitis C     antibodies    Hyperlipidemia     Migraines     On home oxygen therapy     at hs per [place], 2.5 L    Osteomyelitis hip     Osteopenia     Osteoporosis     Osteoporosis     Paranoia     Suicidal ideations     Tuberculosis 2021    Latent, chest x-ray neg    Urinary tract infection        Family History   Problem Relation Age of Onset    Hypertension Other     Diabetes Other     Depression Other     Heart attack Mother     Anxiety disorder Father     Depression Father        Social History     Socioeconomic History    Marital status:     Number of children: 0   Tobacco Use    Smoking status: Heavy Smoker     Packs/day: 1.00     Years: 24.00     Additional pack years: 0.00     Total pack years: 24.00     Types: Cigarettes     Start date: 1996    Smokeless tobacco: Never    Tobacco comments:     pt demands nicotine patch, refuses counseling, Pt is smoking 5 cigs   Vaping Use    Vaping Use: Never used   Substance and Sexual Activity    Alcohol use: No    Drug use: Yes     Types: Amphetamines, Benzodiazepines, Methamphetamines     Comment: TCA; 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       Barak Rivera  reports that he has been smoking cigarettes. He started smoking about 28 years ago. He has a 24.00 pack-year smoking history. He has never used smokeless tobacco..              There were no vitals taken for this visit.    PHYSICAL EXAM  Physical Exam   Constitutional: He is oriented to person, place, and time. He appears well-developed and well-nourished. He does not have a sickly appearance. He does not appear ill.   HENT:   Head: Normocephalic and atraumatic.   Pulmonary/Chest: Effort normal.  No respiratory distress.  Neurological: He is alert and oriented to person, place, and time.       Results for orders placed or performed in visit on 09/25/23   Testosterone (Free & Total), LC / MS   Result Value Ref Range    Testosterone, Total 693.2 264.0 - 916.0 ng/dL    Testosterone, Free 9.5 6.8 - 21.5 pg/mL       Diagnoses and all orders for this visit:    1. Exposure to HIV (Primary)    --advised to go to nearest ER for evaluation and PEP protocol      FOLLOW-UP  As discussed during visit with PCP/[facility] if no improvement or Urgent Care/Emergency Department if worsening of symptoms    Patient verbalizes understanding of medication dosage, comfort measures, instructions for treatment and follow-up.    CONTRERAS Peterson  12/27/2023  12:05 EST    The use of a video visit has been reviewed with the patient and verbal informed consent has been obtained. Myself and Barak Rivera participated in this visit. The patient is located in [address] [address].    I am located in [place], [place]. Mychart and Twilio were utilized. I spent 8 minutes in the patient's chart for this visit.

## 2024-01-29 DIAGNOSIS — F25.1 SCHIZOAFFECTIVE DISORDER, DEPRESSIVE TYPE: ICD-10-CM

## 2024-01-29 DIAGNOSIS — F99 INSOMNIA DUE TO OTHER MENTAL DISORDER: ICD-10-CM

## 2024-01-29 DIAGNOSIS — F51.05 INSOMNIA DUE TO OTHER MENTAL DISORDER: ICD-10-CM

## 2024-01-30 ENCOUNTER — TELEPHONE (OUTPATIENT)
Dept: PSYCHIATRY | Facility: CLINIC | Age: 44
End: 2024-01-30

## 2024-01-30 DIAGNOSIS — F25.1 SCHIZOAFFECTIVE DISORDER, DEPRESSIVE TYPE: Primary | ICD-10-CM

## 2024-01-30 RX ORDER — CLONIDINE HYDROCHLORIDE 0.3 MG/1
0.3 TABLET ORAL NIGHTLY
Qty: 30 TABLET | Refills: 6 | Status: SHIPPED | OUTPATIENT
Start: 2024-01-30

## 2024-01-30 RX ORDER — PALIPERIDONE PALMITATE 819 MG/2.625ML
819 INJECTION, SUSPENSION, EXTENDED RELEASE INTRAMUSCULAR
Qty: 2.63 ML | Refills: 4 | Status: SHIPPED | OUTPATIENT
Start: 2024-01-30

## 2024-01-30 RX ORDER — QUETIAPINE FUMARATE 200 MG/1
400 TABLET, FILM COATED ORAL NIGHTLY
Qty: 60 TABLET | Refills: 6 | Status: SHIPPED | OUTPATIENT
Start: 2024-01-30

## 2024-01-30 NOTE — TELEPHONE ENCOUNTER
Pt called wanting to know can you send the Seroquel in 200 mg  take two once a day.Pt stated the pill is to big swallow.Please advise

## 2024-02-01 ENCOUNTER — TELEMEDICINE (OUTPATIENT)
Dept: ENDOCRINOLOGY | Age: 44
End: 2024-02-01

## 2024-02-01 DIAGNOSIS — E23.0 HYPOGONADOTROPIC HYPOGONADISM: ICD-10-CM

## 2024-02-01 DIAGNOSIS — M81.0 OSTEOPOROSIS, UNSPECIFIED OSTEOPOROSIS TYPE, UNSPECIFIED PATHOLOGICAL FRACTURE PRESENCE: ICD-10-CM

## 2024-02-01 DIAGNOSIS — M81.0 OSTEOPOROSIS, UNSPECIFIED OSTEOPOROSIS TYPE, UNSPECIFIED PATHOLOGICAL FRACTURE PRESENCE: Primary | ICD-10-CM

## 2024-02-01 PROCEDURE — 99214 OFFICE O/P EST MOD 30 MIN: CPT | Performed by: INTERNAL MEDICINE

## 2024-02-01 RX ORDER — DENOSUMAB 60 MG/ML
INJECTION SUBCUTANEOUS
Qty: 1 EACH | Refills: 0 | Status: SHIPPED | OUTPATIENT
Start: 2024-02-01

## 2024-02-01 NOTE — PROGRESS NOTES
Chief complaint/Reason for consult:  Osteoporosis    HPI:   - 43 year old male here for osteoporosis  - He was last seen in 8/2023  - No major changes since last visit  - He was started on Forteo in 4/2018, he was on alendronate for 1 month prior to starting Forteo. He was nonadherent to Forteo on a daily basis and stopped it completely in 10/2019. He was given denosumab on 10/2019 and 5/2020 with no therapy since 5/2020. In 11/2021 he was restarted on Tymlos at his request but stopped it shortly after and got Prolia in 3/2022, 11/2022, 8/2023  - He was also found to have hypogonadism without a known etiology and started on testosterone 150 mg IM every week  - Prior fractures: compression fractures of T5, T6, T8, L1, and L2. He has also had a right wrist and left ankle fracture. He states all of his fractures occurred with minimal trauma. No new fractures.  - He has had fusion from T3 to T9  - No exposure to chronic glucocorticoids, no steroids since last visit  - Has taken Descovy daily for PrEP, previously on Truvada   - No history of kidney stones  - Current smoker  - Family history of osteoporosis: none  - Vitamin D and calcium intake: he is not taking supplemental vitamin D or calcium  - Last DXA: 5/2023 showing osteoporosis and a stable BMD  - He does complain of fatigue    The following portions of the patient's history were reviewed and updated as appropriate: allergies, current medications, past family history, past medical history, past social history, past surgical history, and problem list.    Objective     There were no vitals filed for this visit.     Physical Exam  Vitals reviewed.   Constitutional:       Appearance: Normal appearance.   HENT:      Head: Normocephalic and atraumatic.   Neurological:      Mental Status: He is alert.   Psychiatric:         Mood and Affect: Mood normal.         Behavior: Behavior normal.         Thought Content: Thought content normal.         Judgment: Judgment  normal.       Assessment & Plan   Osteoporosis  - Will plan on ordering a repeat dose of Prolia (he would like to receive it in [place])  - His primary care provider is Rosemary Chang, [phone], who administers his Prolia  - Recommend 1000 IU vitamin D3 daily, 1200 mg of dietary/supplementary calcium, weight bearing exercise as tolerated  - Will order repeat DXA scan     2. Hypogonadism  - Unclear etiology  - Currently on testosterone 150 mg IM every week  - Will check HCT and testosterone levels  - Recommend repeat DXA in 1 year     - Return to clinic in 6 months    - Video was used, consent was obtained

## 2024-02-09 ENCOUNTER — TELEPHONE (OUTPATIENT)
Dept: PSYCHIATRY | Facility: CLINIC | Age: 44
End: 2024-02-09
Payer: COMMERCIAL

## 2024-02-12 DIAGNOSIS — F41.1 GAD (GENERALIZED ANXIETY DISORDER): Primary | ICD-10-CM

## 2024-02-12 RX ORDER — VENLAFAXINE HYDROCHLORIDE 150 MG/1
150 CAPSULE, EXTENDED RELEASE ORAL DAILY
Qty: 30 CAPSULE | Refills: 1 | Status: SHIPPED | OUTPATIENT
Start: 2024-02-12 | End: 2024-02-13 | Stop reason: DRUGHIGH

## 2024-02-12 RX ORDER — DULOXETIN HYDROCHLORIDE 30 MG/1
30 CAPSULE, DELAYED RELEASE ORAL DAILY
Qty: 30 CAPSULE | Refills: 1 | Status: SHIPPED | OUTPATIENT
Start: 2024-02-12 | End: 2024-02-13

## 2024-02-13 ENCOUNTER — TELEPHONE (OUTPATIENT)
Dept: PSYCHIATRY | Facility: CLINIC | Age: 44
End: 2024-02-13
Payer: COMMERCIAL

## 2024-02-13 DIAGNOSIS — F41.1 GAD (GENERALIZED ANXIETY DISORDER): Primary | ICD-10-CM

## 2024-02-13 RX ORDER — VENLAFAXINE HYDROCHLORIDE 225 MG/1
225 TABLET, EXTENDED RELEASE ORAL
Qty: 30 EACH | Refills: 6 | Status: SHIPPED | OUTPATIENT
Start: 2024-02-13

## 2024-02-17 LAB
BUN SERPL-MCNC: 6 MG/DL (ref 6–24)
BUN/CREAT SERPL: 5 (ref 9–20)
CALCIUM SERPL-MCNC: 9.6 MG/DL (ref 8.7–10.2)
CHLORIDE SERPL-SCNC: 99 MMOL/L (ref 96–106)
CO2 SERPL-SCNC: 21 MMOL/L (ref 20–29)
CREAT SERPL-MCNC: 1.12 MG/DL (ref 0.76–1.27)
EGFRCR SERPLBLD CKD-EPI 2021: 83 ML/MIN/1.73
GLUCOSE SERPL-MCNC: 133 MG/DL (ref 70–99)
HCT VFR BLD AUTO: 53 % (ref 37.5–51)
POTASSIUM SERPL-SCNC: 4.1 MMOL/L (ref 3.5–5.2)
SODIUM SERPL-SCNC: 142 MMOL/L (ref 134–144)
TESTOST FREE SERPL-MCNC: 5.9 PG/ML (ref 6.8–21.5)
TESTOST SERPL-MCNC: 445.8 NG/DL (ref 264–916)

## 2024-02-19 DIAGNOSIS — E23.0 HYPOGONADOTROPIC HYPOGONADISM: Primary | ICD-10-CM

## 2024-04-17 DIAGNOSIS — E23.0 HYPOGONADOTROPIC HYPOGONADISM: ICD-10-CM

## 2024-04-18 RX ORDER — TESTOSTERONE CYPIONATE 200 MG/ML
100 INJECTION, SOLUTION INTRAMUSCULAR
Qty: 4 ML | Refills: 5 | Status: SHIPPED | OUTPATIENT
Start: 2024-04-18

## 2024-04-18 NOTE — TELEPHONE ENCOUNTER
Rx Refill Note  Requested Prescriptions     Pending Prescriptions Disp Refills    Testosterone Cypionate (DEPOTESTOTERONE CYPIONATE) 200 MG/ML injection 4 mL 5     Sig: Inject 1.5 mL into the appropriate muscle as directed by prescriber Every 14 (Fourteen) Days.      Last office visit with prescribing clinician: 8/1/2023   Last telemedicine visit with prescribing clinician: 2/1/2024   Next office visit with prescribing clinician: 8/16/2024                         Would you like a call back once the refill request has been completed: [] Yes [] No    If the office needs to give you a call back, can they leave a voicemail: [] Yes [] No    Sophie Escalera MA  04/18/24, 07:41 EDT

## 2024-04-22 DIAGNOSIS — E23.0 HYPOGONADOTROPIC HYPOGONADISM: ICD-10-CM

## 2024-04-22 RX ORDER — SYRINGE WITH NEEDLE, 1 ML 25GX5/8"
SYRINGE, EMPTY DISPOSABLE MISCELLANEOUS
Qty: 100 EACH | Refills: 0 | Status: SHIPPED | OUTPATIENT
Start: 2024-04-22

## 2024-05-07 ENCOUNTER — TELEMEDICINE (OUTPATIENT)
Dept: FAMILY MEDICINE CLINIC | Facility: TELEHEALTH | Age: 44
End: 2024-05-07
Payer: COMMERCIAL

## 2024-05-07 DIAGNOSIS — Z76.0 MEDICATION REFILL: ICD-10-CM

## 2024-05-07 DIAGNOSIS — R21 RASH: Primary | ICD-10-CM

## 2024-05-07 PROCEDURE — 99213 OFFICE O/P EST LOW 20 MIN: CPT | Performed by: NURSE PRACTITIONER

## 2024-06-02 ENCOUNTER — PATIENT MESSAGE (OUTPATIENT)
Dept: ENDOCRINOLOGY | Age: 44
End: 2024-06-02
Payer: MEDICAID

## 2024-06-03 DIAGNOSIS — G47.00 INSOMNIA, UNSPECIFIED TYPE: Primary | ICD-10-CM

## 2024-07-25 ENCOUNTER — TRANSCRIBE ORDERS (OUTPATIENT)
Dept: ADMINISTRATIVE | Facility: HOSPITAL | Age: 44
End: 2024-07-25
Payer: MEDICAID

## 2024-07-25 DIAGNOSIS — K59.00 CONSTIPATION, UNSPECIFIED CONSTIPATION TYPE: Primary | ICD-10-CM

## 2024-08-16 ENCOUNTER — PATIENT MESSAGE (OUTPATIENT)
Dept: ENDOCRINOLOGY | Age: 44
End: 2024-08-16

## 2024-08-16 ENCOUNTER — TELEMEDICINE (OUTPATIENT)
Dept: ENDOCRINOLOGY | Age: 44
End: 2024-08-16
Payer: MEDICAID

## 2024-08-16 ENCOUNTER — PRIOR AUTHORIZATION (OUTPATIENT)
Dept: ENDOCRINOLOGY | Age: 44
End: 2024-08-16

## 2024-08-16 DIAGNOSIS — M81.0 OSTEOPOROSIS, UNSPECIFIED OSTEOPOROSIS TYPE, UNSPECIFIED PATHOLOGICAL FRACTURE PRESENCE: Primary | ICD-10-CM

## 2024-08-16 DIAGNOSIS — E23.0 HYPOGONADOTROPIC HYPOGONADISM: ICD-10-CM

## 2024-08-16 PROCEDURE — 1159F MED LIST DOCD IN RCRD: CPT | Performed by: INTERNAL MEDICINE

## 2024-08-16 PROCEDURE — 99214 OFFICE O/P EST MOD 30 MIN: CPT | Performed by: INTERNAL MEDICINE

## 2024-08-16 PROCEDURE — 1160F RVW MEDS BY RX/DR IN RCRD: CPT | Performed by: INTERNAL MEDICINE

## 2024-08-16 RX ORDER — DENOSUMAB 60 MG/ML
60 INJECTION SUBCUTANEOUS ONCE
Qty: 1 ML | Refills: 0 | Status: SHIPPED | OUTPATIENT
Start: 2024-08-16 | End: 2024-08-16

## 2024-08-16 NOTE — PROGRESS NOTES
Chief complaint/Reason for consult: osteoporosis    HPI:   - 43 year old male here for osteoporosis  - He was last seen in 2/2024  - He currently has COVID  - He was started on Forteo in 4/2018, he was on alendronate for 1 month prior to starting Forteo. He was nonadherent to Forteo on a daily basis and stopped it completely in 10/2019. He was given denosumab on 10/2019 and 5/2020 with no therapy since 5/2020. In 11/2021 he was restarted on Tymlos at his request but stopped it shortly after and got Prolia in 3/2022, 11/2022, 8/2023, 2/2024  - He was also found to have hypogonadism without a known etiology and started on testosterone 200 mg IM every week which he is currently taking  - Prior fractures: compression fractures of T5, T6, T8, L1, and L2. He has also had a right wrist and left ankle fracture. He states all of his fractures occurred with minimal trauma. No new fractures.  - He has had fusion from T3 to T9  - No exposure to chronic glucocorticoids, no steroids since last visit  - Has taken Descovy daily for PrEP, previously on Truvada   - No history of kidney stones  - Current smoker  - Family history of osteoporosis: none  - Vitamin D and calcium intake: he is not taking supplemental vitamin D or calcium  - Last DXA: 8/2024 showing a most severe T-score of -2.8 in the lumbar spine    The following portions of the patient's history were reviewed and updated as appropriate: allergies, current medications, past family history, past medical history, past social history, past surgical history, and problem list.      Objective     Physical Exam  Vitals reviewed.   Constitutional:       Appearance: Normal appearance. He is obese.   HENT:      Head: Normocephalic and atraumatic.   Eyes:      General: No scleral icterus.  Pulmonary:      Effort: Pulmonary effort is normal. No respiratory distress.   Neurological:      Mental Status: He is alert.   Psychiatric:         Mood and Affect: Mood normal.          Behavior: Behavior normal.         Thought Content: Thought content normal.         Judgment: Judgment normal.     Assessment & Plan   Osteoporosis  - Will plan on ordering a repeat dose of Prolia (he would like to receive it in [place])  - His primary care provider is Elizabeth Bush at [facility]  - Recommend 1000 IU vitamin D3 daily, 1200 mg of dietary/supplementary calcium, weight bearing exercise as tolerated     2. Hypogonadism  - Unclear etiology  - Currently on testosterone 200 mg IM every week  - Will check HCT and testosterone levels     - Return to clinic in 6 months    - Video was used, consent was obtained, patient was at home

## 2024-08-19 NOTE — TELEPHONE ENCOUNTER
Approved on August 16 by [place] [employer] 2017    The request has been approved. The authorization is effective from 08/16/2024 to 08/16/2025, as long as the member is enrolled in their current health plan. A written notification letter will follow with additional details.    Authorization Expiration Date: 8/15/2025

## 2024-09-04 ENCOUNTER — TELEPHONE (OUTPATIENT)
Dept: ENDOCRINOLOGY | Age: 44
End: 2024-09-04
Payer: MEDICAID

## 2024-09-04 DIAGNOSIS — E23.0 HYPOGONADOTROPIC HYPOGONADISM: ICD-10-CM

## 2024-09-04 RX ORDER — TESTOSTERONE CYPIONATE 200 MG/ML
200 INJECTION, SOLUTION INTRAMUSCULAR
Qty: 4 ML | Refills: 0 | Status: SHIPPED | OUTPATIENT
Start: 2024-09-04

## 2024-09-04 NOTE — TELEPHONE ENCOUNTER
Hub staff attempted to follow warm transfer process and was unsuccessful     Caller: SHERRY LEES    Relationship to patient: SELF    Best call back number: [phone]    Patient is needing: PATIENT IS NEEDING HIS LAB ORDERS FAXED OVER TO [facility] IN [place] TODAY BEFORE 3. HE IS NEEDING TO GET THIS DONE TODAY. PATIENT WOULD LIKE A CALL BACK AFTER THIS IS SENT OVER. PLEASE ADVISE

## 2024-09-10 DIAGNOSIS — E23.0 HYPOGONADOTROPIC HYPOGONADISM: ICD-10-CM

## 2024-09-10 RX ORDER — SYRINGE WITH NEEDLE, 1 ML 25GX5/8"
SYRINGE, EMPTY DISPOSABLE MISCELLANEOUS
Qty: 100 EACH | Refills: 0 | Status: SHIPPED | OUTPATIENT
Start: 2024-09-10

## 2024-09-10 NOTE — TELEPHONE ENCOUNTER
"Rx Refill Note  Requested Prescriptions     Pending Prescriptions Disp Refills    B-D 3CC LUER-BRYCE SYR 23GX1\" 23G X 1\" 3 ML misc 100 each 0     Sig: Use 1 every week for testosterone injections    Syringe 18G X 1-1/2\" 3 ML misc 6 each 3     Sig: Use 1 every 2 weeks for testosterone injection      Last office visit with prescribing clinician: 8/1/2023   Last telemedicine visit with prescribing clinician: 8/16/2024   Next office visit with prescribing clinician: 2/18/2025                         Would you like a call back once the refill request has been completed: [] Yes [] No    If the office needs to give you a call back, can they leave a voicemail: [] Yes [] No    Sophie Escalera MA  09/10/24, 13:19 EDT  "

## 2024-09-18 ENCOUNTER — TELEMEDICINE (OUTPATIENT)
Dept: FAMILY MEDICINE CLINIC | Facility: TELEHEALTH | Age: 44
End: 2024-09-18
Payer: MEDICAID

## 2024-09-18 DIAGNOSIS — J22 LOWER RESPIRATORY TRACT INFECTION: Primary | ICD-10-CM

## 2024-09-18 PROBLEM — Z20.6 CONTACT WITH AND (SUSPECTED) EXPOSURE TO HUMAN IMMUNODEFICIENCY VIRUS (HIV): Status: ACTIVE | Noted: 2024-08-01

## 2024-09-18 PROBLEM — N48.6 INDURATION PENIS PLASTICA: Status: ACTIVE | Noted: 2024-09-18

## 2024-09-18 PROBLEM — M79.641 PAIN IN RIGHT HAND: Status: ACTIVE | Noted: 2022-05-20

## 2024-09-18 PROBLEM — M19.90 OSTEOARTHRITIS: Status: ACTIVE | Noted: 2020-06-20

## 2024-09-18 PROCEDURE — 1160F RVW MEDS BY RX/DR IN RCRD: CPT | Performed by: NURSE PRACTITIONER

## 2024-09-18 PROCEDURE — 99213 OFFICE O/P EST LOW 20 MIN: CPT | Performed by: NURSE PRACTITIONER

## 2024-09-18 PROCEDURE — 1159F MED LIST DOCD IN RCRD: CPT | Performed by: NURSE PRACTITIONER

## 2024-09-18 RX ORDER — ALBUTEROL SULFATE 90 UG/1
2 AEROSOL, METERED RESPIRATORY (INHALATION) EVERY 4 HOURS PRN
Qty: 18 G | Refills: 0 | Status: SHIPPED | OUTPATIENT
Start: 2024-09-18

## 2024-09-18 RX ORDER — PREDNISONE 20 MG/1
40 TABLET ORAL DAILY
Qty: 13 TABLET | Refills: 0 | Status: SHIPPED | OUTPATIENT
Start: 2024-09-18

## 2024-12-06 ENCOUNTER — PATIENT MESSAGE (OUTPATIENT)
Dept: ENDOCRINOLOGY | Age: 44
End: 2024-12-06
Payer: MEDICAID

## 2024-12-06 DIAGNOSIS — E23.0 HYPOGONADOTROPIC HYPOGONADISM: ICD-10-CM

## 2024-12-06 RX ORDER — SYRINGE WITH NEEDLE, 1 ML 25GX5/8"
SYRINGE, EMPTY DISPOSABLE MISCELLANEOUS
Qty: 100 EACH | Refills: 0 | Status: SHIPPED | OUTPATIENT
Start: 2024-12-06

## 2024-12-06 RX ORDER — TESTOSTERONE CYPIONATE 200 MG/ML
200 INJECTION, SOLUTION INTRAMUSCULAR
Qty: 4 ML | Refills: 3 | Status: SHIPPED | OUTPATIENT
Start: 2024-12-06

## 2024-12-06 RX ORDER — TESTOSTERONE CYPIONATE 200 MG/ML
200 INJECTION, SOLUTION INTRAMUSCULAR
Qty: 4 ML | Refills: 0 | Status: CANCELLED | OUTPATIENT
Start: 2024-12-06

## 2024-12-06 NOTE — TELEPHONE ENCOUNTER
"Rx Refill Note  Requested Prescriptions     Pending Prescriptions Disp Refills    B-D 3CC LUER-BRYCE SYR 23GX1\" 23G X 1\" 3 ML misc 100 each 0     Sig: Use 1 every week for testosterone injections    Syringe 18G X 1-1/2\" 3 ML misc 6 each 3     Sig: Use 1 every 2 weeks for testosterone injection      Last office visit with prescribing clinician: 8/1/2023   Last telemedicine visit with prescribing clinician: 8/16/2024   Next office visit with prescribing clinician: 2/18/2025                         Would you like a call back once the refill request has been completed: [] Yes [] No    If the office needs to give you a call back, can they leave a voicemail: [] Yes [] No    Sophie Escalera MA  12/06/24, 11:10 EST  "

## 2025-01-24 DIAGNOSIS — E23.0 HYPOGONADOTROPIC HYPOGONADISM: ICD-10-CM

## 2025-01-24 RX ORDER — TESTOSTERONE CYPIONATE 200 MG/ML
200 INJECTION, SOLUTION INTRAMUSCULAR
Qty: 4 ML | Refills: 3 | OUTPATIENT
Start: 2025-01-24

## 2025-01-24 RX ORDER — TESTOSTERONE CYPIONATE 200 MG/ML
200 INJECTION, SOLUTION INTRAMUSCULAR
Qty: 4 ML | Refills: 3 | Status: SHIPPED | OUTPATIENT
Start: 2025-01-24

## 2025-01-24 NOTE — TELEPHONE ENCOUNTER
Called and spoke with pt. Pt stated he has Rx transferred to a pharmacy in [place] due to working there. Pt stated he is back home in [place] and tried to have the Rx transferred to the [facility] in [place], but pharmacy would not transfer due to Rx being a controlled. Pt would like an Rx sent to the [facility] in [place].

## 2025-01-24 NOTE — TELEPHONE ENCOUNTER
Called and spoke with pt. Informed pt Rx was sent. Pt voiced understanding and had no further questions or concerns.

## 2025-01-24 NOTE — TELEPHONE ENCOUNTER
Rx Refill Note  Requested Prescriptions     Pending Prescriptions Disp Refills    Testosterone Cypionate (DEPOTESTOTERONE CYPIONATE) 200 MG/ML injection 4 mL 3     Sig: Inject 1 mL into the appropriate muscle as directed by prescriber Every 7 (Seven) Days. Discard rest of vial after using      Last office visit with prescribing clinician: 8/1/2023   Last telemedicine visit with prescribing clinician: 8/16/2024   Next office visit with prescribing clinician: 2/18/2025                         Would you like a call back once the refill request has been completed: [] Yes [] No    If the office needs to give you a call back, can they leave a voicemail: [] Yes [] No    Sophie Escalera MA  01/24/25, 07:20 EST

## 2025-02-18 ENCOUNTER — TELEMEDICINE (OUTPATIENT)
Dept: ENDOCRINOLOGY | Age: 45
End: 2025-02-18
Payer: MEDICAID

## 2025-02-18 DIAGNOSIS — M81.0 OSTEOPOROSIS, UNSPECIFIED OSTEOPOROSIS TYPE, UNSPECIFIED PATHOLOGICAL FRACTURE PRESENCE: ICD-10-CM

## 2025-02-18 PROCEDURE — 1159F MED LIST DOCD IN RCRD: CPT | Performed by: INTERNAL MEDICINE

## 2025-02-18 PROCEDURE — 99214 OFFICE O/P EST MOD 30 MIN: CPT | Performed by: INTERNAL MEDICINE

## 2025-02-18 PROCEDURE — 1160F RVW MEDS BY RX/DR IN RCRD: CPT | Performed by: INTERNAL MEDICINE

## 2025-02-18 RX ORDER — DENOSUMAB 60 MG/ML
60 INJECTION SUBCUTANEOUS ONCE
Qty: 1 ML | Refills: 0 | Status: SHIPPED | OUTPATIENT
Start: 2025-02-18 | End: 2025-02-24 | Stop reason: SDUPTHER

## 2025-02-18 RX ORDER — ANASTROZOLE 1 MG/1
1 TABLET ORAL WEEKLY
Qty: 4 TABLET | Refills: 5 | Status: SHIPPED | OUTPATIENT
Start: 2025-02-18

## 2025-02-18 NOTE — PROGRESS NOTES
Chief complaint/Reason for consult:  osteoporosis    HPI:   - 44 year old male here for osteoporosis  - He was last seen in 8/2024  - He is currently going through a divorce.  He is complaining of gynecomastia  - He was started on Forteo in 4/2018, he was on alendronate for 1 month prior to starting Forteo. He was nonadherent to Forteo on a daily basis and stopped it completely in 10/2019. He was given denosumab on 10/2019 and 5/2020 with no therapy since 5/2020. In 11/2021 he was restarted on Tymlos at his request but stopped it shortly after and got Prolia in 3/2022, 11/2022, 8/2023, 2/2024  - He was also found to have hypogonadism without a known etiology and started on testosterone 200 mg IM every week which he is currently taking  - Prior fractures: compression fractures of T5, T6, T8, L1, and L2. He has also had a right wrist and left ankle fracture. He states all of his fractures occurred with minimal trauma. No new fractures.  - He has had fusion from T3 to T9  - No exposure to chronic glucocorticoids, no steroids since last visit  - Has taken Descovy daily for PrEP, previously on Truvada   - No history of kidney stones  - Current smoker  - Family history of osteoporosis: none  - Vitamin D and calcium intake: he is not taking supplemental vitamin D or calcium  - Last DXA: 8/2024 showing a most severe T-score of -2.8 in the lumbar spine    The following portions of the patient's history were reviewed and updated as appropriate: allergies, current medications, past family history, past medical history, past social history, past surgical history, and problem list.    Objective       Physical Exam  Vitals reviewed.   Constitutional:       Appearance: Normal appearance.   HENT:      Head: Normocephalic and atraumatic.   Eyes:      General: No scleral icterus.  Pulmonary:      Effort: Pulmonary effort is normal. No respiratory distress.   Neurological:      Mental Status: He is alert.   Psychiatric:          Mood and Affect: Mood normal.         Behavior: Behavior normal.         Thought Content: Thought content normal.         Judgment: Judgment normal.       Assessment & Plan   Osteoporosis  - Will plan on ordering a repeat dose of Prolia (he would like to receive it at his primary care provider)  - Recommend 1000 IU vitamin D3 daily, 1200 mg of dietary/supplementary calcium, weight bearing exercise as tolerated     2. Hypogonadism  - Unclear etiology  - Currently on testosterone 200 mg IM every week  - Will check HCT and testosterone and estradiol levels  - Will start off label anastrozole for his gynecomastia as he did not want to lower his testosterone dose, will try to use lowest dose possible given osteoporosis     - Return to clinic in 6 months    - Video and audio were used.  Patient was outside where he is living now.  Provider was at home.  Visit took 10 minutes

## 2025-02-20 ENCOUNTER — PRIOR AUTHORIZATION (OUTPATIENT)
Dept: ENDOCRINOLOGY | Age: 45
End: 2025-02-20
Payer: MEDICAID

## 2025-02-20 NOTE — TELEPHONE ENCOUNTER
A PA for Prolia 60MG/ML syringes has been started.    (Key: [id number])  PA Case ID #: [id number]  Rx #: [id number]

## 2025-02-24 DIAGNOSIS — M81.0 OSTEOPOROSIS, UNSPECIFIED OSTEOPOROSIS TYPE, UNSPECIFIED PATHOLOGICAL FRACTURE PRESENCE: ICD-10-CM

## 2025-02-24 RX ORDER — DENOSUMAB 60 MG/ML
60 INJECTION SUBCUTANEOUS ONCE
Qty: 1 ML | Refills: 0 | Status: SHIPPED | OUTPATIENT
Start: 2025-02-24 | End: 2025-02-24

## 2025-02-25 ENCOUNTER — PATIENT MESSAGE (OUTPATIENT)
Dept: ENDOCRINOLOGY | Age: 45
End: 2025-02-25
Payer: MEDICAID

## 2025-02-25 NOTE — LETTER
February 25, 2025     Barak Rivera    Patient: Barak Rivera   YOB: 1980   Date of Visit: 2/25/2025     To Whom It May Concern:    Barak Rivera would be cleared for horseback riding in terms of his medical conditions.         Sincerely,        Jeremias Metcalf DO        CC: No Recipients

## 2025-03-17 RX ORDER — ANASTROZOLE 1 MG/1
1 TABLET ORAL WEEKLY
Qty: 4 TABLET | Refills: 5 | Status: SHIPPED | OUTPATIENT
Start: 2025-03-17

## 2025-03-24 ENCOUNTER — PRIOR AUTHORIZATION (OUTPATIENT)
Dept: ENDOCRINOLOGY | Age: 45
End: 2025-03-24
Payer: MEDICAID

## 2025-03-24 NOTE — TELEPHONE ENCOUNTER
A PA for Testosterone Cypionate 200MG/ML intramuscular solution has been started.    (Key: [id number])  PA Case ID #: [id number]  Rx #: [id number]

## 2025-03-25 NOTE — TELEPHONE ENCOUNTER
Approved on March 24 by [facility] 2017    Your PA request has been approved. Additional information will be provided in the approval communication.    Effective Date: 3/24/2025    Authorization Expiration Date: 3/23/2026

## 2025-03-27 ENCOUNTER — PRIOR AUTHORIZATION (OUTPATIENT)
Dept: ENDOCRINOLOGY | Age: 45
End: 2025-03-27
Payer: MEDICAID

## 2025-05-09 ENCOUNTER — PATIENT MESSAGE (OUTPATIENT)
Dept: ENDOCRINOLOGY | Age: 45
End: 2025-05-09
Payer: MEDICAID

## 2025-05-09 DIAGNOSIS — E23.0 HYPOGONADOTROPIC HYPOGONADISM: ICD-10-CM

## 2025-05-09 NOTE — TELEPHONE ENCOUNTER
Rx Refill Note  Requested Prescriptions     Pending Prescriptions Disp Refills    Testosterone Cypionate (DEPOTESTOTERONE CYPIONATE) 200 MG/ML injection 4 mL 3     Sig: Inject 1 mL into the appropriate muscle as directed by prescriber Every 7 (Seven) Days. Discard rest of vial after using      Last office visit with prescribing clinician: 2/18/25  Last telemedicine visit with prescribing clinician: 2/18/2025   Next office visit with prescribing clinician: no future visit                         Would you like a call back once the refill request has been completed: [] Yes [] No    If the office needs to give you a call back, can they leave a voicemail: [] Yes [] No    Darya Saucedo MA  05/09/25, 16:37 EDT

## 2025-05-12 RX ORDER — TESTOSTERONE CYPIONATE 200 MG/ML
200 INJECTION, SOLUTION INTRAMUSCULAR
Qty: 4 ML | Refills: 3 | Status: SHIPPED | OUTPATIENT
Start: 2025-05-12

## 2025-05-14 RX ORDER — ANASTROZOLE 1 MG/1
1 TABLET ORAL WEEKLY
Qty: 4 TABLET | Refills: 5 | OUTPATIENT
Start: 2025-05-14

## 2025-05-14 RX ORDER — ANASTROZOLE 1 MG/1
1 TABLET ORAL WEEKLY
Qty: 4 TABLET | Refills: 5 | Status: SHIPPED | OUTPATIENT
Start: 2025-05-14

## 2025-05-15 RX ORDER — ANASTROZOLE 1 MG/1
1 TABLET ORAL WEEKLY
Qty: 4 TABLET | Refills: 5 | OUTPATIENT
Start: 2025-05-15

## 2025-05-15 NOTE — TELEPHONE ENCOUNTER
Rx Refill Note  Requested Prescriptions     Pending Prescriptions Disp Refills    anastrozole (ARIMIDEX) 1 MG tablet 4 tablet 5     Sig: Take 1 tablet by mouth 1 (One) Time Per Week.      Last office visit with prescribing clinician: 8/1/2023   Last telemedicine visit with prescribing clinician: 2/18/2025   Next office visit with prescribing clinician: Visit date not found                         Would you like a call back once the refill request has been completed: [] Yes [] No    If the office needs to give you a call back, can they leave a voicemail: [] Yes [] No  Sophie Escalera MA  5/15/2025  07:49 EDT

## 2025-05-25 ENCOUNTER — TELEMEDICINE (OUTPATIENT)
Dept: FAMILY MEDICINE CLINIC | Facility: TELEHEALTH | Age: 45
End: 2025-05-25
Payer: MEDICAID

## 2025-05-25 DIAGNOSIS — R52 BODY ACHES: Primary | ICD-10-CM

## 2025-05-25 DIAGNOSIS — J22 LOWER RESPIRATORY INFECTION (E.G., BRONCHITIS, PNEUMONIA, PNEUMONITIS, PULMONITIS): Primary | ICD-10-CM

## 2025-05-25 DIAGNOSIS — R19.7 DIARRHEA, UNSPECIFIED TYPE: ICD-10-CM

## 2025-05-25 DIAGNOSIS — J06.9 UPPER RESPIRATORY TRACT INFECTION, UNSPECIFIED TYPE: ICD-10-CM

## 2025-05-25 PROBLEM — A63.0 ANAL WARTS: Status: ACTIVE | Noted: 2025-05-25

## 2025-05-25 PROBLEM — J44.9 CHRONIC OBSTRUCTIVE PULMONARY DISEASE: Status: ACTIVE | Noted: 2025-05-25

## 2025-05-25 PROBLEM — R73.03 PREDIABETES: Status: ACTIVE | Noted: 2025-05-25

## 2025-05-25 PROBLEM — F41.9 ANXIETY: Status: ACTIVE | Noted: 2025-05-25

## 2025-05-25 PROBLEM — F41.0 PANIC ATTACKS: Status: ACTIVE | Noted: 2025-05-25

## 2025-05-25 PROBLEM — I51.9 HEART DISEASE: Status: ACTIVE | Noted: 2025-05-25

## 2025-05-25 PROBLEM — F32.A DEPRESSION: Status: ACTIVE | Noted: 2025-05-25

## 2025-05-25 PROBLEM — Z21 HIV POSITIVE: Status: ACTIVE | Noted: 2025-05-25

## 2025-05-25 PROBLEM — J45.909 ASTHMA: Status: ACTIVE | Noted: 2025-05-25

## 2025-05-25 PROBLEM — R56.9 SEIZURES: Status: ACTIVE | Noted: 2025-05-25

## 2025-05-25 RX ORDER — BENZONATATE 100 MG/1
100 CAPSULE ORAL 3 TIMES DAILY PRN
Qty: 21 CAPSULE | Refills: 0 | Status: SHIPPED | OUTPATIENT
Start: 2025-05-25 | End: 2025-06-01

## 2025-05-25 RX ORDER — CABOTEGRAVIR 600 MG/3ML
SUSPENSION,EXTENDED RELEASE VIAL (ML) INTRAMUSCULAR
COMMUNITY

## 2025-05-25 RX ORDER — ALBENDAZOLE 200 MG/1
TABLET, FILM COATED ORAL
COMMUNITY

## 2025-05-25 RX ORDER — LORAZEPAM 2 MG/1
1 TABLET ORAL EVERY 12 HOURS SCHEDULED
COMMUNITY
Start: 2025-05-20

## 2025-05-25 RX ORDER — PREDNISONE 20 MG/1
TABLET ORAL
Qty: 13 TABLET | Refills: 0 | Status: SHIPPED | OUTPATIENT
Start: 2025-05-25

## 2025-05-25 RX ORDER — BUPROPION HYDROCHLORIDE 150 MG/1
150 TABLET ORAL EVERY MORNING
COMMUNITY
Start: 2025-05-21

## 2025-05-25 RX ORDER — TRETINOIN 0.025 %
CREAM (GRAM) TOPICAL
COMMUNITY

## 2025-05-25 RX ORDER — NALTREXONE HYDROCHLORIDE 50 MG/1
TABLET, FILM COATED ORAL
COMMUNITY
Start: 2025-05-06

## 2025-05-25 RX ORDER — MOXIFLOXACIN 5 MG/ML
SOLUTION/ DROPS OPHTHALMIC
COMMUNITY
Start: 2025-05-21

## 2025-05-25 RX ORDER — IMIQUIMOD 12.5 MG/.25G
CREAM TOPICAL
COMMUNITY
Start: 2025-04-24

## 2025-05-25 RX ORDER — LOPERAMIDE HYDROCHLORIDE 2 MG/1
CAPSULE ORAL
COMMUNITY
Start: 2025-04-24 | End: 2025-05-25

## 2025-05-25 RX ORDER — ONDANSETRON 4 MG/1
4 TABLET, FILM COATED ORAL EVERY 6 HOURS PRN
COMMUNITY
Start: 2025-05-06

## 2025-05-25 RX ORDER — ESZOPICLONE 3 MG/1
3 TABLET, FILM COATED ORAL
COMMUNITY

## 2025-05-25 RX ORDER — LORATADINE 10 MG/1
TABLET ORAL
COMMUNITY

## 2025-05-25 RX ORDER — IBUPROFEN 600 MG/1
600 TABLET, FILM COATED ORAL EVERY 8 HOURS PRN
Qty: 21 TABLET | Refills: 0 | Status: SHIPPED | OUTPATIENT
Start: 2025-05-25 | End: 2025-06-01

## 2025-05-25 RX ORDER — LOPERAMIDE HYDROCHLORIDE 2 MG/1
2 TABLET ORAL 4 TIMES DAILY PRN
Qty: 28 TABLET | Refills: 0 | Status: SHIPPED | OUTPATIENT
Start: 2025-05-25 | End: 2025-06-01

## 2025-05-25 RX ORDER — DOXYCYCLINE 100 MG/1
100 CAPSULE ORAL 2 TIMES DAILY
Qty: 20 CAPSULE | Refills: 0 | Status: SHIPPED | OUTPATIENT
Start: 2025-05-25 | End: 2025-06-04

## 2025-05-25 RX ORDER — METFORMIN HYDROCHLORIDE 500 MG/1
TABLET, EXTENDED RELEASE ORAL
COMMUNITY
Start: 2025-05-02

## 2025-05-25 RX ORDER — PALIPERIDONE PALMITATE 819 MG/2.625ML
INJECTION, SUSPENSION, EXTENDED RELEASE INTRAMUSCULAR
COMMUNITY

## 2025-05-25 RX ORDER — DEXTROMETHORPHAN HYDROBROMIDE AND PROMETHAZINE HYDROCHLORIDE 15; 6.25 MG/5ML; MG/5ML
5 SYRUP ORAL 4 TIMES DAILY PRN
Qty: 140 ML | Refills: 0 | Status: CANCELLED | OUTPATIENT
Start: 2025-05-25 | End: 2025-06-01

## 2025-05-25 RX ORDER — QUETIAPINE FUMARATE 300 MG/1
600 TABLET, FILM COATED ORAL
COMMUNITY

## 2025-05-25 NOTE — PROGRESS NOTES
"You have chosen to receive care through a telehealth visit.  Do you consent to use a video/audio connection for your medical care today? Yes     CHIEF COMPLAINT  Chief Complaint   Patient presents with    Cough         HPI  Barak Rivera is a 44 y.o. male  presents with complaint of cough and congestion. Runny nose, fever and stopped up sinuses. Reports symptoms started 1 day ago. Reports cough started 1 day ago. Reports he is coughing up alittle green mucous. Reports he is having some mild SOA. Reports COVID was negative. Reports he has had steroid in the past that has worked well. Reports he has taken OTC remedies that have not helped. Reports he had fever tmax 101. No chills. No n/v No chest pain. Patient reports \"he did well on Prednisone that is not in the pack the one like it had last time\".     Review of Systems   Constitutional:  Positive for fever. Negative for chills and fatigue.   HENT:  Positive for congestion, sinus pressure and sore throat. Negative for ear discharge, ear pain and sinus pain.    Respiratory:  Positive for cough and shortness of breath (mild). Negative for chest tightness and wheezing.    Cardiovascular:  Negative for chest pain.   Gastrointestinal:  Negative for abdominal pain, diarrhea, nausea and vomiting.   Musculoskeletal:  Positive for myalgias (mild). Negative for back pain.   Neurological:  Negative for dizziness and headaches.   Psychiatric/Behavioral: Negative.         Past Medical History:   Diagnosis Date    ADD (attention deficit disorder)     Anogenital HPV infection     Anxiety     Arthritis     Asthma 2019    Bipolar disorder     Condyloma acuminatum in male     Constipation     COPD (chronic obstructive pulmonary disease)     Depression     Erectile dysfunction     Fibromyalgia     GERD (gastroesophageal reflux disease)     Hepatitis B     antibodies    Hepatitis C     antibodies    Hyperlipidemia     Migraines     On home oxygen therapy     at  per [place], 2.5 L    " Osteomyelitis hip     Osteopenia     Osteoporosis     Osteoporosis     Paranoia     Suicidal ideations     Tuberculosis 2021    Latent, chest x-ray neg    Urinary tract infection        Family History   Problem Relation Age of Onset    Hypertension Other     Diabetes Other     Depression Other     Heart attack Mother     Anxiety disorder Father     Depression Father        Social History     Socioeconomic History    Marital status:     Number of children: 0   Tobacco Use    Smoking status: Former     Current packs/day: 1.00     Average packs/day: 1 pack/day for 29.4 years (29.4 ttl pk-yrs)     Types: Cigarettes     Start date: 1996     Passive exposure: Never    Smokeless tobacco: Never    Tobacco comments:     pt demands nicotine patch, refuses counseling, Pt is smoking 5 cigs   Vaping Use    Vaping status: Never Used   Substance and Sexual Activity    Alcohol use: No    Drug use: Yes     Types: Amphetamines, Benzodiazepines, Methamphetamines     Comment: TCA; 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       Barak Rivera  reports that he has quit smoking. His smoking use included cigarettes. He started smoking about 29 years ago. He has a 29.4 pack-year smoking history. He has never been exposed to tobacco smoke. He has never used smokeless tobacco. I have educated him on the risk of diseases from using tobacco products such as cancer, COPD, and heart disease.         I spent  1  minutes counseling the patient.              There were no vitals taken for this visit.    PHYSICAL EXAM  Physical Exam   Constitutional: He is oriented to person, place, and time. He appears well-developed and well-nourished. He does not have a sickly appearance. No distress.   HENT:   Head: Normocephalic and atraumatic.   Right Ear: Hearing normal.   Left Ear:  Hearing normal.   Nose: Congestion present. Right sinus exhibits maxillary sinus tenderness. Left sinus exhibits maxillary sinus tenderness.   Mouth/Throat: Mouth/Lips are normal.Oropharynx is clear and moist.   Patient directed exam   Throat without red blisters or white patches.    Eyes: Conjunctivae and lids are normal.   Pulmonary/Chest: Effort normal.  No respiratory distress.  Neurological: He is alert and oriented to person, place, and time.   Psychiatric: He has a normal mood and affect. His speech is normal and behavior is normal.   Patient is speaking in full sentences. No respiratory distress noted. Coughing         Diagnoses and all orders for this visit:    1. Lower respiratory infection (e.g., bronchitis, pneumonia, pneumonitis, pulmonitis) (Primary)    2. Upper respiratory tract infection, unspecified type    Other orders  -     Influenza-SARS At Home Test kit; 1 kit by In Vitro route 1 (One) Time for 1 dose.  Dispense: 1 kit; Refill: 0  -     doxycycline (VIBRAMYCIN) 100 MG capsule; Take 1 capsule by mouth 2 (Two) Times a Day for 10 days.  Dispense: 20 capsule; Refill: 0  -     predniSONE (DELTASONE) 20 MG tablet; Take 2 tablets x 4 days, then 1 tablets for 4 days then 1/2 tablet for 2 days  Dispense: 13 tablet; Refill: 0  -     benzonatate (Tessalon Perles) 100 MG capsule; Take 1 capsule by mouth 3 (Three) Times a Day As Needed for Cough for up to 7 days.  Dispense: 21 capsule; Refill: 0    Rest  Fluids  Patient denies feeling that he has flu will order patient a flu test to take at home. Will notify provider of positive test   PCP if symptoms persist   ER for any worsening symptoms such as high fever, chest pain or SOA   O2 sat less than 92% go to the ER      FOLLOW-UP  As discussed during visit with PCP/[facility] if no improvement or Urgent Care/Emergency Department if worsening of symptoms    Patient verbalizes understanding of medication dosage, comfort measures, instructions for treatment  and follow-up.    Ana Maria Garrison, APRN  05/25/2025  02:40 EDT    Mode of Visit: Video  Location of patient: -HOME-  Location of provider: +HOME+  You have chosen to receive care through a telehealth visit.  The patient has signed the video visit consent form.  The visit included audio and video interaction. No technical issues occurred during this visit.      The use of a video visit has been reviewed with the patient and verbal informed consent has been obtained. Myself and Barak Rivera participated in this visit. The patient is located in [address] [place] [address].   I am located in [place], [place]. [facility] and [employer] were utilized. I spent 5 minutes in the patient's chart for this visit.         Note Disclaimer: At [facility], we believe that sharing information builds trust and better   relationships. You are receiving this note because you recently visited [facility]. It is possible you   will see health information before a provider has talked with you about it. This kind of information can   be easy to misunderstand. To help you fully understand what it means for your health, we urge you to   discuss this note with your provider.

## 2025-05-25 NOTE — PATIENT INSTRUCTIONS
Cough, Adult  Coughing is a reflex that clears your throat and airways (respiratory system). It helps heal and protect your lungs. It is normal to cough from time to time. A cough that happens with other symptoms or that lasts a long time may be a sign of a condition that needs treatment. A short-term (acute) cough may only last 2-3 weeks. A long-term (chronic) cough may last 8 or more weeks.  Coughing is often caused by:  Diseases, such as:  An infection of the respiratory system.  Asthma or other heart or lung diseases.  Gastroesophageal reflux. This is when acid comes back up from the stomach.  Breathing in things that irritate your lungs.  Allergies.  Postnasal drip. This is when mucus runs down the back of your throat.  Smoking.  Some medicines.  Follow these instructions at home:  Medicines  Take over-the-counter and prescription medicines only as told by your health care provider.  Talk with your provider before you take cough medicine (cough suppressants).  Eating and drinking  Do not drink alcohol.  Avoid caffeine.  Drink enough fluid to keep your pee (urine) pale yellow.  Lifestyle  Avoid cigarette smoke.  Do not use any products that contain nicotine or tobacco. These products include cigarettes, chewing tobacco, and vaping devices, such as e-cigarettes. If you need help quitting, ask your provider.  Avoid things that make you cough. These may include perfumes, candles, cleaning products, or campfire smoke.  General instructions    Watch for any changes to your cough. Tell your provider about them.  Always cover your mouth when you cough.  If the air is dry in your bedroom or home, use a cool mist vaporizer or humidifier.  If your cough is worse at night, try to sleep in a semi-upright position.  Rest as needed.  Contact a health care provider if:  You have new symptoms, or your symptoms get worse.  You cough up pus.  You have a fever that does not go away or a cough that does not get better after 2-3  weeks.  You cannot control your cough with medicine, and you are losing sleep.  You have pain that gets worse or is not helped with medicine.  You lose weight for no clear reason.  You have night sweats.  Get help right away if:  You cough up blood.  You have trouble breathing.  Your heart is beating very fast.  These symptoms may be an emergency. Get help right away. Call 911.  Do not wait to see if the symptoms will go away.  Do not drive yourself to the hospital.  This information is not intended to replace advice given to you by your health care provider. Make sure you discuss any questions you have with your health care provider.  Document Revised: 08/18/2023 Document Reviewed: 08/18/2023  [employer] Patient Education © 2024 [employer]Upper Respiratory Infection, Adult  An upper respiratory infection (URI) is a common viral infection of the nose, throat, and upper air passages that lead to the lungs. The most common type of URI is the common cold. URIs usually get better on their own, without medical treatment.  What are the causes?  A URI is caused by a virus. You may catch a virus by:  Breathing in droplets from an infected person's cough or sneeze.  Touching something that has been exposed to the virus (is contaminated) and then touching your mouth, nose, or eyes.  What increases the risk?  You are more likely to get a URI if:  You are very young or very old.  You have close contact with others, such as at work, school, or a health care facility.  You smoke.  You have long-term (chronic) heart or lung disease.  You have a weakened disease-fighting system (immune system).  You have nasal allergies or asthma.  You are experiencing a lot of stress.  You have poor nutrition.  What are the signs or symptoms?  A URI usually involves some of the following symptoms:  Runny or stuffy (congested) nose.  Cough.  Sneezing.  Sore throat.  Headache.  Fatigue.  Fever.  Loss of appetite.  Pain in your forehead, behind your  eyes, and over your cheekbones (sinus pain).  Muscle aches.  Redness or irritation of the eyes.  Pressure in the ears or face.  How is this diagnosed?  This condition may be diagnosed based on your medical history and symptoms, and a physical exam. Your health care provider may use a swab to take a mucus sample from your nose (nasal swab). This sample can be tested to determine what virus is causing the illness.  How is this treated?  URIs usually get better on their own within 7-10 days. Medicines cannot cure URIs, but your health care provider may recommend certain medicines to help relieve symptoms, such as:  Over-the-counter cold medicines.  Cough suppressants. Coughing is a type of defense against infection that helps to clear the respiratory system, so take these medicines only as recommended by your health care provider.  Fever-reducing medicines.  Follow these instructions at home:  Activity  Rest as needed.  If you have a fever, stay home from work or school until your fever is gone or until your health care provider says your URI cannot spread to other people (is no longer contagious). Your health care provider may have you wear a face mask to prevent your infection from spreading.  Relieving symptoms  Gargle with a mixture of salt and water 3-4 times a day or as needed. To make salt water, completely dissolve ½-1 tsp (3-6 g) of salt in 1 cup (237 mL) of warm water.  Use a cool-mist humidifier to add moisture to the air. This can help you breathe more easily.  Eating and drinking    Drink enough fluid to keep your urine pale yellow.  Eat soups and other clear broths.  General instructions    Take over-the-counter and prescription medicines only as told by your health care provider. These include cold medicines, fever reducers, and cough suppressants.  Do not use any products that contain nicotine or tobacco. These products include cigarettes, chewing tobacco, and vaping devices, such as e-cigarettes. If  you need help quitting, ask your health care provider.  Stay away from secondhand smoke.  Stay up to date on all immunizations, including the yearly (annual) flu vaccine.  Keep all follow-up visits. This is important.  How to prevent the spread of infection to others  URIs can be contagious. To prevent the infection from spreading:  Wash your hands with soap and water for at least 20 seconds. If soap and water are not available, use hand .  Avoid touching your mouth, face, eyes, or nose.  Cough or sneeze into a tissue or your sleeve or elbow instead of into your hand or into the air.    Contact a health care provider if:  You are getting worse instead of better.  You have a fever or chills.  Your mucus is brown or red.  You have yellow or brown discharge coming from your nose.  You have pain in your face, especially when you bend forward.  You have swollen neck glands.  You have pain while swallowing.  You have white areas in the back of your throat.  Get help right away if:  You have shortness of breath that gets worse.  You have severe or persistent:  Headache.  Ear pain.  Sinus pain.  Chest pain.  You have chronic lung disease along with any of the following:  Making high-pitched whistling sounds when you breathe, most often when you breathe out (wheezing).  Prolonged cough (more than 14 days).  Coughing up blood.  A change in your usual mucus.  You have a stiff neck.  You have changes in your:  Vision.  Hearing.  Thinking.  Mood.  These symptoms may be an emergency. Get help right away. Call 911.  Do not wait to see if the symptoms will go away.  Do not drive yourself to the hospital.  Summary  An upper respiratory infection (URI) is a common infection of the nose, throat, and upper air passages that lead to the lungs.  A URI is caused by a virus.  URIs usually get better on their own within 7-10 days.  Medicines cannot cure URIs, but your health care provider may recommend certain medicines to help  relieve symptoms.  This information is not intended to replace advice given to you by your health care provider. Make sure you discuss any questions you have with your health care provider.  Document Revised: 07/20/2022 Document Reviewed: 07/20/2022  [employer] Patient Education © 2024 [employer]

## 2025-05-25 NOTE — PROGRESS NOTES
0400 spoke with patient reviewed all medications with patient. Patient is requesting IBU and Imodium. Patient reports he is having body aches and he is also having diarrhea. Denies any abdominal pain. Reports he did have a fever x 1.

## 2025-07-04 ENCOUNTER — TELEMEDICINE (OUTPATIENT)
Dept: FAMILY MEDICINE CLINIC | Facility: TELEHEALTH | Age: 45
End: 2025-07-04
Payer: MEDICAID

## 2025-07-04 DIAGNOSIS — L70.9 ACNE, UNSPECIFIED ACNE TYPE: Primary | ICD-10-CM

## 2025-07-04 PROCEDURE — 1160F RVW MEDS BY RX/DR IN RCRD: CPT | Performed by: NURSE PRACTITIONER

## 2025-07-04 PROCEDURE — 1159F MED LIST DOCD IN RCRD: CPT | Performed by: NURSE PRACTITIONER

## 2025-07-04 PROCEDURE — 99213 OFFICE O/P EST LOW 20 MIN: CPT | Performed by: NURSE PRACTITIONER

## 2025-07-04 RX ORDER — AZELASTINE HYDROCHLORIDE 137 UG/1
SPRAY, METERED NASAL
COMMUNITY
Start: 2025-05-25

## 2025-07-04 RX ORDER — BENZOYL PEROXIDE 10 G/100G
1 SUSPENSION TOPICAL DAILY
Qty: 28 G | Refills: 0 | Status: SHIPPED | OUTPATIENT
Start: 2025-07-04 | End: 2025-08-03

## 2025-07-04 RX ORDER — TRETINOIN 0.5 MG/G
1 CREAM TOPICAL NIGHTLY
Qty: 45 G | Refills: 0 | Status: SHIPPED | OUTPATIENT
Start: 2025-07-04

## 2025-07-04 RX ORDER — DOXYCYCLINE 100 MG/1
100 CAPSULE ORAL 2 TIMES DAILY
Qty: 14 CAPSULE | Refills: 0 | Status: SHIPPED | OUTPATIENT
Start: 2025-07-04 | End: 2025-07-11

## 2025-07-04 NOTE — PROGRESS NOTES
You have chosen to receive care through a telehealth visit.  Do you consent to use a video/audio connection for your medical care today? Yes     CHIEF COMPLAINT  Chief Complaint   Patient presents with    Acne         HPI  Barak Rivera is a 44 y.o. male  presents with complaint of My dermatologist is on vacation and I'm out of Metrocream and Retin-A .5 and I am having a bad acne breakout on my back and chest. All of the OTC items are too expensive and prescription acne treatment is free so I would rather do that. Reports this started about a week ago.  Reports he has acne on his chest and back.  Reports it is itching.  Denies changing any lotion, laundry detergent or medications.  Reports no fever or chills. No nausea or vomiting. Reports she has been having some diarrhea. Reports he has been worked up for this.  Patient is also requesting a refill of his MetroCream and Retin-A due to his doctor being out.    Review of Systems   Constitutional:  Negative for chills, fatigue and fever.   HENT:  Negative for congestion, ear discharge, ear pain, sinus pressure, sinus pain and sore throat.    Respiratory:  Negative for cough, chest tightness, shortness of breath and wheezing.    Cardiovascular:  Negative for chest pain.   Gastrointestinal:  Positive for diarrhea. Negative for abdominal pain, nausea and vomiting.   Musculoskeletal:  Negative for back pain and myalgias.   Skin:         Acne on his chest and back. Reports he has ran out of his creams for his face.    Neurological:  Negative for dizziness and headaches.   Psychiatric/Behavioral: Negative.         Past Medical History:   Diagnosis Date    ADD (attention deficit disorder)     Anogenital HPV infection     Anxiety     Arthritis     Asthma 2019    Bipolar disorder     Condyloma acuminatum in male     Constipation     COPD (chronic obstructive pulmonary disease)     Depression     Erectile dysfunction     Fibromyalgia     GERD (gastroesophageal reflux disease)      Hepatitis B     antibodies    Hepatitis C     antibodies    Hyperlipidemia     Migraines     On home oxygen therapy     at hs per [place], 2.5 L    Osteomyelitis hip     Osteopenia     Osteoporosis     Osteoporosis     Paranoia     Suicidal ideations     Tuberculosis 2021    Latent, chest x-ray neg    Urinary tract infection        Family History   Problem Relation Age of Onset    Hypertension Other     Diabetes Other     Depression Other     Heart attack Mother     Anxiety disorder Father     Depression Father        Social History     Socioeconomic History    Marital status:     Number of children: 0   Tobacco Use    Smoking status: Former     Current packs/day: 1.00     Average packs/day: 1 pack/day for 29.5 years (29.5 ttl pk-yrs)     Types: Cigarettes     Start date: 1996     Passive exposure: Never    Smokeless tobacco: Never    Tobacco comments:     pt demands nicotine patch, refuses counseling, Pt is smoking 5 cigs   Vaping Use    Vaping status: Never Used   Substance and Sexual Activity    Alcohol use: No    Drug use: Yes     Types: Amphetamines, Benzodiazepines, Methamphetamines     Comment: TCA; 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       Barak Estrada Rivera  reports that he has quit smoking. His smoking use included cigarettes. He started smoking about 29 years ago. He has a 29.5 pack-year smoking history. He has never been exposed to tobacco smoke. He has never used smokeless tobacco. I have educated him on the risk of diseases from using tobacco products such as cancer, COPD, and heart disease.         I spent 1 minutes counseling the patient.              There were no vitals taken for this visit.    PHYSICAL EXAM  Physical Exam   Constitutional: He is oriented to person, place, and time. He appears well-developed and well-nourished.  He does not have a sickly appearance. No distress.   HENT:   Head: Normocephalic and atraumatic.   Right Ear: Hearing normal.   Left Ear: Hearing normal.   Nose: No congestion.   Mouth/Throat: Mouth/Lips are normal.  Eyes: Conjunctivae and lids are normal.   Pulmonary/Chest: Effort normal.  No respiratory distress.  Neurological: He is alert and oriented to person, place, and time.   Skin: Rash noted. Rash is maculopapular.        Diffuse maculopapular lesions to chest and upper back. No drainage from the area.    Psychiatric: He has a normal mood and affect. His speech is normal and behavior is normal.           Diagnoses and all orders for this visit:    1. Acne, unspecified acne type (Primary)    Other orders  -     metroNIDAZOLE (MetroCream) 0.75 % cream; Apply 1 Application topically to the appropriate area as directed 2 (Two) Times a Day.  Dispense: 45 g; Refill: 0  -     tretinoin (RETIN-A) 0.05 % cream; Apply 1 Application topically to the appropriate area as directed Every Night. Call Ana Maria CHAIREZ [phone] for questions  Dispense: 45 g; Refill: 0  -     doxycycline (VIBRAMYCIN) 100 MG capsule; Take 1 capsule by mouth 2 (Two) Times a Day for 7 days.  Dispense: 14 capsule; Refill: 0    Follow up with your dermatologist  ER for any worsening symptoms such as high fever, chest pain or shortness of air      FOLLOW-UP  As discussed during visit with PCP/[facility] if no improvement or Urgent Care/Emergency Department if worsening of symptoms    Patient verbalizes understanding of medication dosage, comfort measures, instructions for treatment and follow-up.    CONTRERAS Silvestre  07/04/2025  02:02 EDT    Mode of Visit: Video  Location of patient: -HOME-  Location of provider: +HOME+  You have chosen to receive care through a telehealth visit.  The patient has signed the video visit consent form.  The visit included audio and video interaction. No technical issues occurred during this  visit.      The use of a video visit has been reviewed with the patient and verbal informed consent has been obtained. Myself and Barak Rivera participated in this visit. The patient is located in [address] [address].   I am located in [place], [place]. [employer] and [email or web address] were utilized. I spent 5 minutes in the patient's chart for this visit.         Note Disclaimer: At [facility], we believe that sharing information builds trust and better   relationships. You are receiving this note because you recently visited [facility]. It is possible you   will see health information before a provider has talked with you about it. This kind of information can   be easy to misunderstand. To help you fully understand what it means for your health, we urge you to   discuss this note with your provider.

## 2025-08-01 DIAGNOSIS — E23.0 HYPOGONADOTROPIC HYPOGONADISM: ICD-10-CM

## 2025-08-01 RX ORDER — TESTOSTERONE CYPIONATE 200 MG/ML
200 INJECTION, SOLUTION INTRAMUSCULAR
Qty: 4 ML | Refills: 1 | Status: SHIPPED | OUTPATIENT
Start: 2025-08-01

## 2025-08-01 RX ORDER — TESTOSTERONE CYPIONATE 200 MG/ML
200 INJECTION, SOLUTION INTRAMUSCULAR
Qty: 4 ML | Refills: 3 | OUTPATIENT
Start: 2025-08-01

## 2025-08-06 ENCOUNTER — TELEMEDICINE (OUTPATIENT)
Dept: FAMILY MEDICINE CLINIC | Facility: TELEHEALTH | Age: 45
End: 2025-08-06
Payer: MEDICAID

## 2025-08-06 DIAGNOSIS — Z76.0 MEDICATION REFILL: Primary | ICD-10-CM

## 2025-08-06 PROCEDURE — 99212 OFFICE O/P EST SF 10 MIN: CPT | Performed by: NURSE PRACTITIONER

## 2025-08-06 PROCEDURE — 1159F MED LIST DOCD IN RCRD: CPT | Performed by: NURSE PRACTITIONER

## 2025-08-06 PROCEDURE — 1160F RVW MEDS BY RX/DR IN RCRD: CPT | Performed by: NURSE PRACTITIONER

## 2025-08-06 RX ORDER — PROPRANOLOL HYDROCHLORIDE 80 MG/1
80 CAPSULE, EXTENDED RELEASE ORAL DAILY
Qty: 30 CAPSULE | Refills: 0 | Status: SHIPPED | OUTPATIENT
Start: 2025-08-06 | End: 2025-09-05

## 2025-08-14 ENCOUNTER — TELEMEDICINE (OUTPATIENT)
Dept: ENDOCRINOLOGY | Age: 45
End: 2025-08-14
Payer: MEDICAID

## 2025-08-14 DIAGNOSIS — E23.0 HYPOGONADOTROPIC HYPOGONADISM: Primary | ICD-10-CM

## 2025-08-14 DIAGNOSIS — M81.0 OSTEOPOROSIS, UNSPECIFIED OSTEOPOROSIS TYPE, UNSPECIFIED PATHOLOGICAL FRACTURE PRESENCE: ICD-10-CM

## 2025-08-14 DIAGNOSIS — E23.0 HYPOGONADOTROPIC HYPOGONADISM: ICD-10-CM

## 2025-08-14 PROCEDURE — 1159F MED LIST DOCD IN RCRD: CPT | Performed by: INTERNAL MEDICINE

## 2025-08-14 PROCEDURE — 99214 OFFICE O/P EST MOD 30 MIN: CPT | Performed by: INTERNAL MEDICINE

## 2025-08-14 PROCEDURE — 1160F RVW MEDS BY RX/DR IN RCRD: CPT | Performed by: INTERNAL MEDICINE

## 2025-08-14 RX ORDER — TESTOSTERONE CYPIONATE 200 MG/ML
200 INJECTION, SOLUTION INTRAMUSCULAR
Qty: 4 ML | Refills: 5 | Status: SHIPPED | OUTPATIENT
Start: 2025-08-14

## 2025-08-14 RX ORDER — DENOSUMAB 60 MG/ML
60 INJECTION SUBCUTANEOUS ONCE
Qty: 1 ML | Refills: 0 | Status: SHIPPED | OUTPATIENT
Start: 2025-08-14 | End: 2025-08-14

## 2025-08-14 RX ORDER — ANASTROZOLE 1 MG/1
1 TABLET ORAL WEEKLY
Qty: 4 TABLET | Refills: 5 | Status: SHIPPED | OUTPATIENT
Start: 2025-08-14

## 2025-08-29 ENCOUNTER — TELEPHONE (OUTPATIENT)
Dept: ENDOCRINOLOGY | Age: 45
End: 2025-08-29
Payer: MEDICAID

## (undated) DEVICE — CONN TBG Y 5 IN 1 LF STRL

## (undated) DEVICE — ADAPT SWVL FIBROPTIC BRONCH

## (undated) DEVICE — UNDYED BRAIDED (POLYGLACTIN 910), SYNTHETIC ABSORBABLE SUTURE: Brand: COATED VICRYL

## (undated) DEVICE — SUT VIC 2 TP1 54IN J880T

## (undated) DEVICE — SAFESECURE,SECUREMENT,FOLEY CATH,STERILE: Brand: MEDLINE

## (undated) DEVICE — PK THORACOTOMY 10

## (undated) DEVICE — TRAP,MUCUS SPECIMEN,40CC: Brand: MEDLINE

## (undated) DEVICE — SYS PROB ABL/CRYO CRYOSPHERE 18IN

## (undated) DEVICE — DRSNG WND GZ PAD BORDERED 4X8IN STRL

## (undated) DEVICE — SUT VIC 4/0 SH 27IN J415H

## (undated) DEVICE — CLTH CLENS READYCLEANSE PERI CARE PK/5

## (undated) DEVICE — CVR HNDL LIGHT RIGID

## (undated) DEVICE — SUT PROLN 3/0 SH D/A 36IN 8522H

## (undated) DEVICE — TUBING, SUCTION, 1/4" X 10', STRAIGHT: Brand: MEDLINE

## (undated) DEVICE — GLV SURG BIOGELULTRATOUCH POLYISPRN PF LF SZ7 STRL BX/50

## (undated) DEVICE — TOTAL TRAY, 16FR 10ML SIL FOLEY, URN: Brand: MEDLINE

## (undated) DEVICE — SUT MNCRYL PLS ANTIB UD 4/0 PS2 18IN